# Patient Record
Sex: FEMALE | Race: WHITE | NOT HISPANIC OR LATINO | Employment: OTHER | URBAN - METROPOLITAN AREA
[De-identification: names, ages, dates, MRNs, and addresses within clinical notes are randomized per-mention and may not be internally consistent; named-entity substitution may affect disease eponyms.]

---

## 2017-03-21 ENCOUNTER — CONVERSION ENCOUNTER (OUTPATIENT)
Dept: FAMILY MEDICINE CLINIC | Facility: CLINIC | Age: 67
End: 2017-03-21

## 2017-03-21 LAB
ALBUMIN SERPL-MCNC: 4.6 G/DL (ref 3.6–5.1)
ALBUMIN/GLOB SERPL: ABNORMAL {RATIO} (ref 1–2.5)
ALP SERPL-CCNC: 83 UNITS/L (ref 33–130)
ALT SERPL-CCNC: 17 UNITS/L (ref 6–29)
AST SERPL-CCNC: 14 UNITS/L (ref 10–35)
BASOPHILS # BLD AUTO: ABNORMAL 10*3/MM3 (ref 0–200)
BASOPHILS NFR BLD AUTO: 0.4 %
BILIRUB SERPL-MCNC: 0.4 MG/DL (ref 0.2–1.2)
BUN SERPL-MCNC: 13 MG/DL (ref 7–25)
BUN/CREAT SERPL: ABNORMAL (ref 6–22)
CALCIUM SERPL-MCNC: 10.1 MG/DL (ref 8.6–10.4)
CHLORIDE SERPL-SCNC: 102 MMOL/L (ref 98–110)
CHOLEST SERPL-MCNC: 212 MG/DL (ref 125–200)
CHOLEST/HDLC SERPL: ABNORMAL {RATIO}
CO2 CONTENT VENOUS: 30 MMOL/L (ref 20–31)
CONV NEUTROPHILS/100 LEUKOCYTES IN BODY FLUID BY MANUAL COUNT: 66.7 %
CONV TOTAL PROTEIN: 7.1 G/DL (ref 6.1–8.1)
CREAT UR-MCNC: 0.86 MG/DL (ref 0.5–0.99)
EOSINOPHIL # BLD AUTO: 1.3 %
EOSINOPHIL # BLD AUTO: ABNORMAL 10*3/MM3 (ref 15–500)
ERYTHROCYTE [DISTWIDTH] IN BLOOD BY AUTOMATED COUNT: 14.3 % (ref 11–15)
GLOBULIN UR ELPH-MCNC: ABNORMAL G/DL (ref 1.9–3.7)
GLUCOSE SERPL-MCNC: 127 MG/DL (ref 65–99)
HCT VFR BLD AUTO: 39.9 % (ref 35–45)
HDLC SERPL-MCNC: 73 MG/DL
HGB BLD-MCNC: 13.3 G/DL (ref 11.7–15.5)
LDLC SERPL CALC-MCNC: ABNORMAL MG/DL
LYMPHOCYTES # BLD AUTO: ABNORMAL 10*3/MM3 (ref 850–3900)
LYMPHOCYTES NFR BLD AUTO: 27.8 %
MCH RBC QN AUTO: 27.2 PG (ref 27–33)
MCHC RBC AUTO-ENTMCNC: ABNORMAL % (ref 32–36)
MCV RBC AUTO: 81.6 FL (ref 80–100)
MONOCYTES # BLD AUTO: ABNORMAL 10*3/MICROLITER (ref 200–950)
MONOCYTES NFR BLD AUTO: 3.8 %
NEUTROPHILS # BLD AUTO: ABNORMAL 10*3/MM3 (ref 1500–7800)
PLATELET # BLD AUTO: ABNORMAL 10*3/MM3 (ref 140–400)
PMV BLD AUTO: 10.8 FL (ref 7.5–12.5)
POTASSIUM SERPL-SCNC: 3.5 MMOL/L (ref 3.5–5.3)
RBC # BLD AUTO: ABNORMAL 10*6/MM3 (ref 3.8–5.1)
SODIUM SERPL-SCNC: 141 MMOL/L (ref 135–146)
TRIGL SERPL-MCNC: 193 MG/DL
TSH SERPL-ACNC: 1.34 MICROINTL UNITS/ML (ref 0.4–4.5)
WBC # BLD AUTO: ABNORMAL K/UL (ref 3.8–10.8)

## 2018-04-05 ENCOUNTER — HOSPITAL ENCOUNTER (OUTPATIENT)
Dept: FAMILY MEDICINE CLINIC | Facility: CLINIC | Age: 68
Setting detail: SPECIMEN
Discharge: HOME | End: 2018-04-05
Attending: INTERNAL MEDICINE | Admitting: INTERNAL MEDICINE

## 2018-04-05 LAB
ALBUMIN SERPL-MCNC: 4.3 G/DL (ref 3.5–4.8)
ALBUMIN/GLOB SERPL: 1.7 {RATIO} (ref 1–1.7)
ALP SERPL-CCNC: 73 IU/L (ref 32–91)
ALT SERPL-CCNC: 21 IU/L (ref 14–54)
ANION GAP SERPL CALC-SCNC: 11.2 MMOL/L (ref 10–20)
AST SERPL-CCNC: 22 IU/L (ref 15–41)
BASOPHILS # BLD AUTO: 0 10*3/UL (ref 0–0.2)
BASOPHILS NFR BLD AUTO: 1 % (ref 0–2)
BILIRUB SERPL-MCNC: 0.7 MG/DL (ref 0.3–1.2)
BUN SERPL-MCNC: 11 MG/DL (ref 8–20)
BUN/CREAT SERPL: 13.8 (ref 5.4–26.2)
CALCIUM SERPL-MCNC: 9.6 MG/DL (ref 8.9–10.3)
CHLORIDE SERPL-SCNC: 100 MMOL/L (ref 101–111)
CHOLEST SERPL-MCNC: 231 MG/DL
CHOLEST/HDLC SERPL: 2.9 {RATIO}
CONV CO2: 29 MMOL/L (ref 22–32)
CONV LDL CHOLESTEROL DIRECT: 129 MG/DL (ref 0–100)
CONV TOTAL PROTEIN: 6.9 G/DL (ref 6.1–7.9)
CREAT UR-MCNC: 0.8 MG/DL (ref 0.4–1)
DIFFERENTIAL METHOD BLD: (no result)
EOSINOPHIL # BLD AUTO: 0.2 10*3/UL (ref 0–0.3)
EOSINOPHIL # BLD AUTO: 4 % (ref 0–3)
ERYTHROCYTE [DISTWIDTH] IN BLOOD BY AUTOMATED COUNT: 14.2 % (ref 11.5–14.5)
GLOBULIN UR ELPH-MCNC: 2.6 G/DL (ref 2.5–3.8)
GLUCOSE SERPL-MCNC: 100 MG/DL (ref 65–99)
HCT VFR BLD AUTO: 41.5 % (ref 35–49)
HDLC SERPL-MCNC: 79 MG/DL
HGB BLD-MCNC: 13.6 G/DL (ref 12–15)
LDLC/HDLC SERPL: 1.6 {RATIO}
LIPID INTERPRETATION: ABNORMAL
LYMPHOCYTES # BLD AUTO: 0.8 10*3/UL (ref 0.8–4.8)
LYMPHOCYTES NFR BLD AUTO: 22 % (ref 18–42)
MCH RBC QN AUTO: 26.9 PG (ref 26–32)
MCHC RBC AUTO-ENTMCNC: 32.9 G/DL (ref 32–36)
MCV RBC AUTO: 81.7 FL (ref 80–94)
MONOCYTES # BLD AUTO: 0.3 10*3/UL (ref 0.1–1.3)
MONOCYTES NFR BLD AUTO: 7 % (ref 2–11)
NEUTROPHILS # BLD AUTO: 2.5 10*3/UL (ref 2.3–8.6)
NEUTROPHILS NFR BLD AUTO: 66 % (ref 50–75)
NRBC BLD AUTO-RTO: 0 /100{WBCS}
NRBC/RBC NFR BLD MANUAL: 0 10*3/UL
PLATELET # BLD AUTO: 236 10*3/UL (ref 150–450)
PMV BLD AUTO: 9.5 FL (ref 7.4–10.4)
POTASSIUM SERPL-SCNC: 3.2 MMOL/L (ref 3.6–5.1)
RBC # BLD AUTO: 5.08 10*6/UL (ref 4–5.4)
SODIUM SERPL-SCNC: 137 MMOL/L (ref 136–144)
TRIGL SERPL-MCNC: 79 MG/DL
VLDLC SERPL CALC-MCNC: 23.3 MG/DL
WBC # BLD AUTO: 3.8 10*3/UL (ref 4.5–11.5)

## 2018-05-01 ENCOUNTER — HOSPITAL ENCOUNTER (OUTPATIENT)
Dept: FAMILY MEDICINE CLINIC | Facility: CLINIC | Age: 68
Setting detail: SPECIMEN
Discharge: HOME | End: 2018-05-01
Attending: INTERNAL MEDICINE | Admitting: INTERNAL MEDICINE

## 2018-05-01 LAB — POTASSIUM SERPL-SCNC: 3.3 MMOL/L (ref 3.6–5.1)

## 2018-05-17 ENCOUNTER — HOSPITAL ENCOUNTER (OUTPATIENT)
Dept: FAMILY MEDICINE CLINIC | Facility: CLINIC | Age: 68
Setting detail: SPECIMEN
Discharge: HOME | End: 2018-05-17
Attending: INTERNAL MEDICINE | Admitting: INTERNAL MEDICINE

## 2018-05-17 LAB
ANION GAP SERPL CALC-SCNC: 11.6 MMOL/L (ref 10–20)
BUN SERPL-MCNC: 7 MG/DL (ref 8–20)
BUN/CREAT SERPL: 7.8 (ref 5.4–26.2)
CALCIUM SERPL-MCNC: 9.5 MG/DL (ref 8.9–10.3)
CHLORIDE SERPL-SCNC: 101 MMOL/L (ref 101–111)
CONV CO2: 28 MMOL/L (ref 22–32)
CREAT UR-MCNC: 0.9 MG/DL (ref 0.4–1)
GLUCOSE SERPL-MCNC: 128 MG/DL (ref 65–99)
POTASSIUM SERPL-SCNC: 3.6 MMOL/L (ref 3.6–5.1)
SODIUM SERPL-SCNC: 137 MMOL/L (ref 136–144)

## 2018-06-19 ENCOUNTER — HOSPITAL ENCOUNTER (OUTPATIENT)
Dept: FAMILY MEDICINE CLINIC | Facility: CLINIC | Age: 68
Setting detail: SPECIMEN
Discharge: HOME | End: 2018-06-19
Attending: INTERNAL MEDICINE | Admitting: INTERNAL MEDICINE

## 2018-06-19 LAB
ANION GAP SERPL CALC-SCNC: 10.9 MMOL/L (ref 10–20)
BASOPHILS # BLD AUTO: 0 10*3/UL (ref 0–0.2)
BASOPHILS NFR BLD AUTO: 1 % (ref 0–2)
BUN SERPL-MCNC: 13 MG/DL (ref 8–20)
BUN/CREAT SERPL: 16.3 (ref 5.4–26.2)
CALCIUM SERPL-MCNC: 9.7 MG/DL (ref 8.9–10.3)
CHLORIDE SERPL-SCNC: 104 MMOL/L (ref 101–111)
CONV CO2: 27 MMOL/L (ref 22–32)
CREAT UR-MCNC: 0.8 MG/DL (ref 0.4–1)
DIFFERENTIAL METHOD BLD: (no result)
EOSINOPHIL # BLD AUTO: 0.2 10*3/UL (ref 0–0.3)
EOSINOPHIL # BLD AUTO: 4 % (ref 0–3)
ERYTHROCYTE [DISTWIDTH] IN BLOOD BY AUTOMATED COUNT: 14.4 % (ref 11.5–14.5)
GLUCOSE SERPL-MCNC: 92 MG/DL (ref 65–99)
HCT VFR BLD AUTO: 39.3 % (ref 35–49)
HGB BLD-MCNC: 12.8 G/DL (ref 12–15)
LYMPHOCYTES # BLD AUTO: 0.8 10*3/UL (ref 0.8–4.8)
LYMPHOCYTES NFR BLD AUTO: 20 % (ref 18–42)
MCH RBC QN AUTO: 26.6 PG (ref 26–32)
MCHC RBC AUTO-ENTMCNC: 32.7 G/DL (ref 32–36)
MCV RBC AUTO: 81.5 FL (ref 80–94)
MONOCYTES # BLD AUTO: 0.3 10*3/UL (ref 0.1–1.3)
MONOCYTES NFR BLD AUTO: 8 % (ref 2–11)
NEUTROPHILS # BLD AUTO: 2.8 10*3/UL (ref 2.3–8.6)
NEUTROPHILS NFR BLD AUTO: 67 % (ref 50–75)
NRBC BLD AUTO-RTO: 0 /100{WBCS}
NRBC/RBC NFR BLD MANUAL: 0 10*3/UL
PLATELET # BLD AUTO: 211 10*3/UL (ref 150–450)
PMV BLD AUTO: 10.1 FL (ref 7.4–10.4)
POTASSIUM SERPL-SCNC: 3.9 MMOL/L (ref 3.6–5.1)
RBC # BLD AUTO: 4.83 10*6/UL (ref 4–5.4)
SODIUM SERPL-SCNC: 138 MMOL/L (ref 136–144)
WBC # BLD AUTO: 4.1 10*3/UL (ref 4.5–11.5)

## 2018-11-14 ENCOUNTER — HOSPITAL ENCOUNTER (OUTPATIENT)
Dept: PREADMISSION TESTING | Facility: HOSPITAL | Age: 68
Discharge: HOME | End: 2018-11-14
Attending: PODIATRIST | Admitting: PODIATRIST

## 2018-11-14 LAB
ANION GAP SERPL CALC-SCNC: 14.7 MMOL/L (ref 10–20)
BASOPHILS # BLD AUTO: 0 10*3/UL (ref 0–0.2)
BASOPHILS NFR BLD AUTO: 1 % (ref 0–2)
BUN SERPL-MCNC: 9 MG/DL (ref 8–20)
BUN/CREAT SERPL: 11.3 (ref 5.4–26.2)
CALCIUM SERPL-MCNC: 9.6 MG/DL (ref 8.9–10.3)
CHLORIDE SERPL-SCNC: 100 MMOL/L (ref 101–111)
CONV CO2: 27 MMOL/L (ref 22–32)
CREAT UR-MCNC: 0.8 MG/DL (ref 0.4–1)
DIFFERENTIAL METHOD BLD: (no result)
EOSINOPHIL # BLD AUTO: 0.1 10*3/UL (ref 0–0.3)
EOSINOPHIL # BLD AUTO: 2 % (ref 0–3)
ERYTHROCYTE [DISTWIDTH] IN BLOOD BY AUTOMATED COUNT: 14.1 % (ref 11.5–14.5)
GLUCOSE SERPL-MCNC: 87 MG/DL (ref 65–99)
HCT VFR BLD AUTO: 39.3 % (ref 35–49)
HGB BLD-MCNC: 13.1 G/DL (ref 12–15)
LYMPHOCYTES # BLD AUTO: 0.7 10*3/UL (ref 0.8–4.8)
LYMPHOCYTES NFR BLD AUTO: 17 % (ref 18–42)
MCH RBC QN AUTO: 27.1 PG (ref 26–32)
MCHC RBC AUTO-ENTMCNC: 33.2 G/DL (ref 32–36)
MCV RBC AUTO: 81.4 FL (ref 80–94)
MONOCYTES # BLD AUTO: 0.3 10*3/UL (ref 0.1–1.3)
MONOCYTES NFR BLD AUTO: 7 % (ref 2–11)
NEUTROPHILS # BLD AUTO: 3.3 10*3/UL (ref 2.3–8.6)
NEUTROPHILS NFR BLD AUTO: 73 % (ref 50–75)
NRBC BLD AUTO-RTO: 0 /100{WBCS}
NRBC/RBC NFR BLD MANUAL: 0 10*3/UL
PLATELET # BLD AUTO: 220 10*3/UL (ref 150–450)
PMV BLD AUTO: 9.5 FL (ref 7.4–10.4)
POTASSIUM SERPL-SCNC: 3.7 MMOL/L (ref 3.6–5.1)
RBC # BLD AUTO: 4.83 10*6/UL (ref 4–5.4)
SODIUM SERPL-SCNC: 138 MMOL/L (ref 136–144)
WBC # BLD AUTO: 4.5 10*3/UL (ref 4.5–11.5)

## 2018-12-07 ENCOUNTER — HOSPITAL ENCOUNTER (OUTPATIENT)
Dept: OTHER | Facility: HOSPITAL | Age: 68
Setting detail: SPECIMEN
Discharge: HOME | End: 2018-12-07
Attending: PODIATRIST | Admitting: PODIATRIST

## 2019-01-07 ENCOUNTER — HOSPITAL ENCOUNTER (OUTPATIENT)
Dept: FAMILY MEDICINE CLINIC | Facility: CLINIC | Age: 69
Setting detail: SPECIMEN
Discharge: HOME | End: 2019-01-07
Attending: INTERNAL MEDICINE | Admitting: INTERNAL MEDICINE

## 2019-01-07 LAB
ALBUMIN SERPL-MCNC: 4 G/DL (ref 3.5–4.8)
ALBUMIN/GLOB SERPL: 1.6 {RATIO} (ref 1–1.7)
ALP SERPL-CCNC: 65 IU/L (ref 32–91)
ALT SERPL-CCNC: 19 IU/L (ref 14–54)
ANION GAP SERPL CALC-SCNC: 11.5 MMOL/L (ref 10–20)
AST SERPL-CCNC: 20 IU/L (ref 15–41)
BILIRUB SERPL-MCNC: 0.4 MG/DL (ref 0.3–1.2)
BUN SERPL-MCNC: 10 MG/DL (ref 8–20)
BUN/CREAT SERPL: 12.5 (ref 5.4–26.2)
CALCIUM SERPL-MCNC: 9.3 MG/DL (ref 8.9–10.3)
CHLORIDE SERPL-SCNC: 100 MMOL/L (ref 101–111)
CHOLEST SERPL-MCNC: 211 MG/DL
CHOLEST/HDLC SERPL: 2.9 {RATIO}
CONV CO2: 28 MMOL/L (ref 22–32)
CONV LDL CHOLESTEROL DIRECT: 128 MG/DL (ref 0–100)
CONV TOTAL PROTEIN: 6.5 G/DL (ref 6.1–7.9)
CREAT UR-MCNC: 0.8 MG/DL (ref 0.4–1)
GLOBULIN UR ELPH-MCNC: 2.5 G/DL (ref 2.5–3.8)
GLUCOSE SERPL-MCNC: 106 MG/DL (ref 65–99)
HDLC SERPL-MCNC: 72 MG/DL
LDLC/HDLC SERPL: 1.8 {RATIO}
LIPID INTERPRETATION: ABNORMAL
POTASSIUM SERPL-SCNC: 3.5 MMOL/L (ref 3.6–5.1)
SODIUM SERPL-SCNC: 136 MMOL/L (ref 136–144)
TRIGL SERPL-MCNC: 85 MG/DL
VLDLC SERPL CALC-MCNC: 10.7 MG/DL

## 2019-05-23 ENCOUNTER — HOSPITAL ENCOUNTER (OUTPATIENT)
Dept: FAMILY MEDICINE CLINIC | Facility: CLINIC | Age: 69
Setting detail: SPECIMEN
Discharge: HOME | End: 2019-05-23
Attending: INTERNAL MEDICINE | Admitting: INTERNAL MEDICINE

## 2019-05-23 ENCOUNTER — CONVERSION ENCOUNTER (OUTPATIENT)
Dept: FAMILY MEDICINE CLINIC | Facility: CLINIC | Age: 69
End: 2019-05-23

## 2019-05-23 LAB
ALBUMIN SERPL-MCNC: 4.4 G/DL (ref 3.5–4.8)
ALBUMIN/GLOB SERPL: 1.6 {RATIO} (ref 1–1.7)
ALP SERPL-CCNC: 71 IU/L (ref 32–91)
ALT SERPL-CCNC: 27 IU/L (ref 14–54)
ANION GAP SERPL CALC-SCNC: 20.6 MMOL/L (ref 10–20)
AST SERPL-CCNC: 24 IU/L (ref 15–41)
BASOPHILS # BLD AUTO: 0.1 10*3/UL (ref 0–0.2)
BASOPHILS NFR BLD AUTO: 1 % (ref 0–2)
BILIRUB SERPL-MCNC: 0.4 MG/DL (ref 0.3–1.2)
BUN SERPL-MCNC: 10 MG/DL (ref 8–20)
BUN/CREAT SERPL: 12.5 (ref 5.4–26.2)
CALCIUM SERPL-MCNC: 9.8 MG/DL (ref 8.9–10.3)
CHLORIDE SERPL-SCNC: 100 MMOL/L (ref 101–111)
CHOLEST SERPL-MCNC: 222 MG/DL
CHOLEST/HDLC SERPL: 3 {RATIO}
CONV CO2: 22 MMOL/L (ref 22–32)
CONV LDL CHOLESTEROL DIRECT: 129 MG/DL (ref 0–100)
CONV TOTAL PROTEIN: 7.1 G/DL (ref 6.1–7.9)
CREAT UR-MCNC: 0.8 MG/DL (ref 0.4–1)
DIFFERENTIAL METHOD BLD: (no result)
EOSINOPHIL # BLD AUTO: 0.2 10*3/UL (ref 0–0.3)
EOSINOPHIL # BLD AUTO: 3 % (ref 0–3)
ERYTHROCYTE [DISTWIDTH] IN BLOOD BY AUTOMATED COUNT: 14.2 % (ref 11.5–14.5)
GLOBULIN UR ELPH-MCNC: 2.7 G/DL (ref 2.5–3.8)
GLUCOSE SERPL-MCNC: 98 MG/DL (ref 65–99)
HCT VFR BLD AUTO: 39.5 % (ref 35–49)
HDLC SERPL-MCNC: 74 MG/DL
HGB BLD-MCNC: 13.2 G/DL (ref 12–15)
LDLC/HDLC SERPL: 1.8 {RATIO}
LIPID INTERPRETATION: ABNORMAL
LYMPHOCYTES # BLD AUTO: 1.2 10*3/UL (ref 0.8–4.8)
LYMPHOCYTES NFR BLD AUTO: 23 % (ref 18–42)
MCH RBC QN AUTO: 27.6 PG (ref 26–32)
MCHC RBC AUTO-ENTMCNC: 33.4 G/DL (ref 32–36)
MCV RBC AUTO: 82.7 FL (ref 80–94)
MONOCYTES # BLD AUTO: 0.3 10*3/UL (ref 0.1–1.3)
MONOCYTES NFR BLD AUTO: 6 % (ref 2–11)
NEUTROPHILS # BLD AUTO: 3.4 10*3/UL (ref 2.3–8.6)
NEUTROPHILS NFR BLD AUTO: 67 % (ref 50–75)
NRBC BLD AUTO-RTO: 0 /100{WBCS}
NRBC/RBC NFR BLD MANUAL: 0 10*3/UL
PLATELET # BLD AUTO: 249 10*3/UL (ref 150–450)
PMV BLD AUTO: 10.1 FL (ref 7.4–10.4)
POTASSIUM SERPL-SCNC: 3.6 MMOL/L (ref 3.6–5.1)
RBC # BLD AUTO: 4.77 10*6/UL (ref 4–5.4)
SODIUM SERPL-SCNC: 139 MMOL/L (ref 136–144)
TRIGL SERPL-MCNC: 125 MG/DL
TSH SERPL-ACNC: 1.79 UIU/ML (ref 0.34–5.6)
VIT B12 SERPL-MCNC: 799 PG/ML (ref 180–914)
VLDLC SERPL CALC-MCNC: 18.9 MG/DL
WBC # BLD AUTO: 5.2 10*3/UL (ref 4.5–11.5)

## 2019-05-24 LAB
25(OH)D3 SERPL-MCNC: 37 NG/ML (ref 30–100)
HBA1C MFR BLD: 5.6 % (ref 0–5.6)

## 2019-06-04 VITALS
HEART RATE: 85 BPM | RESPIRATION RATE: 20 BRPM | SYSTOLIC BLOOD PRESSURE: 144 MMHG | OXYGEN SATURATION: 98 % | DIASTOLIC BLOOD PRESSURE: 92 MMHG | BODY MASS INDEX: 41.66 KG/M2 | HEIGHT: 60 IN | WEIGHT: 212.2 LBS

## 2019-06-06 NOTE — PROGRESS NOTES
Vital Signs:    Patient Profile:    68 Years Old Female  Height:     60 inches (152.40 cm)  Weight:     212.2 pounds  BMI:        41.44     O2 Sat:     98 %  Temp:       98.0 degrees F oral  Pulse rate: 85 / minute  Resp:       20 per minute  BP Sittin / 92  (right arm)    Cuff size:  large      Problems: Active problems were reviewed with the patient during this visit.  Medications: Medications were reviewed with the patient during this visit.  Allergies: Allergies were reviewed with the patient during this visit.        Vitals Entered By: Sandi Johnston CMA (May 23, 2019 2:39 PM)      Referring Provider:  Tres Quigley MD  Primary Provider:  Sherin Rojo    CC:  Medicare wellness.    History of Present Illness:  see scanned packet         The patient comes in today for a Annual exam.  She denies chest pain, SOA, dizziness, bowel problems and bladder problems.  Since the last visit patient notes no new problems or concerns.  When asked about their health maintenence items, they report   their Colonoscopy is UTD, Mammogram is not UTD and DEXA is not UTD.  The ROS is negative for chest pain, SOA, fatique, dizzyness, headaches, fever, nausea, vomiting, diarrhea, chills, abdominal pain, bowel problems, bladder problems, urinary symptoms,   joint problems, muscle problems, neurologic problems, skin problems, weight gain, weight loss, depression and anxiety.        Past Medical History:     Reviewed history from 2019 and no changes required:        anxiety/depression        post menopausal x 9 years        arthritis        chronic back pain        DJD        asthma        HTN        lymphocytic colitis        low back pain        hpld                mammogram - last , no h/o abnormal        dexa - 2015, normal        pap - , no h/o abnormal        colonoscopy - last 3 years ago        AVNRT RF ABLATION--            Past Surgical History:     Reviewed history from 2016 and no changes  required:        right toe/foot surgery        right knee surgery, 2/2 torn meniscus        tubal ligation        SVT ablation , 8/2016, [facility]    Family History Summary:      Reviewed history Last on 01/14/2019 and no changes required:05/31/2019  Sister - Has Family History of Anxiety - Entered On: 12/8/2015  Sister - Has Family History of Arthritis - Entered On: 12/8/2015  Mother - Has Family History of Lung/Respiratory Disease - emphysema - Entered On: 12/8/2015  Brother - Has Family History of Anxiety - Entered On: 12/8/2015  Brother - Has Family History of Arthritis - Entered On: 12/8/2015  Mother - Has Family History of Heart Disease - MI age 79 - CABG - Entered On: 12/8/2015  Father - Has Family History of Kidney Disease - renal failure - Entered On: 12/8/2015  Mother - Has Family History of Diabetes - Entered On: 12/8/2015  Mother - Has Family History of Arthritis - Entered On: 12/8/2015    General Comments - FH:  Children: 3 - healthy  Denies FH breast, colon, or ovarian cancer    Social History:     Reviewed history from 12/07/2015 and no changes required:        Marital status:         Occupation: retired        Living arrangements: lives with         Risk Factors:     Smoked Tobacco Use:  Never smoker  Smokeless Tobacco Use:  Never  Drug use:  no  HIV high-risk behavior:  no  Caffeine use:  2 drinks per day  Alcohol use:  yes     Drinks per day:  <1     Has patient --        Felt need to cut down:  no        Been annoyed by complaints:  no        Felt guilty about drinking:  no        Needed eye opener in the morning:  no     Counseled to quit/cut down alcohol use:  no  Exercise:  no  Seatbelt use:  100 %  Sun Exposure:  occasionally    Family History Risk Factors:     Family History of MI in females < 65 years old:  no     Family History of MI in males < 55 years old:  no    Previous Tobacco Use: Signed On - 01/14/2019  Smoked Tobacco Use:  Never smoker  Smokeless Tobacco Use:  Never  Drug  use:  no  HIV high-risk behavior:  no  Caffeine use:  2 drinks per day    Previous Alcohol Use: Signed On - 01/14/2019  Alcohol use:  yes     Drinks per day:  <1     Has patient --        Felt need to cut down:  no        Been annoyed by complaints:  no        Felt guilty about drinking:  no        Needed eye opener in the morning:  no     Counseled to quit/cut down alcohol use:  no  Exercise:  no  Seatbelt use:  100 %  Sun Exposure:  occasionally    Family History Risk Factors:     Family History of MI in females < 65 years old:  no     Family History of MI in males < 55 years old:  no    Colonoscopy History:     Date of Last Colonoscopy:  01/01/2012    Mammogram History:     Date of Last Mammogram:  12/28/2015    PAP Smear History:     Date of Last PAP Smear:  01/01/2014        Physical Exam   Height:  60  Weight:  206.6  BP:  144/92 mm HG    Medication List:  POTASSIUM CL 10MEQ ER TABLETS (POTASSIUM CHLORIDE) TAKE 1 TABLET BY MOUTH THREE TIMES DAILY  PROAIR  (90 BASE) MCG/ACT INHALATION AEROSOL SOLUTION (ALBUTEROL SULFATE) 2 puffs 4 times daily as needed  B COMPLETE ORAL TABLET (B COMPLEX-BIOTIN-FA)   VITAMIN D 2000 UNIT ORAL CAPSULE (CHOLECALCIFEROL)   MULTIVITAMINS ORAL CAPSULE (MULTIPLE VITAMIN)   VENTOLIN  (90 BASE) MCG/ACT INHALATION AEROSOL SOLUTION (ALBUTEROL SULFATE) 2 puffs q4-6 hours prn  PULMICORT FLEXHALER 180 MCG/ACT INHALATION AEROSOL POWDER BREATH ACTIVATED (BUDESONIDE) inhale 2 puffs twice daily  OMEPRAZOLE 20 MG ORAL TABLET DELAYED RELEASE (OMEPRAZOLE) 1poqd  VALSARTAN/HCTZ 160MG/25MG TABLETS (VALSARTAN-HYDROCHLOROTHIAZIDE) TAKE 1 TABLET BY MOUTH DAILY      Surgical History   right toe/foot surgery  right knee surgery, 2/2 torn meniscus  tubal ligation  SVT ablation , 8/2016, [facility],    Risk Factors  Tobacco Use: Never smoker  Exercise: no  Type of Exercise: started 6/2016 with water aerobics, pilates, aminah  Illicit Drug use: no      Orientation   What is the:   year 1  season 1  date  1  day 1  month 1  Where are we:   [facility] 1  [facility] 1  [facility] 1  facility 1  floor 1    Registration   Three Objects:  object 1 1  object 2 1  object 3 1  Number of trials 1    Attention and Calculation   Count backwards from 100  OR  spell world backwards. D-L-R-O-W   93 1  86 1  79 1  72 1  65 1    Recall   Ask for the names of the three objects learned above. 1 Point for each.   object 1 1  object 2 1  object 3 1    Language   Name a pen 1  Name a watch 1  Repeat: No ifs, and, or buts 1  Take paper in your non-dominant hand 1  fold it in half 1  place on the floor 1  Read to self and then Close your eyes 1  Write a sentence (subject, verb, and makes sense) 1  Copy design (5 sided geometric figure (2 points must intersect) 1  Alert 1  Total MMSE Score: 30    Level of Function   Falls Information:   Screening for Future Fall Risk    Physical Examination   General Appearance   In no acute distress.  Alert & oriented.  Behavior and affect appropriate to situation  HEENT   PERRLA, EOMI, TM's normal.  Pharynx clear  Cardiovascular   Regular rate and rhythm  Lungs   Clear to auscultation        Impression & Recommendations:    Problem # 1:  PREVENTIVE HEALTH CARE (ICD-V70.0) (JNE42-H31.00)  Assessment: Unchanged  due for labs  due for mammogram, dexa  due for pna vaccine  overall mood is doing well  neg for depression/dementia  discussed decreasing carbs/saturated fats bc of hpld  continue exercise with silver sneakers (and laughter yoga) to help with mood as well as hpld  Orders:  Medicare-Subsequent Year (AWV) ()      Other Orders:  Medicare Pneumonia Injection Administration ()  [facility] CBC W/DIFF; PATH REVIEW IF INDICATED (CBC)  [facility] LIPID PANEL (LIPID)  [facility] VITAMIN B12 (B12)  [facility] COMPREHENSIVE METABOLIC PANEL (CMP) (MPC)  [facility] THYROID STIMULATING HORMONE (TSH) (TSH)  [facility] HEMOGLOBIN A1c (A1DCA)  Pnuemovax Vaccine (CPT-00952)  [facility] VITAMIN D TOTAL (VITD)  Medicare-Subsequent Year (AWV) ()      Patient  Instructions:  1)  During this visit for their annual exam, we reviewed their personal history, social history and family history.  We went over their medications and all the recommended health maintenence items for their age group. They were given the opportunity to ask   questions and discuss other concerns.      Vaccines Administered/Entered:  Vaccination Group: Pneumococcal PPSV23  Series: 1  Vaccination: Pneumovax 23 Injection Injectable 25 MCG/0.5ML  Administered Date: 5/23/2019 5:08 PM  [facility] Eligibility: Medicare  Dose/Route/Site : 0.5 mL / IM / Right Arm  Mfr/Lot#/Exp.Date: [employer] / [id number] / 6/8/2020  NDC/CVX: [id number] / 33  VIS Date : 10/6/2009  Administered by : Yumiko Johnston CMA   Comments :               Medication Administration    Orders Added:  1)  Medicare Pneumonia Injection Administration []  2)  [facility] CBC W/DIFF; PATH REVIEW IF INDICATED [CBC]  3)  [facility] LIPID PANEL [LIPID]  4)  [facility] VITAMIN B12 [B12]  5)  [facility] COMPREHENSIVE METABOLIC PANEL (CMP) [MPC]  6)  [facility] THYROID STIMULATING HORMONE (TSH) [TSH]  7)  [facility] HEMOGLOBIN A1c [A1DCA]  8)  Pnuemovax Vaccine [CPT-34240]  9)  [facility] VITAMIN D TOTAL [VITD]  10)  Medicare-Subsequent Year (AWV) []                PHQ-9 Survey Results             Electronically signed by Sherin Rojo on 05/31/2019 at 6:16 PM  ________________________________________________________________________       Disclaimer: Converted Note message may not contain all data elements that existed in the legacy source system. Please see [employer] for the original note details.

## 2019-08-29 RX ORDER — VALSARTAN AND HYDROCHLOROTHIAZIDE 160; 25 MG/1; MG/1
TABLET ORAL
Qty: 90 TABLET | Refills: 0 | Status: SHIPPED | OUTPATIENT
Start: 2019-08-29 | End: 2019-11-22 | Stop reason: SDUPTHER

## 2019-11-22 ENCOUNTER — OFFICE VISIT (OUTPATIENT)
Dept: FAMILY MEDICINE CLINIC | Facility: CLINIC | Age: 69
End: 2019-11-22

## 2019-11-22 ENCOUNTER — LAB (OUTPATIENT)
Dept: FAMILY MEDICINE CLINIC | Facility: CLINIC | Age: 69
End: 2019-11-22

## 2019-11-22 VITALS
TEMPERATURE: 97.6 F | HEART RATE: 68 BPM | WEIGHT: 215.8 LBS | RESPIRATION RATE: 12 BRPM | BODY MASS INDEX: 42.37 KG/M2 | HEIGHT: 60 IN | DIASTOLIC BLOOD PRESSURE: 78 MMHG | OXYGEN SATURATION: 97 % | SYSTOLIC BLOOD PRESSURE: 124 MMHG

## 2019-11-22 DIAGNOSIS — E78.5 HYPERLIPIDEMIA, UNSPECIFIED HYPERLIPIDEMIA TYPE: ICD-10-CM

## 2019-11-22 DIAGNOSIS — E55.9 VITAMIN D DEFICIENCY: ICD-10-CM

## 2019-11-22 DIAGNOSIS — E78.5 HYPERLIPIDEMIA, UNSPECIFIED HYPERLIPIDEMIA TYPE: Primary | ICD-10-CM

## 2019-11-22 DIAGNOSIS — I10 ESSENTIAL HYPERTENSION: ICD-10-CM

## 2019-11-22 LAB
25(OH)D3 SERPL-MCNC: 32.1 NG/ML (ref 30–100)
ALBUMIN SERPL-MCNC: 4.7 G/DL (ref 3.5–5.2)
ALBUMIN/GLOB SERPL: 1.5 G/DL
ALP SERPL-CCNC: 84 U/L (ref 39–117)
ALT SERPL W P-5'-P-CCNC: 19 U/L (ref 1–33)
ANION GAP SERPL CALCULATED.3IONS-SCNC: 12.4 MMOL/L (ref 5–15)
AST SERPL-CCNC: 17 U/L (ref 1–32)
BILIRUB SERPL-MCNC: 0.4 MG/DL (ref 0.2–1.2)
BUN BLD-MCNC: 13 MG/DL (ref 8–23)
BUN/CREAT SERPL: 17.3 (ref 7–25)
CALCIUM SPEC-SCNC: 10 MG/DL (ref 8.6–10.5)
CHLORIDE SERPL-SCNC: 100 MMOL/L (ref 98–107)
CHOLEST SERPL-MCNC: 208 MG/DL (ref 0–200)
CO2 SERPL-SCNC: 28.6 MMOL/L (ref 22–29)
CREAT BLD-MCNC: 0.75 MG/DL (ref 0.57–1)
GFR SERPL CREATININE-BSD FRML MDRD: 77 ML/MIN/1.73
GLOBULIN UR ELPH-MCNC: 3.2 GM/DL
GLUCOSE BLD-MCNC: 103 MG/DL (ref 65–99)
HDLC SERPL-MCNC: 78 MG/DL (ref 40–60)
LDLC SERPL CALC-MCNC: 106 MG/DL (ref 0–100)
LDLC/HDLC SERPL: 1.36 {RATIO}
POTASSIUM BLD-SCNC: 3.8 MMOL/L (ref 3.5–5.2)
PROT SERPL-MCNC: 7.9 G/DL (ref 6–8.5)
SODIUM BLD-SCNC: 141 MMOL/L (ref 136–145)
TRIGL SERPL-MCNC: 118 MG/DL (ref 0–150)
VLDLC SERPL-MCNC: 23.6 MG/DL (ref 5–40)

## 2019-11-22 PROCEDURE — 82306 VITAMIN D 25 HYDROXY: CPT | Performed by: INTERNAL MEDICINE

## 2019-11-22 PROCEDURE — 99213 OFFICE O/P EST LOW 20 MIN: CPT | Performed by: INTERNAL MEDICINE

## 2019-11-22 PROCEDURE — 80061 LIPID PANEL: CPT | Performed by: INTERNAL MEDICINE

## 2019-11-22 PROCEDURE — 36415 COLL VENOUS BLD VENIPUNCTURE: CPT

## 2019-11-22 PROCEDURE — 80053 COMPREHEN METABOLIC PANEL: CPT | Performed by: INTERNAL MEDICINE

## 2019-11-22 RX ORDER — VALSARTAN AND HYDROCHLOROTHIAZIDE 160; 25 MG/1; MG/1
1 TABLET ORAL DAILY
Qty: 90 TABLET | Refills: 1 | Status: SHIPPED | OUTPATIENT
Start: 2019-11-22 | End: 2020-02-12

## 2019-11-22 RX ORDER — NICOTINE POLACRILEX 4 MG/1
20 GUM, CHEWING ORAL DAILY
COMMUNITY
Start: 2015-12-07 | End: 2023-01-12

## 2019-11-22 RX ORDER — ALBUTEROL SULFATE 90 UG/1
AEROSOL, METERED RESPIRATORY (INHALATION)
COMMUNITY
Start: 2016-01-03 | End: 2020-05-27

## 2019-11-22 RX ORDER — LANOLIN ALCOHOL/MO/W.PET/CERES
1000 CREAM (GRAM) TOPICAL DAILY
COMMUNITY
End: 2020-11-30

## 2019-11-22 RX ORDER — MULTIVIT-MIN/IRON/FOLIC ACID/K 18-600-40
CAPSULE ORAL
COMMUNITY
Start: 2017-03-20 | End: 2020-05-27

## 2019-11-22 RX ORDER — MULTIVITAMIN
1 CAPSULE ORAL DAILY
COMMUNITY
Start: 2017-03-20

## 2019-11-22 RX ORDER — POTASSIUM CHLORIDE 750 MG/1
TABLET, FILM COATED, EXTENDED RELEASE ORAL
Refills: 0 | COMMUNITY
Start: 2019-08-28 | End: 2019-11-26 | Stop reason: SDUPTHER

## 2019-11-26 RX ORDER — POTASSIUM CHLORIDE 750 MG/1
TABLET, FILM COATED, EXTENDED RELEASE ORAL
Qty: 270 TABLET | Refills: 0 | Status: SHIPPED | OUTPATIENT
Start: 2019-11-26 | End: 2020-02-24

## 2019-11-26 RX ORDER — VALSARTAN AND HYDROCHLOROTHIAZIDE 160; 25 MG/1; MG/1
TABLET ORAL
Qty: 90 TABLET | Refills: 0 | Status: SHIPPED | OUTPATIENT
Start: 2019-11-26 | End: 2020-08-24

## 2019-12-06 ENCOUNTER — TELEPHONE (OUTPATIENT)
Dept: FAMILY MEDICINE CLINIC | Facility: CLINIC | Age: 69
End: 2019-12-06

## 2019-12-06 RX ORDER — GUAIFENESIN AND CODEINE PHOSPHATE 100; 10 MG/5ML; MG/5ML
5-10 SOLUTION ORAL 3 TIMES DAILY PRN
Qty: 473 ML | Refills: 0 | Status: SHIPPED | OUTPATIENT
Start: 2019-12-06 | End: 2020-02-12

## 2019-12-06 NOTE — TELEPHONE ENCOUNTER
10:15am Monday?  If she tolerates codeine, I can send in a cough syrup to hopefully help.  Otherwise, would make sure she's taking mucinex over the weekend.

## 2019-12-06 NOTE — TELEPHONE ENCOUNTER
Patient was called and informed. Patient has been schedule. Patient states that she would like the codeine cough syrup sent to [facility].

## 2019-12-06 NOTE — TELEPHONE ENCOUNTER
Patient left  stating that she has had a cough/cold for the last week. Patient states that mucus so far is clear, but she is afraid it will go into bronchitis. Patient is asking if she can be worked in today or Monday.

## 2019-12-09 ENCOUNTER — OFFICE VISIT (OUTPATIENT)
Dept: FAMILY MEDICINE CLINIC | Facility: CLINIC | Age: 69
End: 2019-12-09

## 2019-12-09 VITALS
HEART RATE: 67 BPM | DIASTOLIC BLOOD PRESSURE: 82 MMHG | SYSTOLIC BLOOD PRESSURE: 144 MMHG | WEIGHT: 220.4 LBS | HEIGHT: 60 IN | TEMPERATURE: 97.7 F | OXYGEN SATURATION: 97 % | RESPIRATION RATE: 12 BRPM | BODY MASS INDEX: 43.27 KG/M2

## 2019-12-09 DIAGNOSIS — R05.9 COUGH: Primary | ICD-10-CM

## 2019-12-09 PROCEDURE — 99213 OFFICE O/P EST LOW 20 MIN: CPT | Performed by: INTERNAL MEDICINE

## 2019-12-09 NOTE — PROGRESS NOTES
Subjective   Radha Trujillo is a 69 y.o. female.     Pt has had a cough for about a week now  Started cheratussin ac on Friday  Does help suppress cough and help her sleep  Has also been taking nyquil and mucinex intermittently  Which together seems to help break up chest congestion  But is still there  Still has white sputum  No fever or shortness of breath  Has gotten bronchitis frequently in the past  And this time, picked it up from her niece who came to visit  No sore throat or ear pain  Does have a little chest pain when she coughs  Felt really well yesterday so didn't take anything but a couple of advil  But does have more of a cough and fatigue today       The following portions of the patient's history were reviewed and updated as appropriate: allergies, current medications, past family history, past medical history, past social history, past surgical history and problem list.    Review of Systems   Constitutional: Positive for fatigue. Negative for fever.   HENT: Negative for congestion, ear pain, rhinorrhea and sore throat.    Eyes: Negative for blurred vision and itching.   Respiratory: Positive for cough. Negative for shortness of breath.    Cardiovascular: Negative for chest pain and palpitations.   Gastrointestinal: Negative for abdominal pain, diarrhea and vomiting.   Endocrine: Negative for polydipsia and polyuria.   Genitourinary: Negative for dysuria, frequency, hematuria and urgency.   Musculoskeletal: Negative for joint swelling and myalgias.   Skin: Negative for rash and skin lesions.   Neurological: Negative for dizziness, numbness and headache.   Psychiatric/Behavioral: Negative for depressed mood. The patient is not nervous/anxious.          Current Outpatient Medications:   •  albuterol sulfate HFA (VENTOLIN HFA) 108 (90 Base) MCG/ACT inhaler, VENTOLIN  (90 Base) MCG/ACT AERS, Disp: , Rfl:   •  budesonide (PULMICORT FLEXHALER) 180 MCG/ACT inhaler, PULMICORT FLEXHALER 180 MCG/ACT  "AEPB, Disp: , Rfl:   •  Cholecalciferol (VITAMIN D) 50 MCG (2000 UT) capsule, VITAMIN D 2000 UNIT CAPS, Disp: , Rfl:   •  Glucosamine-Chondroitin (MOVE FREE PO), Take  by mouth., Disp: , Rfl:   •  guaiFENesin-codeine (GUAIFENESIN AC) 100-10 MG/5ML liquid, Take 5-10 mL by mouth 3 (Three) Times a Day As Needed for Cough., Disp: 473 mL, Rfl: 0  •  Multiple Vitamin (MULTIVITAMIN) capsule, MULTIVITAMINS CAPS, Disp: , Rfl:   •  Omeprazole 20 MG tablet delayed-release, 15 mg., Disp: , Rfl:   •  potassium chloride (K-DUR) 10 MEQ CR tablet, TAKE 1 TABLET BY MOUTH THREE TIMES DAILY, Disp: 270 tablet, Rfl: 0  •  valsartan-hydrochlorothiazide (DIOVAN-HCT) 160-25 MG per tablet, Take 1 tablet by mouth Daily., Disp: 90 tablet, Rfl: 1  •  valsartan-hydrochlorothiazide (DIOVAN-HCT) 160-25 MG per tablet, TAKE 1 TABLET BY MOUTH DAILY, Disp: 90 tablet, Rfl: 0  •  vitamin B-12 (CYANOCOBALAMIN) 1000 MCG tablet, Take 1,000 mcg by mouth Daily., Disp: , Rfl:     Objective   /82 (BP Location: Left arm, Patient Position: Sitting, Cuff Size: Adult)   Pulse 67   Temp 97.7 °F (36.5 °C) (Oral)   Resp 12   Ht 152.4 cm (60\")   Wt 100 kg (220 lb 6.4 oz)   SpO2 97%   BMI 43.04 kg/m²   Physical Exam   Constitutional: She is oriented to person, place, and time. She appears well-developed and well-nourished. No distress.   HENT:   Head: Normocephalic and atraumatic.   Right Ear: External ear normal.   Left Ear: External ear normal.   Mouth/Throat: Oropharynx is clear and moist. No oropharyngeal exudate.   Eyes: Conjunctivae and EOM are normal. Right eye exhibits no discharge. Left eye exhibits no discharge. No scleral icterus.   Neck: Normal range of motion. Neck supple. No thyromegaly present.   Cardiovascular: Normal rate, regular rhythm and normal heart sounds. Exam reveals no gallop and no friction rub.   No murmur heard.  Pulmonary/Chest: Effort normal and breath sounds normal. No respiratory distress. She has no wheezes. She has no " rales.   Musculoskeletal: Normal range of motion. She exhibits no edema or deformity.   Lymphadenopathy:     She has no cervical adenopathy.   Neurological: She is alert and oriented to person, place, and time. No cranial nerve deficit.   Skin: Skin is warm and dry. No rash noted. She is not diaphoretic. No erythema.   Psychiatric: She has a normal mood and affect. Her behavior is normal. Thought content normal.   Vitals reviewed.        Assessment/Plan   Problems Addressed this Visit     None      Visit Diagnoses     Cough    -  Primary        Exam with good aeration  Suspect viral URI  Continue supportive care  Discussed to be careful with mucinex and cheratussin since both contain guaifenesin  If not better by Thursday, will send in Rx for steroids + abx         Procedures

## 2019-12-18 RX ORDER — AZITHROMYCIN 250 MG/1
TABLET, FILM COATED ORAL
Qty: 6 TABLET | Refills: 0 | Status: SHIPPED | OUTPATIENT
Start: 2019-12-18 | End: 2020-02-12

## 2019-12-18 RX ORDER — METHYLPREDNISOLONE 4 MG/1
TABLET ORAL
Qty: 21 EACH | Refills: 0 | Status: SHIPPED | OUTPATIENT
Start: 2019-12-18 | End: 2020-02-12

## 2020-02-11 ENCOUNTER — TELEPHONE (OUTPATIENT)
Dept: FAMILY MEDICINE CLINIC | Facility: CLINIC | Age: 70
End: 2020-02-11

## 2020-02-12 ENCOUNTER — OFFICE VISIT (OUTPATIENT)
Dept: FAMILY MEDICINE CLINIC | Facility: CLINIC | Age: 70
End: 2020-02-12

## 2020-02-12 VITALS
RESPIRATION RATE: 12 BRPM | WEIGHT: 221 LBS | HEIGHT: 61 IN | HEART RATE: 70 BPM | TEMPERATURE: 97.8 F | OXYGEN SATURATION: 99 % | SYSTOLIC BLOOD PRESSURE: 142 MMHG | BODY MASS INDEX: 41.72 KG/M2 | DIASTOLIC BLOOD PRESSURE: 82 MMHG

## 2020-02-12 DIAGNOSIS — I10 ESSENTIAL HYPERTENSION: Primary | ICD-10-CM

## 2020-02-12 DIAGNOSIS — R42 VERTIGO: ICD-10-CM

## 2020-02-12 DIAGNOSIS — F39 MOOD DISORDER (HCC): ICD-10-CM

## 2020-02-12 PROCEDURE — 99214 OFFICE O/P EST MOD 30 MIN: CPT | Performed by: INTERNAL MEDICINE

## 2020-02-12 RX ORDER — FAMOTIDINE 20 MG/1
20 TABLET, FILM COATED ORAL
COMMUNITY
End: 2020-02-12

## 2020-02-12 RX ORDER — ASPIRIN 81 MG/1
81 TABLET ORAL DAILY
COMMUNITY
End: 2022-03-03

## 2020-02-12 NOTE — PROGRESS NOTES
Subjective   Radha Trujillo is a 69 y.o. female.     Pt is here for med check htn, vertigo, and mood d/o  Went to [facility] sun, was d'x with vertgo and started meclizine  Not much improvement over the next couple of days  But last night, sister told her to sleep upright in recliner, and head pressure is much better today  Hasn't tried turning head for fear of dizziness  Not nauseous  Spinning/Dizziness will leave after a few min    bp has been better today - was normal at home this morning  Just gets anxious and likely what lead to elevation previously  Was getting 140-150/90 prior to this morning    In general, still feels anxious, though kids are doing ok and  is doing ok  Still going to laughter yoga  Thinks she does want to go back on med  But would like to try an herbal supplement her nephew is currently taking first  Feels better about that than taking rx med       The following portions of the patient's history were reviewed and updated as appropriate: allergies, current medications, past family history, past medical history, past social history, past surgical history and problem list.    Review of Systems   Constitutional: Negative for fatigue and fever.   HENT: Negative for congestion, ear pain, rhinorrhea and sore throat.    Eyes: Negative for blurred vision and itching.   Respiratory: Negative for cough and shortness of breath.    Cardiovascular: Negative for chest pain and palpitations.   Gastrointestinal: Negative for abdominal pain, diarrhea and vomiting.   Endocrine: Negative for polydipsia and polyuria.   Genitourinary: Negative for dysuria, frequency, hematuria and urgency.   Musculoskeletal: Negative for joint swelling and myalgias.   Skin: Negative for rash and skin lesions.   Neurological: Positive for dizziness. Negative for numbness and headache.   Psychiatric/Behavioral: Positive for depressed mood. The patient is nervous/anxious.          Current Outpatient Medications:   •  albuterol  "sulfate HFA (VENTOLIN HFA) 108 (90 Base) MCG/ACT inhaler, VENTOLIN  (90 Base) MCG/ACT AERS, Disp: , Rfl:   •  aspirin 81 MG EC tablet, Take 81 mg by mouth Daily., Disp: , Rfl:   •  budesonide (PULMICORT FLEXHALER) 180 MCG/ACT inhaler, PULMICORT FLEXHALER 180 MCG/ACT AEPB, Disp: , Rfl:   •  Cholecalciferol (VITAMIN D) 50 MCG (2000 UT) capsule, VITAMIN D 2000 UNIT CAPS, Disp: , Rfl:   •  meclizine (ANTIVERT) 25 MG tablet, Take 1 tablet by mouth Every 6 (Six) Hours As Needed for Dizziness., Disp: 30 tablet, Rfl: 0  •  Multiple Vitamin (MULTIVITAMIN) capsule, MULTIVITAMINS CAPS, Disp: , Rfl:   •  Omeprazole 20 MG tablet delayed-release, 15 mg., Disp: , Rfl:   •  potassium chloride (K-DUR) 10 MEQ CR tablet, TAKE 1 TABLET BY MOUTH THREE TIMES DAILY, Disp: 270 tablet, Rfl: 0  •  valsartan-hydrochlorothiazide (DIOVAN-HCT) 160-25 MG per tablet, TAKE 1 TABLET BY MOUTH DAILY, Disp: 90 tablet, Rfl: 0  •  vitamin B-12 (CYANOCOBALAMIN) 1000 MCG tablet, Take 1,000 mcg by mouth Daily., Disp: , Rfl:     Objective   /82 (BP Location: Right arm, Patient Position: Sitting, Cuff Size: Large Adult)   Pulse 70   Temp 97.8 °F (36.6 °C) (Oral)   Resp 12   Ht 154.9 cm (61\")   Wt 100 kg (221 lb)   SpO2 99%   BMI 41.76 kg/m²   Physical Exam   Constitutional: She is oriented to person, place, and time. She appears well-developed and well-nourished. No distress.   HENT:   Head: Normocephalic and atraumatic.   Right Ear: External ear normal.   Left Ear: External ear normal.   Mouth/Throat: Oropharynx is clear and moist. No oropharyngeal exudate.   Eyes: Conjunctivae and EOM are normal. Right eye exhibits no discharge. Left eye exhibits no discharge. No scleral icterus.   Neck: Normal range of motion. Neck supple. No thyromegaly present.   Cardiovascular: Normal rate, regular rhythm and normal heart sounds. Exam reveals no gallop and no friction rub.   No murmur heard.  Pulmonary/Chest: Effort normal and breath sounds normal. " No respiratory distress. She has no wheezes. She has no rales.   Abdominal: Soft. Bowel sounds are normal. She exhibits no distension. There is no tenderness. There is no guarding.   Musculoskeletal: Normal range of motion. She exhibits no edema or deformity.   Lymphadenopathy:     She has no cervical adenopathy.   Neurological: She is alert and oriented to person, place, and time. No cranial nerve deficit.   Skin: Skin is warm and dry. No rash noted. She is not diaphoretic. No erythema.   Psychiatric: She has a normal mood and affect. Her behavior is normal. Thought content normal.   Vitals reviewed.        Assessment/Plan   Problems Addressed this Visit     None      Visit Diagnoses     Essential hypertension    -  Primary    Vertigo        Mood disorder (CMS/HCC)            bp is improving back to normal  Cont to monitor  Vertigo should cont to improve - if last longer than a few more days, refer to PT  Pt will send me name of supplement  Likely ok to try  If not effective, will try zoloft since I suspect pt has some depression as well as anxiety (guilt, overwhlemed, tearful)         Procedures

## 2020-02-17 DIAGNOSIS — R42 VERTIGO: Primary | ICD-10-CM

## 2020-02-17 DIAGNOSIS — H81.10 BENIGN PAROXYSMAL POSITIONAL VERTIGO, UNSPECIFIED LATERALITY: ICD-10-CM

## 2020-02-17 RX ORDER — MECLIZINE HYDROCHLORIDE 25 MG/1
25 TABLET ORAL EVERY 6 HOURS PRN
Qty: 30 TABLET | Refills: 1 | Status: SHIPPED | OUTPATIENT
Start: 2020-02-17 | End: 2020-05-27

## 2020-02-18 ENCOUNTER — TREATMENT (OUTPATIENT)
Dept: PHYSICAL THERAPY | Facility: CLINIC | Age: 70
End: 2020-02-18

## 2020-02-18 DIAGNOSIS — R42 VERTIGO: Primary | ICD-10-CM

## 2020-02-18 PROCEDURE — 97161 PT EVAL LOW COMPLEX 20 MIN: CPT | Performed by: PHYSICAL THERAPIST

## 2020-02-18 PROCEDURE — 95992 CANALITH REPOSITIONING PROC: CPT | Performed by: PHYSICAL THERAPIST

## 2020-02-18 NOTE — PROGRESS NOTES
Physical Therapy Initial Evaluation and Plan of Care    Patient: Radha Trujillo   : 1950  Diagnosis/ICD-10 Code:  Vertigo [R42]  Referring practitioner: Sherin Rojo MD  Date of Initial Visit: 2020  Today's Date: 2020  Patient seen for 1 sessions           Subjective Questionnaire: NDI: 28 Mild handicap      Subjective Evaluation    History of Present Illness  Date of surgery: 2020  Mechanism of injury: 70 y/o female who woke up and noticed spinning sensation with turning in bed; progressively worsened - seen at [facility] [place]: diagnosed w/ BPPV and referred to PT    Worse: turning in bed (L> R); bending over  Better: resting; meclizine.     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    Quality of life: good    Patient Goals  Patient goals for therapy: improved balance  Patient goal: resolve vertigo.           Objective   See Vestibular treatment section for details.  Vestibular Objective  Fall History: none  Use of Assistive Devices: none  Cognition  Orientation Level: Disoriented X4  Subjective Outcome Measures  Dizziness Handicap Inventory: Minimal Perception of having a handicap  Symptom Behavior  Type of Dizziness: Spinning  Frequency of Dizziness: Several Times a Day  Duration of Dizziness: Seconds  Aggravating Factors: Looking up to the ceiling, Lying supine, Turning head quickly, Rolling to right, Rolling to left, Forward bending  Occulomotor Exam Fixation Present  Occular ROM: Normal  Vestibulo-Occular Reflex (VOR)  VOR 1 Head Only: neg  VOR 2 Head and Object: neg        Positional Testing  Vertebrobasilar Artery Screen - Right: Negative  Vertebrobasilar Artery Screen - Left: Negative  [place]-Hallpike Right: Downbeat, right rotatory nystagmus  [place]-Hallpike Right Onset Time : immdediately  [place]-Hallpike Right Duration Time : 30 to 45 sec  [place]-Hallpike Left: No nystagmus  Treatment: R Epley(x 2 (R ear down))        Cervical AROM: min to mod limitation - no pain  MMT: UE's 5/5 B UE's  Sensation: intact to  light touch  Reflexes: 2+ B UE's  Rx: R CRT/Epley maneuver x 2      Assessment & Plan     Assessment  Impairments: impaired balance and safety issue  Other impairment: vestibular impairments: rolling; turning over; bedning down  Assessment details: Exhibits s/s consistent with BPPV: good response to CRT maneuver with lessening of symptoms after second intervention.    Based on the above impairments and the examination, this patient is appropriate for physical therapy and has the potential to benefit from Physical Therapy.   Prognosis: good  Functional Limitations: moving in bed  Goals  Plan Goals: STG's  1. 50% less symptom reproduction with functional activities at home and in community.     LTG's: by d/c  1. 90% less symptom reproduction with functional activities at home and in community.  2. NDI score to be 0 to 5  3. Voice readiness for d/c      Plan  Therapy options: will be seen for skilled physical therapy services  Planned therapy interventions: functional ROM exercises, neuromuscular re-education and home exercise program  Other planned therapy interventions: Can[place]ith repositioning   Frequency: 1x week  Duration in visits: 6  Treatment plan discussed with: patient        History # of Personal Factors and/or Comorbidities: LOW (0)  Examination of Body System(s): # of elements: LOW (1-2)  Clinical Presentation: STABLE   Clinical Decision Making: LOW       Timed:         Manual Therapy:         mins  80734;     Therapeutic Exercise:         mins  90545;     Neuromuscular Mary:        mins  57804;    Therapeutic Activity:          mins  79686;     Gait Training:           mins  74607;     Ultrasound:          mins  24673;    Ionto                                   mins   58710  Self Care                            mins   46657  Aquatic                               mins 45879  CRT                           __20__mins 11245    Un-Timed:  Electrical Stimulation:        mins  40407 ( );  Dry Needling           mins self-pay  Traction          mins 23043  Low Eval     30     Mins  26730  Mod Eval          Mins  79169  High Eval                            Mins  95496  Re-Eval                               mins  29592        Timed Treatment:  20    mins   Total Treatment:     50   mins    PT SIGNATURE: Joseph Stevens, PAN   DATE TREATMENT INITIATED: 2/18/2020    Medicare Initial Certification  Certification Period: 5/18/2020  I certify that the therapy services are furnished while this patient is under my care.  The services outlined above are required by this patient, and will be reviewed every 90 days.     PHYSICIAN:       DATE:     Please sign and return via fax to  .. Thank you, [facility].

## 2020-02-21 ENCOUNTER — TREATMENT (OUTPATIENT)
Dept: PHYSICAL THERAPY | Facility: CLINIC | Age: 70
End: 2020-02-21

## 2020-02-21 DIAGNOSIS — R42 VERTIGO: Primary | ICD-10-CM

## 2020-02-21 PROCEDURE — 97110 THERAPEUTIC EXERCISES: CPT | Performed by: PHYSICAL THERAPIST

## 2020-02-21 PROCEDURE — 95992 CANALITH REPOSITIONING PROC: CPT | Performed by: PHYSICAL THERAPIST

## 2020-02-21 NOTE — PROGRESS NOTES
Physical Therapy Daily Progress Note    VISIT#: 2    Subjective   Radha Trujillo reports: 75% better; occasional symptoms with looking down.      Objective     See Exercise, Manual, and Modality Logs for complete treatment.     Patient Education: relaxation techniques and importance of normalizing movement pattern discussed.  Rx: CRT x 1 (R ear down)  AROM cervical flex/ext x 10; Rotation x 10    Assessment/Plan - advanced HEP; added cervical Rot B and flex/ext x 10, 2 x daily. Significant decrease in symptoms. Anticipate discharge after next visit.    Goals  Plan Goals: STG's  1. 50% less symptom reproduction with functional activities at home and in community. MET    LTG's: by d/c  1. 90% less symptom reproduction with functional activities at home and in community.  2. NDI score to be 0 to 5  3. Voice readiness for d/c    Anticipate DC next Visit            Timed:         Manual Therapy:         mins  91972;     Therapeutic Exercise:    15     mins  06032;     Neuromuscular Mary:        mins  85009;    Therapeutic Activity:          mins  10588;     Gait Training:           mins  95125;     Ultrasound:          mins  21843;    Ionto                                   mins   03880  Self Care                            mins   52499  Canalith Repos                   mins  4209  Aquatic                               mins 44928  CRT                           __15__mins 89513      Un-Timed:  Electrical Stimulation:         mins  18190 ( );  Dry Needling         mins self-pay  Traction          mins 18634  Low Eval          Mins  34724  Mod Eval          Mins  22627  High Eval                            Mins  82300  Re-Eval                               mins  23221    Timed Treatment: 30     mins   Total Treatment:     30   mins    Joseph Stevens, PT

## 2020-02-24 RX ORDER — POTASSIUM CHLORIDE 750 MG/1
TABLET, FILM COATED, EXTENDED RELEASE ORAL
Qty: 270 TABLET | Refills: 0 | Status: SHIPPED | OUTPATIENT
Start: 2020-02-24 | End: 2020-08-24

## 2020-02-28 ENCOUNTER — TREATMENT (OUTPATIENT)
Dept: PHYSICAL THERAPY | Facility: CLINIC | Age: 70
End: 2020-02-28

## 2020-02-28 DIAGNOSIS — R42 VERTIGO: Primary | ICD-10-CM

## 2020-02-28 PROCEDURE — 97110 THERAPEUTIC EXERCISES: CPT | Performed by: PHYSICAL THERAPIST

## 2020-02-28 PROCEDURE — 97530 THERAPEUTIC ACTIVITIES: CPT | Performed by: PHYSICAL THERAPIST

## 2020-03-02 NOTE — PROGRESS NOTES
"Physical Therapy Daily Progress Note    VISIT#: 3    Subjective   Radha Trujillo reports: Reports no spinning sensation with position changes;, but, is experiencing dizziness or funny feeling with supine to sit and sit to stand; bending over; hx of HTN (fluctuates); also very anxious due to recent personal issues; white coat syndrome per patient.       Objective     See Exercise, Manual, and Modality Logs for complete treatment.     Patient Education: relaxation techniques and importance of normalizing movement pattern reviewed; symptoms of orthostatic hypotension and how BP can affect position changes: advised to transition slowly, allowing for 20 to 30 sec for BP to normalize prior to moving; discussed bending and positioning techniques to decrease incidence of symptoms: putting on socks, etc; advised to f/u with MD and monitor BP due to elevation in clinic. Patient to re-take BP once she is home and has relaxed for 20 min. If BP not down, f/u with MD or [facility].    Rx: see above  BP:  measured sit to stand: BP appeared to drop slightly, but difficult to measure due to girth of arm and BP taken in forearm during this trial with mechanical BP machine.  180/100: measured manually in sitting after sit to stand     [place] Hallpike and horizontal canals cleared and both negative B  AROM cervical flex/ext x 10; Rotation x 10 - reviewed    Assessment/Plan - reviewed HEP and educated as above; Patient very anxious and reportedly has \"white coat syndrome\"; has been monitoring at home and has been lower: typically in 140/85 range. If symptoms do not resolve or BP elevated, to f/u with MD.    Goals  Plan Goals: STG's  1. 50% less symptom reproduction with functional activities at home and in community. MET    LTG's: by d/c  1. 90% less symptom reproduction with functional activities at home and in community.  2. NDI score to be 0 to 5  3. Voice readiness for d/c    Anticipate DC next Visit            Timed:         Manual Therapy:   "       mins  13284;     Therapeutic Exercise:     15    mins  07186;     Neuromuscular Mary:        mins  12996;    Therapeutic Activity:     15     mins  36622;     Gait Training:           mins  48255;     Ultrasound:          mins  83468;    Ionto                                   mins   63006  Self Care                            mins   36903  Canalith Repos                   mins  4209  Aquatic                               mins 81352  CRT                           ____mins 95898      Un-Timed:  Electrical Stimulation:         mins  21736 ( );  Dry Needling         mins self-pay  Traction          mins 81009  Low Eval          Mins  07311  Mod Eval          Mins  92589  High Eval                            Mins  95882  Re-Eval                               mins  51425    Timed Treatment: 30     mins   Total Treatment:     30   mins    Joseph Stevens PT

## 2020-03-16 ENCOUNTER — TELEPHONE (OUTPATIENT)
Dept: FAMILY MEDICINE CLINIC | Facility: CLINIC | Age: 70
End: 2020-03-16

## 2020-03-16 NOTE — TELEPHONE ENCOUNTER
"Pt sent message via [employer] with concerns that her anxiety med may not be helping. States she feels a little \"off\". She is still only taking half a tab and wants to know what you suggest. I did let her know you were out until Friday.  "

## 2020-03-19 NOTE — TELEPHONE ENCOUNTER
"Could you clarify?  Does she think the medication is making her feel \"off\"?  Or does she think she feels \"off\" bc the med isn't helping?  If she thinks it's from lexapro, then go ahead and stop it  "

## 2020-03-19 NOTE — TELEPHONE ENCOUNTER
"May not notice any improvement with 5mg in terms of anxiety  I wanted to make sure she tolerated it before increasing to 10mg  If she thinks that the feeling \"off\" is bc of lexapro, that should have gone away by now if it were going to go away"

## 2020-03-19 NOTE — TELEPHONE ENCOUNTER
Spoke with pt, she states she fells fine now. She feels like it is starting to kick in. She has not been crying anymore like she was before. At this time she is happy with the med.

## 2020-03-19 NOTE — TELEPHONE ENCOUNTER
"Sorry, here is her exact message.      \"Hi Dr. Rojo.  Well, not sure this medication is helping.  How long do I need to be taking it until I feel some effectiveness? I still feel anxious and still a little \"off\". \"    "

## 2020-05-27 ENCOUNTER — TELEMEDICINE (OUTPATIENT)
Dept: FAMILY MEDICINE CLINIC | Facility: CLINIC | Age: 70
End: 2020-05-27

## 2020-05-27 DIAGNOSIS — Z12.39 SCREENING FOR BREAST CANCER: ICD-10-CM

## 2020-05-27 DIAGNOSIS — Z00.00 PREVENTATIVE HEALTH CARE: Primary | ICD-10-CM

## 2020-05-27 DIAGNOSIS — E55.9 VITAMIN D DEFICIENCY: ICD-10-CM

## 2020-05-27 DIAGNOSIS — Z12.31 ENCOUNTER FOR SCREENING MAMMOGRAM FOR MALIGNANT NEOPLASM OF BREAST: ICD-10-CM

## 2020-05-27 DIAGNOSIS — I10 ESSENTIAL HYPERTENSION: ICD-10-CM

## 2020-05-27 DIAGNOSIS — E78.5 HYPERLIPIDEMIA, UNSPECIFIED HYPERLIPIDEMIA TYPE: ICD-10-CM

## 2020-05-27 PROCEDURE — G0439 PPPS, SUBSEQ VISIT: HCPCS | Performed by: INTERNAL MEDICINE

## 2020-05-27 NOTE — PROGRESS NOTES
The ABCs of the Annual Wellness Visit  Subsequent Medicare Wellness Visit    No chief complaint on file.      Subjective   History of Present Illness:  Radha Trujillo is a 69 y.o. female who presents for a Subsequent Medicare Wellness Visit.    HEALTH RISK ASSESSMENT    Recent Hospitalizations:  No hospitalization(s) within the last year.    Current Medical Providers:  Patient Care Team:  Sherin Rojo MD as PCP - General (Internal Medicine)  Bismark, Lon Sanford Jr., DPM as PCP - Claims Attributed    Smoking Status:  Social History     Tobacco Use   Smoking Status Never Smoker   Smokeless Tobacco Never Used       Alcohol Consumption:  Social History     Substance and Sexual Activity   Alcohol Use No   • Frequency: Never       Depression Screen:   PHQ-2/PHQ-9 Depression Screening 5/27/2020   Little interest or pleasure in doing things 0   Feeling down, depressed, or hopeless 0   Total Score 0       Fall Risk Screen:  MUNIRA Fall Risk Assessment has not been completed.    Health Habits and Functional and Cognitive Screening:  Functional & Cognitive Status 5/27/2020   Do you have difficulty preparing food and eating? No   Do you have difficulty bathing yourself, getting dressed or grooming yourself? No   Do you have difficulty using the toilet? No   Do you have difficulty moving around from place to place? No   Do you have trouble with steps or getting out of a bed or a chair? Yes   Current Diet Well Balanced Diet   Dental Exam Up to date   Eye Exam Up to date   Exercise (times per week) 2 times per week   Current Exercise Activities Include Aerobics   Do you need help using the phone?  No   Are you deaf or do you have serious difficulty hearing?  No   Do you need help with transportation? No   Do you need help shopping? No   Do you need help preparing meals?  No   Do you need help with housework?  No   Do you need help with laundry? No   Do you need help taking your medications? No   Do you need help managing  money? No   Do you ever drive or ride in a car without wearing a seat belt? No   Have you felt unusual stress, anger or loneliness in the last month? Yes   Who do you live with? Spouse   If you need help, do you have trouble finding someone available to you? No   Have you been bothered in the last four weeks by sexual problems? No   Do you have difficulty concentrating, remembering or making decisions? No         Does the patient have evidence of cognitive impairment? No    Asprin use counseling:Taking ASA appropriately as indicated    Age-appropriate Screening Schedule:  Refer to the list below for future screening recommendations based on patient's age, sex and/or medical conditions. Orders for these recommended tests are listed in the plan section. The patient has been provided with a written plan.    Health Maintenance   Topic Date Due   • TDAP/TD VACCINES (1 - Tdap) 08/28/1961   • ZOSTER VACCINE (1 of 2) 08/28/2000   • COLONOSCOPY  11/22/2019   • MAMMOGRAM  04/13/2020   • INFLUENZA VACCINE  08/01/2020   • LIPID PANEL  11/22/2020          The following portions of the patient's history were reviewed and updated as appropriate: allergies, current medications, past family history, past medical history, past social history, past surgical history and problem list.    Outpatient Medications Prior to Visit   Medication Sig Dispense Refill   • albuterol sulfate HFA (VENTOLIN HFA) 108 (90 Base) MCG/ACT inhaler VENTOLIN  (90 Base) MCG/ACT AERS     • aspirin 81 MG EC tablet Take 81 mg by mouth Daily.     • budesonide (PULMICORT FLEXHALER) 180 MCG/ACT inhaler PULMICORT FLEXHALER 180 MCG/ACT AEPB     • Cholecalciferol (VITAMIN D) 50 MCG (2000 UT) capsule VITAMIN D 2000 UNIT CAPS     • meclizine (ANTIVERT) 25 MG tablet Take 1 tablet by mouth Every 6 (Six) Hours As Needed for Dizziness. 30 tablet 1   • Multiple Vitamin (MULTIVITAMIN) capsule MULTIVITAMINS CAPS     • Omeprazole 20 MG tablet delayed-release 15 mg.     •  potassium chloride (K-DUR) 10 MEQ CR tablet TAKE 1 TABLET BY MOUTH THREE TIMES DAILY 270 tablet 0   • sertraline (ZOLOFT) 50 MG tablet Take 0.5 tablets by mouth Daily. 45 tablet 1   • valsartan-hydrochlorothiazide (DIOVAN-HCT) 160-25 MG per tablet TAKE 1 TABLET BY MOUTH DAILY 90 tablet 0   • vitamin B-12 (CYANOCOBALAMIN) 1000 MCG tablet Take 1,000 mcg by mouth Daily.       No facility-administered medications prior to visit.        There is no problem list on file for this patient.      Advanced Care Planning:  ACP discussion was held with the patient during this visit. Patient has an advance directive in EMR which is still valid.     Review of Systems   Constitutional: Negative for activity change, fatigue and fever.   HENT: Negative for congestion, sinus pain and sore throat.    Eyes: Negative for pain and discharge.   Respiratory: Negative for cough and shortness of breath.    Cardiovascular: Negative for chest pain and palpitations.   Gastrointestinal: Negative for abdominal pain, diarrhea, nausea and vomiting.   Endocrine: Negative for polydipsia, polyphagia and polyuria.   Genitourinary: Negative for dysuria and hematuria.   Musculoskeletal: Positive for back pain. Negative for arthralgias and myalgias.   Skin: Negative for rash and wound.   Neurological: Negative for dizziness, weakness and headaches.   Hematological: Negative for adenopathy. Does not bruise/bleed easily.   Psychiatric/Behavioral: Negative for dysphoric mood. The patient is nervous/anxious.        Compared to one year ago, the patient feels her physical health is the same.  Compared to one year ago, the patient feels her mental health is better.    Reviewed chart for potential of high risk medication in the elderly: yes  Reviewed chart for potential of harmful drug interactions in the elderly:yes    Objective       There were no vitals filed for this visit.   133/88    There is no height or weight on file to calculate BMI.  Discussed the  patient's BMI with her. The BMI unable to obtain.    Physical Exam   Constitutional: She is oriented to person, place, and time. She appears well-developed and well-nourished.   HENT:   Head: Normocephalic and atraumatic.   Right Ear: External ear normal.   Left Ear: External ear normal.   Mouth/Throat: Oropharynx is clear and moist.   Eyes: Conjunctivae and EOM are normal. Right eye exhibits no discharge. Left eye exhibits no discharge. No scleral icterus.   Neck: Normal range of motion. Neck supple. No thyromegaly present.   Pulmonary/Chest: Effort normal.   Neurological: She is alert and oriented to person, place, and time. No cranial nerve deficit.   Psychiatric: She has a normal mood and affect. Her behavior is normal. Thought content normal.             Assessment/Plan   Medicare Risks and Personalized Health Plan  CMS Preventative Services Quick Reference  Breast Cancer/Mammogram Screening  Obesity/Overweight     The above risks/problems have been discussed with the patient.  Pertinent information has been shared with the patient in the After Visit Summary.  Follow up plans and orders are seen below in the Assessment/Plan Section.    Diagnoses and all orders for this visit:    1. Preventative health care (Primary)    2. Essential hypertension  -     CBC Auto Differential; Future  -     Comprehensive Metabolic Panel; Future  -     Lipid Panel; Future  -     TSH; Future    3. Hyperlipidemia, unspecified hyperlipidemia type  -     CBC Auto Differential; Future  -     Comprehensive Metabolic Panel; Future  -     Lipid Panel; Future  -     TSH; Future    4. Vitamin D deficiency  -     Vitamin D 25 Hydroxy; Future    5. Screening for breast cancer  -     Mammo Screening Digital Tomosynthesis Bilateral With CAD; Future    6. Encounter for screening mammogram for malignant neoplasm of breast   -     Mammo Screening Digital Tomosynthesis Bilateral With CAD; Future      Pt is due for labs - [facility]  Pt is due for mammogram  Discussed normal progression and treatment of lumbar spinal stenosis  Pt bought a back brace, which has been helping  Discussed not wearing it for long periods of time, since that could weaken core muscle  Discussed PT - pt will call/message if pain worsens and would like a referral  Pt has gained a little weight and work on diet changes          Follow Up:  No follow-ups on file.     An After Visit Summary and PPPS were given to the patient.

## 2020-06-04 ENCOUNTER — LAB (OUTPATIENT)
Dept: FAMILY MEDICINE CLINIC | Facility: CLINIC | Age: 70
End: 2020-06-04

## 2020-06-04 DIAGNOSIS — E78.5 HYPERLIPIDEMIA, UNSPECIFIED HYPERLIPIDEMIA TYPE: ICD-10-CM

## 2020-06-04 DIAGNOSIS — E55.9 VITAMIN D DEFICIENCY: ICD-10-CM

## 2020-06-04 DIAGNOSIS — I10 ESSENTIAL HYPERTENSION: ICD-10-CM

## 2020-06-04 LAB
25(OH)D3 SERPL-MCNC: 48.1 NG/ML (ref 30–100)
ALBUMIN SERPL-MCNC: 4.3 G/DL (ref 3.5–5.2)
ALBUMIN/GLOB SERPL: 1.7 G/DL
ALP SERPL-CCNC: 81 U/L (ref 39–117)
ALT SERPL W P-5'-P-CCNC: 19 U/L (ref 1–33)
ANION GAP SERPL CALCULATED.3IONS-SCNC: 9.5 MMOL/L (ref 5–15)
AST SERPL-CCNC: 18 U/L (ref 1–32)
BASOPHILS # BLD AUTO: 0.04 10*3/MM3 (ref 0–0.2)
BASOPHILS NFR BLD AUTO: 0.8 % (ref 0–1.5)
BILIRUB SERPL-MCNC: 0.4 MG/DL (ref 0.2–1.2)
BUN BLD-MCNC: 10 MG/DL (ref 8–23)
BUN/CREAT SERPL: 11.8 (ref 7–25)
CALCIUM SPEC-SCNC: 9.5 MG/DL (ref 8.6–10.5)
CHLORIDE SERPL-SCNC: 99 MMOL/L (ref 98–107)
CHOLEST SERPL-MCNC: 181 MG/DL (ref 0–200)
CO2 SERPL-SCNC: 27.5 MMOL/L (ref 22–29)
CREAT BLD-MCNC: 0.85 MG/DL (ref 0.57–1)
DEPRECATED RDW RBC AUTO: 39.2 FL (ref 37–54)
EOSINOPHIL # BLD AUTO: 0.34 10*3/MM3 (ref 0–0.4)
EOSINOPHIL NFR BLD AUTO: 6.6 % (ref 0.3–6.2)
ERYTHROCYTE [DISTWIDTH] IN BLOOD BY AUTOMATED COUNT: 13.2 % (ref 12.3–15.4)
GFR SERPL CREATININE-BSD FRML MDRD: 66 ML/MIN/1.73
GLOBULIN UR ELPH-MCNC: 2.6 GM/DL
GLUCOSE BLD-MCNC: 111 MG/DL (ref 65–99)
HCT VFR BLD AUTO: 38.2 % (ref 34–46.6)
HDLC SERPL-MCNC: 74 MG/DL (ref 40–60)
HGB BLD-MCNC: 12.5 G/DL (ref 12–15.9)
IMM GRANULOCYTES # BLD AUTO: 0.01 10*3/MM3 (ref 0–0.05)
IMM GRANULOCYTES NFR BLD AUTO: 0.2 % (ref 0–0.5)
LDLC SERPL CALC-MCNC: 87 MG/DL (ref 0–100)
LDLC/HDLC SERPL: 1.18 {RATIO}
LYMPHOCYTES # BLD AUTO: 1.08 10*3/MM3 (ref 0.7–3.1)
LYMPHOCYTES NFR BLD AUTO: 20.8 % (ref 19.6–45.3)
MCH RBC QN AUTO: 26.9 PG (ref 26.6–33)
MCHC RBC AUTO-ENTMCNC: 32.7 G/DL (ref 31.5–35.7)
MCV RBC AUTO: 82.2 FL (ref 79–97)
MONOCYTES # BLD AUTO: 0.48 10*3/MM3 (ref 0.1–0.9)
MONOCYTES NFR BLD AUTO: 9.3 % (ref 5–12)
NEUTROPHILS # BLD AUTO: 3.23 10*3/MM3 (ref 1.7–7)
NEUTROPHILS NFR BLD AUTO: 62.3 % (ref 42.7–76)
NRBC BLD AUTO-RTO: 0 /100 WBC (ref 0–0.2)
PLATELET # BLD AUTO: 252 10*3/MM3 (ref 140–450)
PMV BLD AUTO: 11.7 FL (ref 6–12)
POTASSIUM BLD-SCNC: 3.6 MMOL/L (ref 3.5–5.2)
PROT SERPL-MCNC: 6.9 G/DL (ref 6–8.5)
RBC # BLD AUTO: 4.65 10*6/MM3 (ref 3.77–5.28)
SODIUM BLD-SCNC: 136 MMOL/L (ref 136–145)
TRIGL SERPL-MCNC: 99 MG/DL (ref 0–150)
TSH SERPL DL<=0.05 MIU/L-ACNC: 1.68 UIU/ML (ref 0.27–4.2)
VLDLC SERPL-MCNC: 19.8 MG/DL (ref 5–40)
WBC NRBC COR # BLD: 5.18 10*3/MM3 (ref 3.4–10.8)

## 2020-06-04 PROCEDURE — 80053 COMPREHEN METABOLIC PANEL: CPT | Performed by: INTERNAL MEDICINE

## 2020-06-04 PROCEDURE — 36415 COLL VENOUS BLD VENIPUNCTURE: CPT

## 2020-06-04 PROCEDURE — 85025 COMPLETE CBC W/AUTO DIFF WBC: CPT | Performed by: INTERNAL MEDICINE

## 2020-06-04 PROCEDURE — 84443 ASSAY THYROID STIM HORMONE: CPT | Performed by: INTERNAL MEDICINE

## 2020-06-04 PROCEDURE — 82306 VITAMIN D 25 HYDROXY: CPT | Performed by: INTERNAL MEDICINE

## 2020-06-04 PROCEDURE — 80061 LIPID PANEL: CPT | Performed by: INTERNAL MEDICINE

## 2020-06-06 ENCOUNTER — PATIENT MESSAGE (OUTPATIENT)
Dept: FAMILY MEDICINE CLINIC | Facility: CLINIC | Age: 70
End: 2020-06-06

## 2020-06-09 NOTE — TELEPHONE ENCOUNTER
I think she's saying was fasting for the labs, though, so let's go ahead and check an a1c next time

## 2020-06-25 DIAGNOSIS — R92.8 ABNORMAL SCREENING MAMMOGRAM: Primary | ICD-10-CM

## 2020-06-25 DIAGNOSIS — Z12.39 SCREENING FOR BREAST CANCER: ICD-10-CM

## 2020-06-25 DIAGNOSIS — Z12.31 ENCOUNTER FOR SCREENING MAMMOGRAM FOR MALIGNANT NEOPLASM OF BREAST: ICD-10-CM

## 2020-06-29 ENCOUNTER — DOCUMENTATION (OUTPATIENT)
Dept: PHYSICAL THERAPY | Facility: CLINIC | Age: 70
End: 2020-06-29

## 2020-06-29 NOTE — PROGRESS NOTES
Discharge Summary  Discharge Summary from Physical Therapy Report    Patient: Radha Trujillo   : 1950  Diagnosis/ICD-10 Code:  Vertigo [R42]  Referring practitioner: Sherin Rojo MD    Dates  PT visit: 20 to 20  Number of Visits: 3     Discharge Status of Patient: Patient's BPPV seemed to resolve after 3 visits: has not returned to PT and will be discharged per dept guidelines.     Goals: Partially Met    Discharge Plan: Patient to return to referring/providing physician    Comments - good response to Canalith Repositioning Technique     Date of Discharge 20        Joseph Stevens, PT  Physical Therapist

## 2020-07-06 DIAGNOSIS — R92.8 ABNORMAL SCREENING MAMMOGRAM: ICD-10-CM

## 2020-07-16 ENCOUNTER — PATIENT MESSAGE (OUTPATIENT)
Dept: FAMILY MEDICINE CLINIC | Facility: CLINIC | Age: 70
End: 2020-07-16

## 2020-07-20 ENCOUNTER — E-VISIT (OUTPATIENT)
Dept: FAMILY MEDICINE CLINIC | Facility: CLINIC | Age: 70
End: 2020-07-20

## 2020-07-20 DIAGNOSIS — F41.9 ANXIETY: Primary | ICD-10-CM

## 2020-07-20 PROCEDURE — 99213 OFFICE O/P EST LOW 20 MIN: CPT | Performed by: INTERNAL MEDICINE

## 2020-07-20 RX ORDER — DOXEPIN HYDROCHLORIDE 10 MG/1
10 CAPSULE ORAL NIGHTLY
Qty: 30 CAPSULE | Refills: 3 | Status: SHIPPED | OUTPATIENT
Start: 2020-07-20 | End: 2020-11-30

## 2020-07-20 NOTE — PROGRESS NOTES
Ok, thank you very much.  ----- Message -----  From: Sherin Rojo MD  Sent: 7/20/2020  1:01 PM EDT  To: Radha Trujillo  Subject: RE: Non-Urgent Medical Question  Hi,    Sertraline does not seem like it is working for you.  You're not on a high dose, so it should be fine to stop without having to taper off, but I would like you to start another medication in its place.  I would like you to try doxepin before bedtime.  I'm hoping it will relax your muscles and help you sleep, as well as help with your anxiety.    Thanks,  Dr. Rojo      ----- Message -----     From: Radha Trujillo     Sent: 7/16/2020  6:37 PM EDT       To: Sherin Rojo MD  Subject: Non-Urgent Medical Question    Hi Dr. Rojo.  I don't think my anxiety meds are working.  I'm pretty stressed and it is affecting my body.  Stiff neck, shoulders, heart palpitations,  and where ever the stress wants to fall to give me discomfort. Not [facility] [employer] is depressing me.  Missing my friends, not seeing the few friends I have here.  My girls are doing better but now my son is disrespecting me.  He usually doesn't so this is what is upsetting.   Anyway, not sure what else to do.  This pandemic is horrible.  Your thoughts?    A/P:  Anxiety - dc zoloft  Start doxepin qhs  Pt had previously been on celexa, wellbutrin - wellbutrin didn't help and celexa gave side effects

## 2020-07-20 NOTE — TELEPHONE ENCOUNTER
From: Radha Trujillo  Sent: 7/20/2020 3:08 PM EDT  To: Sherin Rojo MD  Subject: RE: Non-Urgent Medical Question    Ok, thank you very much.  ----- Message -----  From: Sherin Rojo MD  Sent: 7/20/2020 1:01 PM EDT  To: Radha Trujillo  Subject: RE: Non-Urgent Medical Question  Hi,    Sertraline does not seem like it is working for you.  You're not on a high dose, so it should be fine to stop without having to taper off, but I would like you to start another medication in its place.  I would like you to try doxepin before bedtime.  I'm hoping it will relax your muscles and help you sleep, as well as help with your anxiety.    Thanks,  Dr. Rojo       ----- Message -----   From: Radha Trujillo   Sent: 7/16/2020 6:37 PM EDT   To: Sherin Rojo MD  Subject: Non-Urgent Medical Question    Hi Dr. Rojo. I don't think my anxiety meds are working. I'm pretty stressed and it is affecting my body. Stiff neck, shoulders, heart palpitations, and where ever the stress wants to fall to give me discomfort. Not going to my classes at [employer] is depressing me. Missing my friends, not seeing the few friends I have here. My girls are doing better but now my son is disrespecting me. He usually doesn't so this is what is upsetting. Anyway, not sure what else to do. This pandemic is horrible. Your thoughts?

## 2020-08-11 ENCOUNTER — PATIENT MESSAGE (OUTPATIENT)
Dept: FAMILY MEDICINE CLINIC | Facility: CLINIC | Age: 70
End: 2020-08-11

## 2020-08-12 NOTE — TELEPHONE ENCOUNTER
From: Rahda Trujillo  To: Sherin Rojo MD  Sent: 8/11/2020 10:37 PM EDT  Subject: Prescription Question    Dr. Rojo. It seems that I have been using my Pulmicort Flehaler a little more often than before. However, I believe the ones I have left have an expiration date of . I am assuming these two I have left should not be used. So, would you be kind to prescribe at least six months of them?    Thank you.    Radha Trujillo

## 2020-08-24 RX ORDER — VALSARTAN AND HYDROCHLOROTHIAZIDE 160; 25 MG/1; MG/1
TABLET ORAL
Qty: 90 TABLET | Refills: 0 | Status: SHIPPED | OUTPATIENT
Start: 2020-08-24 | End: 2020-11-20

## 2020-08-24 RX ORDER — POTASSIUM CHLORIDE 750 MG/1
TABLET, FILM COATED, EXTENDED RELEASE ORAL
Qty: 270 TABLET | Refills: 0 | Status: SHIPPED | OUTPATIENT
Start: 2020-08-24 | End: 2020-11-20

## 2020-09-09 DIAGNOSIS — F41.9 ANXIETY: Primary | ICD-10-CM

## 2020-09-09 RX ORDER — ALPRAZOLAM 0.25 MG/1
0.25 TABLET ORAL 2 TIMES DAILY PRN
Qty: 30 TABLET | Refills: 0 | Status: SHIPPED | OUTPATIENT
Start: 2020-09-09 | End: 2021-04-01

## 2020-09-16 ENCOUNTER — LAB (OUTPATIENT)
Dept: LAB | Facility: HOSPITAL | Age: 70
End: 2020-09-16

## 2020-09-16 ENCOUNTER — TRANSCRIBE ORDERS (OUTPATIENT)
Dept: ADMINISTRATIVE | Facility: HOSPITAL | Age: 70
End: 2020-09-16

## 2020-09-16 ENCOUNTER — HOSPITAL ENCOUNTER (OUTPATIENT)
Dept: CARDIOLOGY | Facility: HOSPITAL | Age: 70
Discharge: HOME | End: 2020-09-16

## 2020-09-16 DIAGNOSIS — I10 ESSENTIAL HYPERTENSION, MALIGNANT: ICD-10-CM

## 2020-09-16 DIAGNOSIS — I10 ESSENTIAL HYPERTENSION, MALIGNANT: Primary | ICD-10-CM

## 2020-09-16 LAB
ANION GAP SERPL CALCULATED.3IONS-SCNC: 9.8 MMOL/L (ref 5–15)
BASOPHILS # BLD AUTO: 0.03 10*3/MM3 (ref 0–0.2)
BASOPHILS NFR BLD AUTO: 0.7 % (ref 0–1.5)
BUN SERPL-MCNC: 13 MG/DL (ref 8–23)
BUN/CREAT SERPL: 15.5 (ref 7–25)
CALCIUM SPEC-SCNC: 9.4 MG/DL (ref 8.6–10.5)
CHLORIDE SERPL-SCNC: 102 MMOL/L (ref 98–107)
CO2 SERPL-SCNC: 27.2 MMOL/L (ref 22–29)
CREAT SERPL-MCNC: 0.84 MG/DL (ref 0.57–1)
DEPRECATED RDW RBC AUTO: 39.6 FL (ref 37–54)
EOSINOPHIL # BLD AUTO: 0.18 10*3/MM3 (ref 0–0.4)
EOSINOPHIL NFR BLD AUTO: 4.4 % (ref 0.3–6.2)
ERYTHROCYTE [DISTWIDTH] IN BLOOD BY AUTOMATED COUNT: 13.7 % (ref 12.3–15.4)
GFR SERPL CREATININE-BSD FRML MDRD: 67 ML/MIN/1.73
GLUCOSE SERPL-MCNC: 105 MG/DL (ref 65–99)
HCT VFR BLD AUTO: 36.9 % (ref 34–46.6)
HGB BLD-MCNC: 12.5 G/DL (ref 12–15.9)
IMM GRANULOCYTES # BLD AUTO: 0.02 10*3/MM3 (ref 0–0.05)
IMM GRANULOCYTES NFR BLD AUTO: 0.5 % (ref 0–0.5)
LYMPHOCYTES # BLD AUTO: 0.93 10*3/MM3 (ref 0.7–3.1)
LYMPHOCYTES NFR BLD AUTO: 22.5 % (ref 19.6–45.3)
MCH RBC QN AUTO: 27.2 PG (ref 26.6–33)
MCHC RBC AUTO-ENTMCNC: 33.9 G/DL (ref 31.5–35.7)
MCV RBC AUTO: 80.4 FL (ref 79–97)
MONOCYTES # BLD AUTO: 0.31 10*3/MM3 (ref 0.1–0.9)
MONOCYTES NFR BLD AUTO: 7.5 % (ref 5–12)
NEUTROPHILS NFR BLD AUTO: 2.66 10*3/MM3 (ref 1.7–7)
NEUTROPHILS NFR BLD AUTO: 64.4 % (ref 42.7–76)
NRBC BLD AUTO-RTO: 0 /100 WBC (ref 0–0.2)
PLATELET # BLD AUTO: 241 10*3/MM3 (ref 140–450)
PMV BLD AUTO: 11.7 FL (ref 6–12)
POTASSIUM SERPL-SCNC: 3.7 MMOL/L (ref 3.5–5.2)
RBC # BLD AUTO: 4.59 10*6/MM3 (ref 3.77–5.28)
SODIUM SERPL-SCNC: 139 MMOL/L (ref 136–145)
WBC # BLD AUTO: 4.13 10*3/MM3 (ref 3.4–10.8)

## 2020-09-16 PROCEDURE — 80048 BASIC METABOLIC PNL TOTAL CA: CPT

## 2020-09-16 PROCEDURE — 93005 ELECTROCARDIOGRAM TRACING: CPT | Performed by: PODIATRIST

## 2020-09-16 PROCEDURE — 85025 COMPLETE CBC W/AUTO DIFF WBC: CPT

## 2020-09-16 PROCEDURE — 36415 COLL VENOUS BLD VENIPUNCTURE: CPT

## 2020-09-17 PROCEDURE — 93010 ELECTROCARDIOGRAM REPORT: CPT | Performed by: INTERNAL MEDICINE

## 2020-09-21 ENCOUNTER — PATIENT MESSAGE (OUTPATIENT)
Dept: FAMILY MEDICINE CLINIC | Facility: CLINIC | Age: 70
End: 2020-09-21

## 2020-09-22 RX ORDER — DEXAMETHASONE 4 MG/1
1 TABLET ORAL
Qty: 1 INHALER | Refills: 1 | Status: SHIPPED | OUTPATIENT
Start: 2020-09-22

## 2020-09-22 NOTE — TELEPHONE ENCOUNTER
From: Radha Trujillo  To: Sherin Rojo MD  Sent: 9/21/2020 10:31 PM EDT  Subject: Non-Urgent Medical Question    Dr. Rojo, my Rx plan does not cover the full expense of my Pulmicort Flexhaler. Is there another inhaler I can use that is less expensive? This costs me $205 . I don't remember this being that expensive in the past. My brother in law suggested Advair. He has much worse asthma than I, so not sure if this is right for me. Also my Valsartan is not covered.    I have [employer] for Rx coverage. I checked [employer] and they didn't cover my Pulmicort either. Thinking of changing Rx plans by not sure now.     Your thoughts?    Thank you,  Radha Trujillo

## 2020-11-20 RX ORDER — VALSARTAN AND HYDROCHLOROTHIAZIDE 160; 25 MG/1; MG/1
TABLET ORAL
Qty: 90 TABLET | Refills: 0 | Status: SHIPPED | OUTPATIENT
Start: 2020-11-20 | End: 2021-02-10

## 2020-11-20 RX ORDER — POTASSIUM CHLORIDE 750 MG/1
TABLET, FILM COATED, EXTENDED RELEASE ORAL
Qty: 270 TABLET | Refills: 0 | Status: SHIPPED | OUTPATIENT
Start: 2020-11-20 | End: 2021-02-10

## 2020-11-30 ENCOUNTER — LAB (OUTPATIENT)
Dept: FAMILY MEDICINE CLINIC | Facility: CLINIC | Age: 70
End: 2020-11-30

## 2020-11-30 ENCOUNTER — OFFICE VISIT (OUTPATIENT)
Dept: FAMILY MEDICINE CLINIC | Facility: CLINIC | Age: 70
End: 2020-11-30

## 2020-11-30 VITALS
HEIGHT: 61 IN | TEMPERATURE: 97.1 F | WEIGHT: 220 LBS | BODY MASS INDEX: 41.54 KG/M2 | OXYGEN SATURATION: 96 % | SYSTOLIC BLOOD PRESSURE: 138 MMHG | DIASTOLIC BLOOD PRESSURE: 84 MMHG | RESPIRATION RATE: 16 BRPM | HEART RATE: 66 BPM

## 2020-11-30 DIAGNOSIS — F41.9 ANXIETY: ICD-10-CM

## 2020-11-30 DIAGNOSIS — E66.01 CLASS 3 SEVERE OBESITY WITHOUT SERIOUS COMORBIDITY WITH BODY MASS INDEX (BMI) OF 40.0 TO 44.9 IN ADULT, UNSPECIFIED OBESITY TYPE (HCC): ICD-10-CM

## 2020-11-30 DIAGNOSIS — E53.8 VITAMIN B12 DEFICIENCY: ICD-10-CM

## 2020-11-30 DIAGNOSIS — E55.9 VITAMIN D DEFICIENCY: ICD-10-CM

## 2020-11-30 DIAGNOSIS — E78.5 HYPERLIPIDEMIA, UNSPECIFIED HYPERLIPIDEMIA TYPE: ICD-10-CM

## 2020-11-30 DIAGNOSIS — R73.01 ELEVATED FASTING GLUCOSE: ICD-10-CM

## 2020-11-30 DIAGNOSIS — I10 ESSENTIAL HYPERTENSION: ICD-10-CM

## 2020-11-30 DIAGNOSIS — I10 ESSENTIAL HYPERTENSION: Primary | ICD-10-CM

## 2020-11-30 LAB
25(OH)D3 SERPL-MCNC: 54.1 NG/ML (ref 30–100)
ALBUMIN SERPL-MCNC: 4.7 G/DL (ref 3.5–5.2)
ALBUMIN/GLOB SERPL: 1.7 G/DL
ALP SERPL-CCNC: 82 U/L (ref 39–117)
ALT SERPL W P-5'-P-CCNC: 19 U/L (ref 1–33)
ANION GAP SERPL CALCULATED.3IONS-SCNC: 11 MMOL/L (ref 5–15)
AST SERPL-CCNC: 19 U/L (ref 1–32)
BASOPHILS # BLD AUTO: 0.05 10*3/MM3 (ref 0–0.2)
BASOPHILS NFR BLD AUTO: 1 % (ref 0–1.5)
BILIRUB SERPL-MCNC: 0.2 MG/DL (ref 0–1.2)
BUN SERPL-MCNC: 12 MG/DL (ref 8–23)
BUN/CREAT SERPL: 14.3 (ref 7–25)
CALCIUM SPEC-SCNC: 9.8 MG/DL (ref 8.6–10.5)
CHLORIDE SERPL-SCNC: 101 MMOL/L (ref 98–107)
CHOLEST SERPL-MCNC: 205 MG/DL (ref 0–200)
CO2 SERPL-SCNC: 29 MMOL/L (ref 22–29)
CREAT SERPL-MCNC: 0.84 MG/DL (ref 0.57–1)
DEPRECATED RDW RBC AUTO: 38.9 FL (ref 37–54)
EOSINOPHIL # BLD AUTO: 0.15 10*3/MM3 (ref 0–0.4)
EOSINOPHIL NFR BLD AUTO: 2.9 % (ref 0.3–6.2)
ERYTHROCYTE [DISTWIDTH] IN BLOOD BY AUTOMATED COUNT: 13.3 % (ref 12.3–15.4)
GFR SERPL CREATININE-BSD FRML MDRD: 67 ML/MIN/1.73
GLOBULIN UR ELPH-MCNC: 2.7 GM/DL
GLUCOSE SERPL-MCNC: 102 MG/DL (ref 65–99)
HBA1C MFR BLD: 5.9 % (ref 3.5–5.6)
HCT VFR BLD AUTO: 42.6 % (ref 34–46.6)
HDLC SERPL-MCNC: 89 MG/DL (ref 40–60)
HGB BLD-MCNC: 13.9 G/DL (ref 12–15.9)
IMM GRANULOCYTES # BLD AUTO: 0.02 10*3/MM3 (ref 0–0.05)
IMM GRANULOCYTES NFR BLD AUTO: 0.4 % (ref 0–0.5)
LDLC SERPL CALC-MCNC: 103 MG/DL (ref 0–100)
LDLC/HDLC SERPL: 1.14 {RATIO}
LYMPHOCYTES # BLD AUTO: 1.19 10*3/MM3 (ref 0.7–3.1)
LYMPHOCYTES NFR BLD AUTO: 23.2 % (ref 19.6–45.3)
MCH RBC QN AUTO: 26.4 PG (ref 26.6–33)
MCHC RBC AUTO-ENTMCNC: 32.6 G/DL (ref 31.5–35.7)
MCV RBC AUTO: 81 FL (ref 79–97)
MONOCYTES # BLD AUTO: 0.35 10*3/MM3 (ref 0.1–0.9)
MONOCYTES NFR BLD AUTO: 6.8 % (ref 5–12)
NEUTROPHILS NFR BLD AUTO: 3.37 10*3/MM3 (ref 1.7–7)
NEUTROPHILS NFR BLD AUTO: 65.7 % (ref 42.7–76)
NRBC BLD AUTO-RTO: 0 /100 WBC (ref 0–0.2)
PLATELET # BLD AUTO: 286 10*3/MM3 (ref 140–450)
PMV BLD AUTO: 11.5 FL (ref 6–12)
POTASSIUM SERPL-SCNC: 3.8 MMOL/L (ref 3.5–5.2)
PROT SERPL-MCNC: 7.4 G/DL (ref 6–8.5)
RBC # BLD AUTO: 5.26 10*6/MM3 (ref 3.77–5.28)
SODIUM SERPL-SCNC: 141 MMOL/L (ref 136–145)
TRIGL SERPL-MCNC: 73 MG/DL (ref 0–150)
VIT B12 BLD-MCNC: 536 PG/ML (ref 211–946)
VLDLC SERPL-MCNC: 13 MG/DL (ref 5–40)
WBC # BLD AUTO: 5.13 10*3/MM3 (ref 3.4–10.8)

## 2020-11-30 PROCEDURE — 83036 HEMOGLOBIN GLYCOSYLATED A1C: CPT | Performed by: INTERNAL MEDICINE

## 2020-11-30 PROCEDURE — 36415 COLL VENOUS BLD VENIPUNCTURE: CPT

## 2020-11-30 PROCEDURE — 82306 VITAMIN D 25 HYDROXY: CPT | Performed by: INTERNAL MEDICINE

## 2020-11-30 PROCEDURE — 80061 LIPID PANEL: CPT | Performed by: INTERNAL MEDICINE

## 2020-11-30 PROCEDURE — 85025 COMPLETE CBC W/AUTO DIFF WBC: CPT | Performed by: INTERNAL MEDICINE

## 2020-11-30 PROCEDURE — 99214 OFFICE O/P EST MOD 30 MIN: CPT | Performed by: INTERNAL MEDICINE

## 2020-11-30 PROCEDURE — 82607 VITAMIN B-12: CPT | Performed by: INTERNAL MEDICINE

## 2020-11-30 PROCEDURE — 80053 COMPREHEN METABOLIC PANEL: CPT | Performed by: INTERNAL MEDICINE

## 2020-11-30 RX ORDER — CHOLECALCIFEROL (VITAMIN D3) 125 MCG
5 CAPSULE ORAL NIGHTLY PRN
COMMUNITY
End: 2022-03-15

## 2020-11-30 RX ORDER — PYRIDOXINE HCL (VITAMIN B6) 100 MG
1 TABLET ORAL DAILY
COMMUNITY
End: 2022-03-15

## 2020-11-30 RX ORDER — VITAMINS A AND D
1 CAPSULE ORAL DAILY
COMMUNITY
Start: 2019-11-29

## 2020-12-30 ENCOUNTER — TELEPHONE (OUTPATIENT)
Dept: CARDIOLOGY | Facility: CLINIC | Age: 70
End: 2020-12-30

## 2020-12-30 NOTE — TELEPHONE ENCOUNTER
New patient being seen on 1/5/2021 by   Dr Vivar.   She just wanted to call and let you know in case she forgets to tell you at her appt. She is having dizziness every morning when she wakes up for about 20 min, she isnt sure if she is having heart palpitations or just extreme anxiety.     Spoke to her on the phone for at least 20 min or so, and she did get emotional randomly while talking about the dizziness and her PCP retiring.   She was worried about getting her prescriptions and I informed her that the practice she goes to will still have to fill her prescriptions until she sees the knew provider in the same office.    Note has been sent to Dr Vivar

## 2021-01-04 NOTE — PROGRESS NOTES
Cardiology Office Consultation      Encounter Date:  01/05/2021    Patient ID:   Radha Trujillo is a 70 y.o. female.    Reason For Consultation:  Chest pressure  Lightheadedness    History of Present Illness:  Dear Dr. Helton, No Known    I had the pleasure of seeing Radha Trujillo today. As you are well aware, this is a 70 y.o. female with no established history of ischemic heart disease.  She does have a history of acid reflux, anxiety, AVNRT status post radiofrequency ablation, and antiplatelet therapy.  She presents today for the evaluation of chest pressure and lightheadedness.    She reports that her chest discomfort is described as a pressure type sensation.  It is in the left side of the chest.  It is mild in intensity.  It tends to occur during times of stress.  She reports that she has had palpitations as well as lightheadedness and shortness of breath on exertion.  She reports that her symptoms were triggered about a month ago when her cousin who is her same age passed away.  She reports that she has begun to have problems focusing too much on her mortality and it is during these episodes where she has her pressure and discomfort.  She reports that the symptoms intensified about 2 weeks ago.  This appears to correlate quite well with the [place] holiday.  She reports that she had very little interaction with her family and she has no hobbies to occupy her time.  This coupled with the Covid shutdown leads the patient to be in active and with nothing to focus on other than her isolation and her recent cousin's passing.  She is anxious and emotional today.    With regards to her lightheadedness, she reports that this tends to happen after she has been in bed all night.  It is early in the morning when she gets up and for a little while afterwards, she will have problems with lightheadedness.  She reports that this goes away and the rest of the day she has no issues.    We discussed options and she  would like to proceed with a 2D echocardiogram and a stress test.  Unfortunately she has osteoarthritis and has difficulty ambulating long distances.  This will necessitate that she undergo a Lexiscan stress nuclear in lieu of a treadmill test.    She had laboratory studies performed in November 2020.  Her total cholesterol was 205, HDL 89, LDL was 103, triglycerides were 73.    Assessment & Plan    Impressions:  Chest pressure with typical and atypical features  Dyspnea on exertion  Palpitations  Dizziness and lightheadedness  Anxiety  Acid reflux  AVNRT status post radiofrequency ablation  Obesity with BMI of 41.5  Osteoarthritis of the knees    Recommendations:  2D echocardiogram  Lexiscan stress test  Follow-up in 6 weeks    Objective:    Vitals:  Vitals:    01/05/21 1017   BP: 176/90   Pulse: 80   SpO2: 98%   Weight: 99.8 kg (220 lb)       Physical Exam:    General: Alert, cooperative, no distress, appears stated age  Head:  Normocephalic, atraumatic, mucous membranes moist  Eyes:  Conjunctiva/corneas clear, EOM's intact     Neck:  Supple, thick with no bruit.  Lungs: Clear to auscultation bilaterally, no wheezes rhonchi rales are noted  Chest wall: No tenderness  Heart::  Regular rate and rhythm, S1 and S2 normal, 1/6 holosystolic murmur.  No rub or gallop  Abdomen: Soft, non-tender, nondistended bowel sounds active  Extremities: No cyanosis, clubbing, or edema  Pulses: 2+ and symmetric all extremities  Skin:  No rashes or lesions  Neuro/psych: A&O x3. CN II through XII are grossly intact with appropriate affect    History of Present Illness      Allergies:  Allergies   Allergen Reactions   • Adhesive Tape Rash       Medication Review:     Current Outpatient Medications:   •  ALPRAZolam (Xanax) 0.25 MG tablet, Take 1 tablet by mouth 2 (Two) Times a Day As Needed for Anxiety., Disp: 30 tablet, Rfl: 0  •  aspirin 81 MG EC tablet, Take 81 mg by mouth Daily., Disp: , Rfl:   •  Bioflavonoid Products (Vitamin C ER)  1000-100 MG tablet controlled-release, Take 1 capsule by mouth Daily., Disp: , Rfl:   •  fluticasone (Flovent HFA) 110 MCG/ACT inhaler, Inhale 1 puff 2 (Two) Times a Day., Disp: 1 inhaler, Rfl: 1  •  melatonin 5 MG tablet tablet, Take 5 mg by mouth At Night As Needed., Disp: , Rfl:   •  Multiple Vitamin (MULTIVITAMIN) capsule, MULTIVITAMINS CAPS, Disp: , Rfl:   •  Omeprazole 20 MG tablet delayed-release, 15 mg., Disp: , Rfl:   •  potassium chloride 10 MEQ CR tablet, TAKE 1 TABLET BY MOUTH THREE TIMES DAILY, Disp: 270 tablet, Rfl: 0  •  valsartan-hydrochlorothiazide (DIOVAN-HCT) 160-25 MG per tablet, TAKE 1 TABLET BY MOUTH DAILY, Disp: 90 tablet, Rfl: 0  •  Vitamins A & D 5000-400 units capsule, , Disp: , Rfl:     Family History:  Family History   Problem Relation Age of Onset   • Arthritis Mother    • Diabetes Mother    • Heart disease Mother    • Emphysema Mother    • Kidney disease Father    • Arthritis Sister    • Anxiety disorder Sister    • Arthritis Brother    • Anxiety disorder Brother        Past Medical History:  Past Medical History:   Diagnosis Date   • Abnormal coagulation profile    • Abnormal EKG    • Abnormal laboratory test    • Acquired spondylolisthesis    • Arthritis    • Asthma    • Body aches    • Bronchitis, acute    • Chest pain     PRESSURE   • Chronic back pain    • Cough    • Depression with anxiety    • DJD (degenerative joint disease)    • Essential hypertension    • Fatigue    • Hyperlipidemia    • Hypokalemia    • Low back pain    • Lymphocytic colitis    • Obesity    • Other specified disorders of bone density and structure, other site    • Panic disorder    • Paroxysmal SVT (supraventricular tachycardia) (CMS/HCC)    • Postmenopausal     9 YEARS   • Prediabetes    • SOB (shortness of breath)    • Unspecified injury of left Achilles tendon, initial encounter    • Vitamin B deficiency, unspecified    • Vitamin D deficiency, unspecified        Past surgical History:  Past Surgical  History:   Procedure Laterality Date   • ACHILLES TENDON SURGERY Left 2018   • AV NODE ABLATION  08/2016    AVNRT RF ABLATION --8/16   • COLONOSCOPY      LAST 3 YEARS AGO   • FOOT SURGERY Right     TOE/FOOT SURGERY   • KNEE SURGERY Right     2/2 TORN MENICUS   • TUBAL ABDOMINAL LIGATION         Social History:  Social History     Socioeconomic History   • Marital status:      Spouse name: Not on file   • Number of children: Not on file   • Years of education: Not on file   • Highest education level: Not on file   Tobacco Use   • Smoking status: Never Smoker   • Smokeless tobacco: Never Used   Substance and Sexual Activity   • Alcohol use: No     Frequency: Never   • Drug use: No   • Sexual activity: Defer       Review of Systems:  The following systems were reviewed as they relate to the cardiovascular system: Constitutional, Eyes, ENT, Cardiovascular, Respiratory, Gastrointestinal, Integumentary, Neurological, Psychiatric, Hematologic, Endocrine, Musculoskeletal, and Genitourinary. The pertinent cardiovascular findings are reported above with all other cardiovascular points within those systems being negative.    Diagnostic Study Review:     Current Electrocardiogram:    ECG 12 Lead    Date/Time: 1/5/2021 5:58 PM  Performed by: Kilo Vivar DO  Authorized by: Kilo Vivar DO   Comparison: not compared with previous ECG   Previous ECG: no previous ECG available  Comments: Normal sinus rhythm with a ventricular rate of 80 bpm.  Low voltage in the precordial leads.  Nonspecific repolarization changes.  Short QT and QTc intervals of 295 and 341 ms respectively.                NOTE: The following portions of the patient's history were reviewed and updated this visit as appropriate: allergies, current medications, past family history, past medical history, past social history, past surgical history and problem list.

## 2021-01-05 ENCOUNTER — OFFICE VISIT (OUTPATIENT)
Dept: CARDIOLOGY | Facility: CLINIC | Age: 71
End: 2021-01-05

## 2021-01-05 VITALS
SYSTOLIC BLOOD PRESSURE: 176 MMHG | HEART RATE: 80 BPM | BODY MASS INDEX: 41.57 KG/M2 | WEIGHT: 220 LBS | OXYGEN SATURATION: 98 % | DIASTOLIC BLOOD PRESSURE: 90 MMHG

## 2021-01-05 DIAGNOSIS — R07.9 CHEST PAIN, UNSPECIFIED TYPE: Primary | ICD-10-CM

## 2021-01-05 DIAGNOSIS — R06.09 DYSPNEA ON EXERTION: ICD-10-CM

## 2021-01-05 DIAGNOSIS — E66.01 MORBID OBESITY WITH BMI OF 40.0-44.9, ADULT (HCC): ICD-10-CM

## 2021-01-05 PROBLEM — M17.0 PRIMARY OSTEOARTHRITIS OF BOTH KNEES: Status: ACTIVE | Noted: 2021-01-05

## 2021-01-05 PROBLEM — R00.2 PALPITATIONS: Status: ACTIVE | Noted: 2021-01-05

## 2021-01-05 PROBLEM — Z98.890 HISTORY OF RADIOFREQUENCY ABLATION (RFA) PROCEDURE FOR CARDIAC ARRHYTHMIA: Status: ACTIVE | Noted: 2021-01-05

## 2021-01-05 PROCEDURE — 93000 ELECTROCARDIOGRAM COMPLETE: CPT | Performed by: INTERNAL MEDICINE

## 2021-01-05 PROCEDURE — 99204 OFFICE O/P NEW MOD 45 MIN: CPT | Performed by: INTERNAL MEDICINE

## 2021-01-25 ENCOUNTER — HOSPITAL ENCOUNTER (OUTPATIENT)
Dept: CARDIOLOGY | Facility: HOSPITAL | Age: 71
Discharge: HOME | End: 2021-01-25

## 2021-01-25 VITALS
DIASTOLIC BLOOD PRESSURE: 92 MMHG | WEIGHT: 220 LBS | HEIGHT: 61 IN | SYSTOLIC BLOOD PRESSURE: 177 MMHG | HEART RATE: 88 BPM | BODY MASS INDEX: 41.54 KG/M2

## 2021-01-25 DIAGNOSIS — R06.09 DYSPNEA ON EXERTION: ICD-10-CM

## 2021-01-25 DIAGNOSIS — R07.9 CHEST PAIN, UNSPECIFIED TYPE: ICD-10-CM

## 2021-01-25 LAB
ASCENDING AORTA: 3.5 CM
BH CV ECHO MEAS - ACS: 2.4 CM
BH CV ECHO MEAS - AO MAX PG (FULL): 2 MMHG
BH CV ECHO MEAS - AO MAX PG: 6.1 MMHG
BH CV ECHO MEAS - AO MEAN PG (FULL): 0.49 MMHG
BH CV ECHO MEAS - AO MEAN PG: 3 MMHG
BH CV ECHO MEAS - AO ROOT AREA (BSA CORRECTED): 1.6
BH CV ECHO MEAS - AO ROOT AREA: 7.9 CM^2
BH CV ECHO MEAS - AO ROOT DIAM: 3.2 CM
BH CV ECHO MEAS - AO V2 MAX: 123.3 CM/SEC
BH CV ECHO MEAS - AO V2 MEAN: 83 CM/SEC
BH CV ECHO MEAS - AO V2 VTI: 27.1 CM
BH CV ECHO MEAS - ASC AORTA: 3.5 CM
BH CV ECHO MEAS - AVA(I,A): 3.1 CM^2
BH CV ECHO MEAS - AVA(I,D): 3.1 CM^2
BH CV ECHO MEAS - AVA(V,A): 2.7 CM^2
BH CV ECHO MEAS - AVA(V,D): 2.7 CM^2
BH CV ECHO MEAS - BSA(HAYCOCK): 2.1 M^2
BH CV ECHO MEAS - BSA: 2 M^2
BH CV ECHO MEAS - BZI_BMI: 41.6 KILOGRAMS/M^2
BH CV ECHO MEAS - BZI_METRIC_HEIGHT: 154.9 CM
BH CV ECHO MEAS - BZI_METRIC_WEIGHT: 99.8 KG
BH CV ECHO MEAS - EDV(CUBED): 139.7 ML
BH CV ECHO MEAS - EDV(MOD-SP2): 71.9 ML
BH CV ECHO MEAS - EDV(MOD-SP4): 77.9 ML
BH CV ECHO MEAS - EDV(TEICH): 128.9 ML
BH CV ECHO MEAS - EF(CUBED): 72.8 %
BH CV ECHO MEAS - EF(MOD-BP): 63 %
BH CV ECHO MEAS - EF(MOD-SP2): 65.1 %
BH CV ECHO MEAS - EF(MOD-SP4): 62.4 %
BH CV ECHO MEAS - EF(TEICH): 64.1 %
BH CV ECHO MEAS - ESV(CUBED): 38.1 ML
BH CV ECHO MEAS - ESV(MOD-SP2): 25.1 ML
BH CV ECHO MEAS - ESV(MOD-SP4): 29.3 ML
BH CV ECHO MEAS - ESV(TEICH): 46.2 ML
BH CV ECHO MEAS - FS: 35.2 %
BH CV ECHO MEAS - IVS/LVPW: 1
BH CV ECHO MEAS - IVSD: 1.3 CM
BH CV ECHO MEAS - LA DIMENSION(2D): 4 CM
BH CV ECHO MEAS - LA DIMENSION: 4.2 CM
BH CV ECHO MEAS - LA/AO: 1.3
BH CV ECHO MEAS - LAT PEAK E' VEL: 6 CM/SEC
BH CV ECHO MEAS - LV DIASTOLIC VOL/BSA (35-75): 39.6 ML/M^2
BH CV ECHO MEAS - LV IVRT: 0.08 SEC
BH CV ECHO MEAS - LV MASS(C)D: 262.9 GRAMS
BH CV ECHO MEAS - LV MASS(C)DI: 133.7 GRAMS/M^2
BH CV ECHO MEAS - LV MAX PG: 4.1 MMHG
BH CV ECHO MEAS - LV MEAN PG: 2.5 MMHG
BH CV ECHO MEAS - LV SYSTOLIC VOL/BSA (12-30): 14.9 ML/M^2
BH CV ECHO MEAS - LV V1 MAX: 100.8 CM/SEC
BH CV ECHO MEAS - LV V1 MEAN: 77.2 CM/SEC
BH CV ECHO MEAS - LV V1 VTI: 25 CM
BH CV ECHO MEAS - LVIDD: 5.2 CM
BH CV ECHO MEAS - LVIDS: 3.4 CM
BH CV ECHO MEAS - LVOT AREA: 3.3 CM^2
BH CV ECHO MEAS - LVOT DIAM: 2.1 CM
BH CV ECHO MEAS - LVPWD: 1.2 CM
BH CV ECHO MEAS - MED PEAK E' VEL: 5 CM/SEC
BH CV ECHO MEAS - MV A MAX VEL: 112.5 CM/SEC
BH CV ECHO MEAS - MV DEC SLOPE: 386.7 CM/SEC^2
BH CV ECHO MEAS - MV DEC TIME: 0.2 SEC
BH CV ECHO MEAS - MV E MAX VEL: 76.7 CM/SEC
BH CV ECHO MEAS - MV E/A: 0.68
BH CV ECHO MEAS - MV MAX PG: 5.4 MMHG
BH CV ECHO MEAS - MV MEAN PG: 1.7 MMHG
BH CV ECHO MEAS - MV P1/2T: 56 MSEC
BH CV ECHO MEAS - MV V2 MAX: 116.5 CM/SEC
BH CV ECHO MEAS - MV V2 MEAN: 57.3 CM/SEC
BH CV ECHO MEAS - MV V2 VTI: 24.9 CM
BH CV ECHO MEAS - MVA(P1/2T): 3.9 CM2
BH CV ECHO MEAS - MVA(VTI): 3.4 CM^2
BH CV ECHO MEAS - PA ACC SLOPE: 895 CM/SEC2
BH CV ECHO MEAS - PA ACC TIME: 0.06 SEC
BH CV ECHO MEAS - PA MAX PG (FULL): 1.4 MMHG
BH CV ECHO MEAS - PA MAX PG: 2.7 MMHG
BH CV ECHO MEAS - PA MEAN PG (FULL): 0.88 MMHG
BH CV ECHO MEAS - PA MEAN PG: 1.7 MMHG
BH CV ECHO MEAS - PA PR(ACCEL): 52.5 MMHG
BH CV ECHO MEAS - PA V2 MAX: 82.6 CM/SEC
BH CV ECHO MEAS - PA V2 MEAN: 63.1 CM/SEC
BH CV ECHO MEAS - PA V2 VTI: 19.1 CM
BH CV ECHO MEAS - PI END-D VEL: 123.8 CM/SEC
BH CV ECHO MEAS - PI MAX PG: 19.6 MMHG
BH CV ECHO MEAS - PI MAX VEL: 221.5 CM/SEC
BH CV ECHO MEAS - PULM A REVS DUR: 0.09 SEC
BH CV ECHO MEAS - PULM A REVS VEL: 32.5 CM/SEC
BH CV ECHO MEAS - PULM DIAS VEL: 49.6 CM/SEC
BH CV ECHO MEAS - PULM S/D: 1.8
BH CV ECHO MEAS - PULM SYS VEL: 87.6 CM/SEC
BH CV ECHO MEAS - RAP SYSTOLE: 8 MMHG
BH CV ECHO MEAS - RV MAX PG: 1.3 MMHG
BH CV ECHO MEAS - RV MEAN PG: 0.83 MMHG
BH CV ECHO MEAS - RV V1 MAX: 58.1 CM/SEC
BH CV ECHO MEAS - RV V1 MEAN: 43.7 CM/SEC
BH CV ECHO MEAS - RV V1 VTI: 10.7 CM
BH CV ECHO MEAS - RVSP: 35.9 MMHG
BH CV ECHO MEAS - SI(AO): 109.3 ML/M^2
BH CV ECHO MEAS - SI(CUBED): 51.7 ML/M^2
BH CV ECHO MEAS - SI(LVOT): 42.5 ML/M^2
BH CV ECHO MEAS - SI(MOD-SP2): 23.8 ML/M^2
BH CV ECHO MEAS - SI(MOD-SP4): 24.7 ML/M^2
BH CV ECHO MEAS - SI(TEICH): 42 ML/M^2
BH CV ECHO MEAS - SV(AO): 215 ML
BH CV ECHO MEAS - SV(CUBED): 101.6 ML
BH CV ECHO MEAS - SV(LVOT): 83.6 ML
BH CV ECHO MEAS - SV(MOD-SP2): 46.8 ML
BH CV ECHO MEAS - SV(MOD-SP4): 48.6 ML
BH CV ECHO MEAS - SV(TEICH): 82.6 ML
BH CV ECHO MEAS - TAPSE (>1.6): 2 CM
BH CV ECHO MEAS - TR MAX PG: 28 MMHG
BH CV ECHO MEAS - TR MAX VEL: 264.1 CM/SEC
BH CV ECHO MEASUREMENTS AVERAGE E/E' RATIO: 13.95
BH CV VAS BP LEFT ARM: NORMAL MMHG
BH CV XLRA - RV BASE: 3.3 CM
BH CV XLRA - RV MID: 3.2 CM
BH CV XLRA - TDI S': 17 CM/SEC
IVRT: 79 MSEC
LEFT ATRIUM VOLUME INDEX: 26 ML/M2
LEFT ATRIUM VOLUME: 51 CM3

## 2021-01-25 PROCEDURE — 93306 TTE W/DOPPLER COMPLETE: CPT

## 2021-01-25 PROCEDURE — 93018 CV STRESS TEST I&R ONLY: CPT | Performed by: INTERNAL MEDICINE

## 2021-01-25 PROCEDURE — 25010000002 REGADENOSON 0.4 MG/5ML SOLUTION: Performed by: INTERNAL MEDICINE

## 2021-01-25 PROCEDURE — 78452 HT MUSCLE IMAGE SPECT MULT: CPT | Performed by: INTERNAL MEDICINE

## 2021-01-25 PROCEDURE — 0 TECHNETIUM SESTAMIBI: Performed by: INTERNAL MEDICINE

## 2021-01-25 PROCEDURE — A9500 TC99M SESTAMIBI: HCPCS | Performed by: INTERNAL MEDICINE

## 2021-01-25 PROCEDURE — 93016 CV STRESS TEST SUPVJ ONLY: CPT | Performed by: INTERNAL MEDICINE

## 2021-01-25 PROCEDURE — 93306 TTE W/DOPPLER COMPLETE: CPT | Performed by: INTERNAL MEDICINE

## 2021-01-25 PROCEDURE — 93017 CV STRESS TEST TRACING ONLY: CPT

## 2021-01-25 PROCEDURE — 78452 HT MUSCLE IMAGE SPECT MULT: CPT

## 2021-01-25 RX ADMIN — REGADENOSON 0.4 MG: 0.08 INJECTION, SOLUTION INTRAVENOUS at 12:35

## 2021-01-25 RX ADMIN — TECHNETIUM TC 99M SESTAMIBI 1 DOSE: 1 INJECTION INTRAVENOUS at 12:35

## 2021-01-25 RX ADMIN — TECHNETIUM TC 99M SESTAMIBI 1 DOSE: 1 INJECTION INTRAVENOUS at 10:40

## 2021-01-29 LAB
BH CV STRESS BP STAGE 1: NORMAL
BH CV STRESS COMMENTS STAGE 1: NORMAL
BH CV STRESS DOSE REGADENOSON STAGE 1: 0.4
BH CV STRESS DURATION MIN STAGE 1: 0
BH CV STRESS DURATION SEC STAGE 1: 10
BH CV STRESS HR STAGE 1: 115
BH CV STRESS PROTOCOL 1: NORMAL
BH CV STRESS RECOVERY BP: NORMAL MMHG
BH CV STRESS RECOVERY HR: 97 BPM
BH CV STRESS STAGE 1: 1
LV EF NUC BP: 79 %
MAXIMAL PREDICTED HEART RATE: 150 BPM
PERCENT MAX PREDICTED HR: 76.67 %
STRESS BASELINE BP: NORMAL MMHG
STRESS BASELINE HR: 85 BPM
STRESS PERCENT HR: 90 %
STRESS POST PEAK BP: NORMAL MMHG
STRESS POST PEAK HR: 115 BPM
STRESS TARGET HR: 128 BPM

## 2021-02-10 RX ORDER — POTASSIUM CHLORIDE 750 MG/1
TABLET, FILM COATED, EXTENDED RELEASE ORAL
Qty: 270 TABLET | Refills: 0 | Status: SHIPPED | OUTPATIENT
Start: 2021-02-10 | End: 2021-05-10

## 2021-02-10 RX ORDER — VALSARTAN AND HYDROCHLOROTHIAZIDE 160; 25 MG/1; MG/1
TABLET ORAL
Qty: 90 TABLET | Refills: 0 | Status: SHIPPED | OUTPATIENT
Start: 2021-02-10 | End: 2021-05-10

## 2021-03-02 RX ORDER — DOXEPIN HYDROCHLORIDE 10 MG/1
10 CAPSULE ORAL NIGHTLY
COMMUNITY
Start: 2021-01-11 | End: 2021-07-28

## 2021-03-09 DIAGNOSIS — Z23 NEED FOR VACCINATION: ICD-10-CM

## 2021-03-10 ENCOUNTER — OFFICE VISIT (OUTPATIENT)
Dept: CARDIOLOGY | Facility: CLINIC | Age: 71
End: 2021-03-10

## 2021-03-10 VITALS
WEIGHT: 216 LBS | HEART RATE: 78 BPM | SYSTOLIC BLOOD PRESSURE: 188 MMHG | DIASTOLIC BLOOD PRESSURE: 82 MMHG | OXYGEN SATURATION: 97 % | RESPIRATION RATE: 18 BRPM | BODY MASS INDEX: 40.78 KG/M2 | HEIGHT: 61 IN

## 2021-03-10 DIAGNOSIS — R07.9 CHEST PAIN, UNSPECIFIED TYPE: Primary | ICD-10-CM

## 2021-03-10 DIAGNOSIS — R06.09 DYSPNEA ON EXERTION: ICD-10-CM

## 2021-03-10 DIAGNOSIS — Z98.890 HISTORY OF RADIOFREQUENCY ABLATION (RFA) PROCEDURE FOR CARDIAC ARRHYTHMIA: ICD-10-CM

## 2021-03-10 PROCEDURE — 99214 OFFICE O/P EST MOD 30 MIN: CPT | Performed by: INTERNAL MEDICINE

## 2021-03-10 RX ORDER — IBUPROFEN 800 MG/1
800 TABLET ORAL 3 TIMES DAILY
COMMUNITY
Start: 2021-03-01 | End: 2021-07-28

## 2021-03-10 NOTE — PROGRESS NOTES
Cardiology Office Visit      Encounter Date:  03/10/2021    Patient ID:   Radha Trujillo is a 70 y.o. female.    Reason For Followup:  Chest pain  History of AVNRT    Brief Clinical History:  Dear Gabriele Riddle PA-C    I had the pleasure of seeing Radha Trujillo today. As you are well aware, this is a 70 y.o. female with no established history of ischemic heart disease.  She does have a history of acid reflux, anxiety, AVNRT status post radiofrequency ablation, and antiplatelet therapy.  She presents today for the evaluation of chest pressure and lightheadedness.    Interval History:  She denies any chest pain pressure heaviness or tightness.  She denies any shortness of breath.  She denies any PND orthopnea.  She denies any syncope or near syncope.  She reports feeling well from a cardiac perspective.    On a previous visit she had reported chest discomfort is described as a pressure type sensation.  It is in the left side of the chest.  It is mild in intensity.  It tends to occur during times of stress.  She reports that she has had palpitations as well as lightheadedness and shortness of breath on exertion.  She reports that her symptoms were triggered about a month ago when her cousin who is her same age passed away.  She reports that she has begun to have problems focusing too much on her mortality and it is during these episodes where she has her pressure and discomfort.  She reports that the symptoms intensified about 2 weeks ago.  This appears to correlate quite well with the [place] holiday.  She reports that she had very little interaction with her family and she has no hobbies to occupy her time.  This coupled with the Covid shutdown leads the patient to be in active and with nothing to focus on other than her isolation and her recent cousin's passing.  She had been anxious on that day and emotional.    She underwent a nuclear stress test that was negative for provokable ischemia.  Ejection  "fraction was calculated at greater than 70%.  Her 2D echocardiogram demonstrated normal left ventricular systolic function with an EF of 60 to 65%.  Moderate pulmonic valve regurgitation was noted.  Stage I diastolic dysfunction and mild left ventricular hypertrophy were noted.  She had both of her Covid vaccinations.    Her blood pressure is elevated today but she reports better control at home.  She will continue to monitor this.  She has started wearing compression hosiery and has noticed a decrease in her edema.    Assessment & Plan    Impressions:  Chest pressure with typical and atypical features     Negative ischemic work-up February 2021  Dyspnea on exertion  Hypertension with a significant situational component  Palpitations  Dizziness and lightheadedness  Anxiety  Acid reflux  AVNRT status post radiofrequency ablation  Obesity with BMI of 41.5  Osteoarthritis of the knees    Recommendations:  Continuation of her current cardiovascular regimen at the present time.  Follow-up in 1 years time sooner should there be difficulties.    Objective:    Vitals:  Vitals:    03/10/21 1432   BP: (!) 188/82   BP Location: Left arm   Patient Position: Sitting   Cuff Size: Large Adult   Pulse: 78   Resp: 18   SpO2: 97%   Weight: 98 kg (216 lb)   Height: 154.9 cm (61\")       Physical Exam:    General: Alert, cooperative, no distress, appears stated age  Head:  Normocephalic, atraumatic, mucous membranes moist  Eyes:  Conjunctiva/corneas clear, EOM's intact     Neck:  Supple,  no bruit  Lungs: Clear to auscultation bilaterally, no wheezes rhonchi rales are noted  Chest wall: No tenderness  Heart::  Regular rate and rhythm, S1 and S2 normal, 1/6 holosystolic murmur.  No rub or gallop  Abdomen: Soft, non-tender, nondistended bowel sounds active  Extremities: No cyanosis, clubbing, or edema  Pulses: 2+ and symmetric all extremities  Skin:  No rashes or lesions  Neuro/psych: A&O x3. CN II through XII are grossly intact with " appropriate affect      Allergies:  Allergies   Allergen Reactions   • Adhesive Tape Rash       Medication Review:     Current Outpatient Medications:   •  ALPRAZolam (Xanax) 0.25 MG tablet, Take 1 tablet by mouth 2 (Two) Times a Day As Needed for Anxiety., Disp: 30 tablet, Rfl: 0  •  aspirin 81 MG EC tablet, Take 81 mg by mouth Daily., Disp: , Rfl:   •  Bioflavonoid Products (Vitamin C ER) 1000-100 MG tablet controlled-release, Take 1 capsule by mouth Daily., Disp: , Rfl:   •  doxepin (SINEquan) 10 MG capsule, Take 10 mg by mouth Every Night., Disp: , Rfl:   •  fluticasone (Flovent HFA) 110 MCG/ACT inhaler, Inhale 1 puff 2 (Two) Times a Day., Disp: 1 inhaler, Rfl: 1  •  ibuprofen (ADVIL,MOTRIN) 800 MG tablet, Take 800 mg by mouth 3 (Three) Times a Day., Disp: , Rfl:   •  melatonin 5 MG tablet tablet, Take 5 mg by mouth At Night As Needed., Disp: , Rfl:   •  Multiple Vitamin (MULTIVITAMIN) capsule, MULTIVITAMINS CAPS, Disp: , Rfl:   •  Omeprazole 20 MG tablet delayed-release, 15 mg., Disp: , Rfl:   •  potassium chloride 10 MEQ CR tablet, TAKE 1 TABLET BY MOUTH THREE TIMES DAILY, Disp: 270 tablet, Rfl: 0  •  valsartan-hydrochlorothiazide (DIOVAN-HCT) 160-25 MG per tablet, TAKE 1 TABLET BY MOUTH DAILY, Disp: 90 tablet, Rfl: 0  •  Vitamins A & D 5000-400 units capsule, , Disp: , Rfl:     Family History:  Family History   Problem Relation Age of Onset   • Arthritis Mother    • Diabetes Mother    • Heart disease Mother    • Emphysema Mother    • Kidney disease Father    • Arthritis Sister    • Anxiety disorder Sister    • Arthritis Brother    • Anxiety disorder Brother        Past Medical History:  Past Medical History:   Diagnosis Date   • Abnormal coagulation profile    • Abnormal EKG    • Abnormal laboratory test    • Acquired spondylolisthesis    • Arthritis    • Asthma    • Body aches    • Bronchitis, acute    • Chest pain     PRESSURE   • Chronic back pain    • Cough    • Depression with anxiety    • DJD  (degenerative joint disease)    • Essential hypertension    • Fatigue    • Hyperlipidemia    • Hypokalemia    • Low back pain    • Lymphocytic colitis    • Obesity    • Other specified disorders of bone density and structure, other site    • Panic disorder    • Paroxysmal SVT (supraventricular tachycardia) (CMS/HCC)    • Postmenopausal     9 YEARS   • Prediabetes    • SOB (shortness of breath)    • Unspecified injury of left Achilles tendon, initial encounter    • Vitamin B deficiency, unspecified    • Vitamin D deficiency, unspecified        Past surgical History:  Past Surgical History:   Procedure Laterality Date   • ACHILLES TENDON SURGERY Left 2018   • AV NODE ABLATION  08/2016    AVNRT RF ABLATION --8/16   • COLONOSCOPY      LAST 3 YEARS AGO   • FOOT SURGERY Right     TOE/FOOT SURGERY   • KNEE SURGERY Right     2/2 TORN MENICUS   • TUBAL ABDOMINAL LIGATION         Social History:  Social History     Socioeconomic History   • Marital status:      Spouse name: Not on file   • Number of children: Not on file   • Years of education: Not on file   • Highest education level: Not on file   Tobacco Use   • Smoking status: Never Smoker   • Smokeless tobacco: Never Used   Vaping Use   • Vaping Use: Never used   Substance and Sexual Activity   • Alcohol use: No   • Drug use: No   • Sexual activity: Defer       Review of Systems:  The following systems were reviewed as they relate to the cardiovascular system: Constitutional, Eyes, ENT, Cardiovascular, Respiratory, Gastrointestinal, Integumentary, Neurological, Psychiatric, Hematologic, Endocrine, Musculoskeletal, and Genitourinary. The pertinent cardiovascular findings are reported above with all other cardiovascular points within those systems being negative.    Diagnostic Study Review:     Current Electrocardiogram:  Procedures no new EKG.  EKG dated 1/5/2021 demonstrates sinus rhythm with a ventricular rate of 80 bpm.      NOTE: The following portions of the  patient's note were reviewed, confirmed and/or updated this visit as appropriate: History of present illness/Interval history, physical examination, assessment & plan, allergies, current medications, past family history, past medical history, past social history, past surgical history and problem list.

## 2021-03-18 ENCOUNTER — TELEPHONE (OUTPATIENT)
Dept: FAMILY MEDICINE CLINIC | Facility: CLINIC | Age: 71
End: 2021-03-18

## 2021-03-18 RX ORDER — MONTELUKAST SODIUM 10 MG/1
10 TABLET ORAL NIGHTLY
Qty: 30 TABLET | Refills: 5 | Status: SHIPPED | OUTPATIENT
Start: 2021-03-18 | End: 2021-03-18

## 2021-03-18 RX ORDER — MONTELUKAST SODIUM 10 MG/1
10 TABLET ORAL NIGHTLY
Qty: 90 TABLET | Refills: 3 | Status: SHIPPED | OUTPATIENT
Start: 2021-03-18 | End: 2021-04-01

## 2021-03-18 NOTE — TELEPHONE ENCOUNTER
----- Message from Radha Trujillo sent at 3/17/2021  5:44 PM EDT -----  Regarding: Prescription Question  Contact: [phone]  Wilber Suazo.  I have started using Flovent HFA 110mcg oral more regularly due to the fact that I weeze at night.  It's not severe, but the weezing is annoying however, when I do weeze and I don't weeze every night.  So, my issue is two-fold.  The price is well over $200 and more importantly, I believe I am having some side effects.  I'm pretty tired throughout the day, even though I slept well at night, slight dizziness and a slight feeling of nausea at times.  I am thinking this Rx may be too strong for me and that the steroids are affecting me.  Is there another one that can be prescribed for me with less side effects?  I do not have severe asthma, but only diagnosed as mild.  I am not a fan of steroids if I don't need them.    Thank you.    Radha Trujillo

## 2021-03-18 NOTE — TELEPHONE ENCOUNTER
I will probably need to see you to make a great call on this. I will try to send in singulair for you and you can take this at night.

## 2021-03-29 ENCOUNTER — TELEPHONE (OUTPATIENT)
Dept: FAMILY MEDICINE CLINIC | Facility: CLINIC | Age: 71
End: 2021-03-29

## 2021-04-01 ENCOUNTER — OFFICE VISIT (OUTPATIENT)
Dept: FAMILY MEDICINE CLINIC | Facility: CLINIC | Age: 71
End: 2021-04-01

## 2021-04-01 ENCOUNTER — TELEPHONE (OUTPATIENT)
Dept: FAMILY MEDICINE CLINIC | Facility: CLINIC | Age: 71
End: 2021-04-01

## 2021-04-01 VITALS
OXYGEN SATURATION: 98 % | WEIGHT: 220.2 LBS | DIASTOLIC BLOOD PRESSURE: 94 MMHG | HEIGHT: 61 IN | HEART RATE: 80 BPM | SYSTOLIC BLOOD PRESSURE: 170 MMHG | TEMPERATURE: 97.1 F | BODY MASS INDEX: 41.57 KG/M2

## 2021-04-01 DIAGNOSIS — I10 ESSENTIAL HYPERTENSION: ICD-10-CM

## 2021-04-01 DIAGNOSIS — F41.9 ANXIETY: ICD-10-CM

## 2021-04-01 DIAGNOSIS — J30.9 ALLERGIC RHINITIS, UNSPECIFIED SEASONALITY, UNSPECIFIED TRIGGER: ICD-10-CM

## 2021-04-01 DIAGNOSIS — I10 ESSENTIAL HYPERTENSION: Primary | ICD-10-CM

## 2021-04-01 PROCEDURE — 99214 OFFICE O/P EST MOD 30 MIN: CPT | Performed by: NURSE PRACTITIONER

## 2021-04-01 RX ORDER — AMLODIPINE BESYLATE 5 MG/1
5 TABLET ORAL DAILY
Qty: 30 TABLET | Refills: 3 | Status: SHIPPED | OUTPATIENT
Start: 2021-04-01 | End: 2021-04-01

## 2021-04-01 RX ORDER — AMLODIPINE BESYLATE 5 MG/1
5 TABLET ORAL DAILY
Qty: 90 TABLET | Refills: 1 | Status: SHIPPED | OUTPATIENT
Start: 2021-04-01 | End: 2021-05-20

## 2021-04-01 RX ORDER — ESCITALOPRAM OXALATE 10 MG/1
10 TABLET ORAL DAILY
Qty: 90 TABLET | Refills: 1 | Status: SHIPPED | OUTPATIENT
Start: 2021-04-01 | End: 2021-07-28

## 2021-04-01 NOTE — PROGRESS NOTES
"Subjective   Radha Trujillo is a 70 y.o. female.     Chief Complaint   Patient presents with   • Allergic Rhinitis     sinus pressure-light headedness    • Allergies   • Fatigue       /94 (BP Location: Left arm, Patient Position: Sitting, Cuff Size: Adult)   Pulse 80   Temp 97.1 °F (36.2 °C) (Skin)   Ht 154.9 cm (61\")   Wt 99.9 kg (220 lb 3.2 oz)   SpO2 98%   BMI 41.61 kg/m²     BP Readings from Last 3 Encounters:   04/01/21 170/94   03/10/21 (!) 188/82   01/25/21 177/92       Wt Readings from Last 3 Encounters:   04/01/21 99.9 kg (220 lb 3.2 oz)   03/10/21 98 kg (216 lb)   01/25/21 99.8 kg (220 lb)       Pt comes in today with c/o allergies, lightheadedness, HA, fatigue.   Not sure what is going on with her. Feels it is either allergies or sinus infection. Not blowing much out of her nose. No cough.   Using nasocort and just recently started allegra a couple of days ago. Looks like she was prescribed singulair at 1 time, but stopped it after she states it was causing nightmares.    BP is elevated today, and looks like it has been elevated last few appts. Explained that her symptoms could also be associated with high BP. She is currently on Diovan HCT that she has been taking for about 20+ years.     Has been under a lot of stress lately.  has been sick with cancer and caring for him. Stressed about the events of our country, election, covid. Cries often. Doesn't sleep well. When asked about anxiety and depression she says she does struggle and thinks that causes her to worry more when she is feeling bad like now. Has been on lexapro in the past, and has worked well for her. Open to trying it again.     Allergic Rhinitis  She complains of fatigue and sneezing. She reports no congestion, cough, ear pain, rhinorrhea, sinus pressure or sore throat. Her past medical history is significant for allergies.   Allergies  Associated symptoms include fatigue. Pertinent negatives include no chest pain, " congestion, coughing or sore throat.   Fatigue  Associated symptoms include fatigue. Pertinent negatives include no chest pain, congestion, coughing or sore throat.        The following portions of the patient's history were reviewed and updated as appropriate: allergies, current medications, past family history, past medical history, past social history, past surgical history and problem list.    Review of Systems   Constitutional: Positive for fatigue.   HENT: Positive for postnasal drip and sneezing. Negative for congestion, ear pain, rhinorrhea, sinus pressure and sore throat.    Eyes: Negative for blurred vision.   Respiratory: Negative for cough and shortness of breath.    Cardiovascular: Negative for chest pain and palpitations.   Neurological: Positive for light-headedness and headache. Negative for dizziness.   Psychiatric/Behavioral: Positive for sleep disturbance and stress. Negative for suicidal ideas. The patient is nervous/anxious.        Objective   Physical Exam  Constitutional:       Appearance: She is well-developed.   HENT:      Nose:      Right Sinus: No maxillary sinus tenderness or frontal sinus tenderness.      Left Sinus: No maxillary sinus tenderness or frontal sinus tenderness.   Eyes:      Pupils: Pupils are equal, round, and reactive to light.   Cardiovascular:      Rate and Rhythm: Normal rate and regular rhythm.   Pulmonary:      Effort: Pulmonary effort is normal.      Breath sounds: Normal breath sounds.   Neurological:      Mental Status: She is alert and oriented to person, place, and time.   Psychiatric:         Attention and Perception: Attention normal.         Mood and Affect: Mood is anxious. Affect is tearful.         Speech: Speech normal.         Behavior: Behavior normal.           Diagnoses and all orders for this visit:    1. Essential hypertension (Primary)  Assessment & Plan:  Hypertension is worsening.  Dietary sodium restriction.  Weight loss.  Regular aerobic  exercise.  Medication changes per orders.  Blood pressure will be reassessed in 4 weeks.  Repeat /92. Will add amlodipine.   Pt states she monitors BP at home and typically much lower. Could have white coat syndrome.     Orders:  -     Discontinue: amLODIPine (NORVASC) 5 MG tablet; Take 1 tablet by mouth Daily.  Dispense: 30 tablet; Refill: 3    2. Anxiety  Assessment & Plan:  Will restart lexapro and re-evaluate in 4 weeks. Instructed pt take it at night.     Orders:  -     escitalopram (Lexapro) 10 MG tablet; Take 1 tablet by mouth Daily.  Dispense: 90 tablet; Refill: 1    3. Allergic rhinitis, unspecified seasonality, unspecified trigger  Comments:  cont allegra and nasocort     During this office visit, we discussed the pertinent aspects of the visit and treatment recommendations. Pt verbalizes understanding. Follow up was discussed. Patient was given the opportunity to ask questions and discuss other concerns.     Return in 4 weeks (on 4/29/2021) for HTN follow up.

## 2021-04-01 NOTE — TELEPHONE ENCOUNTER
----- Message from Radha Trujillo sent at 4/1/2021  9:05 AM EDT -----  Regarding: Non-Urgent Medical Question  Contact: [phone]  I would cem to know if allergies make a person feel lightheaded and a foggy feeling in their head?

## 2021-04-02 PROBLEM — I10 ESSENTIAL HYPERTENSION: Status: ACTIVE | Noted: 2021-04-02

## 2021-04-02 PROBLEM — F39 MOOD DISORDER: Status: ACTIVE | Noted: 2021-04-02

## 2021-04-02 PROBLEM — F41.9 ANXIETY: Status: ACTIVE | Noted: 2021-04-02

## 2021-04-02 PROBLEM — F39 MOOD DISORDER: Status: RESOLVED | Noted: 2021-04-02 | Resolved: 2021-04-02

## 2021-04-02 NOTE — ASSESSMENT & PLAN NOTE
Hypertension is worsening.  Dietary sodium restriction.  Weight loss.  Regular aerobic exercise.  Medication changes per orders.  Blood pressure will be reassessed in 4 weeks.  Repeat /92. Will add amlodipine.   Pt states she monitors BP at home and typically much lower. Could have white coat syndrome.

## 2021-04-29 ENCOUNTER — OFFICE VISIT (OUTPATIENT)
Dept: FAMILY MEDICINE CLINIC | Facility: CLINIC | Age: 71
End: 2021-04-29

## 2021-04-29 VITALS
HEIGHT: 61 IN | OXYGEN SATURATION: 98 % | HEART RATE: 72 BPM | DIASTOLIC BLOOD PRESSURE: 96 MMHG | TEMPERATURE: 97.3 F | WEIGHT: 218.2 LBS | BODY MASS INDEX: 41.19 KG/M2 | SYSTOLIC BLOOD PRESSURE: 160 MMHG

## 2021-04-29 DIAGNOSIS — I10 ESSENTIAL HYPERTENSION: Primary | ICD-10-CM

## 2021-04-29 DIAGNOSIS — F41.8 MIXED ANXIETY DEPRESSIVE DISORDER: ICD-10-CM

## 2021-04-29 DIAGNOSIS — E66.01 MORBID OBESITY WITH BMI OF 40.0-44.9, ADULT (HCC): ICD-10-CM

## 2021-04-29 PROBLEM — E53.9 VITAMIN B DEFICIENCY: Status: ACTIVE | Noted: 2019-01-14

## 2021-04-29 PROBLEM — E78.5 HYPERLIPIDEMIA: Status: ACTIVE | Noted: 2019-01-14

## 2021-04-29 PROBLEM — E87.6 HYPOKALEMIA: Status: ACTIVE | Noted: 2018-04-06

## 2021-04-29 PROCEDURE — 99213 OFFICE O/P EST LOW 20 MIN: CPT | Performed by: NURSE PRACTITIONER

## 2021-04-29 RX ORDER — VITAMIN B COMPLEX
CAPSULE ORAL DAILY
COMMUNITY
End: 2021-07-28

## 2021-04-29 NOTE — ASSESSMENT & PLAN NOTE
BP was repeated several times and compared to pt's home monitors (both wrist and arm cuff).   Repeat readings: 142/80 and 138/82.   We will cont same medication therapy and cont to keep diary.

## 2021-04-29 NOTE — ASSESSMENT & PLAN NOTE
Patient's depression is recurrent and is mild without psychosis. Their depression is currently in partial remission and the condition is improving with treatment. This will be reassessed at the next regular appointment. F/U as described:patient will continue current medication therapy, patient was prescribed an antidepressant medicine, patient referred to Mental Health Specialist and patient depression is being managed by their pcp.  Will cont lexapro for now. Recommendations of counselors given to pt.

## 2021-04-29 NOTE — PROGRESS NOTES
"Subjective   Radha Trujillo is a 70 y.o. female.     Chief Complaint   Patient presents with   • Hypertension       /96 (BP Location: Left arm, Patient Position: Sitting, Cuff Size: Adult)   Pulse 72   Temp 97.3 °F (36.3 °C) (Skin)   Ht 154.9 cm (61\")   Wt 99 kg (218 lb 3.2 oz)   SpO2 98%   BMI 41.23 kg/m²     BP Readings from Last 3 Encounters:   04/29/21 160/96   04/01/21 170/94   03/10/21 (!) 188/82       Wt Readings from Last 3 Encounters:   04/29/21 99 kg (218 lb 3.2 oz)   04/01/21 99.9 kg (220 lb 3.2 oz)   03/10/21 98 kg (216 lb)       Pt comes in today for follow up on BP.   At last appt we had added amlodipine and also started lexapro.   Has been dealing with a lot of anxiety and stress lately.   Has been struggling with daughter whom lives with her. In a bad relationship and mother worries about her. Feels like she is always bailing her out of bad situations.   lexapro is helping some. Would be interested in counseling as well.   At last appt pt was having trouble with dizziness and lightheadedness and that has since improved.   She is monitoring her BP at home and getting readings in the 110-130s/80s. Has improved since starting norvasc. She denies any CP, SOA, palpitatins, dizziness or HA's.        The following portions of the patient's history were reviewed and updated as appropriate: allergies, current medications, past family history, past medical history, past social history, past surgical history and problem list.    Review of Systems   Constitutional: Negative for fatigue.   Eyes: Negative for blurred vision.   Respiratory: Negative for shortness of breath.    Cardiovascular: Negative for chest pain and palpitations.   Neurological: Negative for dizziness and headache.   Psychiatric/Behavioral: Positive for depressed mood and stress.       Objective   Physical Exam  Constitutional:       Appearance: She is well-developed.   Eyes:      Pupils: Pupils are equal, round, and reactive to " light.   Cardiovascular:      Rate and Rhythm: Normal rate and regular rhythm.   Pulmonary:      Effort: Pulmonary effort is normal.      Breath sounds: Normal breath sounds.   Neurological:      Mental Status: She is alert and oriented to person, place, and time.   Psychiatric:         Mood and Affect: Mood normal.         Behavior: Behavior normal.           Diagnoses and all orders for this visit:    1. Essential hypertension (Primary)  Assessment & Plan:  BP was repeated several times and compared to pt's home monitors (both wrist and arm cuff).   Repeat readings: 142/80 and 138/82.   We will cont same medication therapy and cont to keep diary.       2. Morbid obesity with BMI of 40.0-44.9, adult (CMS/[facility])    3. Mixed anxiety depressive disorder  Assessment & Plan:  Patient's depression is recurrent and is mild without psychosis. Their depression is currently in partial remission and the condition is improving with treatment. This will be reassessed at the next regular appointment. F/U as described:patient will continue current medication therapy, patient was prescribed an antidepressant medicine, patient referred to Mental Health Specialist and patient depression is being managed by their pcp.  Will cont lexapro for now. Recommendations of counselors given to pt.       During this office visit, we discussed the pertinent aspects of the visit and treatment recommendations. Pt verbalizes understanding. Follow up was discussed. Patient was given the opportunity to ask questions and discuss other concerns.     Return in about 3 months (around 7/29/2021) for Medicare Wellness.

## 2021-05-10 RX ORDER — VALSARTAN AND HYDROCHLOROTHIAZIDE 160; 25 MG/1; MG/1
TABLET ORAL
Qty: 90 TABLET | Refills: 0 | Status: SHIPPED | OUTPATIENT
Start: 2021-05-10 | End: 2021-07-28

## 2021-05-10 RX ORDER — POTASSIUM CHLORIDE 750 MG/1
TABLET, FILM COATED, EXTENDED RELEASE ORAL
Qty: 270 TABLET | Refills: 0 | Status: SHIPPED | OUTPATIENT
Start: 2021-05-10 | End: 2021-08-06

## 2021-05-20 DIAGNOSIS — I10 ESSENTIAL HYPERTENSION: ICD-10-CM

## 2021-05-20 RX ORDER — AMLODIPINE BESYLATE 5 MG/1
5 TABLET ORAL DAILY
Qty: 90 TABLET | Refills: 1 | Status: SHIPPED | OUTPATIENT
Start: 2021-05-20 | End: 2021-11-11

## 2021-07-28 ENCOUNTER — LAB (OUTPATIENT)
Dept: FAMILY MEDICINE CLINIC | Facility: CLINIC | Age: 71
End: 2021-07-28

## 2021-07-28 ENCOUNTER — OFFICE VISIT (OUTPATIENT)
Dept: FAMILY MEDICINE CLINIC | Facility: CLINIC | Age: 71
End: 2021-07-28

## 2021-07-28 VITALS
SYSTOLIC BLOOD PRESSURE: 150 MMHG | HEART RATE: 69 BPM | WEIGHT: 215 LBS | BODY MASS INDEX: 40.59 KG/M2 | TEMPERATURE: 97.8 F | OXYGEN SATURATION: 98 % | DIASTOLIC BLOOD PRESSURE: 90 MMHG | HEIGHT: 61 IN

## 2021-07-28 DIAGNOSIS — Z11.59 NEED FOR HEPATITIS C SCREENING TEST: ICD-10-CM

## 2021-07-28 DIAGNOSIS — Z12.11 ENCOUNTER FOR SCREENING FOR MALIGNANT NEOPLASM OF COLON: ICD-10-CM

## 2021-07-28 DIAGNOSIS — Z12.31 ENCOUNTER FOR SCREENING MAMMOGRAM FOR MALIGNANT NEOPLASM OF BREAST: ICD-10-CM

## 2021-07-28 DIAGNOSIS — Z00.00 MEDICARE ANNUAL WELLNESS VISIT, SUBSEQUENT: ICD-10-CM

## 2021-07-28 DIAGNOSIS — Z78.0 POSTMENOPAUSE: ICD-10-CM

## 2021-07-28 DIAGNOSIS — I10 ESSENTIAL HYPERTENSION: ICD-10-CM

## 2021-07-28 DIAGNOSIS — Z00.00 MEDICARE ANNUAL WELLNESS VISIT, SUBSEQUENT: Primary | ICD-10-CM

## 2021-07-28 LAB
ALBUMIN SERPL-MCNC: 4.5 G/DL (ref 3.5–5.2)
ALBUMIN/GLOB SERPL: 1.6 G/DL
ALP SERPL-CCNC: 86 U/L (ref 39–117)
ALT SERPL W P-5'-P-CCNC: 20 U/L (ref 1–33)
ANION GAP SERPL CALCULATED.3IONS-SCNC: 9 MMOL/L (ref 5–15)
AST SERPL-CCNC: 19 U/L (ref 1–32)
BASOPHILS # BLD AUTO: 0.03 10*3/MM3 (ref 0–0.2)
BASOPHILS NFR BLD AUTO: 0.6 % (ref 0–1.5)
BILIRUB SERPL-MCNC: 0.3 MG/DL (ref 0–1.2)
BUN SERPL-MCNC: 9 MG/DL (ref 8–23)
BUN/CREAT SERPL: 11 (ref 7–25)
CALCIUM SPEC-SCNC: 9.5 MG/DL (ref 8.6–10.5)
CHLORIDE SERPL-SCNC: 99 MMOL/L (ref 98–107)
CHOLEST SERPL-MCNC: 182 MG/DL (ref 0–200)
CO2 SERPL-SCNC: 29 MMOL/L (ref 22–29)
CREAT SERPL-MCNC: 0.82 MG/DL (ref 0.57–1)
DEPRECATED RDW RBC AUTO: 41.3 FL (ref 37–54)
EOSINOPHIL # BLD AUTO: 0.13 10*3/MM3 (ref 0–0.4)
EOSINOPHIL NFR BLD AUTO: 2.6 % (ref 0.3–6.2)
ERYTHROCYTE [DISTWIDTH] IN BLOOD BY AUTOMATED COUNT: 13.6 % (ref 12.3–15.4)
GFR SERPL CREATININE-BSD FRML MDRD: 69 ML/MIN/1.73
GLOBULIN UR ELPH-MCNC: 2.9 GM/DL
GLUCOSE SERPL-MCNC: 109 MG/DL (ref 65–99)
HCT VFR BLD AUTO: 41.1 % (ref 34–46.6)
HCV AB SER DONR QL: NORMAL
HDLC SERPL-MCNC: 74 MG/DL (ref 40–60)
HGB BLD-MCNC: 13.5 G/DL (ref 12–15.9)
IMM GRANULOCYTES # BLD AUTO: 0 10*3/MM3 (ref 0–0.05)
IMM GRANULOCYTES NFR BLD AUTO: 0 % (ref 0–0.5)
LDLC SERPL CALC-MCNC: 90 MG/DL (ref 0–100)
LDLC/HDLC SERPL: 1.19 {RATIO}
LYMPHOCYTES # BLD AUTO: 1.34 10*3/MM3 (ref 0.7–3.1)
LYMPHOCYTES NFR BLD AUTO: 26.9 % (ref 19.6–45.3)
MCH RBC QN AUTO: 27.4 PG (ref 26.6–33)
MCHC RBC AUTO-ENTMCNC: 32.8 G/DL (ref 31.5–35.7)
MCV RBC AUTO: 83.5 FL (ref 79–97)
MONOCYTES # BLD AUTO: 0.37 10*3/MM3 (ref 0.1–0.9)
MONOCYTES NFR BLD AUTO: 7.4 % (ref 5–12)
NEUTROPHILS NFR BLD AUTO: 3.11 10*3/MM3 (ref 1.7–7)
NEUTROPHILS NFR BLD AUTO: 62.5 % (ref 42.7–76)
NRBC BLD AUTO-RTO: 0 /100 WBC (ref 0–0.2)
PLATELET # BLD AUTO: 280 10*3/MM3 (ref 140–450)
PMV BLD AUTO: 11.7 FL (ref 6–12)
POTASSIUM SERPL-SCNC: 3.5 MMOL/L (ref 3.5–5.2)
PROT SERPL-MCNC: 7.4 G/DL (ref 6–8.5)
RBC # BLD AUTO: 4.92 10*6/MM3 (ref 3.77–5.28)
SODIUM SERPL-SCNC: 137 MMOL/L (ref 136–145)
TRIGL SERPL-MCNC: 101 MG/DL (ref 0–150)
TSH SERPL DL<=0.05 MIU/L-ACNC: 1.42 UIU/ML (ref 0.27–4.2)
VLDLC SERPL-MCNC: 18 MG/DL (ref 5–40)
WBC # BLD AUTO: 4.98 10*3/MM3 (ref 3.4–10.8)

## 2021-07-28 PROCEDURE — 86803 HEPATITIS C AB TEST: CPT | Performed by: NURSE PRACTITIONER

## 2021-07-28 PROCEDURE — G0439 PPPS, SUBSEQ VISIT: HCPCS | Performed by: NURSE PRACTITIONER

## 2021-07-28 PROCEDURE — 36415 COLL VENOUS BLD VENIPUNCTURE: CPT

## 2021-07-28 PROCEDURE — 80053 COMPREHEN METABOLIC PANEL: CPT | Performed by: NURSE PRACTITIONER

## 2021-07-28 PROCEDURE — 99213 OFFICE O/P EST LOW 20 MIN: CPT | Performed by: NURSE PRACTITIONER

## 2021-07-28 PROCEDURE — 85025 COMPLETE CBC W/AUTO DIFF WBC: CPT | Performed by: NURSE PRACTITIONER

## 2021-07-28 PROCEDURE — 84443 ASSAY THYROID STIM HORMONE: CPT | Performed by: NURSE PRACTITIONER

## 2021-07-28 PROCEDURE — 80061 LIPID PANEL: CPT | Performed by: NURSE PRACTITIONER

## 2021-07-28 RX ORDER — VALSARTAN AND HYDROCHLOROTHIAZIDE 320; 25 MG/1; MG/1
1 TABLET, FILM COATED ORAL DAILY
Qty: 90 TABLET | Refills: 3 | Status: SHIPPED | OUTPATIENT
Start: 2021-07-28 | End: 2022-06-20

## 2021-07-28 RX ORDER — VITAMIN B COMPLEX
1 CAPSULE ORAL DAILY
COMMUNITY
End: 2022-03-15

## 2021-07-28 NOTE — PROGRESS NOTES
The ABCs of the Annual Wellness Visit  Subsequent Medicare Wellness Visit    Chief Complaint   Patient presents with   • Medicare Wellness-subsequent       Subjective   History of Present Illness:  Radha Trujillo is a 70 y.o. female who presents for a Subsequent Medicare Wellness Visit and other issues.  HTN:   Saw Dr. WASHINGTON at ENT and recently had in home sleep study, but hasnt received results yet   YONY knee pain: seeing ortho and getting injections currently. Needing replacement. Trying to put it off as long as possible.   Working on diet. Recently started nutrisystem.   Lumbar spinal stenosis: seeing Dr. Friend starting tomorrow.     HEALTH RISK ASSESSMENT    Recent Hospitalizations:  No hospitalization(s) within the last year.    Current Medical Providers:  Patient Care Team:  Maribel Hale APRN as PCP - General (Nurse Practitioner)    Smoking Status:  Social History     Tobacco Use   Smoking Status Never Smoker   Smokeless Tobacco Never Used       Alcohol Consumption:  Social History     Substance and Sexual Activity   Alcohol Use No       Depression Screen:   PHQ-2/PHQ-9 Depression Screening 7/28/2021   Little interest or pleasure in doing things 0   Feeling down, depressed, or hopeless 0   Total Score 0       Fall Risk Screen:  STEADI Fall Risk Assessment was completed, and patient is at MODERATE risk for falls. Assessment completed on:7/28/2021    Health Habits and Functional and Cognitive Screening:  Functional & Cognitive Status 7/28/2021   Do you have difficulty preparing food and eating? No   Do you have difficulty bathing yourself, getting dressed or grooming yourself? No   Do you have difficulty using the toilet? No   Do you have difficulty moving around from place to place? No   Do you have trouble with steps or getting out of a bed or a chair? No   Current Diet Well Balanced Diet   Dental Exam Up to date   Eye Exam Up to date   Exercise (times per week) 1 times per week   Current Exercises Include  Stationary Bicycling/Spin Class   Current Exercise Activities Include -   Do you need help using the phone?  No   Are you deaf or do you have serious difficulty hearing?  No   Do you need help with transportation? No   Do you need help shopping? No   Do you need help preparing meals?  No   Do you need help with housework?  No   Do you need help with laundry? No   Do you need help taking your medications? No   Do you need help managing money? No   Do you ever drive or ride in a car without wearing a seat belt? No   Have you felt unusual stress, anger or loneliness in the last month? No   Who do you live with? Spouse   If you need help, do you have trouble finding someone available to you? No   Have you been bothered in the last four weeks by sexual problems? No   Do you have difficulty concentrating, remembering or making decisions? No         Does the patient have evidence of cognitive impairment? No    Asprin use counseling:Start ASA 81 mg daily     Age-appropriate Screening Schedule:  Refer to the list below for future screening recommendations based on patient's age, sex and/or medical conditions. Orders for these recommended tests are listed in the plan section. The patient has been provided with a written plan.    Health Maintenance   Topic Date Due   • DXA SCAN  05/29/2021   • MAMMOGRAM  07/02/2021   • TDAP/TD VACCINES (1 - Tdap) 07/28/2021 (Originally 8/28/1969)   • ZOSTER VACCINE (1 of 2) 08/06/2022 (Originally 8/28/2000)   • INFLUENZA VACCINE  10/01/2021   • LIPID PANEL  11/30/2021          The following portions of the patient's history were reviewed and updated as appropriate: allergies, current medications, past family history, past medical history, past social history, past surgical history and problem list.    Outpatient Medications Prior to Visit   Medication Sig Dispense Refill   • amLODIPine (NORVASC) 5 MG tablet TAKE 1 TABLET BY MOUTH DAILY 90 tablet 1   • aspirin 81 MG EC tablet Take 81 mg by  mouth Daily.     • Bioflavonoid Products (Vitamin C ER) 1000-100 MG tablet controlled-release Take 1 capsule by mouth Daily.     • fluticasone (Flovent HFA) 110 MCG/ACT inhaler Inhale 1 puff 2 (Two) Times a Day. 1 inhaler 1   • melatonin 5 MG tablet tablet Take 5 mg by mouth At Night As Needed.     • Multiple Vitamin (MULTIVITAMIN) capsule MULTIVITAMINS CAPS     • Omeprazole 20 MG tablet delayed-release 15 mg.     • potassium chloride 10 MEQ CR tablet TAKE 1 TABLET BY MOUTH THREE TIMES DAILY 270 tablet 0   • Vitamins A & D 5000-400 units capsule      • valsartan-hydrochlorothiazide (DIOVAN-HCT) 160-25 MG per tablet TAKE 1 TABLET BY MOUTH DAILY 90 tablet 0   • B Complex Vitamins (vitamin b complex) capsule capsule Take  by mouth Daily.     • B Complex Vitamins (vitamin b complex) capsule capsule Take  by mouth Daily.     • doxepin (SINEquan) 10 MG capsule Take 10 mg by mouth Every Night.     • escitalopram (Lexapro) 10 MG tablet Take 1 tablet by mouth Daily. 90 tablet 1   • ibuprofen (ADVIL,MOTRIN) 800 MG tablet Take 800 mg by mouth 3 (Three) Times a Day.       No facility-administered medications prior to visit.       Patient Active Problem List   Diagnosis   • Primary osteoarthritis of both knees   • Palpitations   • History of radiofrequency ablation (RFA) procedure for cardiac arrhythmia   • Chest pain   • Dyspnea on exertion   • Morbid obesity with BMI of 40.0-44.9, adult (CMS/[facility])   • Essential hypertension   • Anxiety   • Impaired glucose tolerance   • Hyperlipidemia   • Hypokalemia   • Mixed anxiety depressive disorder   • Vitamin B deficiency       Advanced Care Planning:  ACP discussion was held with the patient during this visit. Patient does not have an advance directive, information provided.    Review of Systems    Compared to one year ago, the patient feels her physical health is the same.  Compared to one year ago, the patient feels her mental health is better.    Reviewed chart for potential of high  "risk medication in the elderly: yes  Reviewed chart for potential of harmful drug interactions in the elderly:yes    Objective         Vitals:    07/28/21 1045   BP: 150/90   BP Location: Right arm   Patient Position: Sitting   Cuff Size: Adult   Pulse: 69   Temp: 97.8 °F (36.6 °C)   TempSrc: Skin   SpO2: 98%   Weight: 97.5 kg (215 lb)   Height: 154.9 cm (61\")       Body mass index is 40.62 kg/m².  Discussed the patient's BMI with her. The BMI is above average; BMI management plan is completed.    Physical Exam  Constitutional:       Appearance: She is well-developed.   HENT:      Head: Normocephalic.   Eyes:      Conjunctiva/sclera: Conjunctivae normal.      Pupils: Pupils are equal, round, and reactive to light.   Neck:      Thyroid: No thyromegaly.   Cardiovascular:      Rate and Rhythm: Normal rate and regular rhythm.      Heart sounds: No murmur heard.     Pulmonary:      Effort: Pulmonary effort is normal.      Breath sounds: Normal breath sounds.   Abdominal:      General: Bowel sounds are normal.      Palpations: Abdomen is soft. There is no mass.      Tenderness: There is no abdominal tenderness.   Musculoskeletal:      Cervical back: Neck supple.   Skin:     General: Skin is warm and dry.      Findings: No lesion.   Neurological:      Mental Status: She is alert and oriented to person, place, and time.   Psychiatric:         Behavior: Behavior normal.               Assessment/Plan   Medicare Risks and Personalized Health Plan  CMS Preventative Services Quick Reference  Advance Directive Discussion  Breast Cancer/Mammogram Screening  Cardiovascular risk  Colon Cancer Screening  Dementia/Memory   Depression/Dysphoria  Diabetic Lab Screening   Fall Risk  Glaucoma Risk  Hearing Problem  Obesity/Overweight   Osteoporosis Risk    The above risks/problems have been discussed with the patient.  Pertinent information has been shared with the patient in the After Visit Summary.  Follow up plans and orders are seen " below in the Assessment/Plan Section.    Diagnoses and all orders for this visit:    1. Medicare annual wellness visit, subsequent (Primary)  -     Hepatitis C Antibody; Future  -     Comprehensive Metabolic Panel; Future  -     CBC & Differential; Future  -     Lipid Panel; Future  -     TSH; Future    2. Essential hypertension  Assessment & Plan:  Hypertension is unchanged.  Dietary sodium restriction.  Weight loss.  Regular aerobic exercise.  Medication changes per orders.  Blood pressure will be reassessed in 3 months.  increase valsartan to 320mg     Orders:  -     valsartan-hydrochlorothiazide (DIOVAN-HCT) 320-25 MG per tablet; Take 1 tablet by mouth Daily.  Dispense: 90 tablet; Refill: 3    3. Encounter for screening for malignant neoplasm of colon  -     Ambulatory Referral For Screening Colonoscopy    4. Postmenopause  -     DEXA Bone Density Axial; Future    5. Encounter for screening mammogram for malignant neoplasm of breast  -     Mammo Screening Digital Tomosynthesis Bilateral With CAD; Future    6. Need for hepatitis C screening test  -     Hepatitis C Antibody; Future  increase valsartan  Check labs  Mammogram  dexa  Colonoscopy  Discussed importance of regular exercise and recommended starting or continuing a regular exercise program for good health. The patient was also encouraged to lose weight for better health.   During this visit for their annual exam, we reviewed their personal history, social history and family history. We went over their medications and all the recommended health maintenance items for their age group. They were given the opportunity to ask questions and discuss other concerns.     Follow Up:  No follow-ups on file.     An After Visit Summary and PPPS were given to the patient.

## 2021-07-28 NOTE — ASSESSMENT & PLAN NOTE
Hypertension is unchanged.  Dietary sodium restriction.  Weight loss.  Regular aerobic exercise.  Medication changes per orders.  Blood pressure will be reassessed in 3 months.  increase valsartan to 320mg

## 2021-08-06 RX ORDER — POTASSIUM CHLORIDE 750 MG/1
TABLET, FILM COATED, EXTENDED RELEASE ORAL
Qty: 270 TABLET | Refills: 0 | Status: SHIPPED | OUTPATIENT
Start: 2021-08-06 | End: 2021-11-04

## 2021-08-06 RX ORDER — VALSARTAN AND HYDROCHLOROTHIAZIDE 160; 25 MG/1; MG/1
TABLET ORAL
Qty: 90 TABLET | Refills: 0 | OUTPATIENT
Start: 2021-08-06

## 2021-09-21 ENCOUNTER — OFFICE VISIT (OUTPATIENT)
Dept: FAMILY MEDICINE CLINIC | Facility: CLINIC | Age: 71
End: 2021-09-21

## 2021-09-21 VITALS
TEMPERATURE: 97.5 F | OXYGEN SATURATION: 98 % | HEIGHT: 61 IN | SYSTOLIC BLOOD PRESSURE: 146 MMHG | WEIGHT: 214.6 LBS | HEART RATE: 84 BPM | DIASTOLIC BLOOD PRESSURE: 84 MMHG | BODY MASS INDEX: 40.52 KG/M2

## 2021-09-21 DIAGNOSIS — I10 ESSENTIAL HYPERTENSION: Primary | ICD-10-CM

## 2021-09-21 PROCEDURE — 99213 OFFICE O/P EST LOW 20 MIN: CPT | Performed by: NURSE PRACTITIONER

## 2021-09-21 NOTE — PROGRESS NOTES
"Chief Complaint  3 month follow up (hypertensions)  Subjective        Radha Trujillo presents to [facility]  Pt comes in today for follow up on BP. At last appt we had increased her valsartan to 320mg. Bp is improved. Tolerating it with no neg side effects. Denies any CP, SOA, palpitations, dizziness, or HA's.   No specific concerns today.        Objective     Vital Signs:   /84 (BP Location: Right arm, Patient Position: Sitting, Cuff Size: Adult)   Pulse 84   Temp 97.5 °F (36.4 °C) (Skin)   Ht 154.9 cm (61\")   Wt 97.3 kg (214 lb 9.6 oz)   SpO2 98%   BMI 40.55 kg/m²       BP Readings from Last 3 Encounters:   09/21/21 146/84   07/28/21 150/90   04/29/21 160/96       Wt Readings from Last 3 Encounters:   09/21/21 97.3 kg (214 lb 9.6 oz)   07/28/21 97.5 kg (215 lb)   04/29/21 99 kg (218 lb 3.2 oz)     Physical Exam  Constitutional:       Appearance: She is well-developed.   Eyes:      Pupils: Pupils are equal, round, and reactive to light.   Cardiovascular:      Rate and Rhythm: Normal rate and regular rhythm.   Pulmonary:      Effort: Pulmonary effort is normal.      Breath sounds: Normal breath sounds.   Neurological:      Mental Status: She is alert and oriented to person, place, and time.        Result Review :   The following data was reviewed by: RICKY Mahoney on 09/21/2021:  Common labs    Common Labsle 11/30/20 11/30/20 11/30/20 11/30/20 7/28/21 7/28/21 7/28/21    1000 1000 1000 1000 1110 1110 1110   Glucose   102 (A)   109 (A)    BUN   12   9    Creatinine   0.84   0.82    eGFR Non African Am   67   69    Sodium   141   137    Potassium   3.8   3.5    Chloride   101   99    Calcium   9.8   9.5    Albumin   4.70   4.50    Total Bilirubin   0.2   0.3    Alkaline Phosphatase   82   86    AST (SGOT)   19   19    ALT (SGPT)   19   20    WBC  5.13   4.98     Hemoglobin  13.9   13.5     Hematocrit  42.6   41.1     Platelets  286   280     Total Cholesterol    " 205 (A)   182   Triglycerides    73   101   HDL Cholesterol    89 (A)   74 (A)   LDL Cholesterol     103 (A)   90   Hemoglobin A1C 5.9 (A)         (A) Abnormal value                      Assessment and Plan    Diagnoses and all orders for this visit:    1. Essential hypertension (Primary)  Assessment & Plan:  Hypertension is improving with treatment.  Continue current treatment regimen.  Dietary sodium restriction.  Weight loss.  Regular aerobic exercise.  Continue current medications.  Ambulatory blood pressure monitoring.  Blood pressure will be reassessed at the next regular appointment.  Repeat Bp 134/80. Will cont same regimen. Will monitor BP at home and f/u in 6 months.     During this office visit, we discussed the pertinent aspects of the visit and treatment recommendations. Pt verbalizes understanding. Follow up was discussed. Patient was given the opportunity to ask questions and discuss other concerns.         Follow Up   Return in about 6 months (around 3/21/2022).  Patient was given instructions and counseling regarding her condition or for health maintenance advice. Please see specific information pulled into the AVS if appropriate.

## 2021-09-21 NOTE — ASSESSMENT & PLAN NOTE
Hypertension is improving with treatment.  Continue current treatment regimen.  Dietary sodium restriction.  Weight loss.  Regular aerobic exercise.  Continue current medications.  Ambulatory blood pressure monitoring.  Blood pressure will be reassessed at the next regular appointment.  Repeat Bp 134/80. Will cont same regimen. Will monitor BP at home and f/u in 6 months.

## 2021-10-15 ENCOUNTER — TELEPHONE (OUTPATIENT)
Dept: CARDIOLOGY | Facility: CLINIC | Age: 71
End: 2021-10-15

## 2021-10-15 NOTE — TELEPHONE ENCOUNTER
This patient has sent this message-can you please advise.      ---- Message from Radha Trujillo sent at 10/14/2021  9:41 PM EDT -----  Regarding: Strange heart palpitations       In the last few weeks I've been getting a palpitation where it beats fast for a few seconds, then I feel like it takes my breath away for a second, I feel a little warm and just a little shaky. I check my blood pressure and it's fine.  I have no chest pains.  I have been stressed lately.   I will be getting my cpap machine next week.  I have moderate sleep apnea.  Not sure if that matters. Your thoughts?     Thank you.    Radha Trujillo

## 2021-10-19 ENCOUNTER — TELEPHONE (OUTPATIENT)
Dept: CARDIOLOGY | Facility: CLINIC | Age: 71
End: 2021-10-19

## 2021-10-19 DIAGNOSIS — R00.2 PALPITATIONS: Primary | ICD-10-CM

## 2021-10-19 NOTE — TELEPHONE ENCOUNTER
Patient was contacted and informed of the message below.She verbalized understanding.        ----- Message from Kilo Vivar DO sent at 10/19/2021 12:48 PM EDT -----  We can get her outfitted with a mobile cardiac telemetry to see if there are any significant arrhythmias.  I will place the order.  ----- Message -----  From: Mercy Hood MA  Sent: 10/18/2021   1:29 PM EDT  To: DO Radha Lopez    10/14/2021  9:41 PM    Hi Dr. Perdue.  In the last few weeks I've been getting a palpitation where it beats fast for a few seconds, then I feel like it takes my breath away for a second, I feel a little warm and just a little shaky. I check my blood pressure and it's fine.  I have no chest pains.  I have been stressed lately.   I will be getting my cpap machine next week.  I have moderate sleep apnea.  Not sure if that matters. Your thoughts?     Thank you.     Radha Trujillo

## 2021-10-30 ENCOUNTER — HOSPITAL ENCOUNTER (EMERGENCY)
Facility: HOSPITAL | Age: 71
Discharge: HOME | End: 2021-10-30
Attending: EMERGENCY MEDICINE | Admitting: EMERGENCY MEDICINE

## 2021-10-30 VITALS
BODY MASS INDEX: 39.84 KG/M2 | HEART RATE: 78 BPM | TEMPERATURE: 98.2 F | OXYGEN SATURATION: 98 % | SYSTOLIC BLOOD PRESSURE: 159 MMHG | WEIGHT: 211 LBS | HEIGHT: 61 IN | RESPIRATION RATE: 18 BRPM | DIASTOLIC BLOOD PRESSURE: 85 MMHG

## 2021-10-30 DIAGNOSIS — F41.9 ANXIETY: ICD-10-CM

## 2021-10-30 DIAGNOSIS — R00.2 PALPITATION: Primary | ICD-10-CM

## 2021-10-30 LAB
ANION GAP SERPL CALCULATED.3IONS-SCNC: 15 MMOL/L (ref 5–15)
BASOPHILS # BLD AUTO: 0.1 10*3/MM3 (ref 0–0.2)
BASOPHILS NFR BLD AUTO: 1 % (ref 0–1.5)
BUN SERPL-MCNC: 11 MG/DL (ref 8–23)
BUN/CREAT SERPL: 13.6 (ref 7–25)
CALCIUM SPEC-SCNC: 9.7 MG/DL (ref 8.6–10.5)
CHLORIDE SERPL-SCNC: 100 MMOL/L (ref 98–107)
CO2 SERPL-SCNC: 23 MMOL/L (ref 22–29)
CREAT SERPL-MCNC: 0.81 MG/DL (ref 0.57–1)
DEPRECATED RDW RBC AUTO: 41.1 FL (ref 37–54)
EOSINOPHIL # BLD AUTO: 0 10*3/MM3 (ref 0–0.4)
EOSINOPHIL NFR BLD AUTO: 0.6 % (ref 0.3–6.2)
ERYTHROCYTE [DISTWIDTH] IN BLOOD BY AUTOMATED COUNT: 14.5 % (ref 12.3–15.4)
GFR SERPL CREATININE-BSD FRML MDRD: 70 ML/MIN/1.73
GLUCOSE SERPL-MCNC: 112 MG/DL (ref 65–99)
HCT VFR BLD AUTO: 40.5 % (ref 34–46.6)
HGB BLD-MCNC: 13.7 G/DL (ref 12–15.9)
HOLD SPECIMEN: NORMAL
LYMPHOCYTES # BLD AUTO: 1.1 10*3/MM3 (ref 0.7–3.1)
LYMPHOCYTES NFR BLD AUTO: 13.7 % (ref 19.6–45.3)
MCH RBC QN AUTO: 27.7 PG (ref 26.6–33)
MCHC RBC AUTO-ENTMCNC: 33.7 G/DL (ref 31.5–35.7)
MCV RBC AUTO: 82.2 FL (ref 79–97)
MONOCYTES # BLD AUTO: 0.5 10*3/MM3 (ref 0.1–0.9)
MONOCYTES NFR BLD AUTO: 6.1 % (ref 5–12)
NEUTROPHILS NFR BLD AUTO: 6.3 10*3/MM3 (ref 1.7–7)
NEUTROPHILS NFR BLD AUTO: 78.6 % (ref 42.7–76)
NRBC BLD AUTO-RTO: 0.1 /100 WBC (ref 0–0.2)
PLATELET # BLD AUTO: 248 10*3/MM3 (ref 140–450)
PMV BLD AUTO: 8.9 FL (ref 6–12)
POTASSIUM SERPL-SCNC: 3.8 MMOL/L (ref 3.5–5.2)
RBC # BLD AUTO: 4.93 10*6/MM3 (ref 3.77–5.28)
SODIUM SERPL-SCNC: 138 MMOL/L (ref 136–145)
TROPONIN T SERPL-MCNC: <0.01 NG/ML (ref 0–0.03)
WBC # BLD AUTO: 8 10*3/MM3 (ref 3.4–10.8)

## 2021-10-30 PROCEDURE — 80048 BASIC METABOLIC PNL TOTAL CA: CPT | Performed by: EMERGENCY MEDICINE

## 2021-10-30 PROCEDURE — 84484 ASSAY OF TROPONIN QUANT: CPT | Performed by: EMERGENCY MEDICINE

## 2021-10-30 PROCEDURE — 36415 COLL VENOUS BLD VENIPUNCTURE: CPT | Performed by: EMERGENCY MEDICINE

## 2021-10-30 PROCEDURE — 93005 ELECTROCARDIOGRAM TRACING: CPT | Performed by: EMERGENCY MEDICINE

## 2021-10-30 PROCEDURE — 99283 EMERGENCY DEPT VISIT LOW MDM: CPT

## 2021-10-30 PROCEDURE — 85025 COMPLETE CBC W/AUTO DIFF WBC: CPT | Performed by: EMERGENCY MEDICINE

## 2021-10-30 RX ORDER — ESCITALOPRAM OXALATE 10 MG/1
10 TABLET ORAL DAILY
Qty: 30 TABLET | Refills: 0 | Status: SHIPPED | OUTPATIENT
Start: 2021-10-30 | End: 2021-11-14 | Stop reason: SDUPTHER

## 2021-10-31 LAB — QT INTERVAL: 392 MS

## 2021-11-01 ENCOUNTER — PATIENT MESSAGE (OUTPATIENT)
Dept: FAMILY MEDICINE CLINIC | Facility: CLINIC | Age: 71
End: 2021-11-01

## 2021-11-01 RX ORDER — ALPRAZOLAM 0.25 MG/1
0.25 TABLET ORAL 2 TIMES DAILY PRN
Qty: 30 TABLET | Refills: 0 | Status: SHIPPED | OUTPATIENT
Start: 2021-11-01 | End: 2023-01-24 | Stop reason: SDUPTHER

## 2021-11-01 NOTE — TELEPHONE ENCOUNTER
From: Radha Trujillo  To: RICKY Mahoney  Sent: 11/1/2021 8:03 AM EDT  Subject: Jimena López. I was recently taken to the ER, for as it turned out, an anxiety attack. My heart had checked out fine, however, the doctor on call prescribed Lexopro for me to start taking again. He suggested xanax, for short term, but I still had some left over from when Dr. Rojo prescribed some for me last year. This prescription will be running out soon. Can you prescribe for 30 days? I've started a walking routine to help with this anxiety. I have been told that will help. I know the Lexopro will take some time to kick in.  Thank you.    I will STOP taking the medications listed below when I get home from the hospital:  None

## 2021-11-02 ENCOUNTER — TELEPHONE (OUTPATIENT)
Dept: CARDIOLOGY | Facility: CLINIC | Age: 71
End: 2021-11-02

## 2021-11-02 NOTE — TELEPHONE ENCOUNTER
*I called and notified the patient that someone from our office will contact her to set up for the heart monitor.          ----- Message from Radha Trujillo sent at 10/27/2021 11:52 AM EDT -----  Regarding: Cardiac monitor  I have not yet received a call regarding my monitor.

## 2021-11-02 NOTE — TELEPHONE ENCOUNTER
I spoke with the patient and she wanted to tell Dr. Vivar what her situation was and that she had been evaluated at  ER, but she is still coming to have the monitor on 11/3/2021.              ----- Message from Radha Trujillo sent at 10/30/2021  4:45 PM EDT -----  Regarding: Heart monitor  Dr. Vivar.  Today I had my  call 911 due to a weird feeling I was having in my chest.    Nothing hurt and no pressure but it felt familiar.   My fitbit showed my heart rate going up.  Got nervous.  Long story short after two EKGs and a lot of blood work, nothing negative showed up.  [facility] and I already had some Xanax.  To take them at night.   I've had anxiety attacks before and one similar to this about 20 years ago.  That's why it felt familiar.  Anyway, I was hoping we can cancel the heart monitor after having these negative tests outcome?

## 2021-11-03 ENCOUNTER — HOSPITAL ENCOUNTER (OUTPATIENT)
Dept: CARDIOLOGY | Facility: HOSPITAL | Age: 71
Discharge: HOME | End: 2021-11-03

## 2021-11-03 DIAGNOSIS — R00.2 PALPITATIONS: ICD-10-CM

## 2021-11-04 RX ORDER — POTASSIUM CHLORIDE 750 MG/1
TABLET, FILM COATED, EXTENDED RELEASE ORAL
Qty: 270 TABLET | Refills: 0 | Status: SHIPPED | OUTPATIENT
Start: 2021-11-04 | End: 2022-02-02

## 2021-11-11 DIAGNOSIS — I10 ESSENTIAL HYPERTENSION: ICD-10-CM

## 2021-11-11 RX ORDER — AMLODIPINE BESYLATE 5 MG/1
5 TABLET ORAL DAILY
Qty: 90 TABLET | Refills: 1 | Status: SHIPPED | OUTPATIENT
Start: 2021-11-11 | End: 2022-05-22 | Stop reason: SDUPTHER

## 2021-11-14 RX ORDER — ESCITALOPRAM OXALATE 10 MG/1
10 TABLET ORAL DAILY
Qty: 90 TABLET | Refills: 0 | Status: SHIPPED | OUTPATIENT
Start: 2021-11-14 | End: 2022-02-10 | Stop reason: DRUGHIGH

## 2021-11-19 LAB
MAXIMAL PREDICTED HEART RATE: 149 BPM
STRESS TARGET HR: 127 BPM

## 2021-11-19 PROCEDURE — 93228 REMOTE 30 DAY ECG REV/REPORT: CPT | Performed by: INTERNAL MEDICINE

## 2022-02-02 RX ORDER — POTASSIUM CHLORIDE 750 MG/1
TABLET, FILM COATED, EXTENDED RELEASE ORAL
Qty: 270 TABLET | Refills: 0 | Status: SHIPPED | OUTPATIENT
Start: 2022-02-02 | End: 2022-05-03

## 2022-02-10 ENCOUNTER — LAB (OUTPATIENT)
Dept: FAMILY MEDICINE CLINIC | Facility: CLINIC | Age: 72
End: 2022-02-10

## 2022-02-10 ENCOUNTER — APPOINTMENT (OUTPATIENT)
Dept: CT IMAGING | Facility: HOSPITAL | Age: 72
End: 2022-02-10

## 2022-02-10 ENCOUNTER — HOSPITAL ENCOUNTER (INPATIENT)
Facility: HOSPITAL | Age: 72
LOS: 2 days | Discharge: HOME | End: 2022-02-12
Attending: EMERGENCY MEDICINE | Admitting: INTERNAL MEDICINE

## 2022-02-10 ENCOUNTER — OFFICE VISIT (OUTPATIENT)
Dept: FAMILY MEDICINE CLINIC | Facility: CLINIC | Age: 72
End: 2022-02-10

## 2022-02-10 VITALS
TEMPERATURE: 96.9 F | DIASTOLIC BLOOD PRESSURE: 86 MMHG | HEIGHT: 61 IN | OXYGEN SATURATION: 98 % | SYSTOLIC BLOOD PRESSURE: 157 MMHG | BODY MASS INDEX: 40.06 KG/M2 | WEIGHT: 212.2 LBS | HEART RATE: 79 BPM | RESPIRATION RATE: 22 BRPM

## 2022-02-10 DIAGNOSIS — R53.83 TIREDNESS: ICD-10-CM

## 2022-02-10 DIAGNOSIS — I26.99 OTHER ACUTE PULMONARY EMBOLISM, UNSPECIFIED WHETHER ACUTE COR PULMONALE PRESENT: Primary | ICD-10-CM

## 2022-02-10 DIAGNOSIS — R06.02 SHORTNESS OF BREATH: Primary | ICD-10-CM

## 2022-02-10 DIAGNOSIS — R06.02 SHORTNESS OF BREATH: ICD-10-CM

## 2022-02-10 DIAGNOSIS — F41.9 ANXIETY: ICD-10-CM

## 2022-02-10 PROBLEM — E87.6 HYPOKALEMIA: Status: RESOLVED | Noted: 2018-04-06 | Resolved: 2022-02-10

## 2022-02-10 PROBLEM — I10 ESSENTIAL HYPERTENSION: Chronic | Status: ACTIVE | Noted: 2021-04-02

## 2022-02-10 PROBLEM — F41.8 MIXED ANXIETY DEPRESSIVE DISORDER: Chronic | Status: ACTIVE | Noted: 2018-07-03

## 2022-02-10 PROBLEM — R00.2 PALPITATIONS: Status: RESOLVED | Noted: 2021-01-05 | Resolved: 2022-02-10

## 2022-02-10 PROBLEM — R06.09 DYSPNEA ON EXERTION: Status: RESOLVED | Noted: 2021-01-05 | Resolved: 2022-02-10

## 2022-02-10 PROBLEM — E78.5 HYPERLIPIDEMIA: Chronic | Status: ACTIVE | Noted: 2019-01-14

## 2022-02-10 PROBLEM — Z98.890 HISTORY OF RADIOFREQUENCY ABLATION (RFA) PROCEDURE FOR CARDIAC ARRHYTHMIA: Status: RESOLVED | Noted: 2021-01-05 | Resolved: 2022-02-10

## 2022-02-10 PROBLEM — E66.01 MORBID OBESITY WITH BMI OF 40.0-44.9, ADULT (HCC): Chronic | Status: ACTIVE | Noted: 2021-01-05

## 2022-02-10 PROBLEM — R07.9 CHEST PAIN: Status: RESOLVED | Noted: 2021-01-05 | Resolved: 2022-02-10

## 2022-02-10 PROBLEM — E53.9 VITAMIN B DEFICIENCY: Status: RESOLVED | Noted: 2019-01-14 | Resolved: 2022-02-10

## 2022-02-10 PROBLEM — M17.0 PRIMARY OSTEOARTHRITIS OF BOTH KNEES: Status: RESOLVED | Noted: 2021-01-05 | Resolved: 2022-02-10

## 2022-02-10 LAB
25(OH)D3 SERPL-MCNC: 51.2 NG/ML (ref 30–100)
ALBUMIN SERPL-MCNC: 4.2 G/DL (ref 3.5–5.2)
ALBUMIN/GLOB SERPL: 1.6 G/DL
ALP SERPL-CCNC: 84 U/L (ref 39–117)
ALT SERPL W P-5'-P-CCNC: 15 U/L (ref 1–33)
ANION GAP SERPL CALCULATED.3IONS-SCNC: 12 MMOL/L (ref 5–15)
AST SERPL-CCNC: 16 U/L (ref 1–32)
B PARAPERT DNA SPEC QL NAA+PROBE: NOT DETECTED
B PERT DNA SPEC QL NAA+PROBE: NOT DETECTED
BACTERIA UR QL AUTO: ABNORMAL /HPF
BASOPHILS # BLD AUTO: 0.1 10*3/MM3 (ref 0–0.2)
BASOPHILS NFR BLD AUTO: 0.8 % (ref 0–1.5)
BILIRUB SERPL-MCNC: 0.3 MG/DL (ref 0–1.2)
BILIRUB UR QL STRIP: NEGATIVE
BUN SERPL-MCNC: 12 MG/DL (ref 8–23)
BUN/CREAT SERPL: 16.9 (ref 7–25)
C PNEUM DNA NPH QL NAA+NON-PROBE: NOT DETECTED
CALCIUM SPEC-SCNC: 9.8 MG/DL (ref 8.6–10.5)
CHLORIDE SERPL-SCNC: 101 MMOL/L (ref 98–107)
CLARITY UR: CLEAR
CO2 SERPL-SCNC: 27 MMOL/L (ref 22–29)
COLOR UR: YELLOW
CREAT SERPL-MCNC: 0.71 MG/DL (ref 0.57–1)
D DIMER PPP FEU-MCNC: 11.14 MG/L (FEU) (ref 0–0.59)
DEPRECATED RDW RBC AUTO: 38.8 FL (ref 37–54)
DEPRECATED RDW RBC AUTO: 41.1 FL (ref 37–54)
EOSINOPHIL # BLD AUTO: 0.1 10*3/MM3 (ref 0–0.4)
EOSINOPHIL NFR BLD AUTO: 1.8 % (ref 0.3–6.2)
ERYTHROCYTE [DISTWIDTH] IN BLOOD BY AUTOMATED COUNT: 13.3 % (ref 12.3–15.4)
ERYTHROCYTE [DISTWIDTH] IN BLOOD BY AUTOMATED COUNT: 14.7 % (ref 12.3–15.4)
FLUAV SUBTYP SPEC NAA+PROBE: NOT DETECTED
FLUBV RNA ISLT QL NAA+PROBE: NOT DETECTED
GFR SERPL CREATININE-BSD FRML MDRD: 81 ML/MIN/1.73
GLOBULIN UR ELPH-MCNC: 2.7 GM/DL
GLUCOSE SERPL-MCNC: 107 MG/DL (ref 65–99)
GLUCOSE UR STRIP-MCNC: NEGATIVE MG/DL
HADV DNA SPEC NAA+PROBE: NOT DETECTED
HCOV 229E RNA SPEC QL NAA+PROBE: NOT DETECTED
HCOV HKU1 RNA SPEC QL NAA+PROBE: NOT DETECTED
HCOV NL63 RNA SPEC QL NAA+PROBE: NOT DETECTED
HCOV OC43 RNA SPEC QL NAA+PROBE: NOT DETECTED
HCT VFR BLD AUTO: 37.5 % (ref 34–46.6)
HCT VFR BLD AUTO: 38.6 % (ref 34–46.6)
HGB BLD-MCNC: 12.8 G/DL (ref 12–15.9)
HGB BLD-MCNC: 12.9 G/DL (ref 12–15.9)
HGB UR QL STRIP.AUTO: NEGATIVE
HMPV RNA NPH QL NAA+NON-PROBE: NOT DETECTED
HPIV1 RNA ISLT QL NAA+PROBE: NOT DETECTED
HPIV2 RNA SPEC QL NAA+PROBE: NOT DETECTED
HPIV3 RNA NPH QL NAA+PROBE: NOT DETECTED
HPIV4 P GENE NPH QL NAA+PROBE: NOT DETECTED
HYALINE CASTS UR QL AUTO: ABNORMAL /LPF
KETONES UR QL STRIP: NEGATIVE
LEUKOCYTE ESTERASE UR QL STRIP.AUTO: ABNORMAL
LYMPHOCYTES # BLD AUTO: 1.2 10*3/MM3 (ref 0.7–3.1)
LYMPHOCYTES NFR BLD AUTO: 18.6 % (ref 19.6–45.3)
M PNEUMO IGG SER IA-ACNC: NOT DETECTED
MCH RBC QN AUTO: 27.2 PG (ref 26.6–33)
MCH RBC QN AUTO: 27.6 PG (ref 26.6–33)
MCHC RBC AUTO-ENTMCNC: 33.4 G/DL (ref 31.5–35.7)
MCHC RBC AUTO-ENTMCNC: 34.1 G/DL (ref 31.5–35.7)
MCV RBC AUTO: 81.1 FL (ref 79–97)
MCV RBC AUTO: 81.3 FL (ref 79–97)
MONOCYTES # BLD AUTO: 0.6 10*3/MM3 (ref 0.1–0.9)
MONOCYTES NFR BLD AUTO: 8.6 % (ref 5–12)
NEUTROPHILS NFR BLD AUTO: 4.5 10*3/MM3 (ref 1.7–7)
NEUTROPHILS NFR BLD AUTO: 70.2 % (ref 42.7–76)
NITRITE UR QL STRIP: NEGATIVE
NRBC BLD AUTO-RTO: 0.3 /100 WBC (ref 0–0.2)
NT-PROBNP SERPL-MCNC: 167.4 PG/ML (ref 0–900)
PH UR STRIP.AUTO: 7.5 [PH] (ref 5–8)
PLATELET # BLD AUTO: 195 10*3/MM3 (ref 140–450)
PLATELET # BLD AUTO: 216 10*3/MM3 (ref 140–450)
PMV BLD AUTO: 11.8 FL (ref 6–12)
PMV BLD AUTO: 9.1 FL (ref 6–12)
POTASSIUM SERPL-SCNC: 3.8 MMOL/L (ref 3.5–5.2)
PROT SERPL-MCNC: 6.9 G/DL (ref 6–8.5)
PROT UR QL STRIP: NEGATIVE
QT INTERVAL: 386 MS
RBC # BLD AUTO: 4.63 10*6/MM3 (ref 3.77–5.28)
RBC # BLD AUTO: 4.75 10*6/MM3 (ref 3.77–5.28)
RBC # UR STRIP: ABNORMAL /HPF
REF LAB TEST METHOD: ABNORMAL
RHINOVIRUS RNA SPEC NAA+PROBE: NOT DETECTED
RSV RNA NPH QL NAA+NON-PROBE: NOT DETECTED
SARS-COV-2 RNA NPH QL NAA+NON-PROBE: NOT DETECTED
SODIUM SERPL-SCNC: 140 MMOL/L (ref 136–145)
SP GR UR STRIP: 1.01 (ref 1–1.03)
SQUAMOUS #/AREA URNS HPF: ABNORMAL /HPF
TROPONIN T SERPL-MCNC: <0.01 NG/ML (ref 0–0.03)
TSH SERPL DL<=0.05 MIU/L-ACNC: 2.07 UIU/ML (ref 0.27–4.2)
UROBILINOGEN UR QL STRIP: ABNORMAL
VIT B12 BLD-MCNC: 727 PG/ML (ref 211–946)
WBC # UR STRIP: ABNORMAL /HPF
WBC NRBC COR # BLD: 6.07 10*3/MM3 (ref 3.4–10.8)
WBC NRBC COR # BLD: 6.4 10*3/MM3 (ref 3.4–10.8)

## 2022-02-10 PROCEDURE — 85025 COMPLETE CBC W/AUTO DIFF WBC: CPT | Performed by: NURSE PRACTITIONER

## 2022-02-10 PROCEDURE — 83880 ASSAY OF NATRIURETIC PEPTIDE: CPT | Performed by: NURSE PRACTITIONER

## 2022-02-10 PROCEDURE — 0 IOPAMIDOL PER 1 ML: Performed by: EMERGENCY MEDICINE

## 2022-02-10 PROCEDURE — 84443 ASSAY THYROID STIM HORMONE: CPT | Performed by: STUDENT IN AN ORGANIZED HEALTH CARE EDUCATION/TRAINING PROGRAM

## 2022-02-10 PROCEDURE — 36415 COLL VENOUS BLD VENIPUNCTURE: CPT

## 2022-02-10 PROCEDURE — 99214 OFFICE O/P EST MOD 30 MIN: CPT | Performed by: NURSE PRACTITIONER

## 2022-02-10 PROCEDURE — 81001 URINALYSIS AUTO W/SCOPE: CPT | Performed by: NURSE PRACTITIONER

## 2022-02-10 PROCEDURE — 82306 VITAMIN D 25 HYDROXY: CPT | Performed by: NURSE PRACTITIONER

## 2022-02-10 PROCEDURE — 85027 COMPLETE CBC AUTOMATED: CPT | Performed by: NURSE PRACTITIONER

## 2022-02-10 PROCEDURE — 25010000002 HEPARIN (PORCINE) PER 1000 UNITS: Performed by: NURSE PRACTITIONER

## 2022-02-10 PROCEDURE — 82607 VITAMIN B-12: CPT | Performed by: NURSE PRACTITIONER

## 2022-02-10 PROCEDURE — 0202U NFCT DS 22 TRGT SARS-COV-2: CPT | Performed by: NURSE PRACTITIONER

## 2022-02-10 PROCEDURE — 87086 URINE CULTURE/COLONY COUNT: CPT | Performed by: NURSE PRACTITIONER

## 2022-02-10 PROCEDURE — 94640 AIRWAY INHALATION TREATMENT: CPT

## 2022-02-10 PROCEDURE — 94799 UNLISTED PULMONARY SVC/PX: CPT

## 2022-02-10 PROCEDURE — 71275 CT ANGIOGRAPHY CHEST: CPT

## 2022-02-10 PROCEDURE — 99222 1ST HOSP IP/OBS MODERATE 55: CPT | Performed by: STUDENT IN AN ORGANIZED HEALTH CARE EDUCATION/TRAINING PROGRAM

## 2022-02-10 PROCEDURE — 80053 COMPREHEN METABOLIC PANEL: CPT | Performed by: NURSE PRACTITIONER

## 2022-02-10 PROCEDURE — 99284 EMERGENCY DEPT VISIT MOD MDM: CPT

## 2022-02-10 PROCEDURE — 93005 ELECTROCARDIOGRAM TRACING: CPT

## 2022-02-10 PROCEDURE — 93005 ELECTROCARDIOGRAM TRACING: CPT | Performed by: EMERGENCY MEDICINE

## 2022-02-10 PROCEDURE — 84484 ASSAY OF TROPONIN QUANT: CPT | Performed by: NURSE PRACTITIONER

## 2022-02-10 PROCEDURE — 85379 FIBRIN DEGRADATION QUANT: CPT | Performed by: NURSE PRACTITIONER

## 2022-02-10 RX ORDER — INSULIN LISPRO 100 [IU]/ML
0-7 INJECTION, SOLUTION INTRAVENOUS; SUBCUTANEOUS EVERY 6 HOURS SCHEDULED
Status: DISCONTINUED | OUTPATIENT
Start: 2022-02-11 | End: 2022-02-12

## 2022-02-10 RX ORDER — AMOXICILLIN 250 MG
2 CAPSULE ORAL 2 TIMES DAILY
Status: DISCONTINUED | OUTPATIENT
Start: 2022-02-11 | End: 2022-02-12 | Stop reason: HOSPADM

## 2022-02-10 RX ORDER — HEPARIN SOD,PORCINE/0.9 % NACL 25000/250
15.2 INTRAVENOUS SOLUTION INTRAVENOUS
Status: DISCONTINUED | OUTPATIENT
Start: 2022-02-10 | End: 2022-02-11

## 2022-02-10 RX ORDER — AZELASTINE 1 MG/ML
1 SPRAY, METERED NASAL 2 TIMES DAILY
COMMUNITY
Start: 2021-11-29

## 2022-02-10 RX ORDER — POLYETHYLENE GLYCOL 3350 17 G/17G
17 POWDER, FOR SOLUTION ORAL DAILY PRN
Status: DISCONTINUED | OUTPATIENT
Start: 2022-02-10 | End: 2022-02-12 | Stop reason: HOSPADM

## 2022-02-10 RX ORDER — NICOTINE POLACRILEX 4 MG
15 LOZENGE BUCCAL
Status: DISCONTINUED | OUTPATIENT
Start: 2022-02-10 | End: 2022-02-12 | Stop reason: HOSPADM

## 2022-02-10 RX ORDER — IPRATROPIUM BROMIDE AND ALBUTEROL SULFATE 2.5; .5 MG/3ML; MG/3ML
3 SOLUTION RESPIRATORY (INHALATION) EVERY 6 HOURS PRN
Status: DISCONTINUED | OUTPATIENT
Start: 2022-02-10 | End: 2022-02-12 | Stop reason: HOSPADM

## 2022-02-10 RX ORDER — INSULIN LISPRO 100 [IU]/ML
0-7 INJECTION, SOLUTION INTRAVENOUS; SUBCUTANEOUS AS NEEDED
Status: DISCONTINUED | OUTPATIENT
Start: 2022-02-10 | End: 2022-02-12

## 2022-02-10 RX ORDER — ALUMINA, MAGNESIA, AND SIMETHICONE 2400; 2400; 240 MG/30ML; MG/30ML; MG/30ML
15 SUSPENSION ORAL EVERY 6 HOURS PRN
Status: DISCONTINUED | OUTPATIENT
Start: 2022-02-10 | End: 2022-02-12 | Stop reason: HOSPADM

## 2022-02-10 RX ORDER — DEXTROSE MONOHYDRATE 25 G/50ML
25 INJECTION, SOLUTION INTRAVENOUS
Status: DISCONTINUED | OUTPATIENT
Start: 2022-02-10 | End: 2022-02-12 | Stop reason: HOSPADM

## 2022-02-10 RX ORDER — BISACODYL 10 MG
10 SUPPOSITORY, RECTAL RECTAL DAILY PRN
Status: DISCONTINUED | OUTPATIENT
Start: 2022-02-10 | End: 2022-02-12 | Stop reason: HOSPADM

## 2022-02-10 RX ORDER — ONDANSETRON 4 MG/1
4 TABLET, FILM COATED ORAL EVERY 6 HOURS PRN
Status: DISCONTINUED | OUTPATIENT
Start: 2022-02-10 | End: 2022-02-12 | Stop reason: HOSPADM

## 2022-02-10 RX ORDER — BISACODYL 5 MG/1
5 TABLET, DELAYED RELEASE ORAL DAILY PRN
Status: DISCONTINUED | OUTPATIENT
Start: 2022-02-10 | End: 2022-02-12 | Stop reason: HOSPADM

## 2022-02-10 RX ORDER — ESCITALOPRAM OXALATE 20 MG/1
20 TABLET ORAL DAILY
Qty: 30 TABLET | Refills: 1 | Status: SHIPPED | OUTPATIENT
Start: 2022-02-10 | End: 2022-03-31

## 2022-02-10 RX ORDER — ACETAMINOPHEN 650 MG/1
650 SUPPOSITORY RECTAL EVERY 4 HOURS PRN
Status: DISCONTINUED | OUTPATIENT
Start: 2022-02-10 | End: 2022-02-12 | Stop reason: HOSPADM

## 2022-02-10 RX ORDER — IPRATROPIUM BROMIDE AND ALBUTEROL SULFATE 2.5; .5 MG/3ML; MG/3ML
3 SOLUTION RESPIRATORY (INHALATION)
Status: DISCONTINUED | OUTPATIENT
Start: 2022-02-11 | End: 2022-02-11 | Stop reason: SDUPTHER

## 2022-02-10 RX ORDER — PANTOPRAZOLE SODIUM 40 MG/10ML
40 INJECTION, POWDER, LYOPHILIZED, FOR SOLUTION INTRAVENOUS
Status: DISCONTINUED | OUTPATIENT
Start: 2022-02-11 | End: 2022-02-11

## 2022-02-10 RX ORDER — SODIUM CHLORIDE 0.9 % (FLUSH) 0.9 %
10 SYRINGE (ML) INJECTION AS NEEDED
Status: DISCONTINUED | OUTPATIENT
Start: 2022-02-10 | End: 2022-02-12 | Stop reason: HOSPADM

## 2022-02-10 RX ORDER — ONDANSETRON 2 MG/ML
4 INJECTION INTRAMUSCULAR; INTRAVENOUS EVERY 6 HOURS PRN
Status: DISCONTINUED | OUTPATIENT
Start: 2022-02-10 | End: 2022-02-12 | Stop reason: HOSPADM

## 2022-02-10 RX ORDER — OLANZAPINE 10 MG/2ML
1 INJECTION, POWDER, LYOPHILIZED, FOR SOLUTION INTRAMUSCULAR AS NEEDED
Status: DISCONTINUED | OUTPATIENT
Start: 2022-02-10 | End: 2022-02-12 | Stop reason: HOSPADM

## 2022-02-10 RX ORDER — ACETAMINOPHEN 325 MG/1
650 TABLET ORAL EVERY 4 HOURS PRN
Status: DISCONTINUED | OUTPATIENT
Start: 2022-02-10 | End: 2022-02-12 | Stop reason: HOSPADM

## 2022-02-10 RX ORDER — SODIUM CHLORIDE 0.9 % (FLUSH) 0.9 %
10 SYRINGE (ML) INJECTION EVERY 12 HOURS SCHEDULED
Status: DISCONTINUED | OUTPATIENT
Start: 2022-02-10 | End: 2022-02-12 | Stop reason: HOSPADM

## 2022-02-10 RX ADMIN — IOPAMIDOL 75 ML: 755 INJECTION, SOLUTION INTRAVENOUS at 21:05

## 2022-02-10 RX ADMIN — HEPARIN SODIUM 15.2 UNITS/KG/HR: 5000 INJECTION INTRAVENOUS; SUBCUTANEOUS at 23:03

## 2022-02-10 RX ADMIN — IPRATROPIUM BROMIDE AND ALBUTEROL SULFATE 3 ML: 2.5; .5 SOLUTION RESPIRATORY (INHALATION) at 23:11

## 2022-02-10 NOTE — PROGRESS NOTES
"Chief Complaint  Shortness of Breath (x2 days, started: Tues. 2/8)    Subjective          Radha Trujillo presents to [facility]  History of Present Illness  Here today with c/o sob since Tuesday of this week  Woke up fine but noticed that she was short of breath after walking into the ca    She also had an allergy attack that day, lots of nasal allergy symptoms, lots of blowing of her nose - using 3 different things for this - saline nasal, astelin, and flonase with some relief  Towards the end of the day her symptoms improved     In the past couple of weeks has been feeling more tired through the day  she does have a h/o sleep apnea and she uses a CPAP at night    Denies that her sob is increased with activity    Has had some nasal drainage and throat clearing    Has experienced tightness in her chest this morning is relieved with xanax which she took at 8 am today  Is on lexapro for anxiety - thinks maybe her dose is insufficient    Sees cardiology, Dr Vivar - has follow up visit scheduled for 3/22/22    She feels bored and thinks maybe she has too much time on her hands    Review of Systems   Constitutional: Positive for fatigue.        Reports feeling tired after sleeping for 8 hours   HENT: Positive for postnasal drip and rhinorrhea.    Respiratory: Positive for chest tightness and shortness of breath. Negative for cough and wheezing.    Cardiovascular: Negative for chest pain, palpitations and leg swelling.   Gastrointestinal: Negative.    Genitourinary: Negative.    Neurological: Positive for headaches.        \"tired headache\"   Psychiatric/Behavioral: Negative.  Negative for sleep disturbance.     Objective   Vital Signs:   /86 (BP Location: Left arm, Patient Position: Sitting, Cuff Size: Large Adult)   Pulse 79   Temp 96.9 °F (36.1 °C) (Infrared)   Resp 22   Ht 154.9 cm (61\")   Wt 96.3 kg (212 lb 3.2 oz)   SpO2 98%   BMI 40.09 kg/m²       Rechecked o2 sat " was 98% on room air    Physical Exam  Vitals reviewed.   Constitutional:       Appearance: Normal appearance.   HENT:      Right Ear: Tympanic membrane normal.      Left Ear: Tympanic membrane normal.      Nose: Nose normal.      Mouth/Throat:      Mouth: Mucous membranes are moist.      Pharynx: Oropharynx is clear.   Eyes:      Extraocular Movements: Extraocular movements intact.   Neck:      Vascular: No carotid bruit.   Cardiovascular:      Rate and Rhythm: Normal rate and regular rhythm.      Pulses: Normal pulses.      Heart sounds: Normal heart sounds.   Pulmonary:      Effort: Pulmonary effort is normal.      Breath sounds: Normal breath sounds.   Musculoskeletal:      Cervical back: Neck supple. No tenderness.      Right lower leg: No edema.      Left lower leg: No edema.   Lymphadenopathy:      Cervical: No cervical adenopathy.   Skin:     General: Skin is warm.   Neurological:      Mental Status: She is alert and oriented to person, place, and time.        Result Review :                 Assessment and Plan    Diagnoses and all orders for this visit:    1. Shortness of breath (Primary)  -     CBC No Differential; Future  -     D-dimer, Quantitative; Future  -     XR Chest PA & Lateral; Future    2. Tiredness  -     CBC No Differential; Future  -     Vitamin D 25 hydroxy; Future  -     Vitamin B12; Future  -     Urinalysis With Culture If Indicated - Urine, Clean Catch; Future    3. Body mass index (BMI) 40.0-44.9, adult (HCC)   -     Vitamin D 25 hydroxy; Future    4. Anxiety  -     escitalopram (Lexapro) 20 MG tablet; Take 1 tablet by mouth Daily.  Dispense: 30 tablet; Refill: 1    check labs, xray, adjust dose of lexapro      Follow Up   Return if symptoms worsen or fail to improve, for Next scheduled follow up.  Patient was given instructions and counseling regarding her condition or for health maintenance advice. Please see specific information pulled into the AVS if appropriate.

## 2022-02-11 ENCOUNTER — APPOINTMENT (OUTPATIENT)
Dept: CARDIOLOGY | Facility: HOSPITAL | Age: 72
End: 2022-02-11

## 2022-02-11 ENCOUNTER — TELEPHONE (OUTPATIENT)
Dept: ONCOLOGY | Facility: OTHER | Age: 72
End: 2022-02-11

## 2022-02-11 LAB
ALBUMIN SERPL-MCNC: 4.1 G/DL (ref 3.5–5.2)
ALBUMIN/GLOB SERPL: 1.6 G/DL
ALP SERPL-CCNC: 80 U/L (ref 39–117)
ALT SERPL W P-5'-P-CCNC: 12 U/L (ref 1–33)
ANION GAP SERPL CALCULATED.3IONS-SCNC: 10 MMOL/L (ref 5–15)
ANION GAP SERPL CALCULATED.3IONS-SCNC: 11 MMOL/L (ref 5–15)
APTT PPP: 51.6 SECONDS (ref 61–76.5)
APTT PPP: 53.9 SECONDS (ref 61–76.5)
APTT PPP: 98.9 SECONDS (ref 24–31)
AST SERPL-CCNC: 13 U/L (ref 1–32)
BACTERIA SPEC AEROBE CULT: NORMAL
BASOPHILS # BLD AUTO: 0 10*3/MM3 (ref 0–0.2)
BASOPHILS NFR BLD AUTO: 0.6 % (ref 0–1.5)
BH CV ECHO MEAS - ACS: 2.4 CM
BH CV ECHO MEAS - AO MAX PG (FULL): 3.2 MMHG
BH CV ECHO MEAS - AO MAX PG: 7.5 MMHG
BH CV ECHO MEAS - AO MEAN PG (FULL): 1.1 MMHG
BH CV ECHO MEAS - AO MEAN PG: 3.5 MMHG
BH CV ECHO MEAS - AO ROOT AREA (BSA CORRECTED): 1.8
BH CV ECHO MEAS - AO ROOT AREA: 9.7 CM^2
BH CV ECHO MEAS - AO ROOT DIAM: 3.5 CM
BH CV ECHO MEAS - AO V2 MAX: 137.1 CM/SEC
BH CV ECHO MEAS - AO V2 MEAN: 86.8 CM/SEC
BH CV ECHO MEAS - AO V2 VTI: 28.1 CM
BH CV ECHO MEAS - AORTIC HR: 77.7 BPM
BH CV ECHO MEAS - AORTIC R-R: 0.77 SEC
BH CV ECHO MEAS - AVA(I,A): 3.7 CM^2
BH CV ECHO MEAS - AVA(I,D): 3.7 CM^2
BH CV ECHO MEAS - AVA(V,A): 3.1 CM^2
BH CV ECHO MEAS - AVA(V,D): 3.1 CM^2
BH CV ECHO MEAS - BSA(HAYCOCK): 2.1 M^2
BH CV ECHO MEAS - BSA: 2 M^2
BH CV ECHO MEAS - BZI_BMI: 40.8 KILOGRAMS/M^2
BH CV ECHO MEAS - BZI_METRIC_HEIGHT: 154.9 CM
BH CV ECHO MEAS - BZI_METRIC_WEIGHT: 98 KG
BH CV ECHO MEAS - CI(AO): 10.8 L/MIN/M^2
BH CV ECHO MEAS - CI(LVOT): 4.1 L/MIN/M^2
BH CV ECHO MEAS - CO(AO): 21.2 L/MIN
BH CV ECHO MEAS - CO(LVOT): 8 L/MIN
BH CV ECHO MEAS - EDV(CUBED): 115.3 ML
BH CV ECHO MEAS - EDV(MOD-SP4): 76.8 ML
BH CV ECHO MEAS - EDV(TEICH): 111.1 ML
BH CV ECHO MEAS - EF(CUBED): 63.1 %
BH CV ECHO MEAS - EF(MOD-BP): 67 %
BH CV ECHO MEAS - EF(MOD-SP4): 66.5 %
BH CV ECHO MEAS - EF(TEICH): 54.5 %
BH CV ECHO MEAS - ESV(CUBED): 42.5 ML
BH CV ECHO MEAS - ESV(MOD-SP4): 25.7 ML
BH CV ECHO MEAS - ESV(TEICH): 50.5 ML
BH CV ECHO MEAS - FS: 28.3 %
BH CV ECHO MEAS - IVS/LVPW: 1
BH CV ECHO MEAS - IVSD: 1.2 CM
BH CV ECHO MEAS - LA DIMENSION(2D): 3.8 CM
BH CV ECHO MEAS - LV DIASTOLIC VOL/BSA (35-75): 39.4 ML/M^2
BH CV ECHO MEAS - LV MASS(C)D: 231.3 GRAMS
BH CV ECHO MEAS - LV MASS(C)DI: 118.5 GRAMS/M^2
BH CV ECHO MEAS - LV MAX PG: 4.4 MMHG
BH CV ECHO MEAS - LV MEAN PG: 2.4 MMHG
BH CV ECHO MEAS - LV SYSTOLIC VOL/BSA (12-30): 13.2 ML/M^2
BH CV ECHO MEAS - LV V1 MAX: 104.3 CM/SEC
BH CV ECHO MEAS - LV V1 MEAN: 73.6 CM/SEC
BH CV ECHO MEAS - LV V1 VTI: 25.5 CM
BH CV ECHO MEAS - LVIDD: 4.9 CM
BH CV ECHO MEAS - LVIDS: 3.5 CM
BH CV ECHO MEAS - LVOT AREA: 4.1 CM^2
BH CV ECHO MEAS - LVOT DIAM: 2.3 CM
BH CV ECHO MEAS - LVPWD: 1.2 CM
BH CV ECHO MEAS - MV A MAX VEL: 107.3 CM/SEC
BH CV ECHO MEAS - MV DEC SLOPE: 273.3 CM/SEC^2
BH CV ECHO MEAS - MV DEC TIME: 0.28 SEC
BH CV ECHO MEAS - MV E MAX VEL: 75.4 CM/SEC
BH CV ECHO MEAS - MV E/A: 0.7
BH CV ECHO MEAS - MV MAX PG: 4.5 MMHG
BH CV ECHO MEAS - MV MEAN PG: 1.6 MMHG
BH CV ECHO MEAS - MV V2 MAX: 105.8 CM/SEC
BH CV ECHO MEAS - MV V2 MEAN: 58.3 CM/SEC
BH CV ECHO MEAS - MV V2 VTI: 24.4 CM
BH CV ECHO MEAS - MVA(VTI): 4.2 CM^2
BH CV ECHO MEAS - PA MAX PG (FULL): 0.82 MMHG
BH CV ECHO MEAS - PA MAX PG: 2.8 MMHG
BH CV ECHO MEAS - PA V2 MAX: 82.7 CM/SEC
BH CV ECHO MEAS - PULM A REVS DUR: 0.11 SEC
BH CV ECHO MEAS - PULM A REVS VEL: 22.2 CM/SEC
BH CV ECHO MEAS - PULM DIAS VEL: 39 CM/SEC
BH CV ECHO MEAS - PULM S/D: 1.6
BH CV ECHO MEAS - PULM SYS VEL: 62.5 CM/SEC
BH CV ECHO MEAS - RAP SYSTOLE: 3 MMHG
BH CV ECHO MEAS - RV MAX PG: 2 MMHG
BH CV ECHO MEAS - RV MEAN PG: 1.1 MMHG
BH CV ECHO MEAS - RV V1 MAX: 70.3 CM/SEC
BH CV ECHO MEAS - RV V1 MEAN: 49.1 CM/SEC
BH CV ECHO MEAS - RV V1 VTI: 17.6 CM
BH CV ECHO MEAS - RVDD: 3.4 CM
BH CV ECHO MEAS - RVSP: 41 MMHG
BH CV ECHO MEAS - SI(AO): 139.6 ML/M^2
BH CV ECHO MEAS - SI(CUBED): 37.3 ML/M^2
BH CV ECHO MEAS - SI(LVOT): 53.1 ML/M^2
BH CV ECHO MEAS - SI(MOD-SP4): 26.2 ML/M^2
BH CV ECHO MEAS - SI(TEICH): 31 ML/M^2
BH CV ECHO MEAS - SV(AO): 272.6 ML
BH CV ECHO MEAS - SV(CUBED): 72.8 ML
BH CV ECHO MEAS - SV(LVOT): 103.6 ML
BH CV ECHO MEAS - SV(MOD-SP4): 51.1 ML
BH CV ECHO MEAS - SV(TEICH): 60.5 ML
BH CV ECHO MEAS - TR MAX VEL: 288.2 CM/SEC
BH CV LOW VAS RIGHT GASTRONEMIUS VESSEL: 1
BH CV LOW VAS RIGHT POPLITEAL SPONT: 1
BH CV LOW VAS RIGHT POSTERIOR TIBIAL VESSEL: 1
BH CV LOWER VASCULAR LEFT COMMON FEMORAL AUGMENT: NORMAL
BH CV LOWER VASCULAR LEFT COMMON FEMORAL COMPETENT: NORMAL
BH CV LOWER VASCULAR LEFT COMMON FEMORAL COMPRESS: NORMAL
BH CV LOWER VASCULAR LEFT COMMON FEMORAL PHASIC: NORMAL
BH CV LOWER VASCULAR LEFT COMMON FEMORAL SPONT: NORMAL
BH CV LOWER VASCULAR LEFT DISTAL FEMORAL COMPRESS: NORMAL
BH CV LOWER VASCULAR LEFT GASTRONEMIUS COMPRESS: NORMAL
BH CV LOWER VASCULAR LEFT GREATER SAPH AK COMPRESS: NORMAL
BH CV LOWER VASCULAR LEFT GREATER SAPH BK COMPRESS: NORMAL
BH CV LOWER VASCULAR LEFT LESSER SAPH COMPRESS: NORMAL
BH CV LOWER VASCULAR LEFT MID FEMORAL AUGMENT: NORMAL
BH CV LOWER VASCULAR LEFT MID FEMORAL COMPETENT: NORMAL
BH CV LOWER VASCULAR LEFT MID FEMORAL COMPRESS: NORMAL
BH CV LOWER VASCULAR LEFT MID FEMORAL PHASIC: NORMAL
BH CV LOWER VASCULAR LEFT MID FEMORAL SPONT: NORMAL
BH CV LOWER VASCULAR LEFT PERONEAL COMPRESS: NORMAL
BH CV LOWER VASCULAR LEFT POPLITEAL AUGMENT: NORMAL
BH CV LOWER VASCULAR LEFT POPLITEAL COMPETENT: NORMAL
BH CV LOWER VASCULAR LEFT POPLITEAL COMPRESS: NORMAL
BH CV LOWER VASCULAR LEFT POPLITEAL PHASIC: NORMAL
BH CV LOWER VASCULAR LEFT POPLITEAL SPONT: NORMAL
BH CV LOWER VASCULAR LEFT POSTERIOR TIBIAL COMPRESS: NORMAL
BH CV LOWER VASCULAR LEFT PROXIMAL FEMORAL COMPRESS: NORMAL
BH CV LOWER VASCULAR LEFT SAPHENOFEMORAL JUNCTION COMPRESS: NORMAL
BH CV LOWER VASCULAR RIGHT COMMON FEMORAL AUGMENT: NORMAL
BH CV LOWER VASCULAR RIGHT COMMON FEMORAL COMPETENT: NORMAL
BH CV LOWER VASCULAR RIGHT COMMON FEMORAL COMPRESS: NORMAL
BH CV LOWER VASCULAR RIGHT COMMON FEMORAL PHASIC: NORMAL
BH CV LOWER VASCULAR RIGHT COMMON FEMORAL SPONT: NORMAL
BH CV LOWER VASCULAR RIGHT DISTAL FEMORAL COMPRESS: NORMAL
BH CV LOWER VASCULAR RIGHT GASTRONEMIUS COMPRESS: NORMAL
BH CV LOWER VASCULAR RIGHT GASTRONEMIUS THROMBUS: NORMAL
BH CV LOWER VASCULAR RIGHT GREATER SAPH AK COMPRESS: NORMAL
BH CV LOWER VASCULAR RIGHT GREATER SAPH BK COMPRESS: NORMAL
BH CV LOWER VASCULAR RIGHT LESSER SAPH COMPRESS: NORMAL
BH CV LOWER VASCULAR RIGHT MID FEMORAL AUGMENT: NORMAL
BH CV LOWER VASCULAR RIGHT MID FEMORAL COMPETENT: NORMAL
BH CV LOWER VASCULAR RIGHT MID FEMORAL COMPRESS: NORMAL
BH CV LOWER VASCULAR RIGHT MID FEMORAL PHASIC: NORMAL
BH CV LOWER VASCULAR RIGHT MID FEMORAL SPONT: NORMAL
BH CV LOWER VASCULAR RIGHT PERONEAL COMPRESS: NORMAL
BH CV LOWER VASCULAR RIGHT POPLITEAL AUGMENT: NORMAL
BH CV LOWER VASCULAR RIGHT POPLITEAL COMPETENT: NORMAL
BH CV LOWER VASCULAR RIGHT POPLITEAL COMPRESS: NORMAL
BH CV LOWER VASCULAR RIGHT POPLITEAL PHASIC: NORMAL
BH CV LOWER VASCULAR RIGHT POPLITEAL SPONT: NORMAL
BH CV LOWER VASCULAR RIGHT POPLITEAL THROMBUS: NORMAL
BH CV LOWER VASCULAR RIGHT POSTERIOR TIBIAL COMPRESS: NORMAL
BH CV LOWER VASCULAR RIGHT POSTERIOR TIBIAL THROMBUS: NORMAL
BH CV LOWER VASCULAR RIGHT PROXIMAL FEMORAL COMPRESS: NORMAL
BH CV LOWER VASCULAR RIGHT SAPHENOFEMORAL JUNCTION COMPRESS: NORMAL
BILIRUB SERPL-MCNC: 0.4 MG/DL (ref 0–1.2)
BUN SERPL-MCNC: 8 MG/DL (ref 8–23)
BUN SERPL-MCNC: 9 MG/DL (ref 8–23)
BUN/CREAT SERPL: 11.8 (ref 7–25)
BUN/CREAT SERPL: 16.4 (ref 7–25)
CA-I SERPL ISE-MCNC: 1.21 MMOL/L (ref 1.2–1.3)
CALCIUM SPEC-SCNC: 9.3 MG/DL (ref 8.6–10.5)
CALCIUM SPEC-SCNC: 9.5 MG/DL (ref 8.6–10.5)
CHLORIDE SERPL-SCNC: 101 MMOL/L (ref 98–107)
CHLORIDE SERPL-SCNC: 103 MMOL/L (ref 98–107)
CHOLEST SERPL-MCNC: 181 MG/DL (ref 0–200)
CO2 SERPL-SCNC: 25 MMOL/L (ref 22–29)
CO2 SERPL-SCNC: 28 MMOL/L (ref 22–29)
CREAT SERPL-MCNC: 0.55 MG/DL (ref 0.57–1)
CREAT SERPL-MCNC: 0.68 MG/DL (ref 0.57–1)
DEPRECATED RDW RBC AUTO: 41.6 FL (ref 37–54)
EOSINOPHIL # BLD AUTO: 0 10*3/MM3 (ref 0–0.4)
EOSINOPHIL NFR BLD AUTO: 0.5 % (ref 0.3–6.2)
ERYTHROCYTE [DISTWIDTH] IN BLOOD BY AUTOMATED COUNT: 14.8 % (ref 12.3–15.4)
GFR SERPL CREATININE-BSD FRML MDRD: 109 ML/MIN/1.73
GFR SERPL CREATININE-BSD FRML MDRD: 85 ML/MIN/1.73
GLOBULIN UR ELPH-MCNC: 2.5 GM/DL
GLUCOSE BLDC GLUCOMTR-MCNC: 114 MG/DL (ref 70–105)
GLUCOSE BLDC GLUCOMTR-MCNC: 122 MG/DL (ref 70–105)
GLUCOSE BLDC GLUCOMTR-MCNC: 124 MG/DL (ref 70–105)
GLUCOSE SERPL-MCNC: 123 MG/DL (ref 65–99)
GLUCOSE SERPL-MCNC: 139 MG/DL (ref 65–99)
HBA1C MFR BLD: 5.7 % (ref 3.5–5.6)
HCG SERPL QL: NEGATIVE
HCT VFR BLD AUTO: 37.1 % (ref 34–46.6)
HDLC SERPL-MCNC: 75 MG/DL (ref 40–60)
HGB BLD-MCNC: 12.4 G/DL (ref 12–15.9)
INR PPP: 1.03 (ref 2–3)
INR PPP: 1.04 (ref 0.93–1.1)
INR PPP: 1.05 (ref 0.93–1.1)
LDLC SERPL CALC-MCNC: 93 MG/DL (ref 0–100)
LDLC/HDLC SERPL: 1.23 {RATIO}
LYMPHOCYTES # BLD AUTO: 1.1 10*3/MM3 (ref 0.7–3.1)
LYMPHOCYTES NFR BLD AUTO: 16.5 % (ref 19.6–45.3)
MAGNESIUM SERPL-MCNC: 1.9 MG/DL (ref 1.6–2.4)
MCH RBC QN AUTO: 27.1 PG (ref 26.6–33)
MCHC RBC AUTO-ENTMCNC: 33.5 G/DL (ref 31.5–35.7)
MCV RBC AUTO: 80.9 FL (ref 79–97)
MONOCYTES # BLD AUTO: 0.4 10*3/MM3 (ref 0.1–0.9)
MONOCYTES NFR BLD AUTO: 6 % (ref 5–12)
NEUTROPHILS NFR BLD AUTO: 5 10*3/MM3 (ref 1.7–7)
NEUTROPHILS NFR BLD AUTO: 76.4 % (ref 42.7–76)
NRBC BLD AUTO-RTO: 0.1 /100 WBC (ref 0–0.2)
PHOSPHATE SERPL-MCNC: 3.3 MG/DL (ref 2.5–4.5)
PLATELET # BLD AUTO: 191 10*3/MM3 (ref 140–450)
PMV BLD AUTO: 8.9 FL (ref 6–12)
POTASSIUM SERPL-SCNC: 3.4 MMOL/L (ref 3.5–5.2)
POTASSIUM SERPL-SCNC: 3.6 MMOL/L (ref 3.5–5.2)
PROT SERPL-MCNC: 6.6 G/DL (ref 6–8.5)
PROTHROMBIN TIME: 11.4 SECONDS (ref 19.4–28.5)
PROTHROMBIN TIME: 11.5 SECONDS (ref 9.6–11.7)
PROTHROMBIN TIME: 11.6 SECONDS (ref 9.6–11.7)
RBC # BLD AUTO: 4.59 10*6/MM3 (ref 3.77–5.28)
SODIUM SERPL-SCNC: 139 MMOL/L (ref 136–145)
SODIUM SERPL-SCNC: 139 MMOL/L (ref 136–145)
TRIGL SERPL-MCNC: 68 MG/DL (ref 0–150)
VLDLC SERPL-MCNC: 13 MG/DL (ref 5–40)
WBC NRBC COR # BLD: 6.5 10*3/MM3 (ref 3.4–10.8)

## 2022-02-11 PROCEDURE — 99222 1ST HOSP IP/OBS MODERATE 55: CPT | Performed by: INTERNAL MEDICINE

## 2022-02-11 PROCEDURE — 99152 MOD SED SAME PHYS/QHP 5/>YRS: CPT | Performed by: INTERNAL MEDICINE

## 2022-02-11 PROCEDURE — 3E06317 INTRODUCTION OF OTHER THROMBOLYTIC INTO CENTRAL ARTERY, PERCUTANEOUS APPROACH: ICD-10-PCS | Performed by: INTERNAL MEDICINE

## 2022-02-11 PROCEDURE — 94760 N-INVAS EAR/PLS OXIMETRY 1: CPT

## 2022-02-11 PROCEDURE — 94799 UNLISTED PULMONARY SVC/PX: CPT

## 2022-02-11 PROCEDURE — 25010000002 HEPARIN (PORCINE) PER 1000 UNITS: Performed by: NURSE PRACTITIONER

## 2022-02-11 PROCEDURE — 25010000002 MIDAZOLAM PER 1 MG: Performed by: INTERNAL MEDICINE

## 2022-02-11 PROCEDURE — 85306 CLOT INHIBIT PROT S FREE: CPT | Performed by: INTERNAL MEDICINE

## 2022-02-11 PROCEDURE — 85210 CLOT FACTOR II PROTHROM SPEC: CPT | Performed by: INTERNAL MEDICINE

## 2022-02-11 PROCEDURE — 85730 THROMBOPLASTIN TIME PARTIAL: CPT | Performed by: NURSE PRACTITIONER

## 2022-02-11 PROCEDURE — 86147 CARDIOLIPIN ANTIBODY EA IG: CPT | Performed by: INTERNAL MEDICINE

## 2022-02-11 PROCEDURE — 25010000002 FENTANYL CITRATE (PF) 100 MCG/2ML SOLUTION: Performed by: INTERNAL MEDICINE

## 2022-02-11 PROCEDURE — C1769 GUIDE WIRE: HCPCS | Performed by: INTERNAL MEDICINE

## 2022-02-11 PROCEDURE — 85300 ANTITHROMBIN III ACTIVITY: CPT | Performed by: INTERNAL MEDICINE

## 2022-02-11 PROCEDURE — 85303 CLOT INHIBIT PROT C ACTIVITY: CPT | Performed by: INTERNAL MEDICINE

## 2022-02-11 PROCEDURE — 85705 THROMBOPLASTIN INHIBITION: CPT | Performed by: INTERNAL MEDICINE

## 2022-02-11 PROCEDURE — 82962 GLUCOSE BLOOD TEST: CPT

## 2022-02-11 PROCEDURE — 83735 ASSAY OF MAGNESIUM: CPT | Performed by: STUDENT IN AN ORGANIZED HEALTH CARE EDUCATION/TRAINING PROGRAM

## 2022-02-11 PROCEDURE — 94640 AIRWAY INHALATION TREATMENT: CPT

## 2022-02-11 PROCEDURE — 85670 THROMBIN TIME PLASMA: CPT | Performed by: INTERNAL MEDICINE

## 2022-02-11 PROCEDURE — 85610 PROTHROMBIN TIME: CPT | Performed by: NURSE PRACTITIONER

## 2022-02-11 PROCEDURE — 85347 COAGULATION TIME ACTIVATED: CPT

## 2022-02-11 PROCEDURE — 99153 MOD SED SAME PHYS/QHP EA: CPT | Performed by: INTERNAL MEDICINE

## 2022-02-11 PROCEDURE — 85613 RUSSELL VIPER VENOM DILUTED: CPT | Performed by: INTERNAL MEDICINE

## 2022-02-11 PROCEDURE — 85610 PROTHROMBIN TIME: CPT | Performed by: INTERNAL MEDICINE

## 2022-02-11 PROCEDURE — 85730 THROMBOPLASTIN TIME PARTIAL: CPT | Performed by: INTERNAL MEDICINE

## 2022-02-11 PROCEDURE — C1751 CATH, INF, PER/CENT/MIDLINE: HCPCS | Performed by: INTERNAL MEDICINE

## 2022-02-11 PROCEDURE — 85732 THROMBOPLASTIN TIME PARTIAL: CPT | Performed by: INTERNAL MEDICINE

## 2022-02-11 PROCEDURE — 02FR3Z0 FRAGMENTATION OF LEFT PULMONARY ARTERY, PERCUTANEOUS APPROACH, ULTRASONIC: ICD-10-PCS | Performed by: INTERNAL MEDICINE

## 2022-02-11 PROCEDURE — 02FQ3Z0 FRAGMENTATION OF RIGHT PULMONARY ARTERY, PERCUTANEOUS APPROACH, ULTRASONIC: ICD-10-PCS | Performed by: INTERNAL MEDICINE

## 2022-02-11 PROCEDURE — 36014 PLACE CATHETER IN ARTERY: CPT | Performed by: INTERNAL MEDICINE

## 2022-02-11 PROCEDURE — 37211 THROMBOLYTIC ART THERAPY: CPT | Performed by: INTERNAL MEDICINE

## 2022-02-11 PROCEDURE — 86148 ANTI-PHOSPHOLIPID ANTIBODY: CPT | Performed by: INTERNAL MEDICINE

## 2022-02-11 PROCEDURE — C1894 INTRO/SHEATH, NON-LASER: HCPCS | Performed by: INTERNAL MEDICINE

## 2022-02-11 PROCEDURE — 85305 CLOT INHIBIT PROT S TOTAL: CPT | Performed by: INTERNAL MEDICINE

## 2022-02-11 PROCEDURE — 93306 TTE W/DOPPLER COMPLETE: CPT | Performed by: INTERNAL MEDICINE

## 2022-02-11 PROCEDURE — 80053 COMPREHEN METABOLIC PANEL: CPT | Performed by: STUDENT IN AN ORGANIZED HEALTH CARE EDUCATION/TRAINING PROGRAM

## 2022-02-11 PROCEDURE — 80061 LIPID PANEL: CPT | Performed by: STUDENT IN AN ORGANIZED HEALTH CARE EDUCATION/TRAINING PROGRAM

## 2022-02-11 PROCEDURE — 84100 ASSAY OF PHOSPHORUS: CPT | Performed by: STUDENT IN AN ORGANIZED HEALTH CARE EDUCATION/TRAINING PROGRAM

## 2022-02-11 PROCEDURE — 94761 N-INVAS EAR/PLS OXIMETRY MLT: CPT

## 2022-02-11 PROCEDURE — 85025 COMPLETE CBC W/AUTO DIFF WBC: CPT | Performed by: STUDENT IN AN ORGANIZED HEALTH CARE EDUCATION/TRAINING PROGRAM

## 2022-02-11 PROCEDURE — 84703 CHORIONIC GONADOTROPIN ASSAY: CPT | Performed by: INTERNAL MEDICINE

## 2022-02-11 PROCEDURE — 83036 HEMOGLOBIN GLYCOSYLATED A1C: CPT | Performed by: STUDENT IN AN ORGANIZED HEALTH CARE EDUCATION/TRAINING PROGRAM

## 2022-02-11 PROCEDURE — 81240 F2 GENE: CPT | Performed by: INTERNAL MEDICINE

## 2022-02-11 PROCEDURE — 86146 BETA-2 GLYCOPROTEIN ANTIBODY: CPT | Performed by: INTERNAL MEDICINE

## 2022-02-11 PROCEDURE — 0 IOPAMIDOL PER 1 ML: Performed by: INTERNAL MEDICINE

## 2022-02-11 PROCEDURE — 99223 1ST HOSP IP/OBS HIGH 75: CPT | Performed by: INTERNAL MEDICINE

## 2022-02-11 PROCEDURE — C1725 CATH, TRANSLUMIN NON-LASER: HCPCS | Performed by: INTERNAL MEDICINE

## 2022-02-11 PROCEDURE — 82330 ASSAY OF CALCIUM: CPT | Performed by: STUDENT IN AN ORGANIZED HEALTH CARE EDUCATION/TRAINING PROGRAM

## 2022-02-11 PROCEDURE — 93970 EXTREMITY STUDY: CPT

## 2022-02-11 PROCEDURE — 81241 F5 GENE: CPT | Performed by: INTERNAL MEDICINE

## 2022-02-11 PROCEDURE — 93306 TTE W/DOPPLER COMPLETE: CPT

## 2022-02-11 PROCEDURE — 25010000002 ALTEPLASE 2 MG RECONSTITUTED SOLUTION 1 EACH VIAL: Performed by: INTERNAL MEDICINE

## 2022-02-11 RX ORDER — MIDAZOLAM HYDROCHLORIDE 1 MG/ML
INJECTION INTRAMUSCULAR; INTRAVENOUS AS NEEDED
Status: DISCONTINUED | OUTPATIENT
Start: 2022-02-11 | End: 2022-02-11 | Stop reason: HOSPADM

## 2022-02-11 RX ORDER — BUDESONIDE 0.5 MG/2ML
1 INHALANT ORAL
Status: DISCONTINUED | OUTPATIENT
Start: 2022-02-11 | End: 2022-02-12 | Stop reason: HOSPADM

## 2022-02-11 RX ORDER — AMLODIPINE BESYLATE 5 MG/1
5 TABLET ORAL DAILY
Status: DISCONTINUED | OUTPATIENT
Start: 2022-02-11 | End: 2022-02-12 | Stop reason: HOSPADM

## 2022-02-11 RX ORDER — HEPARIN SOD,PORCINE/0.9 % NACL 25000/250
250 INTRAVENOUS SOLUTION INTRAVENOUS CONTINUOUS
Status: DISCONTINUED | OUTPATIENT
Start: 2022-02-11 | End: 2022-02-12 | Stop reason: ALTCHOICE

## 2022-02-11 RX ORDER — HEPARIN SODIUM 10000 [USP'U]/100ML
250 INJECTION, SOLUTION INTRAVENOUS CONTINUOUS
Status: DISCONTINUED | OUTPATIENT
Start: 2022-02-11 | End: 2022-02-11

## 2022-02-11 RX ORDER — SODIUM CHLORIDE 9 MG/ML
35 INJECTION, SOLUTION INTRAVENOUS CONTINUOUS
Status: DISCONTINUED | OUTPATIENT
Start: 2022-02-11 | End: 2022-02-12 | Stop reason: HOSPADM

## 2022-02-11 RX ORDER — WARFARIN SODIUM 5 MG/1
5 TABLET ORAL
Status: DISCONTINUED | OUTPATIENT
Start: 2022-02-11 | End: 2022-02-11

## 2022-02-11 RX ORDER — PANTOPRAZOLE SODIUM 40 MG/1
40 TABLET, DELAYED RELEASE ORAL EVERY MORNING
Status: DISCONTINUED | OUTPATIENT
Start: 2022-02-11 | End: 2022-02-11

## 2022-02-11 RX ORDER — ACETAMINOPHEN 325 MG/1
650 TABLET ORAL EVERY 6 HOURS PRN
Status: DISCONTINUED | OUTPATIENT
Start: 2022-02-11 | End: 2022-02-12 | Stop reason: HOSPADM

## 2022-02-11 RX ORDER — FENTANYL CITRATE 50 UG/ML
INJECTION, SOLUTION INTRAMUSCULAR; INTRAVENOUS AS NEEDED
Status: DISCONTINUED | OUTPATIENT
Start: 2022-02-11 | End: 2022-02-11 | Stop reason: HOSPADM

## 2022-02-11 RX ORDER — PANTOPRAZOLE SODIUM 40 MG/1
40 TABLET, DELAYED RELEASE ORAL EVERY MORNING
Status: DISCONTINUED | OUTPATIENT
Start: 2022-02-12 | End: 2022-02-12 | Stop reason: HOSPADM

## 2022-02-11 RX ORDER — LIDOCAINE HYDROCHLORIDE 20 MG/ML
INJECTION, SOLUTION INFILTRATION; PERINEURAL AS NEEDED
Status: DISCONTINUED | OUTPATIENT
Start: 2022-02-11 | End: 2022-02-11 | Stop reason: HOSPADM

## 2022-02-11 RX ORDER — SODIUM CHLORIDE 9 MG/ML
35 INJECTION, SOLUTION INTRAVENOUS CONTINUOUS
Status: DISCONTINUED | OUTPATIENT
Start: 2022-02-11 | End: 2022-02-11 | Stop reason: SDUPTHER

## 2022-02-11 RX ORDER — ALPRAZOLAM 0.25 MG/1
0.25 TABLET ORAL 2 TIMES DAILY PRN
Status: DISCONTINUED | OUTPATIENT
Start: 2022-02-11 | End: 2022-02-12 | Stop reason: HOSPADM

## 2022-02-11 RX ORDER — CHOLECALCIFEROL (VITAMIN D3) 125 MCG
5 CAPSULE ORAL NIGHTLY PRN
Status: DISCONTINUED | OUTPATIENT
Start: 2022-02-11 | End: 2022-02-12 | Stop reason: HOSPADM

## 2022-02-11 RX ORDER — DIPHENOXYLATE HYDROCHLORIDE AND ATROPINE SULFATE 2.5; .025 MG/1; MG/1
1 TABLET ORAL DAILY
Status: DISCONTINUED | OUTPATIENT
Start: 2022-02-11 | End: 2022-02-12 | Stop reason: HOSPADM

## 2022-02-11 RX ORDER — ESCITALOPRAM OXALATE 10 MG/1
20 TABLET ORAL DAILY
Status: DISCONTINUED | OUTPATIENT
Start: 2022-02-11 | End: 2022-02-12 | Stop reason: HOSPADM

## 2022-02-11 RX ORDER — OXYCODONE HYDROCHLORIDE 5 MG/1
5 TABLET ORAL EVERY 4 HOURS PRN
Status: DISCONTINUED | OUTPATIENT
Start: 2022-02-11 | End: 2022-02-12 | Stop reason: HOSPADM

## 2022-02-11 RX ORDER — ASPIRIN 81 MG/1
81 TABLET ORAL DAILY
Status: CANCELLED | OUTPATIENT
Start: 2022-02-11

## 2022-02-11 RX ORDER — IPRATROPIUM BROMIDE AND ALBUTEROL SULFATE 2.5; .5 MG/3ML; MG/3ML
3 SOLUTION RESPIRATORY (INHALATION)
Status: DISCONTINUED | OUTPATIENT
Start: 2022-02-11 | End: 2022-02-12 | Stop reason: HOSPADM

## 2022-02-11 RX ADMIN — Medication 10 ML: at 08:20

## 2022-02-11 RX ADMIN — SODIUM CHLORIDE 35 ML/HR: 0.9 INJECTION, SOLUTION INTRAVENOUS at 17:30

## 2022-02-11 RX ADMIN — IPRATROPIUM BROMIDE AND ALBUTEROL SULFATE 3 ML: 2.5; .5 SOLUTION RESPIRATORY (INHALATION) at 22:22

## 2022-02-11 RX ADMIN — IPRATROPIUM BROMIDE AND ALBUTEROL SULFATE 3 ML: 2.5; .5 SOLUTION RESPIRATORY (INHALATION) at 06:55

## 2022-02-11 RX ADMIN — ALTEPLASE 1 MG/HR: 2.2 INJECTION, POWDER, LYOPHILIZED, FOR SOLUTION INTRAVENOUS at 17:30

## 2022-02-11 RX ADMIN — Medication 10 ML: at 21:00

## 2022-02-11 RX ADMIN — ESCITALOPRAM OXALATE 20 MG: 10 TABLET ORAL at 12:23

## 2022-02-11 RX ADMIN — IPRATROPIUM BROMIDE AND ALBUTEROL SULFATE 3 ML: 2.5; .5 SOLUTION RESPIRATORY (INHALATION) at 10:43

## 2022-02-11 RX ADMIN — BUDESONIDE 1 MG: 0.5 SUSPENSION RESPIRATORY (INHALATION) at 22:26

## 2022-02-11 RX ADMIN — HEPARIN SODIUM 13.2 UNITS/KG/HR: 5000 INJECTION INTRAVENOUS; SUBCUTANEOUS at 08:11

## 2022-02-11 RX ADMIN — AMLODIPINE BESYLATE 5 MG: 5 TABLET ORAL at 12:23

## 2022-02-11 RX ADMIN — BUDESONIDE 0.5 MG: 0.5 SUSPENSION RESPIRATORY (INHALATION) at 10:43

## 2022-02-11 RX ADMIN — PANTOPRAZOLE SODIUM 40 MG: 40 INJECTION, POWDER, LYOPHILIZED, FOR SOLUTION INTRAVENOUS at 05:05

## 2022-02-11 NOTE — CONSULTS
PULMONARY CRITICAL CARE CONSULT NOTE      PATIENT IDENTIFICATION:  Name: Radha Trujillo  MRN: [id number]  :  1950     Age: 71 y.o.  Sex: female        DATE OF CONSULTATION:  2022  PRIMARY CARE PHYSICIAN    Maribel Hale APRN                  CHIEF COMPLAINT: PE    History of Present Illness:   Radha Trujillo is a 71 y.o. female pt with coagulations abnormalities presented er with 3 days  shortness of breath.  Patient states she began to feel short of breath , started  walking from her car into the [facility].  She states she has not been feeling ill, no cough no fever no chills,   she hasdenies pain or swelling in her lower extremities, she denies airplane travel, and long car rides.       The patient denies chest pain, abdominal pain, and fevers.  At the time of my assessment, she is awake alert oriented and on room air.  She denies complaints states she only feels short of air with exertion      Review of Systems:   Constitutional: As above    Eyes: negative   ENT/oropharynx: negative   Cardiovascular: negative   Respiratory: As above    Gastrointestinal: negative   Genitourinary: negative   Neurological: negative   Musculoskeletal: negative   Integument/breast: negative   Endocrine: negative   Allergic/Immunologic: negative     Past Medical History:  Past Medical History:   Diagnosis Date   • Abnormal coagulation profile    • Abnormal EKG    • Abnormal laboratory test    • Acquired spondylolisthesis    • Allergic 2021    Start shots in 21   • Arthritis    • Asthma    • Body aches    • Bronchitis, acute    • Chest pain     PRESSURE   • Chronic back pain    • Cough    • Depression with anxiety    • DJD (degenerative joint disease)    • Essential hypertension    • Fatigue    • History of radiofrequency ablation (RFA) procedure for cardiac arrhythmia 2021   • Hyperlipidemia    • Hypokalemia    • Low back pain    • Lymphocytic colitis    • Obesity    • Other specified disorders of bone  density and structure, other site    • Panic disorder    • Paroxysmal SVT (supraventricular tachycardia) (HCC)    • Postmenopausal     9 YEARS   • Prediabetes    • SOB (shortness of breath)    • Unspecified injury of left Achilles tendon, initial encounter    • Vitamin B deficiency 1/14/2019   • Vitamin B deficiency, unspecified    • Vitamin D deficiency, unspecified        Past Surgical History:  Past Surgical History:   Procedure Laterality Date   • ACHILLES TENDON SURGERY Left 2018   • AV NODE ABLATION  08/2016    AVNRT RF ABLATION --8/16   • COLONOSCOPY N/A 01/01/2012   • FOOT SURGERY Right     TOE/FOOT SURGERY   • KNEE SURGERY Right     2/2 TORN MENICUS   • TUBAL ABDOMINAL LIGATION          Family History:  Family History   Problem Relation Age of Onset   • Arthritis Mother    • Diabetes Mother    • Heart disease Mother    • Emphysema Mother    • Kidney disease Father    • Arthritis Sister    • Anxiety disorder Sister    • Arthritis Brother    • Anxiety disorder Brother    • Acute myelogenous leukemia Niece    • Myasthenia gravis Niece         Social History:   Social History     Tobacco Use   • Smoking status: Never Smoker   • Smokeless tobacco: Never Used   Substance Use Topics   • Alcohol use: Yes     Alcohol/week: 1.0 standard drink     Types: 1 Standard drinks or equivalent per week     Comment: 1 DRINK WEEKLY, IF THAT: RARE/OCC;  CAFFEINE USE + 1 COFFEE DAILY        Allergies:  Allergies   Allergen Reactions   • Adhesive Tape Rash       Home Meds:  Medications Prior to Admission   Medication Sig Dispense Refill Last Dose   • ALPRAZolam (Xanax) 0.25 MG tablet Take 1 tablet by mouth 2 (Two) Times a Day As Needed for Anxiety. 30 tablet 0    • amLODIPine (NORVASC) 5 MG tablet TAKE 1 TABLET BY MOUTH DAILY 90 tablet 1    • aspirin 81 MG EC tablet Take 81 mg by mouth Daily.      • azelastine (ASTELIN) 0.1 % nasal spray       • B Complex Vitamins (vitamin b complex) capsule capsule Take  by mouth Daily.      •  "Bioflavonoid Products (Vitamin C ER) 1000-100 MG tablet controlled-release Take 1 capsule by mouth Daily.      • escitalopram (Lexapro) 20 MG tablet Take 1 tablet by mouth Daily. 30 tablet 1    • fluticasone (Flovent HFA) 110 MCG/ACT inhaler Inhale 1 puff 2 (Two) Times a Day. 1 inhaler 1    • melatonin 5 MG tablet tablet Take 5 mg by mouth At Night As Needed.      • Multiple Vitamin (MULTIVITAMIN) capsule MULTIVITAMINS CAPS      • Omeprazole 20 MG tablet delayed-release 15 mg.      • potassium chloride 10 MEQ CR tablet TAKE 1 TABLET BY MOUTH THREE TIMES DAILY 270 tablet 0    • valsartan-hydrochlorothiazide (DIOVAN-HCT) 320-25 MG per tablet Take 1 tablet by mouth Daily. 90 tablet 3    • Vitamins A & D 5000-400 units capsule           Objective:  tMax 24 hrs: Temp (24hrs), Av.6 °F (36.4 °C), Min:96.9 °F (36.1 °C), Max:98.3 °F (36.8 °C)      Vitals Ranges:   Temp:  [96.9 °F (36.1 °C)-98.3 °F (36.8 °C)] 97.7 °F (36.5 °C)  Heart Rate:  [71-96] 82  Resp:  [14-22] 16  BP: (129-185)/(67-91) 135/67    Intake and Output Last 3 Shifts:   No intake/output data recorded.    Exam:  /67   Pulse 82   Temp 97.7 °F (36.5 °C) (Oral)   Resp 16   Ht 154.9 cm (61\")   Wt 98 kg (216 lb)   SpO2 97%   BMI 40.81 kg/m²       22  0325 22  0639   Weight: 98.2 kg (216 lb 7.9 oz) 98 kg (216 lb)     General Appearance: WDWN     HEENT:  Normocephalic, without obvious abnormality, atraumaticConjunctiva/corneas clear,.  Normal external ear canals, Nares normal, no drainage  Neck:  Supple, symmetrical, trachea midline. No JVD.  Lungs /Chest wall:   Bilateral basal rhonchi, respirations unlabored symmetrical wall movement.     Heart:  Regular rate and rhythm, systolic murmur PMI left sternal border  Abdomen: Soft, non-tender, no masses, no organomegaly.    Extremities: Trace edema no clubbing or Cyanosis        Data Review:  All labs (24hrs):   Recent Results (from the past 24 hour(s))   CBC No Differential    Collection " Time: 02/10/22 12:30 PM    Specimen: Blood   Result Value Ref Range    WBC 6.07 3.40 - 10.80 10*3/mm3    RBC 4.75 3.77 - 5.28 10*6/mm3    Hemoglobin 12.9 12.0 - 15.9 g/dL    Hematocrit 38.6 34.0 - 46.6 %    MCV 81.3 79.0 - 97.0 fL    MCH 27.2 26.6 - 33.0 pg    MCHC 33.4 31.5 - 35.7 g/dL    RDW 13.3 12.3 - 15.4 %    RDW-SD 38.8 37.0 - 54.0 fl    MPV 11.8 6.0 - 12.0 fL    Platelets 216 140 - 450 10*3/mm3   D-dimer, Quantitative    Collection Time: 02/10/22 12:30 PM    Specimen: Blood   Result Value Ref Range    D-Dimer, Quantitative 11.14 (H) 0.00 - 0.59 mg/L (FEU)   Vitamin D 25 hydroxy    Collection Time: 02/10/22 12:30 PM    Specimen: Blood   Result Value Ref Range    25 Hydroxy, Vitamin D 51.2 30.0 - 100.0 ng/ml   Vitamin B12    Collection Time: 02/10/22 12:30 PM    Specimen: Blood   Result Value Ref Range    Vitamin B-12 727 211 - 946 pg/mL   Urinalysis With Culture If Indicated - Urine, Clean Catch    Collection Time: 02/10/22 12:30 PM    Specimen: Urine, Clean Catch   Result Value Ref Range    Color, UA Yellow Yellow, Straw    Appearance, UA Clear Clear    pH, UA 7.5 5.0 - 8.0    Specific Gravity, UA 1.009 1.005 - 1.030    Glucose, UA Negative Negative    Ketones, UA Negative Negative    Bilirubin, UA Negative Negative    Blood, UA Negative Negative    Protein, UA Negative Negative    Leuk Esterase, UA Moderate (2+) (A) Negative    Nitrite, UA Negative Negative    Urobilinogen, UA 0.2 E.U./dL 0.2 - 1.0 E.U./dL   Urinalysis, Microscopic Only - Urine, Clean Catch    Collection Time: 02/10/22 12:30 PM    Specimen: Urine, Clean Catch   Result Value Ref Range    RBC, UA None Seen None Seen, 0-2 /HPF    WBC, UA 0-2 None Seen, 0-2 /HPF    Bacteria, UA 1+ (A) None Seen /HPF    Squamous Epithelial Cells, UA 0-2 None Seen, 0-2 /HPF    Hyaline Casts, UA None Seen None Seen /LPF    Methodology Manual Light Microscopy    ECG 12 Lead    Collection Time: 02/10/22  5:45 PM   Result Value Ref Range    QT Interval 386 ms    Comprehensive Metabolic Panel    Collection Time: 02/10/22  7:42 PM    Specimen: Blood   Result Value Ref Range    Glucose 107 (H) 65 - 99 mg/dL    BUN 12 8 - 23 mg/dL    Creatinine 0.71 0.57 - 1.00 mg/dL    Sodium 140 136 - 145 mmol/L    Potassium 3.8 3.5 - 5.2 mmol/L    Chloride 101 98 - 107 mmol/L    CO2 27.0 22.0 - 29.0 mmol/L    Calcium 9.8 8.6 - 10.5 mg/dL    Total Protein 6.9 6.0 - 8.5 g/dL    Albumin 4.20 3.50 - 5.20 g/dL    ALT (SGPT) 15 1 - 33 U/L    AST (SGOT) 16 1 - 32 U/L    Alkaline Phosphatase 84 39 - 117 U/L    Total Bilirubin 0.3 0.0 - 1.2 mg/dL    eGFR Non African Amer 81 >60 mL/min/1.73    Globulin 2.7 gm/dL    A/G Ratio 1.6 g/dL    BUN/Creatinine Ratio 16.9 7.0 - 25.0    Anion Gap 12.0 5.0 - 15.0 mmol/L   Troponin    Collection Time: 02/10/22  7:42 PM    Specimen: Blood   Result Value Ref Range    Troponin T <0.010 0.000 - 0.030 ng/mL   BNP    Collection Time: 02/10/22  7:42 PM    Specimen: Blood   Result Value Ref Range    proBNP 167.4 0.0 - 900.0 pg/mL   CBC Auto Differential    Collection Time: 02/10/22  7:42 PM    Specimen: Blood   Result Value Ref Range    WBC 6.40 3.40 - 10.80 10*3/mm3    RBC 4.63 3.77 - 5.28 10*6/mm3    Hemoglobin 12.8 12.0 - 15.9 g/dL    Hematocrit 37.5 34.0 - 46.6 %    MCV 81.1 79.0 - 97.0 fL    MCH 27.6 26.6 - 33.0 pg    MCHC 34.1 31.5 - 35.7 g/dL    RDW 14.7 12.3 - 15.4 %    RDW-SD 41.1 37.0 - 54.0 fl    MPV 9.1 6.0 - 12.0 fL    Platelets 195 140 - 450 10*3/mm3    Neutrophil % 70.2 42.7 - 76.0 %    Lymphocyte % 18.6 (L) 19.6 - 45.3 %    Monocyte % 8.6 5.0 - 12.0 %    Eosinophil % 1.8 0.3 - 6.2 %    Basophil % 0.8 0.0 - 1.5 %    Neutrophils, Absolute 4.50 1.70 - 7.00 10*3/mm3    Lymphocytes, Absolute 1.20 0.70 - 3.10 10*3/mm3    Monocytes, Absolute 0.60 0.10 - 0.90 10*3/mm3    Eosinophils, Absolute 0.10 0.00 - 0.40 10*3/mm3    Basophils, Absolute 0.10 0.00 - 0.20 10*3/mm3    nRBC 0.3 (H) 0.0 - 0.2 /100 WBC   TSH    Collection Time: 02/10/22  7:42 PM    Specimen: Blood    Result Value Ref Range    TSH 2.070 0.270 - 4.200 uIU/mL   Respiratory Panel PCR w/COVID-19(SARS-CoV-2) MEGA/SCAR/KARUNA/PAD/COR/MAD/THANG In-House, NP Swab in UTM/VTM, 3-4 HR TAT - Swab, Nasopharynx    Collection Time: 02/10/22  7:43 PM    Specimen: Nasopharynx; Swab   Result Value Ref Range    ADENOVIRUS, PCR Not Detected Not Detected    Coronavirus 229E Not Detected Not Detected    Coronavirus HKU1 Not Detected Not Detected    Coronavirus NL63 Not Detected Not Detected    Coronavirus OC43 Not Detected Not Detected    COVID19 Not Detected Not Detected - Ref. Range    Human Metapneumovirus Not Detected Not Detected    Human Rhinovirus/Enterovirus Not Detected Not Detected    Influenza A PCR Not Detected Not Detected    Influenza B PCR Not Detected Not Detected    Parainfluenza Virus 1 Not Detected Not Detected    Parainfluenza Virus 2 Not Detected Not Detected    Parainfluenza Virus 3 Not Detected Not Detected    Parainfluenza Virus 4 Not Detected Not Detected    RSV, PCR Not Detected Not Detected    Bordetella pertussis pcr Not Detected Not Detected    Bordetella parapertussis PCR Not Detected Not Detected    Chlamydophila pneumoniae PCR Not Detected Not Detected    Mycoplasma pneumo by PCR Not Detected Not Detected   Protime-INR    Collection Time: 02/11/22  5:06 AM    Specimen: Blood   Result Value Ref Range    Protime 11.6 9.6 - 11.7 Seconds    INR 1.05 0.93 - 1.10   aPTT    Collection Time: 02/11/22  5:06 AM    Specimen: Blood   Result Value Ref Range    PTT 98.9 (C) 24.0 - 31.0 seconds   Magnesium    Collection Time: 02/11/22  5:06 AM    Specimen: Blood   Result Value Ref Range    Magnesium 1.9 1.6 - 2.4 mg/dL   Phosphorus    Collection Time: 02/11/22  5:06 AM    Specimen: Blood   Result Value Ref Range    Phosphorus 3.3 2.5 - 4.5 mg/dL   Comprehensive Metabolic Panel    Collection Time: 02/11/22  5:06 AM    Specimen: Blood   Result Value Ref Range    Glucose 123 (H) 65 - 99 mg/dL    BUN 8 8 - 23 mg/dL     Creatinine 0.68 0.57 - 1.00 mg/dL    Sodium 139 136 - 145 mmol/L    Potassium 3.6 3.5 - 5.2 mmol/L    Chloride 101 98 - 107 mmol/L    CO2 28.0 22.0 - 29.0 mmol/L    Calcium 9.5 8.6 - 10.5 mg/dL    Total Protein 6.6 6.0 - 8.5 g/dL    Albumin 4.10 3.50 - 5.20 g/dL    ALT (SGPT) 12 1 - 33 U/L    AST (SGOT) 13 1 - 32 U/L    Alkaline Phosphatase 80 39 - 117 U/L    Total Bilirubin 0.4 0.0 - 1.2 mg/dL    eGFR Non African Amer 85 >60 mL/min/1.73    Globulin 2.5 gm/dL    A/G Ratio 1.6 g/dL    BUN/Creatinine Ratio 11.8 7.0 - 25.0    Anion Gap 10.0 5.0 - 15.0 mmol/L   Lipid Panel    Collection Time: 02/11/22  5:06 AM    Specimen: Blood   Result Value Ref Range    Total Cholesterol 181 0 - 200 mg/dL    Triglycerides 68 0 - 150 mg/dL    HDL Cholesterol 75 (H) 40 - 60 mg/dL    LDL Cholesterol  93 0 - 100 mg/dL    VLDL Cholesterol 13 5 - 40 mg/dL    LDL/HDL Ratio 1.23    Calcium, Ionized    Collection Time: 02/11/22  5:06 AM    Specimen: Blood   Result Value Ref Range    Ionized Calcium 1.21 1.20 - 1.30 mmol/L   CBC Auto Differential    Collection Time: 02/11/22  5:06 AM    Specimen: Blood   Result Value Ref Range    WBC 6.50 3.40 - 10.80 10*3/mm3    RBC 4.59 3.77 - 5.28 10*6/mm3    Hemoglobin 12.4 12.0 - 15.9 g/dL    Hematocrit 37.1 34.0 - 46.6 %    MCV 80.9 79.0 - 97.0 fL    MCH 27.1 26.6 - 33.0 pg    MCHC 33.5 31.5 - 35.7 g/dL    RDW 14.8 12.3 - 15.4 %    RDW-SD 41.6 37.0 - 54.0 fl    MPV 8.9 6.0 - 12.0 fL    Platelets 191 140 - 450 10*3/mm3    Neutrophil % 76.4 (H) 42.7 - 76.0 %    Lymphocyte % 16.5 (L) 19.6 - 45.3 %    Monocyte % 6.0 5.0 - 12.0 %    Eosinophil % 0.5 0.3 - 6.2 %    Basophil % 0.6 0.0 - 1.5 %    Neutrophils, Absolute 5.00 1.70 - 7.00 10*3/mm3    Lymphocytes, Absolute 1.10 0.70 - 3.10 10*3/mm3    Monocytes, Absolute 0.40 0.10 - 0.90 10*3/mm3    Eosinophils, Absolute 0.00 0.00 - 0.40 10*3/mm3    Basophils, Absolute 0.00 0.00 - 0.20 10*3/mm3    nRBC 0.1 0.0 - 0.2 /100 WBC   Adult Transthoracic Echo Complete W/  Cont if Necessary Per Protocol    Collection Time: 02/11/22  7:09 AM   Result Value Ref Range    BSA 2.0 m^2    RVIDd 3.4 cm    IVSd 1.2 cm    LVIDd 4.9 cm    LVIDs 3.5 cm    LVPWd 1.2 cm    IVS/LVPW 1.0     FS 28.3 %    EDV(Teich) 111.1 ml    ESV(Teich) 50.5 ml    EF(Teich) 54.5 %    EDV(cubed) 115.3 ml    ESV(cubed) 42.5 ml    EF(cubed) 63.1 %    LV mass(C)d 231.3 grams    LV mass(C)dI 118.5 grams/m^2    SV(Teich) 60.5 ml    SI(Teich) 31.0 ml/m^2    SV(cubed) 72.8 ml    SI(cubed) 37.3 ml/m^2    Ao root diam 3.5 cm    Ao root area 9.7 cm^2    ACS 2.4 cm    LVOT diam 2.3 cm    LVOT area 4.1 cm^2    EDV(MOD-sp4) 76.8 ml    ESV(MOD-sp4) 25.7 ml    EF(MOD-sp4) 66.5 %    SV(MOD-sp4) 51.1 ml    SI(MOD-sp4) 26.2 ml/m^2    Ao root area (BSA corrected) 1.8     LV Chavez Vol (BSA corrected) 39.4 ml/m^2    LV Sys Vol (BSA corrected) 13.2 ml/m^2    Aortic R-R 0.77 sec    Aortic HR 77.7 BPM    MV E max tim 75.4 cm/sec    MV A max tim 107.3 cm/sec    MV E/A 0.7     MV V2 max 105.8 cm/sec    MV max PG 4.5 mmHg    MV V2 mean 58.3 cm/sec    MV mean PG 1.6 mmHg    MV V2 VTI 24.4 cm    MVA(VTI) 4.2 cm^2    MV dec slope 273.3 cm/sec^2    MV dec time 0.28 sec    Ao pk tim 137.1 cm/sec    Ao max PG 7.5 mmHg    Ao max PG (full) 3.2 mmHg    Ao V2 mean 86.8 cm/sec    Ao mean PG 3.5 mmHg    Ao mean PG (full) 1.1 mmHg    Ao V2 VTI 28.1 cm    LINCOLN(I,A) 3.7 cm^2    LINCOLN(I,D) 3.7 cm^2    LINCOLN(V,A) 3.1 cm^2    LINCOLN(V,D) 3.1 cm^2    LV V1 max PG 4.4 mmHg    LV V1 mean PG 2.4 mmHg    LV V1 max 104.3 cm/sec    LV V1 mean 73.6 cm/sec    LV V1 VTI 25.5 cm    CO(Ao) 21.2 l/min    CI(Ao) 10.8 l/min/m^2    SV(Ao) 272.6 ml    SI(Ao) 139.6 ml/m^2    CO(LVOT) 8.0 l/min    CI(LVOT) 4.1 l/min/m^2    SV(LVOT) 103.6 ml    SI(LVOT) 53.1 ml/m^2    PA V2 max 82.7 cm/sec    PA max PG 2.8 mmHg    PA max PG (full) 0.82 mmHg    RV V1 max PG 2.0 mmHg    RV V1 mean PG 1.1 mmHg    RV V1 max 70.3 cm/sec    RV V1 mean 49.1 cm/sec    RV V1 VTI 17.6 cm    TR max tim 288.2 cm/sec     RVSP(TR) 36.5 mmHg    RAP systole 3.0 mmHg    Pulm Sys Dominic 62.5 cm/sec    Pulm Chavez Dominic 39.0 cm/sec    Pulm S/D 1.6     Pulm A Revs Dur 0.11 sec    Pulm A Revs Dominic 22.2 cm/sec     CV ECHO PAOLA - BZI_BMI 40.8 kilograms/m^2     CV ECHO PAOLA - BSA(HAYCOCK) 2.1 m^2     CV ECHO PAOLA - BZI_METRIC_WEIGHT 98.0 kg     CV ECHO PAOLA - BZI_METRIC_HEIGHT 154.9 cm    EF(MOD-bp) 67.0 %    LA dimension(2D) 3.8 cm   POC Glucose Once    Collection Time: 02/11/22  7:38 AM    Specimen: Blood   Result Value Ref Range    Glucose 124 (H) 70 - 105 mg/dL   Duplex Venous Lower Extremity - Bilateral CAR    Collection Time: 02/11/22  8:47 AM   Result Value Ref Range    Right Popliteal Spont 1     Right Posterior Tibial Vessel 1     Right Gastronemius Vessel 1     Right Common Femoral Spont Y     Right Common Femoral Phasic Y     Right Common Femoral Augment Y     Right Common Femoral Competent Y     Right Common Femoral Compress C     Right Saphenofemoral Junction Compress C     Right Proximal Femoral Compress C     Right Mid Femoral Spont Y     Right Mid Femoral Phasic Y     Right Mid Femoral Augment Y     Right Mid Femoral Competent Y     Right Mid Femoral Compress C     Right Distal Femoral Compress C     Right Popliteal Spont N     Right Popliteal Phasic N     Right Popliteal Augment N     Right Popliteal Competent N     Right Popliteal Compress N     Right Popliteal Thrombus A     Right Posterior Tibial Compress P     Right Posterior Tibial Thrombus A     Right Peroneal Compress C     Right Gastronemius Compress P     Right Gastronemius Thrombus A     Right Greater Saph AK Compress C     Right Greater Saph BK Compress C     Right Lesser Saph Compress C     Left Common Femoral Spont Y     Left Common Femoral Phasic Y     Left Common Femoral Augment Y     Left Common Femoral Competent Y     Left Common Femoral Compress C     Left Saphenofemoral Junction Compress C     Left Proximal Femoral Compress C     Left Mid Femoral Spont Y      Left Mid Femoral Phasic Y     Left Mid Femoral Augment Y     Left Mid Femoral Competent Y     Left Mid Femoral Compress C     Left Distal Femoral Compress C     Left Popliteal Spont Y     Left Popliteal Phasic Y     Left Popliteal Augment Y     Left Popliteal Competent Y     Left Popliteal Compress C     Left Posterior Tibial Compress C     Left Peroneal Compress C     Left Gastronemius Compress C     Left Greater Saph AK Compress C     Left Greater Saph BK Compress C     Left Lesser Saph Compress C         Imaging:  [unfilled]    ASSESSMENT:  Acute pulmonary embolism (HCC)    Morbid obesity with BMI of 40.0-44.9, adult (HCC)    SOLE  Asthma  Essential hypertension    Hyperlipidemia    Mixed anxiety depressive disorder     PLAN:  Anticoagulation heparin clinically stable on RA  O2 support at night / BIPAP  Bronchodilator  Inhaled corticosteroids  Electrolytes/ glycemic control  DVT and GI prophylaxis.  Total Critical care time in direct medical management (   ) minutes, This time specifically excludes time spent performing procedures.       Feliz Preciado MD. D, ABSM.  2/11/2022  09:13 EST

## 2022-02-11 NOTE — H&P
PULMONARY/CRITICAL CARE HISTORY & PHYSICAL       PATIENT NAME:     Radha Trujillo  :     1950    MRN:     [id number]       ROOM:     [facility] ED      PRIMARY CARE PHYSICIAN:  Maribel Hale APRN    SUBJECTIVE     CHIEF COMPLAINT:   Dyspnea    HISTORY OF PRESENT ILLNESS:  Radha Trujillo is a 71 y.o. female  has a past medical history of Abnormal coagulation profile, Abnormal EKG, Abnormal laboratory test, Acquired spondylolisthesis, Allergic (2021), Arthritis, Asthma, Body aches, Bronchitis, acute, Chest pain, Chronic back pain, Cough, Depression with anxiety, DJD (degenerative joint disease), Essential hypertension, Fatigue, History of radiofrequency ablation (RFA) procedure for cardiac arrhythmia (2021), Hyperlipidemia, Hypokalemia, Low back pain, Lymphocytic colitis, Obesity, Other specified disorders of bone density and structure, other site, Panic disorder, Paroxysmal SVT (supraventricular tachycardia) (HCC), Postmenopausal, Prediabetes, SOB (shortness of breath), Unspecified injury of left Achilles tendon, initial encounter, Vitamin B deficiency (2019), Vitamin B deficiency, unspecified, and Vitamin D deficiency, unspecified.   Patient presented to [facility] on 2/10/2022 with complaint of shortness of breath.  Patient states she began to feel short of breath Tuesday while walking from her car into the [facility].  She states she has not been feeling ill, she has noticed no pain or swelling in her lower extremities, she denies airplane travel, and long car rides.  Patient states that she nor her  have had COVID-19 and she is negative today by lab test.  The patient denies chest pain, abdominal pain, and fevers.  At the time of my assessment, she is awake alert oriented and on room air.  She denies complaints states she only feels short of air with exertion.    In the ED, labs were obtained with abnormalities as follows: Glucose 107, D-dimer 11.14.  Radiology studies  obtained with the following findings: CT chest showed pulmonary emboli throughout right and left segmental and subsegmental branches with possible early right heart strain.    Pulmonary/Intensivist service was contacted for admission to ICU and further evaluation and treatment of patient's condition.      REVIEW OF SYSTEMS:  Pertinent items are noted in HPI, all other systems reviewed and negative      ASSESSMENT     Principal Problem:    Acute pulmonary embolism (HCC)  Active Problems:    Morbid obesity with BMI of 40.0-44.9, adult (HCC)    Essential hypertension    Hyperlipidemia    Mixed anxiety depressive disorder         PLAN     Pulmonary embolism  -CT reviewed  -Continue Heparin drip protocol  -BLE dopplers  -Echo in a.m.  -Continuous Pulse Ox  -Titrate O2 to keep SATS >90%  -Cardiology following due to possible right heart strain    Essential Hypertension, Chronic; hyperlipidemia  -Continue home medications as appropriate   - Monitor with routine vital signs     Anxiety/Depression  -Continue home meds as appropriate    Obesity  -encourage lifestyle modifications      Code Status: Full  VTE Prophylaxis: Heparin drip  PUD Prophylaxis: PPI      HOSPITAL MEDICATIONS     SCHEDULED MEDICATIONS:  [START ON 2/11/2022] insulin lispro, 0-7 Units, Subcutaneous, Q6H  [START ON 2/11/2022] ipratropium-albuterol, 3 mL, Nebulization, Q6H - RT  [START ON 2/11/2022] senna-docusate sodium, 2 tablet, Oral, BID  sodium chloride, 10 mL, Intravenous, Q12H         CONTINUOUS INFUSIONS:    heparin, 15.2 Units/kg/hr, Last Rate: 15.2 Units/kg/hr (02/10/22 0092)         PRN MEDICATIONS:     acetaminophen **OR** acetaminophen    aluminum-magnesium hydroxide-simethicone    [START ON 2/11/2022] senna-docusate sodium **AND** polyethylene glycol **AND** bisacodyl **AND** bisacodyl    dextrose    dextrose    glucagon (human recombinant)    heparin    heparin    [START ON 2/11/2022] insulin lispro **AND** insulin lispro     "ipratropium-albuterol    ondansetron **OR** ondansetron    sodium chloride       OBJECTIVE     VITAL SIGNS:  /73   Pulse 84   Temp 97.4 °F (36.3 °C) (Tympanic)   Resp 14   Ht 154.9 cm (61\")   Wt 98.6 kg (217 lb 6 oz)   SpO2 97%   BMI 41.07 kg/m²     Wt Readings from Last 3 Encounters:   02/10/22 98.6 kg (217 lb 6 oz)   02/10/22 96.3 kg (212 lb 3.2 oz)   10/30/21 95.7 kg (211 lb)       INTAKE/OUTPUT:  No intake or output data in the 24 hours ending 02/10/22 2609    PHYSICAL EXAM:   Constitutional: Obese, non-toxic appearance   Eyes:  PERRL  HENT:  Atraumatic, trachea midline  Respiratory: Clear throughout, non-labored respirations  Cardiovascular:  Normal rate, normal rhythm   GI:  Soft, rounded, nondistended, normal bowel sounds   :  Defer   Musculoskeletal: Bilateral lower extremity nonpitting ankle edema  Integument:  Well hydrated, no rash   Neurologic:  Alert & oriented x 3, no focal deficits noted   Psychiatric:  Speech and behavior appropriate       HISTORY     HISTORY:  Past Medical History:   Diagnosis Date    Abnormal coagulation profile     Abnormal EKG     Abnormal laboratory test     Acquired spondylolisthesis     Allergic 7/16/2021    Start shots in 7/27/21    Arthritis     Asthma     Body aches     Bronchitis, acute     Chest pain     PRESSURE    Chronic back pain     Cough     Depression with anxiety     DJD (degenerative joint disease)     Essential hypertension     Fatigue     History of radiofrequency ablation (RFA) procedure for cardiac arrhythmia 1/5/2021    Hyperlipidemia     Hypokalemia     Low back pain     Lymphocytic colitis     Obesity     Other specified disorders of bone density and structure, other site     Panic disorder     Paroxysmal SVT (supraventricular tachycardia) (HCC)     Postmenopausal     9 YEARS    Prediabetes     SOB (shortness of breath)     Unspecified injury of left Achilles tendon, initial encounter     Vitamin B deficiency 1/14/2019    Vitamin B " deficiency, unspecified     Vitamin D deficiency, unspecified      Past Surgical History:   Procedure Laterality Date    ACHILLES TENDON SURGERY Left 2018    AV NODE ABLATION  08/2016    AVNRT RF ABLATION --8/16    COLONOSCOPY N/A 01/01/2012    FOOT SURGERY Right     TOE/FOOT SURGERY    KNEE SURGERY Right     2/2 TORN MENICUS    TUBAL ABDOMINAL LIGATION       Family History   Problem Relation Age of Onset    Arthritis Mother     Diabetes Mother     Heart disease Mother     Emphysema Mother     Kidney disease Father     Arthritis Sister     Anxiety disorder Sister     Arthritis Brother     Anxiety disorder Brother     Acute myelogenous leukemia Niece     Myasthenia gravis Niece      Social History     Socioeconomic History    Marital status:      Spouse name: Alonso Trujillo    Number of children: 3   Tobacco Use    Smoking status: Never Smoker    Smokeless tobacco: Never Used   Vaping Use    Vaping Use: Never used   Substance and Sexual Activity    Alcohol use: Yes     Alcohol/week: 1.0 standard drink     Types: 1 Standard drinks or equivalent per week     Comment: 1 DRINK WEEKLY, IF THAT: RARE/OCC;  CAFFEINE USE + 1 COFFEE DAILY    Drug use: No    Sexual activity: Not Currently     Partners: Male     Birth control/protection: Other, Tubal ligation     Comment: monogamous w/ ;  s/p BTL - no high-risk sexual behaviors        HOME MEDICATIONS:   Prior to Admission medications    Medication Sig Start Date End Date Taking? Authorizing Provider   ALPRAZolam (Xanax) 0.25 MG tablet Take 1 tablet by mouth 2 (Two) Times a Day As Needed for Anxiety. 11/1/21   Maribel Hale APRN   amLODIPine (NORVASC) 5 MG tablet TAKE 1 TABLET BY MOUTH DAILY 11/11/21   Maribel Hale APRN   aspirin 81 MG EC tablet Take 81 mg by mouth Daily.    Provider, MD More   azelastine (ASTELIN) 0.1 % nasal spray  11/29/21   Provider, MD More   B Complex Vitamins (vitamin b complex) capsule capsule Take  by mouth Daily.     More Helton MD   Bioflavonoid Products (Vitamin C ER) 1000-100 MG tablet controlled-release Take 1 capsule by mouth Daily.    More Helton MD   escitalopram (Lexapro) 20 MG tablet Take 1 tablet by mouth Daily. 2/10/22   Raj Funk APRN   fluticasone (Flovent HFA) 110 MCG/ACT inhaler Inhale 1 puff 2 (Two) Times a Day. 9/22/20   Sherin Rojo MD   melatonin 5 MG tablet tablet Take 5 mg by mouth At Night As Needed.    ProviderMore MD   Multiple Vitamin (MULTIVITAMIN) capsule MULTIVITAMINS CAPS 3/20/17   More Helton MD   Omeprazole 20 MG tablet delayed-release 15 mg. 12/7/15   More Helton MD   potassium chloride 10 MEQ CR tablet TAKE 1 TABLET BY MOUTH THREE TIMES DAILY 2/2/22   Maribel Hale APRN   valsartan-hydrochlorothiazide (DIOVAN-HCT) 320-25 MG per tablet Take 1 tablet by mouth Daily. 7/28/21 7/28/22  Maribel Hale APRN   Vitamins A & D 5000-400 units capsule  11/29/19   More Helton MD   escitalopram (Lexapro) 10 MG tablet Take 1 tablet by mouth Daily. 11/14/21 2/10/22  Maribel Hale APRN     Patient's home medications not verified, unable to reconcile at this time    IMMUNIZATIONS:  Immunization History   Administered Date(s) Administered    COVID-19 (PFIZER) PURPLE CAP 02/08/2021, 03/08/2021, 11/22/2021    Flu Vaccine Intradermal Quad 18-64YR 10/16/2019    Fluad Quad 65+ 10/10/2020    Fluzone High Dose =>65 Years ([employer] ONLY) 12/07/2015, 11/08/2016, 10/08/2017    Fluzone High-Dose 65+yrs 10/10/2020, 10/17/2021    Influenza, Unspecified 10/16/2019    Pneumococcal Conjugate 13-Valent (PCV13) 04/05/2018    Pneumococcal Polysaccharide (PPSV23) 05/23/2019        ALLERGIES:  Adhesive tape      RESULTS     LABS:  Lab Results (last 24 hours)       Procedure Component Value Units Date/Time    CBC No Differential [[id number]]  (Normal) Collected: 02/10/22 1230    Specimen: Blood Updated: 02/10/22 1944     WBC 6.07 10*3/mm3      RBC  4.75 10*6/mm3      Hemoglobin 12.9 g/dL      Hematocrit 38.6 %      MCV 81.3 fL      MCH 27.2 pg      MCHC 33.4 g/dL      RDW 13.3 %      RDW-SD 38.8 fl      MPV 11.8 fL      Platelets 216 10*3/mm3     D-dimer, Quantitative [[id number]]  (Abnormal) Collected: 02/10/22 1230    Specimen: Blood Updated: 02/10/22 1452     D-Dimer, Quantitative 11.14 mg/L (FEU)     Narrative:      Reference Range  --------------------------------------------------------------------     < 0.50   Negative Predictive Value  0.50-0.59   Indeterminate    >= 0.60   Probable VTE             A very low percentage of patients with DVT may yield D-Dimer results   below the cut-off of 0.50 mg/L FEU.  This is known to be more   prevalent in patients with distal DVT.             Results of this test should always be interpreted in conjunction with   the patient's medical history, clinical presentation and other   findings.  Clinical diagnosis should not be based on the result of   INNOVANCE D-Dimer alone.    Vitamin D 25 hydroxy [[id number]]  (Normal) Collected: 02/10/22 1230    Specimen: Blood Updated: 02/10/22 2024     25 Hydroxy, Vitamin D 51.2 ng/ml     Narrative:      Reference Range for Total Vitamin D 25(OH)     Deficiency <20.0 ng/mL   Insufficiency 21-29 ng/mL   Sufficiency  ng/mL  Toxicity >100 ng/ml    Results may be falsely increased if patient taking Biotin.      Vitamin B12 [[id number]]  (Normal) Collected: 02/10/22 1230    Specimen: Blood Updated: 02/10/22 2024     Vitamin B-12 727 pg/mL     Narrative:      Results may be falsely increased if patient taking Biotin.      Urinalysis With Culture If Indicated - Urine, Clean Catch [[id number]]  (Abnormal) Collected: 02/10/22 1230    Specimen: Urine, Clean Catch Updated: 02/10/22 1943     Color, UA Yellow     Appearance, UA Clear     pH, UA 7.5     Specific Gravity, UA 1.009     Glucose, UA Negative     Ketones, UA Negative     Bilirubin, UA Negative     Blood, UA Negative     Protein, UA  Negative     Leuk Esterase, UA Moderate (2+)     Nitrite, UA Negative     Urobilinogen, UA 0.2 E.U./dL    Urine Culture - Urine, Urine, Clean Catch [[id number]] Collected: 02/10/22 1230    Specimen: Urine, Clean Catch Updated: 02/10/22 1943    Urinalysis, Microscopic Only - Urine, Clean Catch [[id number]]  (Abnormal) Collected: 02/10/22 1230    Specimen: Urine, Clean Catch Updated: 02/10/22 2018     RBC, UA None Seen /HPF      WBC, UA 0-2 /HPF      Bacteria, UA 1+ /HPF      Squamous Epithelial Cells, UA 0-2 /HPF      Hyaline Casts, UA None Seen /LPF      Methodology Manual Light Microscopy    CBC & Differential [[id number]]  (Abnormal) Collected: 02/10/22 1942    Specimen: Blood Updated: 02/10/22 2006    Narrative:      The following orders were created for panel order CBC & Differential.  Procedure                               Abnormality         Status                     ---------                               -----------         ------                     CBC Auto Differential[[id number]]        Abnormal            Final result                 Please view results for these tests on the individual orders.    Comprehensive Metabolic Panel [[id number]]  (Abnormal) Collected: 02/10/22 1942    Specimen: Blood Updated: 02/10/22 2013     Glucose 107 mg/dL      BUN 12 mg/dL      Creatinine 0.71 mg/dL      Sodium 140 mmol/L      Potassium 3.8 mmol/L      Chloride 101 mmol/L      CO2 27.0 mmol/L      Calcium 9.8 mg/dL      Total Protein 6.9 g/dL      Albumin 4.20 g/dL      ALT (SGPT) 15 U/L      AST (SGOT) 16 U/L      Alkaline Phosphatase 84 U/L      Total Bilirubin 0.3 mg/dL      eGFR Non African Amer 81 mL/min/1.73      Globulin 2.7 gm/dL      A/G Ratio 1.6 g/dL      BUN/Creatinine Ratio 16.9     Anion Gap 12.0 mmol/L     Narrative:      GFR Normal >60  Chronic Kidney Disease <60  Kidney Failure <15      Troponin [[id number]]  (Normal) Collected: 02/10/22 1942    Specimen: Blood Updated: 02/10/22 2013     Troponin T <0.010  ng/mL     Narrative:      Troponin T Reference Range:  <= 0.03 ng/mL-   Negative for AMI  >0.03 ng/mL-     Abnormal for myocardial necrosis.  Clinicians would have to utilize clinical acumen, EKG, Troponin and serial changes to determine if it is an Acute Myocardial Infarction or myocardial injury due to an underlying chronic condition.       Results may be falsely decreased if patient taking Biotin.      BNP [[id number]]  (Normal) Collected: 02/10/22 1942    Specimen: Blood Updated: 02/10/22 2009     proBNP 167.4 pg/mL     Narrative:      Among patients with dyspnea, NT-proBNP is highly sensitive for the detection of acute congestive heart failure. In addition NT-proBNP of <300 pg/ml effectively rules out acute congestive heart failure with 99% negative predictive value.    Results may be falsely decreased if patient taking Biotin.      CBC Auto Differential [[id number]]  (Abnormal) Collected: 02/10/22 1942    Specimen: Blood Updated: 02/10/22 2006     WBC 6.40 10*3/mm3      RBC 4.63 10*6/mm3      Hemoglobin 12.8 g/dL      Hematocrit 37.5 %      MCV 81.1 fL      MCH 27.6 pg      MCHC 34.1 g/dL      RDW 14.7 %      RDW-SD 41.1 fl      MPV 9.1 fL      Platelets 195 10*3/mm3      Neutrophil % 70.2 %      Lymphocyte % 18.6 %      Monocyte % 8.6 %      Eosinophil % 1.8 %      Basophil % 0.8 %      Neutrophils, Absolute 4.50 10*3/mm3      Lymphocytes, Absolute 1.20 10*3/mm3      Monocytes, Absolute 0.60 10*3/mm3      Eosinophils, Absolute 0.10 10*3/mm3      Basophils, Absolute 0.10 10*3/mm3      nRBC 0.3 /100 WBC     TSH [[id number]]  (Normal) Collected: 02/10/22 1942    Specimen: Blood Updated: 02/10/22 2333     TSH 2.070 uIU/mL     Respiratory Panel PCR w/COVID-19(SARS-CoV-2) MEGA/SCAR/KARUNA/PAD/COR/MAD/THANG In-House, NP Swab in [facility], 3-4 HR TAT - Swab, Nasopharynx [[id number]]  (Normal) Collected: 02/10/22 1943    Specimen: Swab from Nasopharynx Updated: 02/10/22 2035     ADENOVIRUS, PCR Not Detected     Coronavirus 229E  Not Detected     Coronavirus HKU1 Not Detected     Coronavirus NL63 Not Detected     Coronavirus OC43 Not Detected     COVID19 Not Detected     Human Metapneumovirus Not Detected     Human Rhinovirus/Enterovirus Not Detected     Influenza A PCR Not Detected     Influenza B PCR Not Detected     Parainfluenza Virus 1 Not Detected     Parainfluenza Virus 2 Not Detected     Parainfluenza Virus 3 Not Detected     Parainfluenza Virus 4 Not Detected     RSV, PCR Not Detected     Bordetella pertussis pcr Not Detected     Bordetella parapertussis PCR Not Detected     Chlamydophila pneumoniae PCR Not Detected     Mycoplasma pneumo by PCR Not Detected    Narrative:      In the setting of a positive respiratory panel with a viral infection PLUS a negative procalcitonin without other underlying concern for bacterial infection, consider observing off antibiotics or discontinuation of antibiotics and continue supportive care. If the respiratory panel is positive for atypical bacterial infection (Bordetella pertussis, Chlamydophila pneumoniae, or Mycoplasma pneumoniae), consider antibiotic de-escalation to target atypical bacterial infection.              MICRO:  Microbiology Results (last 10 days)       Procedure Component Value - Date/Time    Respiratory Panel PCR w/COVID-19(SARS-CoV-2) MEGA/SCAR/KARUNA/PAD/COR/MAD/THANG In-House, NP Swab in UTM/VTM, 3-4 HR TAT - Swab, Nasopharynx [[id number]]  (Normal) Collected: 02/10/22 1943    Lab Status: Final result Specimen: Swab from Nasopharynx Updated: 02/10/22 2035     ADENOVIRUS, PCR Not Detected     Coronavirus 229E Not Detected     Coronavirus HKU1 Not Detected     Coronavirus NL63 Not Detected     Coronavirus OC43 Not Detected     COVID19 Not Detected     Human Metapneumovirus Not Detected     Human Rhinovirus/Enterovirus Not Detected     Influenza A PCR Not Detected     Influenza B PCR Not Detected     Parainfluenza Virus 1 Not Detected     Parainfluenza Virus 2 Not Detected      Parainfluenza Virus 3 Not Detected     Parainfluenza Virus 4 Not Detected     RSV, PCR Not Detected     Bordetella pertussis pcr Not Detected     Bordetella parapertussis PCR Not Detected     Chlamydophila pneumoniae PCR Not Detected     Mycoplasma pneumo by PCR Not Detected    Narrative:      In the setting of a positive respiratory panel with a viral infection PLUS a negative procalcitonin without other underlying concern for bacterial infection, consider observing off antibiotics or discontinuation of antibiotics and continue supportive care. If the respiratory panel is positive for atypical bacterial infection (Bordetella pertussis, Chlamydophila pneumoniae, or Mycoplasma pneumoniae), consider antibiotic de-escalation to target atypical bacterial infection.              RADIOLOGY STUDIES:  Imaging Results (Last 72 Hours)       Procedure Component Value Units Date/Time    CT Chest Pulmonary Embolism [[id number]] Collected: 02/10/22 2108     Updated: 02/10/22 2116    Narrative:      CT CHEST PULMONARY EMBOLISM-     Date of Exam: 2/10/2022 9:00 PM     Indication: PE suspected, low/intermediate prob, positive D-dimer.   Shortness of breath for 4 days     Comparison: CT PE 12/05/2016     Technique: Serial and axial CT images of the chest were obtained  following the uneventful intravenous administration of 75 cc Isovue-370  contrast. Reconstructions in the coronal and sagittal planes were also  performed.  Automated exposure control and iterative reconstruction  methods were used.     FINDINGS:     Pulmonary Arteries there is adequate opacification of pulmonary  arteries. There are filling defects present within the right upper lobe  segmental and subsegmental bronchi, primarily the apical apical  posterior branch, filling defects within the right middle lobe medial  branch and segmental branch, filling defects in the right lower lobe  segmental and subsegmental arteries. There are filling defects within  the left  upper lobe anterior subsegmental vessel, the left lower lobe  and lingular segmental branches and the left lower lobe subsegmental  branches. The pulmonary arteries have normal caliber. There is slight  deflection of the in interventricular septum to the left which could be  seen with right heart strain.      Hilum and Mediastinum: No pathologically enlarged lymph nodes.  Normal  heart size.  No significant coronary artery atherosclerotic disease.  Unremarkable thoracic aorta.   No pericardial effusion.     Lung Parenchyma and Pleura: Mild bibasilar subsegmental atelectasis. No  focal parenchymal opacities. No suspicious pulmonary nodules. No  endobronchial lesions. No pleural effusions.     Upper Abdomen: Small hiatal hernia.     Soft tissues: Unremarkable.     Osseous structures: No aggressive focal lytic or sclerotic osseous  lesions.       Impression:      There our pulmonary artery emboli throughout the right and left  segmental and subsegmental branches. No pulmonary infarct is seen. There  is minimal common cavity of the interventricular septum which could be  seen with early right heart strain.           Electronically Signed By-Lilliam Gaspar MD On:2/10/2022 9:14 PM  This report was finalized on [id number] by  Lilliam Gaspar MD.                ECHOCARDIOGRAM:  Results for orders placed during the hospital encounter of 01/25/21    Adult Transthoracic Echo Complete W/ Cont if Necessary Per Protocol    Interpretation Summary  · Estimated left ventricular EF was in agreement with the calculated left ventricular EF. Left ventricular ejection fraction appears to be 61 - 65%.  · Estimated right ventricular systolic pressure from tricuspid regurgitation is mildly elevated (35-45 mmHg).  · Moderate pulmonic valve regurgitation is present.  · Left ventricular diastolic function is consistent with (grade I) impaired relaxation.  · Left ventricular wall thickness is consistent with concentric hypertrophy.         I  reviewed the patient's new clinical results.    I discussed the patient's findings and my recommendations with patient and nursing staff.  I have discussed plan with the attending Pulmonary/Intensivist physician, who agrees with this plan of care.    Appropriate PPE worn during assessment of patient per established guidelines.      Electronically signed by RICKY Cadet, 02/10/22, 10:38 PM EST.     New H/P was done

## 2022-02-11 NOTE — ED PROVIDER NOTES
Subjective   Patient is a 71-year-old female presents emergency department with a complaint of an abnormal outpatient test for her primary care provider.  Patient reports that she had not been feeling well and feeling short of breath onset Monday.  Patient reports she started walking into the [facility], and suddenly felt for breath.  She denies any chest pain.  No dizziness, lightheadedness, diaphoresis or syncope.  No abnormal leg pain or swelling.  No recent surgeries.  No recent long trips.  She does report has been more sedentary than normal.  No fever chills.  No recent COVID-19 diagnosis.          Review of Systems   Constitutional: Negative for chills, diaphoresis, fatigue and fever.   Respiratory: Positive for shortness of breath. Negative for cough and chest tightness.    Cardiovascular: Negative for chest pain, palpitations and leg swelling.   Gastrointestinal: Negative for abdominal pain, diarrhea, nausea and vomiting.   Genitourinary: Negative for dysuria.   Musculoskeletal: Negative for back pain and neck pain.   Skin: Negative for color change and rash.   Neurological: Negative for dizziness, syncope and light-headedness.       Past Medical History:   Diagnosis Date   • Abnormal coagulation profile    • Abnormal EKG    • Abnormal laboratory test    • Acquired spondylolisthesis    • Allergic 7/16/2021    Start shots in 7/27/21   • Arthritis    • Asthma    • Body aches    • Bronchitis, acute    • Chest pain     PRESSURE   • Chronic back pain    • Cough    • Depression with anxiety    • DJD (degenerative joint disease)    • Essential hypertension    • Fatigue    • History of radiofrequency ablation (RFA) procedure for cardiac arrhythmia 1/5/2021   • Hyperlipidemia    • Hypokalemia    • Low back pain    • Lymphocytic colitis    • Obesity    • Other specified disorders of bone density and structure, other site    • Panic disorder    • Paroxysmal SVT (supraventricular tachycardia) (HCC)    • Postmenopausal     9  YEARS   • Prediabetes    • SOB (shortness of breath)    • Unspecified injury of left Achilles tendon, initial encounter    • Vitamin B deficiency 1/14/2019   • Vitamin B deficiency, unspecified    • Vitamin D deficiency, unspecified        Allergies   Allergen Reactions   • Adhesive Tape Rash       Past Surgical History:   Procedure Laterality Date   • ACHILLES TENDON SURGERY Left 2018   • AV NODE ABLATION  08/2016    AVNRT RF ABLATION --8/16   • COLONOSCOPY N/A 01/01/2012   • FOOT SURGERY Right     TOE/FOOT SURGERY   • KNEE SURGERY Right     2/2 TORN MENICUS   • TUBAL ABDOMINAL LIGATION         Family History   Problem Relation Age of Onset   • Arthritis Mother    • Diabetes Mother    • Heart disease Mother    • Emphysema Mother    • Kidney disease Father    • Arthritis Sister    • Anxiety disorder Sister    • Arthritis Brother    • Anxiety disorder Brother    • Acute myelogenous leukemia Niece    • Myasthenia gravis Niece        Social History     Socioeconomic History   • Marital status:      Spouse name: Alonso Trujillo   • Number of children: 3   Tobacco Use   • Smoking status: Never Smoker   • Smokeless tobacco: Never Used   Vaping Use   • Vaping Use: Never used   Substance and Sexual Activity   • Alcohol use: Yes     Alcohol/week: 1.0 standard drink     Types: 1 Standard drinks or equivalent per week     Comment: 1 DRINK WEEKLY, IF THAT: RARE/OCC;  CAFFEINE USE + 1 COFFEE DAILY   • Drug use: No   • Sexual activity: Not Currently     Partners: Male     Birth control/protection: Other, Tubal ligation     Comment: monogamous w/ ;  s/p BTL - no high-risk sexual behaviors           Objective   Physical Exam  Vitals and nursing note reviewed.   Constitutional:       General: She is not in acute distress.     Appearance: She is well-developed. She is not ill-appearing, toxic-appearing or diaphoretic.   HENT:      Head: Normocephalic and atraumatic.   Eyes:      Extraocular Movements: Extraocular movements  "intact.      Pupils: Pupils are equal, round, and reactive to light.   Cardiovascular:      Rate and Rhythm: Normal rate and regular rhythm.      Heart sounds: No murmur heard.  No friction rub. No gallop.    Pulmonary:      Effort: Pulmonary effort is normal.      Breath sounds: Normal breath sounds.   Abdominal:      General: Bowel sounds are normal.      Palpations: Abdomen is soft.   Musculoskeletal:      Cervical back: Normal range of motion and neck supple.      Right lower leg: No tenderness. No edema.      Left lower leg: No tenderness. No edema.   Skin:     General: Skin is warm and dry.      Capillary Refill: Capillary refill takes less than 2 seconds.   Neurological:      General: No focal deficit present.      Mental Status: She is alert and oriented to person, place, and time.   Psychiatric:         Mood and Affect: Mood normal.         Behavior: Behavior normal.         Procedures           ED Course  ED Course as of 02/11/22 0135   Thu Feb 10, 2022   2146 Consult with Dr. Stinson cardiology [LB]   2234 Consult with intensivist Rosio GARCÍA.  [LB]      ED Course User Index  [LB] Patti Tapia APRN           /73   Pulse 80   Temp 97.4 °F (36.3 °C) (Tympanic)   Resp 14   Ht 154.9 cm (61\")   Wt 98.6 kg (217 lb 6 oz)   SpO2 92%   BMI 41.07 kg/m²   Labs Reviewed   COMPREHENSIVE METABOLIC PANEL - Abnormal; Notable for the following components:       Result Value    Glucose 107 (*)     All other components within normal limits    Narrative:     GFR Normal >60  Chronic Kidney Disease <60  Kidney Failure <15     CBC WITH AUTO DIFFERENTIAL - Abnormal; Notable for the following components:    Lymphocyte % 18.6 (*)     nRBC 0.3 (*)     All other components within normal limits   RESPIRATORY PANEL PCR W/ COVID-19 (SARS-COV-2) MEGA/SCAR/KARUNA/PAD/[facility] IN-HOUSE, NP SWAB IN UTM/VTP, 3-4 HR TAT - Normal    Narrative:     In the setting of a positive respiratory panel with a viral infection PLUS a " negative procalcitonin without other underlying concern for bacterial infection, consider observing off antibiotics or discontinuation of antibiotics and continue supportive care. If the respiratory panel is positive for atypical bacterial infection (Bordetella pertussis, Chlamydophila pneumoniae, or Mycoplasma pneumoniae), consider antibiotic de-escalation to target atypical bacterial infection.   TROPONIN (IN-HOUSE) - Normal    Narrative:     Troponin T Reference Range:  <= 0.03 ng/mL-   Negative for AMI  >0.03 ng/mL-     Abnormal for myocardial necrosis.  Clinicians would have to utilize clinical acumen, EKG, Troponin and serial changes to determine if it is an Acute Myocardial Infarction or myocardial injury due to an underlying chronic condition.       Results may be falsely decreased if patient taking Biotin.     BNP (IN-HOUSE) - Normal    Narrative:     Among patients with dyspnea, NT-proBNP is highly sensitive for the detection of acute congestive heart failure. In addition NT-proBNP of <300 pg/ml effectively rules out acute congestive heart failure with 99% negative predictive value.    Results may be falsely decreased if patient taking Biotin.     TSH - Normal   PROTIME-INR   APTT   APTT   APTT   HEMOGLOBIN A1C   MAGNESIUM   PHOSPHORUS   COMPREHENSIVE METABOLIC PANEL   LIPID PANEL   CALCIUM, IONIZED   CBC WITH AUTO DIFFERENTIAL   POCT GLUCOSE FINGERSTICK   POCT GLUCOSE FINGERSTICK   POCT GLUCOSE FINGERSTICK   POCT GLUCOSE FINGERSTICK   POCT GLUCOSE FINGERSTICK   CBC AND DIFFERENTIAL    Narrative:     The following orders were created for panel order CBC & Differential.  Procedure                               Abnormality         Status                     ---------                               -----------         ------                     CBC Auto Differential[[id number]]        Abnormal            Final result                 Please view results for these tests on the individual orders.   CBC AND  DIFFERENTIAL    Narrative:     The following orders were created for panel order CBC & Differential.  Procedure                               Abnormality         Status                     ---------                               -----------         ------                     CBC Auto Differential[[id number]]                                                         Please view results for these tests on the individual orders.     Medications   heparin 25348 units/250 mL (100 units/mL) in 0.9% NaCl infusion (15.2 Units/kg/hr × 98.6 kg Intravenous New Bag 2/10/22 8343)   heparin bolus from bag 3,900 Units (has no administration in time range)   heparin bolus from bag 7,900 Units (has no administration in time range)   dextrose (GLUTOSE) oral gel 15 g (has no administration in time range)   dextrose (D50W) (25 g/50 mL) IV injection 25 g (has no administration in time range)   glucagon (human recombinant) (GLUCAGEN DIAGNOSTIC) 1 mg in sterile water (preservative free) 1 mL injection (has no administration in time range)   sodium chloride 0.9 % flush 10 mL (has no administration in time range)   sodium chloride 0.9 % flush 10 mL (has no administration in time range)   aluminum-magnesium hydroxide-simethicone (MAALOX MAX) 400-400-40 MG/5ML suspension 15 mL (has no administration in time range)   acetaminophen (TYLENOL) tablet 650 mg (has no administration in time range)     Or   acetaminophen (TYLENOL) suppository 650 mg (has no administration in time range)   ondansetron (ZOFRAN) tablet 4 mg (has no administration in time range)     Or   ondansetron (ZOFRAN) injection 4 mg (has no administration in time range)   insulin lispro (ADMELOG) injection 0-7 Units (has no administration in time range)     And   insulin lispro (ADMELOG) injection 0-7 Units (has no administration in time range)   ipratropium-albuterol (DUO-NEB) nebulizer solution 3 mL ( Nebulization Canceled Entry 2/11/22 0100)   ipratropium-albuterol (DUO-NEB)  nebulizer solution 3 mL (has no administration in time range)   sennosides-docusate (PERICOLACE) 8.6-50 MG per tablet 2 tablet (has no administration in time range)     And   polyethylene glycol (MIRALAX) packet 17 g (has no administration in time range)     And   bisacodyl (DULCOLAX) EC tablet 5 mg (has no administration in time range)     And   bisacodyl (DULCOLAX) suppository 10 mg (has no administration in time range)   pantoprazole (PROTONIX) injection 40 mg (has no administration in time range)   iopamidol (ISOVUE-370) 76 % injection 100 mL (75 mL Intravenous Given 2/10/22 2105)   heparin bolus from bag 7,900 Units (7,900 Units Intravenous Bolus from Bag 2/10/22 2307)     CT Chest Pulmonary Embolism    Result Date: 2/10/2022  There our pulmonary artery emboli throughout the right and left segmental and subsegmental branches. No pulmonary infarct is seen. There is minimal common cavity of the interventricular septum which could be seen with early right heart strain.    Electronically Signed By-Lilliam Gaspar MD On:2/10/2022 9:14 PM This report was finalized on [id number] by  Lilliam Gaspar MD.                                          MDM  Appropriate PPE was worn during the duration of the care for this patient while in the emergency department per [facility] Policy    Chart Review--> patient's D-dimer elevated with outpatient labs today.  Noted to be 11.14.    ----Differentials--> PE, COVID-19, pneumonia  This list is not all inclusive and does not constitute the entireity of considered causes.     ----ED  Course-->Patient was brought back to the emergency department room for evaluation and placed on appropriate monitoring.    Patient had IV established and blood work obtained    Labs--> D-dimer on outpatient basis 11.14, which was completed today.  CBC, CMP, BNP and troponin are unremarkable.    ----Radiology and imaging orders as above, interpreted per ED physician and/or radiologist----> CT  revealsPulmonary emboli throughout the right and left segmental and subsegmental branches.  No pulmonary artifact.  Possible early right heart strain.      ----Consults--> spoke with Dr. Stinson, interventional cardiology regarding CT results.  Requested patient be placed in ICU, and started on heparin protocol.    Consulted with intensivist, RICKY Avelar.  Patient will be admitted to pulmonology.    ----Disposition> I discussed with the patient their test results, work-up here in the emergency department, and need for admission and further evaluation. Patient is agreeable to the plan of care. At time of disposition patients VS are non concerning, and without acute distress.  Opportunity was provided for questions at the bedside, all questions and concerns were addressed.    Vital signs have  been reviewed. Patient is afebrile. Non toxic in appearance. Alert and oriented to person, place, time and situation.                          Final diagnoses:   Other acute pulmonary embolism, unspecified whether acute cor pulmonale present (HCC)   Shortness of breath       ED Disposition  ED Disposition     ED Disposition Condition Comment    Decision to Admit  Level of Care: Critical Care [6]   Diagnosis: Other acute pulmonary embolism, unspecified whether acute cor pulmonale present (HCC) [[id number]]   Certification: I Certify That Inpatient Hospital Services Are Medically Necessary For Greater Than 2 Midnights            No follow-up provider specified.       Medication List      No changes were made to your prescriptions during this visit.          Patti Tapia, APRN  02/11/22 0135

## 2022-02-11 NOTE — CONSULTS
Hematology/Oncology Inpatient Consultation    Patient name: Radha Trujillo  : 1950  MRN: [id number]  Referring Provider: Dr.Kheder Preciado  Reason for Consultation: Pulmonary embolism    Chief complaint:     History of present illness:    Radha Trujillo is a 71 y.o. female who presented to [facility] on 2/10/2022 with complaints of abnormal test from her PCP.  Reports that she has not been feeling well and has been feeling short of breath since Monday.  Patient reports that she started walking into [facility] and suddenly felt short of breath.  Denies chest pain, fever, chills, no recent COVID-19 diagnosis, dizziness, lightheadedness, diaphoresis, syncope, abnormal leg pain or swelling.  No recent surgeries or long trips.  She does report she has been more sedentary than normal.  Positive Respiratory panel with viral infection plus a negative procalcitonin without other underlying concern for bacterial infection.  CT chest PE protocol revealed Pulmonary embolism in the right and left segmental and subsegmental branches with early right heart strain. No pulmonary infarct is seen. Patient was started on IV heparin.       22  Hematology/Oncology was consulted for Pulmonary embolism in the right and left segmental and subsegmental branches with early right heart strain.  No pulmonary infarct is seen; Patient was started on IV heparin. No history of blood disorders or DVT.  Patient has no smoking history including no smokeless tobacco or illicit drug use.  Admits to occasional ETOH use.     He/She  has a past medical history of Abnormal coagulation profile, Abnormal EKG, Abnormal laboratory test, Acquired spondylolisthesis, Allergic (2021), Arthritis, Asthma, Body aches, Bronchitis, acute, Chest pain, Chronic back pain, Cough, Depression with anxiety, DJD (degenerative joint disease), Essential hypertension, Fatigue, History of radiofrequency ablation (RFA) procedure for cardiac arrhythmia  (1/5/2021), Hyperlipidemia, Hypokalemia, Low back pain, Lymphocytic colitis, Obesity, Other specified disorders of bone density and structure, other site, Panic disorder, Paroxysmal SVT (supraventricular tachycardia) (HCC), Postmenopausal, Prediabetes, SOB (shortness of breath), Unspecified injury of left Achilles tendon, initial encounter, Vitamin B deficiency (1/14/2019), Vitamin B deficiency, unspecified, and Vitamin D deficiency, unspecified.    PCP: Maribel Hale APRN    History:  Past Medical History:   Diagnosis Date   • Abnormal coagulation profile    • Abnormal EKG    • Abnormal laboratory test    • Acquired spondylolisthesis    • Allergic 7/16/2021    Start shots in 7/27/21   • Arthritis    • Asthma    • Body aches    • Bronchitis, acute    • Chest pain     PRESSURE   • Chronic back pain    • Cough    • Depression with anxiety    • DJD (degenerative joint disease)    • Essential hypertension    • Fatigue    • History of radiofrequency ablation (RFA) procedure for cardiac arrhythmia 1/5/2021   • Hyperlipidemia    • Hypokalemia    • Low back pain    • Lymphocytic colitis    • Obesity    • Other specified disorders of bone density and structure, other site    • Panic disorder    • Paroxysmal SVT (supraventricular tachycardia) (HCC)    • Postmenopausal     9 YEARS   • Prediabetes    • SOB (shortness of breath)    • Unspecified injury of left Achilles tendon, initial encounter    • Vitamin B deficiency 1/14/2019   • Vitamin B deficiency, unspecified    • Vitamin D deficiency, unspecified    ,   Past Surgical History:   Procedure Laterality Date   • ACHILLES TENDON SURGERY Left 2018   • AV NODE ABLATION  08/2016    AVNRT RF ABLATION --8/16   • COLONOSCOPY N/A 01/01/2012   • FOOT SURGERY Right     TOE/FOOT SURGERY   • KNEE SURGERY Right     2/2 TORN MENICUS   • TUBAL ABDOMINAL LIGATION     ,   Family History   Problem Relation Age of Onset   • Arthritis Mother    • Diabetes Mother    • Heart disease Mother     • Emphysema Mother    • Kidney disease Father    • Arthritis Sister    • Anxiety disorder Sister    • Arthritis Brother    • Anxiety disorder Brother    • Acute myelogenous leukemia Niece    • Myasthenia gravis Niece    ,   Social History     Tobacco Use   • Smoking status: Never Smoker   • Smokeless tobacco: Never Used   Vaping Use   • Vaping Use: Never used   Substance Use Topics   • Alcohol use: Yes     Alcohol/week: 1.0 standard drink     Types: 1 Standard drinks or equivalent per week     Comment: 1 DRINK WEEKLY, IF THAT: RARE/OCC;  CAFFEINE USE + 1 COFFEE DAILY   • Drug use: No   ,   Medications Prior to Admission   Medication Sig Dispense Refill Last Dose   • ALPRAZolam (Xanax) 0.25 MG tablet Take 1 tablet by mouth 2 (Two) Times a Day As Needed for Anxiety. 30 tablet 0    • amLODIPine (NORVASC) 5 MG tablet TAKE 1 TABLET BY MOUTH DAILY 90 tablet 1    • aspirin 81 MG EC tablet Take 81 mg by mouth Daily.      • azelastine (ASTELIN) 0.1 % nasal spray 1 spray into the nostril(s) as directed by provider 2 (Two) Times a Day.      • B Complex Vitamins (vitamin b complex) capsule capsule Take 1 capsule by mouth Daily.      • Bioflavonoid Products (Vitamin C ER) 1000-100 MG tablet controlled-release Take 1 capsule by mouth Daily.      • escitalopram (Lexapro) 20 MG tablet Take 1 tablet by mouth Daily. 30 tablet 1    • fluticasone (Flovent HFA) 110 MCG/ACT inhaler Inhale 1 puff 2 (Two) Times a Day. 1 inhaler 1    • melatonin 5 MG tablet tablet Take 5 mg by mouth At Night As Needed.      • Multiple Vitamin (MULTIVITAMIN) capsule Take 1 capsule by mouth Daily.      • Omeprazole 20 MG tablet delayed-release Take 20 mg by mouth Daily.      • potassium chloride 10 MEQ CR tablet TAKE 1 TABLET BY MOUTH THREE TIMES DAILY 270 tablet 0    • valsartan-hydrochlorothiazide (DIOVAN-HCT) 320-25 MG per tablet Take 1 tablet by mouth Daily. 90 tablet 3    • Vitamins A & D 5000-400 units capsule Take 1 capsule by mouth Daily.      ,  Scheduled Meds:  budesonide, 1 mg, Nebulization, BID - RT  insulin lispro, 0-7 Units, Subcutaneous, Q6H  ipratropium-albuterol, 3 mL, Nebulization, Q4H - RT  pantoprazole, 40 mg, Intravenous, Q AM  senna-docusate sodium, 2 tablet, Oral, BID  sodium chloride, 10 mL, Intravenous, Q12H    , Continuous Infusions:  heparin, 15.2 Units/kg/hr, Last Rate: 13.2 Units/kg/hr (02/11/22 0811)    , PRN Meds:  •  acetaminophen **OR** acetaminophen  •  aluminum-magnesium hydroxide-simethicone  •  senna-docusate sodium **AND** polyethylene glycol **AND** bisacodyl **AND** bisacodyl  •  dextrose  •  dextrose  •  glucagon (human recombinant)  •  heparin  •  heparin  •  insulin lispro **AND** insulin lispro  •  ipratropium-albuterol  •  ondansetron **OR** ondansetron  •  sodium chloride   Allergies:  Adhesive tape    Subjective     ROS:  Review of Systems   Constitutional: Positive for fatigue. Negative for activity change, appetite change, chills, diaphoresis, fever and unexpected weight change.   HENT: Negative for congestion, dental problem, ear discharge, ear pain, facial swelling, hearing loss, mouth sores, nosebleeds, sore throat, tinnitus, trouble swallowing and voice change.    Eyes: Negative for photophobia and visual disturbance.   Respiratory: Negative for cough, choking, chest tightness, shortness of breath and wheezing.    Cardiovascular: Negative for chest pain, palpitations and leg swelling.   Gastrointestinal: Negative for abdominal distention, abdominal pain, constipation, diarrhea, nausea and vomiting.   Endocrine: Negative.    Genitourinary: Negative for decreased urine volume, difficulty urinating, dysuria, flank pain, frequency, hematuria and urgency.   Musculoskeletal: Negative for back pain, gait problem, joint swelling, myalgias, neck pain and neck stiffness.   Skin: Negative for color change, rash and wound.   Neurological: Negative for dizziness, tremors, syncope, speech difficulty, weakness, numbness and  "headaches.   Hematological: Negative.    Psychiatric/Behavioral: Negative.         Objective   Vital Signs:   /94   Pulse 70   Temp 97.7 °F (36.5 °C) (Oral)   Resp 16   Ht 154.9 cm (61\")   Wt 98 kg (216 lb)   SpO2 97%   BMI 40.81 kg/m²     Physical Exam: (performed by MD)  Physical Exam  Vitals and nursing note reviewed. Exam conducted with a chaperone present.   Constitutional:       General: She is not in acute distress.     Appearance: Normal appearance. She is obese. She is not ill-appearing, toxic-appearing or diaphoretic.   HENT:      Head: Normocephalic and atraumatic.      Right Ear: External ear normal.      Left Ear: External ear normal.      Nose: Nose normal. No congestion or rhinorrhea.      Mouth/Throat:      Mouth: Mucous membranes are moist.      Pharynx: Oropharynx is clear.   Eyes:      General:         Right eye: No discharge.         Left eye: No discharge.      Extraocular Movements: Extraocular movements intact.      Conjunctiva/sclera: Conjunctivae normal.      Pupils: Pupils are equal, round, and reactive to light.   Neck:      Vascular: No carotid bruit.   Cardiovascular:      Rate and Rhythm: Normal rate and regular rhythm.      Pulses: Normal pulses.      Heart sounds: Normal heart sounds. No murmur heard.  No friction rub. No gallop.    Pulmonary:      Effort: Pulmonary effort is normal. No respiratory distress.      Breath sounds: Normal breath sounds. No stridor. No wheezing, rhonchi or rales.   Chest:      Chest wall: No tenderness.   Abdominal:      General: Abdomen is flat. Bowel sounds are normal. There is no distension.      Palpations: Abdomen is soft. There is no mass.      Tenderness: There is no abdominal tenderness. There is no guarding or rebound.      Hernia: No hernia is present.   Genitourinary:     Rectum: Normal.   Musculoskeletal:         General: No swelling, tenderness or signs of injury. Normal range of motion.      Cervical back: Normal range of motion " and neck supple. No rigidity or tenderness.   Lymphadenopathy:      Cervical: No cervical adenopathy.   Skin:     General: Skin is warm and dry.      Capillary Refill: Capillary refill takes less than 2 seconds.      Coloration: Skin is not jaundiced.      Findings: No erythema or rash.   Neurological:      General: No focal deficit present.      Mental Status: She is alert and oriented to person, place, and time. Mental status is at baseline.      Cranial Nerves: No cranial nerve deficit.      Sensory: No sensory deficit.      Motor: No weakness.      Coordination: Coordination normal.      Gait: Gait normal.      Deep Tendon Reflexes: Reflexes normal.   Psychiatric:         Mood and Affect: Mood normal.         Behavior: Behavior normal.         Thought Content: Thought content normal.         Judgment: Judgment normal.         Results Review:  Lab Results (last 48 hours)     Procedure Component Value Units Date/Time    POC Glucose Once [[id number]]  (Abnormal) Collected: 02/11/22 1001    Specimen: Blood Updated: 02/11/22 1034     Glucose 122 mg/dL      Comment: Serial Number: [id number]:  [id number]       POC Glucose Once [[id number]]  (Abnormal) Collected: 02/11/22 0738    Specimen: Blood Updated: 02/11/22 0739     Glucose 124 mg/dL      Comment: Serial Number: [id number]:  [id number]       Protime-INR [[id number]]  (Normal) Collected: 02/11/22 0506    Specimen: Blood Updated: 02/11/22 0556     Protime 11.6 Seconds      INR 1.05    aPTT [[id number]]  (Abnormal) Collected: 02/11/22 0506    Specimen: Blood Updated: 02/11/22 0556     PTT 98.9 seconds     Calcium, Ionized [[id number]]  (Normal) Collected: 02/11/22 0506    Specimen: Blood Updated: 02/11/22 0548     Ionized Calcium 1.21 mmol/L     Phosphorus [[id number]]  (Normal) Collected: 02/11/22 0506    Specimen: Blood Updated: 02/11/22 0539     Phosphorus 3.3 mg/dL     Comprehensive Metabolic Panel [[id number]]  (Abnormal) Collected: 02/11/22 0506     Specimen: Blood Updated: 02/11/22 0539     Glucose 123 mg/dL      BUN 8 mg/dL      Creatinine 0.68 mg/dL      Sodium 139 mmol/L      Potassium 3.6 mmol/L      Chloride 101 mmol/L      CO2 28.0 mmol/L      Calcium 9.5 mg/dL      Total Protein 6.6 g/dL      Albumin 4.10 g/dL      ALT (SGPT) 12 U/L      AST (SGOT) 13 U/L      Alkaline Phosphatase 80 U/L      Total Bilirubin 0.4 mg/dL      eGFR Non African Amer 85 mL/min/1.73      Globulin 2.5 gm/dL      A/G Ratio 1.6 g/dL      BUN/Creatinine Ratio 11.8     Anion Gap 10.0 mmol/L     Narrative:      GFR Normal >60  Chronic Kidney Disease <60  Kidney Failure <15      Magnesium [[id number]]  (Normal) Collected: 02/11/22 0506    Specimen: Blood Updated: 02/11/22 0539     Magnesium 1.9 mg/dL     Lipid Panel [[id number]]  (Abnormal) Collected: 02/11/22 0506    Specimen: Blood Updated: 02/11/22 0539     Total Cholesterol 181 mg/dL      Triglycerides 68 mg/dL      HDL Cholesterol 75 mg/dL      LDL Cholesterol  93 mg/dL      VLDL Cholesterol 13 mg/dL      LDL/HDL Ratio 1.23    Narrative:      Cholesterol Reference Ranges  (U.S. Department of Health and Human Services ATP III Classifications)    Desirable          <200 mg/dL  Borderline High    200-239 mg/dL  High Risk          >240 mg/dL      Triglyceride Reference Ranges  (U.S. Department of Health and Human Services ATP III Classifications)    Normal           <150 mg/dL  Borderline High  150-199 mg/dL  High             200-499 mg/dL  Very High        >500 mg/dL    HDL Reference Ranges  (U.S. Department of Health and Human Services ATP III Classifications)    Low     <40 mg/dl (major risk factor for CHD)  High    >60 mg/dl ('negative' risk factor for CHD)        LDL Reference Ranges  (U.S. Department of Health and Human Services ATP III Classifications)    Optimal          <100 mg/dL  Near Optimal     100-129 mg/dL  Borderline High  130-159 mg/dL  High             160-189 mg/dL  Very High        >189 mg/dL    CBC & Differential  [[id number]]  (Abnormal) Collected: 02/11/22 0506    Specimen: Blood Updated: 02/11/22 0513    Narrative:      The following orders were created for panel order CBC & Differential.  Procedure                               Abnormality         Status                     ---------                               -----------         ------                     CBC Auto Differential[[id number]]        Abnormal            Final result                 Please view results for these tests on the individual orders.    CBC Auto Differential [[id number]]  (Abnormal) Collected: 02/11/22 0506    Specimen: Blood Updated: 02/11/22 0513     WBC 6.50 10*3/mm3      RBC 4.59 10*6/mm3      Hemoglobin 12.4 g/dL      Hematocrit 37.1 %      MCV 80.9 fL      MCH 27.1 pg      MCHC 33.5 g/dL      RDW 14.8 %      RDW-SD 41.6 fl      MPV 8.9 fL      Platelets 191 10*3/mm3      Neutrophil % 76.4 %      Lymphocyte % 16.5 %      Monocyte % 6.0 %      Eosinophil % 0.5 %      Basophil % 0.6 %      Neutrophils, Absolute 5.00 10*3/mm3      Lymphocytes, Absolute 1.10 10*3/mm3      Monocytes, Absolute 0.40 10*3/mm3      Eosinophils, Absolute 0.00 10*3/mm3      Basophils, Absolute 0.00 10*3/mm3      nRBC 0.1 /100 WBC     Hemoglobin A1c [[id number]] Collected: 02/11/22 0506    Specimen: Blood Updated: 02/11/22 0510    TSH [[id number]]  (Normal) Collected: 02/10/22 1942    Specimen: Blood Updated: 02/10/22 2333     TSH 2.070 uIU/mL     Respiratory Panel PCR w/COVID-19(SARS-CoV-2) MEGA/SCAR/KARUNA/PAD/COR/MAD/THANG In-House, NP Swab in UTM/VTM, 3-4 HR TAT - Swab, Nasopharynx [[id number]]  (Normal) Collected: 02/10/22 1943    Specimen: Swab from Nasopharynx Updated: 02/10/22 2035     ADENOVIRUS, PCR Not Detected     Coronavirus 229E Not Detected     Coronavirus HKU1 Not Detected     Coronavirus NL63 Not Detected     Coronavirus OC43 Not Detected     COVID19 Not Detected     Human Metapneumovirus Not Detected     Human Rhinovirus/Enterovirus Not Detected     Influenza A PCR  Not Detected     Influenza B PCR Not Detected     Parainfluenza Virus 1 Not Detected     Parainfluenza Virus 2 Not Detected     Parainfluenza Virus 3 Not Detected     Parainfluenza Virus 4 Not Detected     RSV, PCR Not Detected     Bordetella pertussis pcr Not Detected     Bordetella parapertussis PCR Not Detected     Chlamydophila pneumoniae PCR Not Detected     Mycoplasma pneumo by PCR Not Detected    Narrative:      In the setting of a positive respiratory panel with a viral infection PLUS a negative procalcitonin without other underlying concern for bacterial infection, consider observing off antibiotics or discontinuation of antibiotics and continue supportive care. If the respiratory panel is positive for atypical bacterial infection (Bordetella pertussis, Chlamydophila pneumoniae, or Mycoplasma pneumoniae), consider antibiotic de-escalation to target atypical bacterial infection.    Comprehensive Metabolic Panel [[id number]]  (Abnormal) Collected: 02/10/22 1942    Specimen: Blood Updated: 02/10/22 2013     Glucose 107 mg/dL      BUN 12 mg/dL      Creatinine 0.71 mg/dL      Sodium 140 mmol/L      Potassium 3.8 mmol/L      Chloride 101 mmol/L      CO2 27.0 mmol/L      Calcium 9.8 mg/dL      Total Protein 6.9 g/dL      Albumin 4.20 g/dL      ALT (SGPT) 15 U/L      AST (SGOT) 16 U/L      Alkaline Phosphatase 84 U/L      Total Bilirubin 0.3 mg/dL      eGFR Non African Amer 81 mL/min/1.73      Globulin 2.7 gm/dL      A/G Ratio 1.6 g/dL      BUN/Creatinine Ratio 16.9     Anion Gap 12.0 mmol/L     Narrative:      GFR Normal >60  Chronic Kidney Disease <60  Kidney Failure <15      Troponin [[id number]]  (Normal) Collected: 02/10/22 1942    Specimen: Blood Updated: 02/10/22 2013     Troponin T <0.010 ng/mL     Narrative:      Troponin T Reference Range:  <= 0.03 ng/mL-   Negative for AMI  >0.03 ng/mL-     Abnormal for myocardial necrosis.  Clinicians would have to utilize clinical acumen, EKG, Troponin and serial changes  to determine if it is an Acute Myocardial Infarction or myocardial injury due to an underlying chronic condition.       Results may be falsely decreased if patient taking Biotin.      BNP [[id number]]  (Normal) Collected: 02/10/22 1942    Specimen: Blood Updated: 02/10/22 2009     proBNP 167.4 pg/mL     Narrative:      Among patients with dyspnea, NT-proBNP is highly sensitive for the detection of acute congestive heart failure. In addition NT-proBNP of <300 pg/ml effectively rules out acute congestive heart failure with 99% negative predictive value.    Results may be falsely decreased if patient taking Biotin.      CBC & Differential [[id number]]  (Abnormal) Collected: 02/10/22 1942    Specimen: Blood Updated: 02/10/22 2006    Narrative:      The following orders were created for panel order CBC & Differential.  Procedure                               Abnormality         Status                     ---------                               -----------         ------                     CBC Auto Differential[[id number]]        Abnormal            Final result                 Please view results for these tests on the individual orders.    CBC Auto Differential [[id number]]  (Abnormal) Collected: 02/10/22 1942    Specimen: Blood Updated: 02/10/22 2006     WBC 6.40 10*3/mm3      RBC 4.63 10*6/mm3      Hemoglobin 12.8 g/dL      Hematocrit 37.5 %      MCV 81.1 fL      MCH 27.6 pg      MCHC 34.1 g/dL      RDW 14.7 %      RDW-SD 41.1 fl      MPV 9.1 fL      Platelets 195 10*3/mm3      Neutrophil % 70.2 %      Lymphocyte % 18.6 %      Monocyte % 8.6 %      Eosinophil % 1.8 %      Basophil % 0.8 %      Neutrophils, Absolute 4.50 10*3/mm3      Lymphocytes, Absolute 1.20 10*3/mm3      Monocytes, Absolute 0.60 10*3/mm3      Eosinophils, Absolute 0.10 10*3/mm3      Basophils, Absolute 0.10 10*3/mm3      nRBC 0.3 /100 WBC            Pending Results:     Imaging Reviewed:   CT Chest Pulmonary Embolism    Result Date: 2/10/2022  There  our pulmonary artery emboli throughout the right and left segmental and subsegmental branches. No pulmonary infarct is seen. There is minimal common cavity of the interventricular septum which could be seen with early right heart strain.    Electronically Signed By-Lilliam Gaspar MD On:2/10/2022 9:14 PM This report was finalized on [id number] by  Lilliam Gaspar MD.           Assessment/Plan   ASSESSMENT  Pulmonary embolism:   CT chest PE protocol confirmed pulmonary embolism  in the right and left segmental and subsegmental branches with early right heart strain.  No pulmonary infarct is seen; Most likely provoked PE due to increased sedentary lifestyle.and obesity  Differentials include sedentary lifestyle, obesity, heart disease, CHF, kidney disease, obesity, malignancy, immobilization, acquired causes and inherited causes such as Factor V mutation.  Patient was started on IV heparin. No history of blood disorders or DVT.  Patient has no smoking history including no smokeless tobacco or illicit drug use.  Admits to occasional ETOH use. Will obtain hypercoagulation work up and start on oral anticoagulation Coumadin along with heparin gtt until INR goal is 2-3. Pharmacy consulted.    Obesity: BMI 40.8: recommend lifestyle changes and diet modifications    Osteoarthritis:    Asthma:   Ordered ICS/bronchodilators, followed by pulmonary    HLD/HTN  Receiving amlodipine    Mixed anxiety depressive disorder  Receiving lexapro and prn Xanax    PLAN  1. CT reviewed   2. Continue  Heparin gtt until INR 2-3  3. Will consult pharmacy to assist with dosing of Coumadin will begin with Coumadin 5mg po daily along with heparin gtt, pharmacy to monitoring of INR with goal of 2-3  4. Will need anticoagulation outpatient consult in order to continue monitoring of INR  5. Obtain hypercoagulation work up and PT/INR for baseline  6. Monitor for bleeding  7. Will continue to follow    Electronically signed by RICKY Elliott, 02/11/22,  12:31 PM EST.    Ms. Maurer was admitted with sudden onset of dyspnea without any associated symptoms.  She was found to have pulmonary embolism with associated thrombosis of the right posterior tibial and the right gastrocnemius veins.  She reported that for several days, at least a few weeks, she had been much less mobile than before.  She had been spending 6 to 8 hours of the day in a recliner watching TV.  Shortly before the admission she had walked a distance that before she had been able to traverse without difficulties and became dyspneic to the point where she couldn't talk.  She had no chest pain.  She has had no other significant past medical history.  She suffers from obesity and describes a history of osteoarthritis she receives treatment for hypertension as well as hyperlipidemia but otherwise takes very little medication.  On exam she is a well-built and obese woman.  She is conversant, in good spirits and well oriented to person time and place.  She is not jaundiced.  The lungs are clear bilaterally.  Heart is regular.  Abdomen is rounded, protuberant and soft.  The liver and spleen do not seem to be enlarged.  There is no significant edema or tenderness of the lower extremities.  No cords can be palpated.  Reviewed the laboratory exams and the imaging studies.  Discussed with her and with her family.  I am inclined to believe that this is a provoked thromboembolic phenomenon and does a limited period of anticoagulation is reasonable.  Anticoagulation with warfarin will be started.  Direct oral anticoagulant drugs are not generally recommended for people whose body mass index is 40 or greater.  These drugs have not been tested in this subgroup of patients.  On this basis warfarin seems the most appropriate medication.  Discussed with her the importance of mobility as well as of weight management.  I will continue to follow her.    Chalino Russo MD on 2/11/2022 at 5:06 PM

## 2022-02-11 NOTE — TELEPHONE ENCOUNTER
[facility] CALLED REQUESTING AN INPATIENT CONSULTATION FOR THIS PATIENT. W/T TO OFFICE TO ASSIST.

## 2022-02-11 NOTE — CONSULTS
Cardiology Consult Note      REQUESTING PHYSICIAN    Feliz Preciado MD    PATIENT IDENTIFICATION  Name: Radha Trujillo  Age: 71 y.o.  Sex: female  :  1950  MRN: [id number]             REASON FOR CONSULTATION:  71-year-old female with no known history of coronary occlusive disease.  Past medical history includes SVT ablation, degenerative disc disease, essential hypertension, dyslipidemia, prediabetes, vitamin D/B deficiency      CC:  Elevated heart rate    HISTORY OF PRESENT ILLNESS:   Patient presented to the emergency department [facility] to  with complaint of elevated heart rate and dyspnea with exertion.  Her symptoms began approximately 4 days ago when she experienced shortness of breath when walking from her car into the [facility]-which is abnormal for her.  She denies any chest pain, pressure, tightness, palpitations.  She reports that she does not feel ill.  She did note elevated heart rate on her smart watch.  In the ED, work-up includes CT chest that showed multiple pulmonary emboli throughout the right and left segmental and subsegmental branches with possible early right heart strain.  She was admitted to the intensive care unit, started on IV heparin and warfarin.  Upon my evaluation, she is sitting up in bed smiling and appears no acute distress.  She has had no lower extremity edema, dizziness or lightheadedness.  She does note shortness of breath now with just ambulating across the room.      REVIEW OF SYSTEMS:  Pertinent items are noted in HPI, all other systems reviewed and negative    OBJECTIVE   Troponin negative x1  proBNP within normal limits      ASSESSMENT  Multiple bilateral pulmonary embolism  Dyspnea with exertion  Mild-moderate right heart strain  Obesity  Osteoarthritis  Hypertension  Dyslipidemia    PLAN  TTE with normal LV systolic function EF 66-70%, moderate TR, mildly elevated RV systolic function with RVSP 35-45 mmHg.  RV cavity moderately dilated with mildly  "reduced RV systolic function.  No evidence of acute fluid overload, no respiratory distress at rest  No evidence of acute myocardial injury  Plan for EKOS procedure this afternoon  Further recommendations following procedure        Vital Signs  Visit Vitals  /63   Pulse 90   Temp 98 °F (36.7 °C) (Oral)   Resp 16   Ht 154.9 cm (61\")   Wt 98 kg (216 lb)   SpO2 98%   BMI 40.81 kg/m²     Oxygen Therapy  SpO2: 98 %  Pulse Oximetry Type: Continuous  Device (Oxygen Therapy): room air  Probe Placed On (Pulse Ox): Right:, finger  Probe Removed From (Pulse Ox): Right:, finger  Flowsheet Rows        First Filed Value   Admission Height 154.9 cm (61\") Documented at 02/10/2022 1623   Admission Weight 98.6 kg (217 lb 6 oz) Documented at 02/10/2022 1623          Intake & Output (last 3 days)         02/08 0701  02/09 0700 02/09 0701  02/10 0700 02/10 0701  02/11 0700 02/11 0701  02/12 0700    Urine (mL/kg/hr)    950 (1.5)    Total Output    950    Net    -950                  Lines, Drains & Airways       Active LDAs       Name Placement date Placement time Site Days    Peripheral IV 02/10/22 2303 Right Antecubital 02/10/22  2303  Antecubital  less than 1                    MEDICAL HISTORY    Past Medical History:   Diagnosis Date    Abnormal coagulation profile     Abnormal EKG     Abnormal laboratory test     Acquired spondylolisthesis     Allergic 7/16/2021    Start shots in 7/27/21    Arthritis     Asthma     Body aches     Bronchitis, acute     Chest pain     PRESSURE    Chronic back pain     Cough     Depression with anxiety     DJD (degenerative joint disease)     Essential hypertension     Fatigue     History of radiofrequency ablation (RFA) procedure for cardiac arrhythmia 1/5/2021    Hyperlipidemia     Hypokalemia     Low back pain     Lymphocytic colitis     Obesity     Other specified disorders of bone density and structure, other site     Panic disorder     Paroxysmal SVT (supraventricular tachycardia) (HCC)     " "Postmenopausal     9 YEARS    Prediabetes     SOB (shortness of breath)     Unspecified injury of left Achilles tendon, initial encounter     Vitamin B deficiency 1/14/2019    Vitamin B deficiency, unspecified     Vitamin D deficiency, unspecified         SURGICAL HISTORY    Past Surgical History:   Procedure Laterality Date    ACHILLES TENDON SURGERY Left 2018    AV NODE ABLATION  08/2016    AVNRT RF ABLATION --8/16    COLONOSCOPY N/A 01/01/2012    FOOT SURGERY Right     TOE/FOOT SURGERY    KNEE SURGERY Right     2/2 TORN MENICUS    TUBAL ABDOMINAL LIGATION          FAMILY HISTORY    Family History   Problem Relation Age of Onset    Arthritis Mother     Diabetes Mother     Heart disease Mother     Emphysema Mother     Kidney disease Father     Arthritis Sister     Anxiety disorder Sister     Arthritis Brother     Anxiety disorder Brother     Acute myelogenous leukemia Niece     Myasthenia gravis Niece        SOCIAL HISTORY    Social History     Tobacco Use    Smoking status: Never Smoker    Smokeless tobacco: Never Used   Substance Use Topics    Alcohol use: Yes     Alcohol/week: 1.0 standard drink     Types: 1 Standard drinks or equivalent per week     Comment: 1 DRINK WEEKLY, IF THAT: RARE/OCC;  CAFFEINE USE + 1 COFFEE DAILY        ALLERGIES    Allergies   Allergen Reactions    Adhesive Tape Rash              /63   Pulse 90   Temp 98 °F (36.7 °C) (Oral)   Resp 16   Ht 154.9 cm (61\")   Wt 98 kg (216 lb)   SpO2 98%   BMI 40.81 kg/m²   Intake/Output last 3 shifts:  No intake/output data recorded.  Intake/Output this shift:  I/O this shift:  In: -   Out: 950 [Urine:950]    PHYSICAL EXAM:    General: Well-developed, obese 71-year-old female who is alert, cooperative, no distress, appears stated age  Head:  Normocephalic, atraumatic, mucous membranes moist  Eyes:  Conjunctiva/corneas clear, EOM's intact     Neck:  Supple,  no bruit  Lungs: Clear to auscultation bilaterally, no wheezes rhonchi rales are " noted  Chest wall: No tenderness  Heart::  Regular rate and rhythm, S1 and S2 normal, 1/6 holosystolic murmur.  No rub or gallop  Abdomen: Soft, non-tender, nondistended bowel sounds active  Extremities: No cyanosis, clubbing, or edema   Pulses: 2+ and symmetric all extremities  Skin:  No rashes or lesions  Neuro/psych: A&O x3. CN II through XII are grossly intact with appropriate affect      Scheduled Meds:      amLODIPine, 5 mg, Oral, Daily  budesonide, 1 mg, Nebulization, BID - RT  escitalopram, 20 mg, Oral, Daily  insulin lispro, 0-7 Units, Subcutaneous, Q6H  ipratropium-albuterol, 3 mL, Nebulization, Q4H - RT  multivitamin, 1 tablet, Oral, Daily  [START ON 2/12/2022] pantoprazole, 40 mg, Oral, QAM  senna-docusate sodium, 2 tablet, Oral, BID  sodium chloride, 10 mL, Intravenous, Q12H  warfarin, 5 mg, Oral, Daily        Continuous Infusions:    heparin, 15.2 Units/kg/hr, Last Rate: 15.2 Units/kg/hr (02/11/22 1251)  Pharmacy to dose warfarin,         PRN Meds:      acetaminophen **OR** acetaminophen    ALPRAZolam    aluminum-magnesium hydroxide-simethicone    senna-docusate sodium **AND** polyethylene glycol **AND** bisacodyl **AND** bisacodyl    dextrose    dextrose    glucagon (human recombinant)    heparin    heparin    insulin lispro **AND** insulin lispro    ipratropium-albuterol    melatonin    ondansetron **OR** ondansetron    Pharmacy to dose warfarin    sodium chloride        Results Review:     I reviewed the patient's new clinical results.    CBC    Results from last 7 days   Lab Units 02/11/22  0506 02/10/22  1942 02/10/22  1230   WBC 10*3/mm3 6.50 6.40 6.07   HEMOGLOBIN g/dL 12.4 12.8 12.9   PLATELETS 10*3/mm3 191 195 216     Cr Clearance Estimated Creatinine Clearance: 69.1 mL/min (by C-G formula based on SCr of 0.68 mg/dL).  Coag   Results from last 7 days   Lab Units 02/11/22  1159 02/11/22  0506   INR  1.03* 1.05   APTT seconds 51.6* 98.9*     HbA1C   Lab Results   Component Value Date    [facility]  5[facility]7 (H) 02/11/2022    HGBA1C 5.9 (H) 11/30/2020    HGBA1C 5.6 05/23/2019     Blood Glucose   Glucose   Date/Time Value Ref Range Status   02/11/2022 1001 122 (H) 70 - 105 mg/dL Final     Comment:     Serial Number: [id number]:  [id number]   02/11/2022 0738 124 (H) 70 - 105 mg/dL Final     Comment:     Serial Number: [id number]:  [id number]     Infection   Results from last 7 days   Lab Units 02/10/22  1230   URINECX  Growth present, too young to evaluate     CMP   Results from last 7 days   Lab Units 02/11/22  0506 02/10/22  1942   SODIUM mmol/L 139 140   POTASSIUM mmol/L 3.6 3.8   CHLORIDE mmol/L 101 101   CO2 mmol/L 28.0 27.0   BUN mg/dL 8 12   CREATININE mg/dL 0.68 0.71   GLUCOSE mg/dL 123* 107*   ALBUMIN g/dL 4.10 4.20   BILIRUBIN mg/dL 0.4 0.3   ALK PHOS U/L 80 84   AST (SGOT) U/L 13 16   ALT (SGPT) U/L 12 15     ABG      UA    Results from last 7 days   Lab Units 02/10/22  1230   NITRITE UA  Negative   WBC UA /HPF 0-2   BACTERIA UA /HPF 1+*   SQUAM EPITHEL UA /HPF 0-2   URINECX  Growth present, too young to evaluate     MELLY  No results found for: POCMETH, POCAMPHET, POCBARBITUR, POCBENZO, POCCOCAINE, POCOPIATES, POCOXYCODO, POCPHENCYC, POCPROPOXY, POCTHC, POCTRICYC  Lysis Labs   Results from last 7 days   Lab Units 02/11/22  1159 02/11/22  0506 02/10/22  1942 02/10/22  1230   INR  1.03* 1.05  --   --    APTT seconds 51.6* 98.9*  --   --    HEMOGLOBIN g/dL  --  12.4 12.8 12.9   PLATELETS 10*3/mm3  --  191 195 216   CREATININE mg/dL  --  0.68 0.71  --      Radiology(recent) CT Chest Pulmonary Embolism    Result Date: 2/10/2022  There our pulmonary artery emboli throughout the right and left segmental and subsegmental branches. No pulmonary infarct is seen. There is minimal common cavity of the interventricular septum which could be seen with early right heart strain.    Electronically Signed By-Lilliam Gaspar MD On:2/10/2022 9:14 PM This report was finalized on [id number] by  Lilliam Gaspar,  MD.        Results from last 7 days   Lab Units 02/10/22  1942   TROPONIN T ng/mL <0.010       Xrays, labs reviewed personally by physician.    ECG/EMG Results (most recent)       Procedure Component Value Units Date/Time    ECG 12 Lead [[id number]] Collected: 02/10/22 1745     Updated: 02/10/22 1747     QT Interval 386 ms     Narrative:      HEART RATE= 79  bpm  RR Interval= 760  ms  SD Interval= 162  ms  P Horizontal Axis= -8  deg  P Front Axis= 58  deg  QRSD Interval= 88  ms  QT Interval= 386  ms  QRS Axis= -8  deg  T Wave Axis= 44  deg  - OTHERWISE NORMAL ECG -  Sinus rhythm  Low voltage, precordial leads  When compared with ECG of 30-Oct-2021 12:46:11,  No significant change  Electronically Signed By:   Date and Time of Study: 2022-02-10 17:45:36    Adult Transthoracic Echo Complete W/ Cont if Necessary Per Protocol [[id number]] Collected: 02/11/22 0640     Updated: 02/11/22 1317     BSA 2.0 m^2      RVIDd 3.4 cm      IVSd 1.2 cm      LVIDd 4.9 cm      LVIDs 3.5 cm      LVPWd 1.2 cm      IVS/LVPW 1.0     FS 28.3 %      EDV(Teich) 111.1 ml      ESV(Teich) 50.5 ml      EF(Teich) 54.5 %      EDV(cubed) 115.3 ml      ESV(cubed) 42.5 ml      EF(cubed) 63.1 %      LV mass(C)d 231.3 grams      LV mass(C)dI 118.5 grams/m^2      SV(Teich) 60.5 ml      SI(Teich) 31.0 ml/m^2      SV(cubed) 72.8 ml      SI(cubed) 37.3 ml/m^2      Ao root diam 3.5 cm      Ao root area 9.7 cm^2      ACS 2.4 cm      LVOT diam 2.3 cm      LVOT area 4.1 cm^2      EDV(MOD-sp4) 76.8 ml      ESV(MOD-sp4) 25.7 ml      EF(MOD-sp4) 66.5 %      SV(MOD-sp4) 51.1 ml      SI(MOD-sp4) 26.2 ml/m^2      Ao root area (BSA corrected) 1.8     LV Chavez Vol (BSA corrected) 39.4 ml/m^2      LV Sys Vol (BSA corrected) 13.2 ml/m^2      Aortic R-R 0.77 sec      Aortic HR 77.7 BPM      MV E max tim 75.4 cm/sec      MV A max tim 107.3 cm/sec      MV E/A 0.7     MV V2 max 105.8 cm/sec      MV max PG 4.5 mmHg      MV V2 mean 58.3 cm/sec      MV mean PG 1.6 mmHg      MV V2  VTI 24.4 cm      MVA(VTI) 4.2 cm^2      MV dec slope 273.3 cm/sec^2      MV dec time 0.28 sec      Ao pk dominic 137.1 cm/sec      Ao max PG 7.5 mmHg      Ao max PG (full) 3.2 mmHg      Ao V2 mean 86.8 cm/sec      Ao mean PG 3.5 mmHg      Ao mean PG (full) 1.1 mmHg      Ao V2 VTI 28.1 cm      LINCOLN(I,A) 3.7 cm^2      LINCOLN(I,D) 3.7 cm^2      LINCOLN(V,A) 3.1 cm^2      LINCOLN(V,D) 3.1 cm^2      LV V1 max PG 4.4 mmHg      LV V1 mean PG 2.4 mmHg      LV V1 max 104.3 cm/sec      LV V1 mean 73.6 cm/sec      LV V1 VTI 25.5 cm      CO(Ao) 21.2 l/min      CI(Ao) 10.8 l/min/m^2      SV(Ao) 272.6 ml      SI(Ao) 139.6 ml/m^2      CO(LVOT) 8.0 l/min      CI(LVOT) 4.1 l/min/m^2      SV(LVOT) 103.6 ml      SI(LVOT) 53.1 ml/m^2      PA V2 max 82.7 cm/sec      PA max PG 2.8 mmHg      PA max PG (full) 0.82 mmHg      RV V1 max PG 2.0 mmHg      RV V1 mean PG 1.1 mmHg      RV V1 max 70.3 cm/sec      RV V1 mean 49.1 cm/sec      RV V1 VTI 17.6 cm      TR max dominic 288.2 cm/sec      RVSP(TR) 41 mmHg      RAP systole 3.0 mmHg      Pulm Sys Dominic 62.5 cm/sec      Pulm Chavez Dominic 39.0 cm/sec      Pulm S/D 1.6     Pulm A Revs Dur 0.11 sec      Pulm A Revs Dominic 22.2 cm/sec       CV ECHO PAOLA - BZI_BMI 40.8 kilograms/m^2       CV ECHO PAOLA - BSA(HAYCOCK) 2.1 m^2       CV ECHO PAOLA - BZI_METRIC_WEIGHT 98.0 kg       CV ECHO PAOLA - BZI_METRIC_HEIGHT 154.9 cm      EF(MOD-bp) 67.0 %      LA dimension(2D) 3.8 cm     Narrative:      · Estimated left ventricular EF was in agreement with the calculated left   ventricular EF. Left ventricular ejection fraction appears to be 66 - 70%.   Left ventricular systolic function is normal.  · Moderate tricuspid valve regurgitation is present.  · Estimated right ventricular systolic pressure from tricuspid   regurgitation is mildly elevated (35-45 mmHg).  · The right ventricular cavity is moderately dilated.  · Mildly reduced right ventricular systolic function noted.                 Medication Review:   I have reviewed the  patient's current medication list  Scheduled Meds:amLODIPine, 5 mg, Oral, Daily  budesonide, 1 mg, Nebulization, BID - RT  escitalopram, 20 mg, Oral, Daily  insulin lispro, 0-7 Units, Subcutaneous, Q6H  ipratropium-albuterol, 3 mL, Nebulization, Q4H - RT  multivitamin, 1 tablet, Oral, Daily  [START ON 2/12/2022] pantoprazole, 40 mg, Oral, QAM  senna-docusate sodium, 2 tablet, Oral, BID  sodium chloride, 10 mL, Intravenous, Q12H  warfarin, 5 mg, Oral, Daily      Continuous Infusions:heparin, 15.2 Units/kg/hr, Last Rate: 15.2 Units/kg/hr (02/11/22 1251)  Pharmacy to dose warfarin,       PRN Meds:.  acetaminophen **OR** acetaminophen    ALPRAZolam    aluminum-magnesium hydroxide-simethicone    senna-docusate sodium **AND** polyethylene glycol **AND** bisacodyl **AND** bisacodyl    dextrose    dextrose    glucagon (human recombinant)    heparin    heparin    insulin lispro **AND** insulin lispro    ipratropium-albuterol    melatonin    ondansetron **OR** ondansetron    Pharmacy to dose warfarin    sodium chloride    Imaging:  Imaging Results (Last 72 Hours)       Procedure Component Value Units Date/Time    CT Chest Pulmonary Embolism [[id number]] Collected: 02/10/22 2108     Updated: 02/10/22 2116    Narrative:      CT CHEST PULMONARY EMBOLISM-     Date of Exam: 2/10/2022 9:00 PM     Indication: PE suspected, low/intermediate prob, positive D-dimer.   Shortness of breath for 4 days     Comparison: CT PE 12/05/2016     Technique: Serial and axial CT images of the chest were obtained  following the uneventful intravenous administration of 75 cc Isovue-370  contrast. Reconstructions in the coronal and sagittal planes were also  performed.  Automated exposure control and iterative reconstruction  methods were used.     FINDINGS:     Pulmonary Arteries there is adequate opacification of pulmonary  arteries. There are filling defects present within the right upper lobe  segmental and subsegmental bronchi, primarily the apical  "apical  posterior branch, filling defects within the right middle lobe medial  branch and segmental branch, filling defects in the right lower lobe  segmental and subsegmental arteries. There are filling defects within  the left upper lobe anterior subsegmental vessel, the left lower lobe  and lingular segmental branches and the left lower lobe subsegmental  branches. The pulmonary arteries have normal caliber. There is slight  deflection of the in interventricular septum to the left which could be  seen with right heart strain.      Hilum and Mediastinum: No pathologically enlarged lymph nodes.  Normal  heart size.  No significant coronary artery atherosclerotic disease.  Unremarkable thoracic aorta.   No pericardial effusion.     Lung Parenchyma and Pleura: Mild bibasilar subsegmental atelectasis. No  focal parenchymal opacities. No suspicious pulmonary nodules. No  endobronchial lesions. No pleural effusions.     Upper Abdomen: Small hiatal hernia.     Soft tissues: Unremarkable.     Osseous structures: No aggressive focal lytic or sclerotic osseous  lesions.       Impression:      There our pulmonary artery emboli throughout the right and left  segmental and subsegmental branches. No pulmonary infarct is seen. There  is minimal common cavity of the interventricular septum which could be  seen with early right heart strain.           Electronically Signed By-Lilliam Gaspar MD On:2/10/2022 9:14 PM  This report was finalized on [id number] by  Lilliam Gaspar MD.              RICKY Harding  02/11/22  13:26 EST       EMR Dragon/Transcription:   \"Dictated utilizing Dragon dictation\".       Electronically signed by RICKY Harding, 02/11/22, 1:26 PM EST.    Cardiology attending:  Seen and examined.  Chart and labs reviewed.  History and exam findings are verified with above changes noted.  Assessment and plan notated by APC after being formulated by attending consultant.  Note that greater than 50% of " the time spent in care of the patient was provided by attending consultant.  Patient reports that she began having shortness of breath after leaving the [facility].  CT scan in the emergency department demonstrated pulmonary emboli with right heart strain.  Images personally reviewed.  Additionally 2D echocardiogram demonstrated right ventricular dilation and mild right ventricular systolic dysfunction.  Images personally reviewed.  Case discussed with pulmonary medicine.  We will plan for EKOS procedure later today.  Risk benefits and options discussed with patient.  She consents to proceed.

## 2022-02-11 NOTE — CASE MANAGEMENT/SOCIAL WORK
Discharge Planning Assessment   [place]     Patient Name: Radha Trujillo  MRN: [id number]  Today's Date: 2/11/2022    Admit Date: 2/10/2022     Discharge Needs Assessment     Row Name 02/11/22 1221       Living Environment    Lives With spouse    Current Living Arrangements home/apartment/condo    Primary Care Provided by self    Provides Primary Care For no one    Family Caregiver if Needed spouse    Quality of Family Relationships supportive; helpful    Able to Return to Prior Arrangements yes       Resource/Environmental Concerns    Resource/Environmental Concerns none    Transportation Concerns car, none       Transition Planning    Patient/Family Anticipates Transition to home with family    Patient/Family Anticipated Services at Transition none    Transportation Anticipated family or friend will provide       Discharge Needs Assessment    Readmission Within the Last 30 Days no previous admission in last 30 days    Equipment Currently Used at Home cpap; cane, straight    Concerns to be Addressed discharge planning; medication  possible test claim may be needed for anticoagulant, once determined.    Anticipated Changes Related to Illness none    Equipment Needed After Discharge none               Discharge Plan     Row Name 02/11/22 1224       Plan    Plan D/C Plan: Anticipate home with spouse. Heart cath 2/11. Watch for need of anticoagulant.    Patient/Family in Agreement with Plan yes    Plan Comments Met with patient in room.  Patient lives at home with spouse, is I with ADLs and drives.  Patient has cpap and cane.  Denies need for HHC.  PCP verified.  Patient denies trouble affording home medications.  Patient spouse will provide transportation at d/c.  Patient currently on hep gtt, with heart cath scheduled for today.              Continued Care and Services - Admitted Since 2/10/2022           Expected Discharge Date and Time     Expected Discharge Date Expected Discharge Time    Feb 14, 2022           Demographic Summary     Row Name 02/11/22 1220       General Information    Admission Type inpatient    Arrived From emergency department    Referral Source admission list    Reason for Consult discharge planning    Preferred Language English     Used During This Interaction no               Functional Status     Row Name 02/11/22 1221       Functional Status    Usual Activity Tolerance excellent    Current Activity Tolerance good       Functional Status, IADL    Medications independent    Meal Preparation independent    Housekeeping independent    Laundry independent    Shopping independent       Mental Status    General Appearance WDL WDL       Mental Status Summary    Recent Changes in Mental Status/Cognitive Functioning no changes                          Renate Vaughn RN

## 2022-02-11 NOTE — ED NOTES
Pt reports SOA since Tuesday, was seen at PCP and had elevated DDimer. Pt sent to ED for further evaluation. PT does report being under increased stress with family and covid worries lately        Brandee Chase RN  02/10/22 1953

## 2022-02-12 ENCOUNTER — READMISSION MANAGEMENT (OUTPATIENT)
Dept: CALL CENTER | Facility: HOSPITAL | Age: 72
End: 2022-02-12

## 2022-02-12 VITALS
SYSTOLIC BLOOD PRESSURE: 127 MMHG | HEIGHT: 61 IN | RESPIRATION RATE: 18 BRPM | BODY MASS INDEX: 40.37 KG/M2 | DIASTOLIC BLOOD PRESSURE: 61 MMHG | TEMPERATURE: 98.1 F | OXYGEN SATURATION: 97 % | WEIGHT: 213.85 LBS | HEART RATE: 82 BPM

## 2022-02-12 LAB
ACT BLD: 160 SECONDS (ref 89–137)
ACT BLD: 184 SECONDS (ref 89–137)
ALBUMIN SERPL-MCNC: 4.1 G/DL (ref 3.5–5.2)
ALBUMIN/GLOB SERPL: 1.6 G/DL
ALP SERPL-CCNC: 82 U/L (ref 39–117)
ALT SERPL W P-5'-P-CCNC: 13 U/L (ref 1–33)
ANION GAP SERPL CALCULATED.3IONS-SCNC: 11 MMOL/L (ref 5–15)
APTT PPP: 23.3 SECONDS (ref 61–76.5)
APTT PPP: 28.7 SECONDS (ref 61–76.5)
APTT PPP: 31 SECONDS (ref 61–76.5)
AST SERPL-CCNC: 18 U/L (ref 1–32)
BASOPHILS # BLD AUTO: 0 10*3/MM3 (ref 0–0.2)
BASOPHILS # BLD AUTO: 0.1 10*3/MM3 (ref 0–0.2)
BASOPHILS # BLD AUTO: 0.1 10*3/MM3 (ref 0–0.2)
BASOPHILS NFR BLD AUTO: 0.7 % (ref 0–1.5)
BASOPHILS NFR BLD AUTO: 0.7 % (ref 0–1.5)
BASOPHILS NFR BLD AUTO: 0.9 % (ref 0–1.5)
BILIRUB SERPL-MCNC: 0.5 MG/DL (ref 0–1.2)
BUN SERPL-MCNC: 10 MG/DL (ref 8–23)
BUN/CREAT SERPL: 16.9 (ref 7–25)
CA-I SERPL ISE-MCNC: 1.21 MMOL/L (ref 1.2–1.3)
CALCIUM SPEC-SCNC: 9.2 MG/DL (ref 8.6–10.5)
CARDIOLIPIN IGG SER IA-ACNC: <9 GPL U/ML (ref 0–14)
CARDIOLIPIN IGM SER IA-ACNC: <9 MPL U/ML (ref 0–12)
CHLORIDE SERPL-SCNC: 103 MMOL/L (ref 98–107)
CO2 SERPL-SCNC: 25 MMOL/L (ref 22–29)
CREAT SERPL-MCNC: 0.59 MG/DL (ref 0.57–1)
DEPRECATED RDW RBC AUTO: 41.6 FL (ref 37–54)
DEPRECATED RDW RBC AUTO: 42.4 FL (ref 37–54)
DEPRECATED RDW RBC AUTO: 42.4 FL (ref 37–54)
EOSINOPHIL # BLD AUTO: 0.1 10*3/MM3 (ref 0–0.4)
EOSINOPHIL NFR BLD AUTO: 0.8 % (ref 0.3–6.2)
EOSINOPHIL NFR BLD AUTO: 0.9 % (ref 0.3–6.2)
EOSINOPHIL NFR BLD AUTO: 1.6 % (ref 0.3–6.2)
ERYTHROCYTE [DISTWIDTH] IN BLOOD BY AUTOMATED COUNT: 14.6 % (ref 12.3–15.4)
ERYTHROCYTE [DISTWIDTH] IN BLOOD BY AUTOMATED COUNT: 14.9 % (ref 12.3–15.4)
ERYTHROCYTE [DISTWIDTH] IN BLOOD BY AUTOMATED COUNT: 14.9 % (ref 12.3–15.4)
FIBRINOGEN PPP-MCNC: 386 MG/DL (ref 210–450)
FIBRINOGEN PPP-MCNC: 404 MG/DL (ref 210–450)
FIBRINOGEN PPP-MCNC: 416 MG/DL (ref 210–450)
GFR SERPL CREATININE-BSD FRML MDRD: 100 ML/MIN/1.73
GLOBULIN UR ELPH-MCNC: 2.5 GM/DL
GLUCOSE BLDC GLUCOMTR-MCNC: 167 MG/DL (ref 70–105)
GLUCOSE SERPL-MCNC: 122 MG/DL (ref 65–99)
HCT VFR BLD AUTO: 33.6 % (ref 34–46.6)
HCT VFR BLD AUTO: 37.4 % (ref 34–46.6)
HCT VFR BLD AUTO: 37.4 % (ref 34–46.6)
HGB BLD-MCNC: 11.5 G/DL (ref 12–15.9)
HGB BLD-MCNC: 12.6 G/DL (ref 12–15.9)
HGB BLD-MCNC: 12.7 G/DL (ref 12–15.9)
INR PPP: 1.04 (ref 0.93–1.1)
INR PPP: 1.05 (ref 0.93–1.1)
INR PPP: 1.06 (ref 0.93–1.1)
LYMPHOCYTES # BLD AUTO: 0.7 10*3/MM3 (ref 0.7–3.1)
LYMPHOCYTES # BLD AUTO: 0.8 10*3/MM3 (ref 0.7–3.1)
LYMPHOCYTES # BLD AUTO: 1 10*3/MM3 (ref 0.7–3.1)
LYMPHOCYTES NFR BLD AUTO: 10.4 % (ref 19.6–45.3)
LYMPHOCYTES NFR BLD AUTO: 11.2 % (ref 19.6–45.3)
LYMPHOCYTES NFR BLD AUTO: 12.8 % (ref 19.6–45.3)
MAGNESIUM SERPL-MCNC: 1.8 MG/DL (ref 1.6–2.4)
MCH RBC QN AUTO: 27.7 PG (ref 26.6–33)
MCH RBC QN AUTO: 27.7 PG (ref 26.6–33)
MCH RBC QN AUTO: 28 PG (ref 26.6–33)
MCHC RBC AUTO-ENTMCNC: 33.8 G/DL (ref 31.5–35.7)
MCHC RBC AUTO-ENTMCNC: 33.9 G/DL (ref 31.5–35.7)
MCHC RBC AUTO-ENTMCNC: 34.3 G/DL (ref 31.5–35.7)
MCV RBC AUTO: 80.7 FL (ref 79–97)
MCV RBC AUTO: 81.7 FL (ref 79–97)
MCV RBC AUTO: 82.9 FL (ref 79–97)
MONOCYTES # BLD AUTO: 0.5 10*3/MM3 (ref 0.1–0.9)
MONOCYTES # BLD AUTO: 0.7 10*3/MM3 (ref 0.1–0.9)
MONOCYTES # BLD AUTO: 0.7 10*3/MM3 (ref 0.1–0.9)
MONOCYTES NFR BLD AUTO: 7.1 % (ref 5–12)
MONOCYTES NFR BLD AUTO: 8.9 % (ref 5–12)
MONOCYTES NFR BLD AUTO: 9.3 % (ref 5–12)
NEUTROPHILS NFR BLD AUTO: 5.5 10*3/MM3 (ref 1.7–7)
NEUTROPHILS NFR BLD AUTO: 5.8 10*3/MM3 (ref 1.7–7)
NEUTROPHILS NFR BLD AUTO: 5.9 10*3/MM3 (ref 1.7–7)
NEUTROPHILS NFR BLD AUTO: 76 % (ref 42.7–76)
NEUTROPHILS NFR BLD AUTO: 77.8 % (ref 42.7–76)
NEUTROPHILS NFR BLD AUTO: 80.9 % (ref 42.7–76)
NRBC BLD AUTO-RTO: 0 /100 WBC (ref 0–0.2)
NRBC BLD AUTO-RTO: 0.1 /100 WBC (ref 0–0.2)
NRBC BLD AUTO-RTO: 0.1 /100 WBC (ref 0–0.2)
PHOSPHATE SERPL-MCNC: 3 MG/DL (ref 2.5–4.5)
PLATELET # BLD AUTO: 189 10*3/MM3 (ref 140–450)
PLATELET # BLD AUTO: 196 10*3/MM3 (ref 140–450)
PLATELET # BLD AUTO: 204 10*3/MM3 (ref 140–450)
PMV BLD AUTO: 8.7 FL (ref 6–12)
PMV BLD AUTO: 8.8 FL (ref 6–12)
PMV BLD AUTO: 9.1 FL (ref 6–12)
POTASSIUM SERPL-SCNC: 3.2 MMOL/L (ref 3.5–5.2)
POTASSIUM SERPL-SCNC: 4.4 MMOL/L (ref 3.5–5.2)
PROT SERPL-MCNC: 6.6 G/DL (ref 6–8.5)
PROTHROM ACT/NOR PPP: 115 % (ref 50–154)
PROTHROMBIN TIME: 11.5 SECONDS (ref 9.6–11.7)
PROTHROMBIN TIME: 11.6 SECONDS (ref 9.6–11.7)
PROTHROMBIN TIME: 11.7 SECONDS (ref 9.6–11.7)
RBC # BLD AUTO: 4.17 10*6/MM3 (ref 3.77–5.28)
RBC # BLD AUTO: 4.51 10*6/MM3 (ref 3.77–5.28)
RBC # BLD AUTO: 4.57 10*6/MM3 (ref 3.77–5.28)
SODIUM SERPL-SCNC: 139 MMOL/L (ref 136–145)
WBC NRBC COR # BLD: 6.8 10*3/MM3 (ref 3.4–10.8)
WBC NRBC COR # BLD: 7.5 10*3/MM3 (ref 3.4–10.8)
WBC NRBC COR # BLD: 7.8 10*3/MM3 (ref 3.4–10.8)

## 2022-02-12 PROCEDURE — 99232 SBSQ HOSP IP/OBS MODERATE 35: CPT | Performed by: INTERNAL MEDICINE

## 2022-02-12 PROCEDURE — 85025 COMPLETE CBC W/AUTO DIFF WBC: CPT | Performed by: INTERNAL MEDICINE

## 2022-02-12 PROCEDURE — 85347 COAGULATION TIME ACTIVATED: CPT

## 2022-02-12 PROCEDURE — 83735 ASSAY OF MAGNESIUM: CPT | Performed by: INTERNAL MEDICINE

## 2022-02-12 PROCEDURE — 99231 SBSQ HOSP IP/OBS SF/LOW 25: CPT | Performed by: INTERNAL MEDICINE

## 2022-02-12 PROCEDURE — 85730 THROMBOPLASTIN TIME PARTIAL: CPT | Performed by: INTERNAL MEDICINE

## 2022-02-12 PROCEDURE — 85384 FIBRINOGEN ACTIVITY: CPT | Performed by: INTERNAL MEDICINE

## 2022-02-12 PROCEDURE — 82330 ASSAY OF CALCIUM: CPT | Performed by: INTERNAL MEDICINE

## 2022-02-12 PROCEDURE — 82962 GLUCOSE BLOOD TEST: CPT

## 2022-02-12 PROCEDURE — 25010000002 ONDANSETRON PER 1 MG: Performed by: INTERNAL MEDICINE

## 2022-02-12 PROCEDURE — 84132 ASSAY OF SERUM POTASSIUM: CPT | Performed by: INTERNAL MEDICINE

## 2022-02-12 PROCEDURE — 85610 PROTHROMBIN TIME: CPT | Performed by: INTERNAL MEDICINE

## 2022-02-12 PROCEDURE — 25010000002 ENOXAPARIN PER 10 MG: Performed by: INTERNAL MEDICINE

## 2022-02-12 PROCEDURE — 36415 COLL VENOUS BLD VENIPUNCTURE: CPT | Performed by: INTERNAL MEDICINE

## 2022-02-12 PROCEDURE — 80053 COMPREHEN METABOLIC PANEL: CPT | Performed by: INTERNAL MEDICINE

## 2022-02-12 PROCEDURE — 84100 ASSAY OF PHOSPHORUS: CPT | Performed by: INTERNAL MEDICINE

## 2022-02-12 PROCEDURE — 94799 UNLISTED PULMONARY SVC/PX: CPT

## 2022-02-12 RX ORDER — POTASSIUM CHLORIDE 20 MEQ/1
40 TABLET, EXTENDED RELEASE ORAL AS NEEDED
Status: DISCONTINUED | OUTPATIENT
Start: 2022-02-12 | End: 2022-02-12 | Stop reason: SDUPTHER

## 2022-02-12 RX ORDER — POTASSIUM CHLORIDE 20 MEQ/1
40 TABLET, EXTENDED RELEASE ORAL AS NEEDED
Status: DISCONTINUED | OUTPATIENT
Start: 2022-02-12 | End: 2022-02-12 | Stop reason: HOSPADM

## 2022-02-12 RX ORDER — WARFARIN SODIUM 5 MG/1
5 TABLET ORAL
Status: DISCONTINUED | OUTPATIENT
Start: 2022-02-12 | End: 2022-02-12

## 2022-02-12 RX ORDER — POTASSIUM CHLORIDE 1.5 G/1.77G
40 POWDER, FOR SOLUTION ORAL AS NEEDED
Status: DISCONTINUED | OUTPATIENT
Start: 2022-02-12 | End: 2022-02-12 | Stop reason: HOSPADM

## 2022-02-12 RX ADMIN — ONDANSETRON 4 MG: 2 INJECTION INTRAMUSCULAR; INTRAVENOUS at 01:04

## 2022-02-12 RX ADMIN — Medication 10 ML: at 08:05

## 2022-02-12 RX ADMIN — ESCITALOPRAM OXALATE 20 MG: 10 TABLET ORAL at 08:05

## 2022-02-12 RX ADMIN — THERA TABS 1 TABLET: TAB at 08:05

## 2022-02-12 RX ADMIN — BUDESONIDE 1 MG: 0.5 SUSPENSION RESPIRATORY (INHALATION) at 08:50

## 2022-02-12 RX ADMIN — RIVAROXABAN 15 MG: 15 TABLET, FILM COATED ORAL at 14:07

## 2022-02-12 RX ADMIN — POTASSIUM CHLORIDE 40 MEQ: 1500 TABLET, EXTENDED RELEASE ORAL at 03:57

## 2022-02-12 RX ADMIN — IPRATROPIUM BROMIDE AND ALBUTEROL SULFATE 3 ML: 2.5; .5 SOLUTION RESPIRATORY (INHALATION) at 12:11

## 2022-02-12 RX ADMIN — AMLODIPINE BESYLATE 5 MG: 5 TABLET ORAL at 08:05

## 2022-02-12 RX ADMIN — PANTOPRAZOLE SODIUM 40 MG: 40 TABLET, DELAYED RELEASE ORAL at 08:05

## 2022-02-12 RX ADMIN — DOCUSATE SODIUM 50 MG AND SENNOSIDES 8.6 MG 2 TABLET: 8.6; 5 TABLET, FILM COATED ORAL at 08:05

## 2022-02-12 RX ADMIN — ACETAMINOPHEN 650 MG: 325 TABLET, FILM COATED ORAL at 02:18

## 2022-02-12 RX ADMIN — ENOXAPARIN SODIUM 100 MG: 100 INJECTION SUBCUTANEOUS at 01:47

## 2022-02-12 RX ADMIN — IPRATROPIUM BROMIDE AND ALBUTEROL SULFATE 3 ML: 2.5; .5 SOLUTION RESPIRATORY (INHALATION) at 08:50

## 2022-02-12 NOTE — PLAN OF CARE
Pt being discharged home today. To follow up with cardiology next week, and hematology in 2-3 weeks

## 2022-02-12 NOTE — NURSING NOTE
Eval: Upon my arrival to room and upon receiving report; A review of EKOS system and review of Drips, IV and dual venous sheaths.completed with Cath Lab RN. EKOS representative. TPA, Heparin and NS started with insertion of EKOS Catheter in Cath Lab @ 1730. Will con't infusions for a total time of 6 hours. Plan for MELINA GARCÍA intensivist  Will discontinue devices at 2330 and I will start eval of ACT to allow for sheaths to be removed. Coordination of care with pharmacy, cardiology and intensivist will be ongoing

## 2022-02-12 NOTE — PROGRESS NOTES
Cardiology Progress Note    Patient Identification:  Name: Radha Trujillo  Age: 71 y.o.  Sex: female  :  1950  MRN: [id number]                 Follow Up / Chief Complaint: PE s/p EKOS  Chief Complaint   Patient presents with   • Shortness of Breath       Interval History: Patient presented with PE and DVT underwent EKOS TPA on 2022.       Subjective: Patient seen and examined.  Chart reviewed.  Labs reviewed.  Discussed with RN taking care of patient.      Objective:  Troponin negative x1  proBNP within normal limits    History of present illness:  This is a 71-year-old with previous history of nonocclusive CAD, SVT ablation, DDD, hypertension, dyslipidemia, prediabetes, vitamin D and B deficiency presented to the emergency department [facility] to 1022 with complaint of elevated heart rate and dyspnea with exertion.  Her symptoms began approximately 4 days ago when she experienced shortness of breath when walking from her car into the [facility]-which is abnormal for her.  She denies any chest pain, pressure, tightness, palpitations.  She reports that she does not feel ill.  She did note elevated heart rate on her smart watch.  In the ED, work-up includes CT chest that showed multiple pulmonary emboli throughout the right and left segmental and subsegmental branches with possible early right heart strain.  She was admitted to the intensive care unit, started on IV heparin and warfarin.  Upon my evaluation, she is sitting up in bed smiling and appears no acute distress.  She has had no lower extremity edema, dizziness or lightheadedness        Assessment:  Multiple bilateral pulmonary embolism  Dyspnea with exertion  Mild-moderate right heart strain  Obesity  Osteoarthritis  Hypertension  Dyslipidemia      Plan:    TTE with normal LV systolic function EF 66-70%, moderate TR, mildly elevated RV systolic function with RVSP 35-45 mmHg.  RV cavity moderately dilated with mildly reduced RV systolic  function.  No evidence of acute fluid overload, no respiratory distress at rest  No evidence of acute myocardial injury  Patient underwent EKOS on 2/11/2022.  Would recommend full anticoagulation with NOACs for PE.  With Xarelto loading dose for 5 days and 20 daily after that with meals.  Discussed with patient about options of warfarin versus Xarelto.  Patient wants to go on Xarelto.  Discussed with pharmacist in the unit.  Discussed with intensivist team rounding on the patient today nurse practitioner and Dr. Preciado  Discussed with RN taking care of patient to coordinate care.  Advised patient to follow-up with primary cardiologist in 2 weeks.    Past Medical History:  Past Medical History:   Diagnosis Date   • Abnormal coagulation profile    • Abnormal EKG    • Abnormal laboratory test    • Acquired spondylolisthesis    • Allergic 7/16/2021    Start shots in 7/27/21   • Arthritis    • Asthma    • Body aches    • Bronchitis, acute    • Chest pain     PRESSURE   • Chronic back pain    • Cough    • Depression with anxiety    • DJD (degenerative joint disease)    • Essential hypertension    • Fatigue    • History of radiofrequency ablation (RFA) procedure for cardiac arrhythmia 1/5/2021   • Hyperlipidemia    • Hypokalemia    • Low back pain    • Lymphocytic colitis    • Obesity    • Other specified disorders of bone density and structure, other site    • Panic disorder    • Paroxysmal SVT (supraventricular tachycardia) (HCC)    • Postmenopausal     9 YEARS   • Prediabetes    • SOB (shortness of breath)    • Unspecified injury of left Achilles tendon, initial encounter    • Vitamin B deficiency 1/14/2019   • Vitamin B deficiency, unspecified    • Vitamin D deficiency, unspecified      Past Surgical History:  Past Surgical History:   Procedure Laterality Date   • ACHILLES TENDON SURGERY Left 2018   • AV NODE ABLATION  08/2016    AVNRT RF ABLATION --8/16   • COLONOSCOPY N/A 01/01/2012   • FOOT SURGERY Right      "TOE/FOOT SURGERY   • KNEE SURGERY Right     2/2 TORN MENICUS   • TUBAL ABDOMINAL LIGATION          Social History:   Social History     Tobacco Use   • Smoking status: Never Smoker   • Smokeless tobacco: Never Used   Substance Use Topics   • Alcohol use: Yes     Alcohol/week: 1.0 standard drink     Types: 1 Standard drinks or equivalent per week     Comment: 1 DRINK WEEKLY, IF THAT: RARE/OCC;  CAFFEINE USE + 1 COFFEE DAILY      Family History:  Family History   Problem Relation Age of Onset   • Arthritis Mother    • Diabetes Mother    • Heart disease Mother    • Emphysema Mother    • Kidney disease Father    • Arthritis Sister    • Anxiety disorder Sister    • Arthritis Brother    • Anxiety disorder Brother    • Acute myelogenous leukemia Niece    • Myasthenia gravis Niece           Allergies:  Allergies   Allergen Reactions   • Adhesive Tape Rash     Scheduled Meds:  amLODIPine, 5 mg, Daily  budesonide, 1 mg, BID - RT  enoxaparin, 1 mg/kg, Q12H  escitalopram, 20 mg, Daily  ipratropium-albuterol, 3 mL, Q4H - RT  multivitamin, 1 tablet, Daily  pantoprazole, 40 mg, QAM  senna-docusate sodium, 2 tablet, BID  sodium chloride, 10 mL, Q12H  warfarin, 5 mg, Daily          Review of Systems:   ROS    Constitution: Negative for chills and fever.   Cardiovascular: Negative for chest pain and palpitations.   Respiratory: Negative for cough and hemoptysis.    Gastrointestinal: Negative for nausea.        Constitutional:  Temp:  [98 °F (36.7 °C)-98.3 °F (36.8 °C)] 98 °F (36.7 °C)  Heart Rate:  [46-98] 87  Resp:  [16-18] 18  BP: ()/(45-81) 106/56    Physical Exam   /56   Pulse 87   Temp 98 °F (36.7 °C) (Oral)   Resp 18   Ht 154.9 cm (61\")   Wt 97 kg (213 lb 13.5 oz)   SpO2 98%   BMI 40.41 kg/m²   General:  Appears in no acute distress  Eyes: Sclera is anicteric,  conjunctiva is clear   HEENT:  No JVD. Thyroid not visibly enlarged. No mucosal pallor or cyanosis  Respiratory: Respirations regular and unlabored at " rest.  Bilaterally good breath sounds, with good air entry in all fields. No crackles, rubs or wheezes auscultated  Cardiovascular: S1,S2 Regular rate and rhythm. No murmur, rub or gallop auscultated.  . No pretibial pitting edema  Gastrointestinal: Abdomen nondistended, soft  Musculoskeletal:  No abnormal movements  Extremities: No digital clubbing or cyanosis  Skin: Color pink. Skin warm and dry to touch. No rashes  No xanthoma  Neuro: Alert and awake, no lateralizing deficits appreciated    INTAKE AND OUTPUT:    Intake/Output Summary (Last 24 hours) at 2/12/2022 1017  Last data filed at 2/12/2022 0916  Gross per 24 hour   Intake 2176 ml   Output 300 ml   Net 1876 ml       Cardiographics  Telemetry: Sinus rhythm    ECG:   ECG 12 Lead   Preliminary Result   HEART RATE= 79  bpm   RR Interval= 760  ms   GA Interval= 162  ms   P Horizontal Axis= -8  deg   P Front Axis= 58  deg   QRSD Interval= 88  ms   QT Interval= 386  ms   QRS Axis= -8  deg   T Wave Axis= 44  deg   - OTHERWISE NORMAL ECG -   Sinus rhythm   Low voltage, precordial leads   When compared with ECG of 30-Oct-2021 12:46:11,   No significant change   Electronically Signed By:    Date and Time of Study: 2022-02-10 17:45:36        I have personally reviewed EKG    Echocardiogram: Results for orders placed during the hospital encounter of 02/10/22    Adult Transthoracic Echo Complete W/ Cont if Necessary Per Protocol    Interpretation Summary  · Estimated left ventricular EF was in agreement with the calculated left ventricular EF. Left ventricular ejection fraction appears to be 66 - 70%. Left ventricular systolic function is normal.  · Moderate tricuspid valve regurgitation is present.  · Estimated right ventricular systolic pressure from tricuspid regurgitation is mildly elevated (35-45 mmHg).  · The right ventricular cavity is moderately dilated.  · Mildly reduced right ventricular systolic function noted.      Lab Review   I have reviewed the  "labs  Results from last 7 days   Lab Units 02/10/22  1942   TROPONIN T ng/mL <0.010     Results from last 7 days   Lab Units 02/12/22  0058   MAGNESIUM mg/dL 1.8     Results from last 7 days   Lab Units 02/12/22  0838 02/12/22 0058 02/12/22 0058   SODIUM mmol/L  --   --  139   POTASSIUM mmol/L 4.4   < > 3.2*   BUN mg/dL  --   --  10   CREATININE mg/dL  --   --  0.59   CALCIUM mg/dL  --   --  9.2    < > = values in this interval not displayed.         Results from last 7 days   Lab Units 02/12/22  0624 02/12/22  0058 02/11/22  0506   WBC 10*3/mm3 7.50 6.80 6.50   HEMOGLOBIN g/dL 11.5* 12.7 12.4   HEMATOCRIT % 33.6* 37.4 37.1   PLATELETS 10*3/mm3 189 196 191     Results from last 7 days   Lab Units 02/12/22  0624 02/12/22 0058 02/11/22  1935   INR  1.06 1.04 1.04   APTT seconds 31.0* 23.3* 53.9*       RADIOLOGY:  Imaging Results (Last 24 Hours)     ** No results found for the last 24 hours. **                )2/12/2022  MD FREDDIE Kaur/Transcription:   \"Dictated utilizing Dragon dictation\".   "

## 2022-02-12 NOTE — PLAN OF CARE
Goal Outcome Evaluation:                Pharmacist Future Anticoagulation COST Assessment ([facility]):     Pharmacy ran test claim for future anticoagulation therapy. Indication: DVT/PE    Drug Covered/PA required Patient Copay per month        Xarelto Covered without PA $ $18.85            [facility] pharmacy can provide first month free to patient through M2B service if chosen. (ALL patients eligible.)    Patient is not currently signed up.    If they are eligible,  or [facility] pharmacy can provide coupon upon request.     For questions, reach out to [facility] at [phone]    Brandee Mcdonnell RPH   2/12/2022 13:32 EST2

## 2022-02-12 NOTE — PROGRESS NOTES
"PULMONARY CRITICAL CARE Progress  NOTE      PATIENT IDENTIFICATION:  Name: Radha Trujillo  MRN: [id number]  :  1950     Age: 71 y.o.  Sex: female    DATE OF Note:  2022   Referring Physician: Feliz Preciado MD                  Subjective:   Feeling better, no new issue, no SOB no chest pain, no nausea or vomiting, no change in bowel habit, no dysuria,  no new  skin rash or itching.      Objective:  tMax 24 hrs: Temp (24hrs), Av °F (36.7 °C), Min:97.4 °F (36.3 °C), Max:98.3 °F (36.8 °C)      Vitals Ranges:   Temp:  [97.4 °F (36.3 °C)-98.3 °F (36.8 °C)] 97.4 °F (36.3 °C)  Heart Rate:  [46-98] 81  Resp:  [16-20] 20  BP: ()/(45-81) 104/47    Intake and Output Last 3 Shifts:   I/O last 3 completed shifts:  In: 1696 [P.O.:360; I.V.:1336]  Out: 1250 [Urine:1250]    Exam:  /47 (BP Location: Left arm, Patient Position: Sitting)   Pulse 81   Temp 97.4 °F (36.3 °C) (Oral)   Resp 20   Ht 154.9 cm (61\")   Wt 97 kg (213 lb 13.5 oz)   SpO2 97%   BMI 40.41 kg/m²     General Appearance:     HEENT:  Normocephalic, without obvious abnormality, Conjunctiva/corneas clear,.  Normal external ear canals, Nares normal, no drainage     Neck:  Supple, symmetrical, trachea midline. No JVD.  Lungs /Chest wall:   Bilateral basal rhonchi, respirations unlabored symmetrical wall movement.     Heart:  Regular rate and rhythm, systolic murmur PMI left sternal border  Abdomen: Soft, non-tender, no masses, no organomegaly.    Extremities: Trace edema no clubbing or Cyanosis        Medications:    Current Facility-Administered Medications:   •  acetaminophen (TYLENOL) tablet 650 mg, 650 mg, Oral, Q4H PRN, 650 mg at 22 0218 **OR** acetaminophen (TYLENOL) suppository 650 mg, 650 mg, Rectal, Q4H PRN, Kilo Vivar, DO  •  acetaminophen (TYLENOL) tablet 650 mg, 650 mg, Oral, Q6H PRN, Kilo Vivar, DO  •  ALPRAZolam (XANAX) tablet 0.25 mg, 0.25 mg, Oral, BID PRN, Kilo Vivar" Clifford, DO  •  aluminum-magnesium hydroxide-simethicone (MAALOX MAX) 400-400-40 MG/5ML suspension 15 mL, 15 mL, Oral, Q6H PRN, Kilo Vivar, DO  •  amLODIPine (NORVASC) tablet 5 mg, 5 mg, Oral, Daily, Kilo Vivar DO, 5 mg at 02/12/22 0805  •  sennosides-docusate (PERICOLACE) 8.6-50 MG per tablet 2 tablet, 2 tablet, Oral, BID, 2 tablet at 02/12/22 0805 **AND** polyethylene glycol (MIRALAX) packet 17 g, 17 g, Oral, Daily PRN **AND** bisacodyl (DULCOLAX) EC tablet 5 mg, 5 mg, Oral, Daily PRN **AND** bisacodyl (DULCOLAX) suppository 10 mg, 10 mg, Rectal, Daily PRN, Kilo Vivar, DO  •  budesonide (PULMICORT) nebulizer solution 1 mg, 1 mg, Nebulization, BID - RT, Kilo Vivar DO, 1 mg at 02/12/22 0850  •  dextrose (D50W) (25 g/50 mL) IV injection 25 g, 25 g, Intravenous, Q15 Min PRN, Kilo Vivar, DO  •  dextrose (GLUTOSE) oral gel 15 g, 15 g, Oral, Q15 Min PRN, Kilo Vivar, DO  •  escitalopram (LEXAPRO) tablet 20 mg, 20 mg, Oral, Daily, Kilo Vivar, DO, 20 mg at 02/12/22 0805  •  glucagon (human recombinant) (GLUCAGEN DIAGNOSTIC) 1 mg in sterile water (preservative free) 1 mL injection, 1 mg, Subcutaneous, PRN, Kilo Vivar, DO  •  ipratropium-albuterol (DUO-NEB) nebulizer solution 3 mL, 3 mL, Nebulization, Q6H PRN, Kilo Vivar, DO  •  ipratropium-albuterol (DUO-NEB) nebulizer solution 3 mL, 3 mL, Nebulization, Q4H - RT, Kilo Vivar DO, 3 mL at 02/12/22 0850  •  melatonin tablet 5 mg, 5 mg, Oral, Nightly PRN, Kilo Vivar DO  •  multivitamin (THERAGRAN) tablet 1 tablet, 1 tablet, Oral, Daily, Kilo Vivar DO, 1 tablet at 02/12/22 0805  •  ondansetron (ZOFRAN) tablet 4 mg, 4 mg, Oral, Q6H PRN **OR** ondansetron (ZOFRAN) injection 4 mg, 4 mg, Intravenous, Q6H PRN, Kilo Vivar DO, 4 mg at 02/12/22 0104  •  oxyCODONE  (ROXICODONE) immediate release tablet 5 mg, 5 mg, Oral, Q4H PRN, Kilo Vivar DO  •  pantoprazole (PROTONIX) EC tablet 40 mg, 40 mg, Oral, QAM, Kilo Vivar DO, 40 mg at 02/12/22 0805  •  potassium chloride (K-DUR,KLOR-CON) CR tablet 40 mEq, 40 mEq, Oral, PRN, Kiesha Mcdaniel, APRN, 40 mEq at 02/12/22 0357  •  potassium chloride (KLOR-CON) packet 40 mEq, 40 mEq, Oral, PRN, Cruzito Quiroz MD  •  rivaroxaban (XARELTO) tablet 15 mg, 15 mg, Oral, BID With Meals **FOLLOWED BY** [START ON 3/5/2022] rivaroxaban (XARELTO) tablet 20 mg, 20 mg, Oral, Daily With Dinner, Cruzito Quiroz MD  •  sodium chloride 0.9 % flush 10 mL, 10 mL, Intravenous, Q12H, Kilo Vivar DO, 10 mL at 02/12/22 0805  •  sodium chloride 0.9 % flush 10 mL, 10 mL, Intravenous, PRN, Kilo Vivar DO  •  sodium chloride 0.9 % infusion, 35 mL/hr, Intracatheter, Continuous, Kilo Vivar DO, Stopped at 02/11/22 2335  •  sodium chloride 0.9 % infusion, 35 mL/hr, Intracatheter, Continuous, Kilo Vivar DO, Stopped at 02/11/22 2335    Data Review:  All labs (24hrs):   Recent Results (from the past 24 hour(s))   aPTT    Collection Time: 02/11/22 11:59 AM    Specimen: Blood   Result Value Ref Range    PTT 51.6 (L) 61.0 - 76.5 seconds   Protime-INR    Collection Time: 02/11/22 11:59 AM    Specimen: Blood   Result Value Ref Range    Protime 11.4 (L) 19.4 - 28.5 Seconds    INR 1.03 (L) 2.00 - 3.00   Basic Metabolic Panel    Collection Time: 02/11/22  7:35 PM    Specimen: Blood   Result Value Ref Range    Glucose 139 (H) 65 - 99 mg/dL    BUN 9 8 - 23 mg/dL    Creatinine 0.55 (L) 0.57 - 1.00 mg/dL    Sodium 139 136 - 145 mmol/L    Potassium 3.4 (L) 3.5 - 5.2 mmol/L    Chloride 103 98 - 107 mmol/L    CO2 25.0 22.0 - 29.0 mmol/L    Calcium 9.3 8.6 - 10.5 mg/dL    eGFR Non African Amer 109 >60 mL/min/1.73    BUN/Creatinine Ratio 16.4 7.0 - 25.0    Anion  Gap 11.0 5.0 - 15.0 mmol/L   Protime-INR    Collection Time: 02/11/22  7:35 PM    Specimen: Blood   Result Value Ref Range    Protime 11.5 9.6 - 11.7 Seconds    INR 1.04 0.93 - 1.10   aPTT    Collection Time: 02/11/22  7:35 PM    Specimen: Blood   Result Value Ref Range    PTT 53.9 (L) 61.0 - 76.5 seconds   hCG, Serum, Qualitative    Collection Time: 02/11/22  7:35 PM    Specimen: Blood   Result Value Ref Range    HCG Qualitative Negative Negative   POC Activated Clotting Time    Collection Time: 02/11/22 11:50 PM    Specimen: Blood   Result Value Ref Range    Activated Clotting Time  184 (H) 89 - 137 Seconds   POC Glucose Once    Collection Time: 02/11/22 11:52 PM    Specimen: Blood   Result Value Ref Range    Glucose 114 (H) 70 - 105 mg/dL   POC Activated Clotting Time    Collection Time: 02/12/22 12:27 AM    Specimen: Blood   Result Value Ref Range    Activated Clotting Time  160 (H) 89 - 137 Seconds   Protime-INR    Collection Time: 02/12/22 12:58 AM    Specimen: Blood   Result Value Ref Range    Protime 11.5 9.6 - 11.7 Seconds    INR 1.04 0.93 - 1.10   aPTT    Collection Time: 02/12/22 12:58 AM    Specimen: Blood   Result Value Ref Range    PTT 23.3 (L) 61.0 - 76.5 seconds   Fibrinogen    Collection Time: 02/12/22 12:58 AM    Specimen: Blood   Result Value Ref Range    Fibrinogen 416 210 - 450 mg/dL   CBC Auto Differential    Collection Time: 02/12/22 12:58 AM    Specimen: Blood   Result Value Ref Range    WBC 6.80 3.40 - 10.80 10*3/mm3    RBC 4.57 3.77 - 5.28 10*6/mm3    Hemoglobin 12.7 12.0 - 15.9 g/dL    Hematocrit 37.4 34.0 - 46.6 %    MCV 81.7 79.0 - 97.0 fL    MCH 27.7 26.6 - 33.0 pg    MCHC 33.9 31.5 - 35.7 g/dL    RDW 14.9 12.3 - 15.4 %    RDW-SD 42.4 37.0 - 54.0 fl    MPV 8.8 6.0 - 12.0 fL    Platelets 196 140 - 450 10*3/mm3    Neutrophil % 80.9 (H) 42.7 - 76.0 %    Lymphocyte % 10.4 (L) 19.6 - 45.3 %    Monocyte % 7.1 5.0 - 12.0 %    Eosinophil % 0.9 0.3 - 6.2 %    Basophil % 0.7 0.0 - 1.5 %     Neutrophils, Absolute 5.50 1.70 - 7.00 10*3/mm3    Lymphocytes, Absolute 0.70 0.70 - 3.10 10*3/mm3    Monocytes, Absolute 0.50 0.10 - 0.90 10*3/mm3    Eosinophils, Absolute 0.10 0.00 - 0.40 10*3/mm3    Basophils, Absolute 0.00 0.00 - 0.20 10*3/mm3    nRBC 0.1 0.0 - 0.2 /100 WBC   Magnesium    Collection Time: 02/12/22 12:58 AM    Specimen: Blood   Result Value Ref Range    Magnesium 1.8 1.6 - 2.4 mg/dL   Phosphorus    Collection Time: 02/12/22 12:58 AM    Specimen: Blood   Result Value Ref Range    Phosphorus 3.0 2.5 - 4.5 mg/dL   Comprehensive Metabolic Panel    Collection Time: 02/12/22 12:58 AM    Specimen: Blood   Result Value Ref Range    Glucose 122 (H) 65 - 99 mg/dL    BUN 10 8 - 23 mg/dL    Creatinine 0.59 0.57 - 1.00 mg/dL    Sodium 139 136 - 145 mmol/L    Potassium 3.2 (L) 3.5 - 5.2 mmol/L    Chloride 103 98 - 107 mmol/L    CO2 25.0 22.0 - 29.0 mmol/L    Calcium 9.2 8.6 - 10.5 mg/dL    Total Protein 6.6 6.0 - 8.5 g/dL    Albumin 4.10 3.50 - 5.20 g/dL    ALT (SGPT) 13 1 - 33 U/L    AST (SGOT) 18 1 - 32 U/L    Alkaline Phosphatase 82 39 - 117 U/L    Total Bilirubin 0.5 0.0 - 1.2 mg/dL    eGFR Non African Amer 100 >60 mL/min/1.73    Globulin 2.5 gm/dL    A/G Ratio 1.6 g/dL    BUN/Creatinine Ratio 16.9 7.0 - 25.0    Anion Gap 11.0 5.0 - 15.0 mmol/L   Calcium, Ionized    Collection Time: 02/12/22 12:58 AM    Specimen: Blood   Result Value Ref Range    Ionized Calcium 1.21 1.20 - 1.30 mmol/L   Protime-INR    Collection Time: 02/12/22  6:24 AM    Specimen: Blood   Result Value Ref Range    Protime 11.7 9.6 - 11.7 Seconds    INR 1.06 0.93 - 1.10   aPTT    Collection Time: 02/12/22  6:24 AM    Specimen: Blood   Result Value Ref Range    PTT 31.0 (L) 61.0 - 76.5 seconds   Fibrinogen    Collection Time: 02/12/22  6:24 AM    Specimen: Blood   Result Value Ref Range    Fibrinogen 386 210 - 450 mg/dL   CBC Auto Differential    Collection Time: 02/12/22  6:24 AM    Specimen: Blood   Result Value Ref Range    WBC 7.50  3.40 - 10.80 10*3/mm3    RBC 4.17 3.77 - 5.28 10*6/mm3    Hemoglobin 11.5 (L) 12.0 - 15.9 g/dL    Hematocrit 33.6 (L) 34.0 - 46.6 %    MCV 80.7 79.0 - 97.0 fL    MCH 27.7 26.6 - 33.0 pg    MCHC 34.3 31.5 - 35.7 g/dL    RDW 14.6 12.3 - 15.4 %    RDW-SD 41.6 37.0 - 54.0 fl    MPV 8.7 6.0 - 12.0 fL    Platelets 189 140 - 450 10*3/mm3    Neutrophil % 77.8 (H) 42.7 - 76.0 %    Lymphocyte % 11.2 (L) 19.6 - 45.3 %    Monocyte % 9.3 5.0 - 12.0 %    Eosinophil % 0.8 0.3 - 6.2 %    Basophil % 0.9 0.0 - 1.5 %    Neutrophils, Absolute 5.80 1.70 - 7.00 10*3/mm3    Lymphocytes, Absolute 0.80 0.70 - 3.10 10*3/mm3    Monocytes, Absolute 0.70 0.10 - 0.90 10*3/mm3    Eosinophils, Absolute 0.10 0.00 - 0.40 10*3/mm3    Basophils, Absolute 0.10 0.00 - 0.20 10*3/mm3    nRBC 0.0 0.0 - 0.2 /100 WBC   Potassium    Collection Time: 02/12/22  8:38 AM    Specimen: Blood   Result Value Ref Range    Potassium 4.4 3.5 - 5.2 mmol/L   POC Glucose Once    Collection Time: 02/12/22 10:58 AM    Specimen: Blood   Result Value Ref Range    Glucose 167 (H) 70 - 105 mg/dL        Imaging:  Cardiac Catheterization/Vascular Study  Radha Trujillo  [id number]  2/11/2022  @[facility]@    Procedure:  She underwent EKOS catheter directed thrombolytic system placement    Surgeon:  Ghazala    Indications:  Bilateral pulmonary emboli with right ventricular strain    Complications:  None immediate    Informed Consent:  The risks, benefits, complications, treatment options, and expected   outcomes were discussed with the patient. The patient and/or family   concurred with the proposed plan, giving informed consent.     Description of Procedure:  After informed consent the patient was brought to the cardiac   catheterization suite after appropriate IV hydration was begun and oral   premedication was given. She was further sedated with fentanyl and   midazolam. The patient was prepped and draped in the normal sterile   fashion.  The right femoral vein was accessed at 2  locations in close   proximity via a modified Seldinger technique.  A 7 Chadian venous sheath   was placed in the superior position and a 6 [place] venous sheath was   placed in the inferior position.  These catheters were flushed with   heparinized saline.    Next, utilizing fluoroscopic guidance, one long guidewire was advanced   through each sheath and into the left and right pulmonary artery systems   respectively. Over each wire, the EKOS delivery catheters were then   advanced and placed into the left and right pulmonary artery systems   respectively.  Finally, the ultrasonic catheters were advanced into the   delivery catheters and secured via the integrated Luer-Rajinder mechanism.    Thrombolytic and ultrasonic therapy was then initiated utilizing hospital   established protocols.    At this point, the procedure was completed.  Sheaths were sewn into place.    Heparin was initiated and administered through the venous sheaths   utilizing hospital established protocols.  The patient tolerated the   procedure well.      Conscious sedation:  Conscious sedation was performed according to protocol.  I supervised and   directed an independent trained observer with the assistance of monitoring   the patient's level of consciousness and physiologic status throughout the   procedure.  Intraoperative service time was 75 minutes.    Impressions:  Successful placement of EKOS catheter directed thrombolytic system into   right and left pulmonary artery systems.    Recommendations:  Thrombolytic and ultrasonic therapy administration for 6 hours  Monitor rate and rhythm  Supplemental oxygen as indicated  Sheath removal when ACT less than 170  Bedrest until condition allows for advancement of activity  Systemic anticoagulation with unfractionated heparin/low molecular weight   heparin beginning 30 minutes after sheath removal  Further recommendations as patient's course progresses.  Adult Transthoracic Echo Complete W/ Cont if  Necessary Per Protocol  · Estimated left ventricular EF was in agreement with the calculated left   ventricular EF. Left ventricular ejection fraction appears to be 66 - 70%.   Left ventricular systolic function is normal.  · Moderate tricuspid valve regurgitation is present.  · Estimated right ventricular systolic pressure from tricuspid   regurgitation is mildly elevated (35-45 mmHg).  · The right ventricular cavity is moderately dilated.  · Mildly reduced right ventricular systolic function noted.     Duplex Venous Lower Extremity - Bilateral CAR  · Acute right lower extremity deep vein thrombosis noted in the popliteal,   posterior tibial and gastrocnemius.  · All other veins appeared normal bilaterally.          ASSESSMENT:  Acute pulmonary embolism (HCC)    DVT  Morbid obesity with BMI of 40.0-44.9, adult (HCC)    SOLE  Asthma  Essential hypertension    Hyperlipidemia    Mixed anxiety depressive disorder      PLAN:  Anticoagulation s/p EKOS now on xarelto now  O2 support at night / BIPAP at night   Bronchodilator  Inhaled corticosteroids  Electrolytes/ glycemic control  DVT and GI prophylaxis.    Total Critical care time in direct medical management (   ) minutes, This time specifically excludes time spent performing procedures.  Feliz Preciado MD. D, ABSM.     2/12/2022  11:40 EST

## 2022-02-12 NOTE — DISCHARGE SUMMARY
PULMONARY/CRITICAL CARE DISCHARGE SUMMARY NOTE           PATIENT NAME:  Radha Trujillo             :  1950            MRN:  [id number]    DATE OF ADMISSION: 2/10/2022     DATE OF DISCHARGE: 2022    DISCHARGE DIAGNOSES:       HOSPITAL COURSE  The patient is a 71-year-old female who presented to the emergency department of [facility] on 2/10/2022 for evaluation of tachycardia and dyspnea on exertion. She reported that her symptoms began approximately 4 days prior to presentation and originally started when she was walking from her car into the [facility]. Exercise at the [facility] is a normal routine for her however the shortness of air with simple ambulation was new. She denied that she had chest pain, chest pressure, chest tightness, or palpitations. She denied fever, chills, nausea, vomiting. She denied other constitutional symptoms, expectoration, or hemoptysis. She has not had any recent surgeries or extended traveling. CT per PE protocol revealed multiple pulmonary emboli throughout the left and right segmental and subsegmental branches with possible early right heart strain. The patient received IV heparin and warfarin and was admitted to the ICU for further evaluation and treatment. Duplex venous Dopplers of the bilateral lower extremities revealed acute right lower extremity DVT in the popliteal, posterior tibial, and gastrocnemius veins. Hematology/oncology was consulted and evaluated the patient for unprovoked DVTs and pulmonary emboli. Cardiology followed the patient closely and she was deemed an appropriate candidate for EKOS which she underwent on 2022. The regimen was completed and sheaths were successfully discontinued with no hematoma or bleeding noted to the right femoral site. The patient will be started on Xarelto for anticoagulation with her first dose today and instructions for follow-up. She will be discharged home in stable condition    She reports that she feels much better  today. Her shortness of air has completely resolved. She is tolerating room air. She denies chest pain or lower extremity pain.       PROCEDURES PERFORMED    Procedure(s):  EKOS  -------------------       LABS/RADIOLOGICAL STUDIES  Lab Results (last 72 hours)       Procedure Component Value Units Date/Time    Protime-INR [[id number]]  (Normal) Collected: 02/12/22 1206    Specimen: Blood Updated: 02/12/22 1235     Protime 11.6 Seconds      INR 1.05    aPTT [[id number]]  (Abnormal) Collected: 02/12/22 1206    Specimen: Blood Updated: 02/12/22 1235     PTT 28.7 seconds     Fibrinogen [[id number]]  (Normal) Collected: 02/12/22 1206    Specimen: Blood Updated: 02/12/22 1235     Fibrinogen 404 mg/dL     CBC & Differential [[id number]]  (Abnormal) Collected: 02/12/22 1206    Specimen: Blood Updated: 02/12/22 1218    Narrative:      The following orders were created for panel order CBC & Differential.  Procedure                               Abnormality         Status                     ---------                               -----------         ------                     CBC Auto Differential[[id number]]        Abnormal            Final result                 Please view results for these tests on the individual orders.    CBC Auto Differential [[id number]]  (Abnormal) Collected: 02/12/22 1206    Specimen: Blood Updated: 02/12/22 1218     WBC 7.80 10*3/mm3      RBC 4.51 10*6/mm3      Hemoglobin 12.6 g/dL      Hematocrit 37.4 %      MCV 82.9 fL      MCH 28.0 pg      MCHC 33.8 g/dL      RDW 14.9 %      RDW-SD 42.4 fl      MPV 9.1 fL      Platelets 204 10*3/mm3      Neutrophil % 76.0 %      Lymphocyte % 12.8 %      Monocyte % 8.9 %      Eosinophil % 1.6 %      Basophil % 0.7 %      Neutrophils, Absolute 5.90 10*3/mm3      Lymphocytes, Absolute 1.00 10*3/mm3      Monocytes, Absolute 0.70 10*3/mm3      Eosinophils, Absolute 0.10 10*3/mm3      Basophils, Absolute 0.10 10*3/mm3      nRBC 0.1 /100 WBC     POC Glucose Once [[id number]]   (Abnormal) Collected: 02/12/22 1058    Specimen: Blood Updated: 02/12/22 1059     Glucose 167 mg/dL      Comment: Serial Number: [id number]:  [id number]       Potassium [[id number]]  (Normal) Collected: 02/12/22 0838    Specimen: Blood Updated: 02/12/22 0903     Potassium 4.4 mmol/L      Comment: Result checked        Fibrinogen [[id number]]  (Normal) Collected: 02/12/22 0624    Specimen: Blood Updated: 02/12/22 0653     Fibrinogen 386 mg/dL     Protime-INR [[id number]]  (Normal) Collected: 02/12/22 0624    Specimen: Blood Updated: 02/12/22 0653     Protime 11.7 Seconds      INR 1.06    aPTT [[id number]]  (Abnormal) Collected: 02/12/22 0624    Specimen: Blood Updated: 02/12/22 0653     PTT 31.0 seconds     CBC & Differential [[id number]]  (Abnormal) Collected: 02/12/22 0624    Specimen: Blood Updated: 02/12/22 0630    Narrative:      The following orders were created for panel order CBC & Differential.  Procedure                               Abnormality         Status                     ---------                               -----------         ------                     CBC Auto Differential[[id number]]        Abnormal            Final result                 Please view results for these tests on the individual orders.    CBC Auto Differential [[id number]]  (Abnormal) Collected: 02/12/22 0624    Specimen: Blood Updated: 02/12/22 0630     WBC 7.50 10*3/mm3      RBC 4.17 10*6/mm3      Hemoglobin 11.5 g/dL      Hematocrit 33.6 %      MCV 80.7 fL      MCH 27.7 pg      MCHC 34.3 g/dL      RDW 14.6 %      RDW-SD 41.6 fl      MPV 8.7 fL      Platelets 189 10*3/mm3      Neutrophil % 77.8 %      Lymphocyte % 11.2 %      Monocyte % 9.3 %      Eosinophil % 0.8 %      Basophil % 0.9 %      Neutrophils, Absolute 5.80 10*3/mm3      Lymphocytes, Absolute 0.80 10*3/mm3      Monocytes, Absolute 0.70 10*3/mm3      Eosinophils, Absolute 0.10 10*3/mm3      Basophils, Absolute 0.10 10*3/mm3      nRBC 0.0 /100 WBC     Comprehensive  Metabolic Panel [[id number]]  (Abnormal) Collected: 02/12/22 0058    Specimen: Blood Updated: 02/12/22 0121     Glucose 122 mg/dL      BUN 10 mg/dL      Creatinine 0.59 mg/dL      Sodium 139 mmol/L      Potassium 3.2 mmol/L      Chloride 103 mmol/L      CO2 25.0 mmol/L      Calcium 9.2 mg/dL      Total Protein 6.6 g/dL      Albumin 4.10 g/dL      ALT (SGPT) 13 U/L      AST (SGOT) 18 U/L      Alkaline Phosphatase 82 U/L      Total Bilirubin 0.5 mg/dL      eGFR Non African Amer 100 mL/min/1.73      Globulin 2.5 gm/dL      A/G Ratio 1.6 g/dL      BUN/Creatinine Ratio 16.9     Anion Gap 11.0 mmol/L     Narrative:      GFR Normal >60  Chronic Kidney Disease <60  Kidney Failure <15      Phosphorus [[id number]]  (Normal) Collected: 02/12/22 0058    Specimen: Blood Updated: 02/12/22 0121     Phosphorus 3.0 mg/dL     Magnesium [[id number]]  (Normal) Collected: 02/12/22 0058    Specimen: Blood Updated: 02/12/22 0121     Magnesium 1.8 mg/dL     Protime-INR [[id number]]  (Normal) Collected: 02/12/22 0058    Specimen: Blood Updated: 02/12/22 0120     Protime 11.5 Seconds      INR 1.04    Fibrinogen [[id number]]  (Normal) Collected: 02/12/22 0058    Specimen: Blood Updated: 02/12/22 0120     Fibrinogen 416 mg/dL     aPTT [[id number]]  (Abnormal) Collected: 02/12/22 0058    Specimen: Blood Updated: 02/12/22 0120     PTT 23.3 seconds     Calcium, Ionized [[id number]]  (Normal) Collected: 02/12/22 0058    Specimen: Blood Updated: 02/12/22 0112     Ionized Calcium 1.21 mmol/L     CBC & Differential [[id number]]  (Abnormal) Collected: 02/12/22 0058    Specimen: Blood Updated: 02/12/22 0107    Narrative:      The following orders were created for panel order CBC & Differential.  Procedure                               Abnormality         Status                     ---------                               -----------         ------                     CBC Auto Differential[[id number]]        Abnormal            Final result                 Please  view results for these tests on the individual orders.    CBC Auto Differential [[id number]]  (Abnormal) Collected: 02/12/22 0058    Specimen: Blood Updated: 02/12/22 0107     WBC 6.80 10*3/mm3      RBC 4.57 10*6/mm3      Hemoglobin 12.7 g/dL      Hematocrit 37.4 %      MCV 81.7 fL      MCH 27.7 pg      MCHC 33.9 g/dL      RDW 14.9 %      RDW-SD 42.4 fl      MPV 8.8 fL      Platelets 196 10*3/mm3      Neutrophil % 80.9 %      Lymphocyte % 10.4 %      Monocyte % 7.1 %      Eosinophil % 0.9 %      Basophil % 0.7 %      Neutrophils, Absolute 5.50 10*3/mm3      Lymphocytes, Absolute 0.70 10*3/mm3      Monocytes, Absolute 0.50 10*3/mm3      Eosinophils, Absolute 0.10 10*3/mm3      Basophils, Absolute 0.00 10*3/mm3      nRBC 0.1 /100 WBC     POC Activated Clotting Time [[id number]]  (Abnormal) Collected: 02/12/22 0027    Specimen: Blood Updated: 02/12/22 0031     Activated Clotting Time  160 Seconds      Comment: Serial Number: [id number]:  [id number]       POC Activated Clotting Time [[id number]]  (Abnormal) Collected: 02/11/22 2350    Specimen: Blood Updated: 02/12/22 0031     Activated Clotting Time  184 Seconds      Comment: Serial Number: [id number]:  [id number]       POC Glucose Once [[id number]]  (Abnormal) Collected: 02/11/22 2352    Specimen: Blood Updated: 02/11/22 2353     Glucose 114 mg/dL      Comment: Serial Number: [id number]:  [id number]       hCG, Serum, Qualitative [[id number]]  (Normal) Collected: 02/11/22 1935    Specimen: Blood Updated: 02/11/22 2036     HCG Qualitative Negative    Basic Metabolic Panel [[id number]]  (Abnormal) Collected: 02/11/22 1935    Specimen: Blood Updated: 02/11/22 2015     Glucose 139 mg/dL      BUN 9 mg/dL      Creatinine 0.55 mg/dL      Sodium 139 mmol/L      Potassium 3.4 mmol/L      Chloride 103 mmol/L      CO2 25.0 mmol/L      Calcium 9.3 mg/dL      eGFR Non African Amer 109 mL/min/1.73      BUN/Creatinine Ratio 16.4     Anion Gap 11.0 mmol/L     Narrative:      GFR  Normal >60  Chronic Kidney Disease <60  Kidney Failure <15      aPTT [[id number]]  (Abnormal) Collected: 02/11/22 1935    Specimen: Blood Updated: 02/11/22 2013     PTT 53.9 seconds     Protime-INR [[id number]]  (Normal) Collected: 02/11/22 1935    Specimen: Blood Updated: 02/11/22 2013     Protime 11.5 Seconds      INR 1.04    Anticardiolipin Antibody, IgG / M, Qn [[id number]] Collected: 02/11/22 1453    Specimen: Blood Updated: 02/11/22 1458    Antiphosphatidylserine IgG / M [[id number]] Collected: 02/11/22 1453    Specimen: Blood Updated: 02/11/22 1457    Factor 2 Activity [[id number]] Collected: 02/11/22 1452    Specimen: Blood Updated: 02/11/22 1457    Protein C Activity [[id number]] Collected: 02/11/22 1452    Specimen: Blood Updated: 02/11/22 1457    Protein S, Total & Free [[id number]] Collected: 02/11/22 1453    Specimen: Blood Updated: 02/11/22 1457    Antithrombin III [[id number]] Collected: 02/11/22 1452    Specimen: Blood Updated: 02/11/22 1457    Protein S Functional [[id number]] Collected: 02/11/22 1453    Specimen: Blood Updated: 02/11/22 1457    Lupus Anticoagulant [[id number]] Collected: 02/11/22 1452    Specimen: Blood Updated: 02/11/22 1457    Protime-INR [[id number]]  (Abnormal) Collected: 02/11/22 1159    Specimen: Blood Updated: 02/11/22 1238     Protime 11.4 Seconds      INR 1.03    aPTT [[id number]]  (Abnormal) Collected: 02/11/22 1159    Specimen: Blood Updated: 02/11/22 1232     PTT 51.6 seconds      Comment: Result checked        Factor II, DNA Analysis [[id number]] Collected: 02/11/22 1159    Specimen: Blood Updated: 02/11/22 1210    Beta-2 Glycoprotein Antibodies [[id number]] Collected: 02/11/22 1159    Specimen: Blood Updated: 02/11/22 1210    Factor 5 Leiden [[id number]] Collected: 02/11/22 1159    Specimen: Blood Updated: 02/11/22 1210    Hemoglobin A1c [[id number]]  (Abnormal) Collected: 02/11/22 0506    Specimen: Blood Updated: 02/11/22 1109     Hemoglobin A1C 5.7 %     Narrative:      Hemoglobin  A1C Reference Range:    <5.7 %        Normal  5.7-6.4 %     Increased risk for diabetes  > 6.4 %        Diabetes       These guidelines have been recommended by the American Diabetic Association for Hgb A1c.      The following 2010 guidelines have been recommended by the American Diabetes Association for Hemoglobin A1c.    HBA1c 5.7-6.4% Increased risk for future diabetes (pre-diabetes)  HBA1c     >6.4% Diabetes      POC Glucose Once [[id number]]  (Abnormal) Collected: 02/11/22 1001    Specimen: Blood Updated: 02/11/22 1034     Glucose 122 mg/dL      Comment: Serial Number: [id number]:  [id number]       POC Glucose Once [[id number]]  (Abnormal) Collected: 02/11/22 0738    Specimen: Blood Updated: 02/11/22 0739     Glucose 124 mg/dL      Comment: Serial Number: [id number]:  [id number]       Protime-INR [[id number]]  (Normal) Collected: 02/11/22 0506    Specimen: Blood Updated: 02/11/22 0556     Protime 11.6 Seconds      INR 1.05    aPTT [[id number]]  (Abnormal) Collected: 02/11/22 0506    Specimen: Blood Updated: 02/11/22 0556     PTT 98.9 seconds     Calcium, Ionized [[id number]]  (Normal) Collected: 02/11/22 0506    Specimen: Blood Updated: 02/11/22 0548     Ionized Calcium 1.21 mmol/L     Phosphorus [[id number]]  (Normal) Collected: 02/11/22 0506    Specimen: Blood Updated: 02/11/22 0539     Phosphorus 3.3 mg/dL     Comprehensive Metabolic Panel [[id number]]  (Abnormal) Collected: 02/11/22 0506    Specimen: Blood Updated: 02/11/22 0539     Glucose 123 mg/dL      BUN 8 mg/dL      Creatinine 0.68 mg/dL      Sodium 139 mmol/L      Potassium 3.6 mmol/L      Chloride 101 mmol/L      CO2 28.0 mmol/L      Calcium 9.5 mg/dL      Total Protein 6.6 g/dL      Albumin 4.10 g/dL      ALT (SGPT) 12 U/L      AST (SGOT) 13 U/L      Alkaline Phosphatase 80 U/L      Total Bilirubin 0.4 mg/dL      eGFR Non African Amer 85 mL/min/1.73      Globulin 2.5 gm/dL      A/G Ratio 1.6 g/dL      BUN/Creatinine Ratio 11.8     Anion Gap  10.0 mmol/L     Narrative:      GFR Normal >60  Chronic Kidney Disease <60  Kidney Failure <15      Magnesium [[id number]]  (Normal) Collected: 02/11/22 0506    Specimen: Blood Updated: 02/11/22 0539     Magnesium 1.9 mg/dL     Lipid Panel [[id number]]  (Abnormal) Collected: 02/11/22 0506    Specimen: Blood Updated: 02/11/22 0539     Total Cholesterol 181 mg/dL      Triglycerides 68 mg/dL      HDL Cholesterol 75 mg/dL      LDL Cholesterol  93 mg/dL      VLDL Cholesterol 13 mg/dL      LDL/HDL Ratio 1.23    Narrative:      Cholesterol Reference Ranges  (U.S. Department of Health and Human Services ATP III Classifications)    Desirable          <200 mg/dL  Borderline High    200-239 mg/dL  High Risk          >240 mg/dL      Triglyceride Reference Ranges  (U.S. Department of Health and Human Services ATP III Classifications)    Normal           <150 mg/dL  Borderline High  150-199 mg/dL  High             200-499 mg/dL  Very High        >500 mg/dL    HDL Reference Ranges  (U.S. Department of Health and Human Services ATP III Classifications)    Low     <40 mg/dl (major risk factor for CHD)  High    >60 mg/dl ('negative' risk factor for CHD)        LDL Reference Ranges  (U.S. Department of Health and Human Services ATP III Classifications)    Optimal          <100 mg/dL  Near Optimal     100-129 mg/dL  Borderline High  130-159 mg/dL  High             160-189 mg/dL  Very High        >189 mg/dL    CBC & Differential [[id number]]  (Abnormal) Collected: 02/11/22 0506    Specimen: Blood Updated: 02/11/22 0513    Narrative:      The following orders were created for panel order CBC & Differential.  Procedure                               Abnormality         Status                     ---------                               -----------         ------                     CBC Auto Differential[[id number]]        Abnormal            Final result                 Please view results for these tests on the individual orders.    CBC Auto  Differential [[id number]]  (Abnormal) Collected: 02/11/22 0506    Specimen: Blood Updated: 02/11/22 0513     WBC 6.50 10*3/mm3      RBC 4.59 10*6/mm3      Hemoglobin 12.4 g/dL      Hematocrit 37.1 %      MCV 80.9 fL      MCH 27.1 pg      MCHC 33.5 g/dL      RDW 14.8 %      RDW-SD 41.6 fl      MPV 8.9 fL      Platelets 191 10*3/mm3      Neutrophil % 76.4 %      Lymphocyte % 16.5 %      Monocyte % 6.0 %      Eosinophil % 0.5 %      Basophil % 0.6 %      Neutrophils, Absolute 5.00 10*3/mm3      Lymphocytes, Absolute 1.10 10*3/mm3      Monocytes, Absolute 0.40 10*3/mm3      Eosinophils, Absolute 0.00 10*3/mm3      Basophils, Absolute 0.00 10*3/mm3      nRBC 0.1 /100 WBC     TSH [[id number]]  (Normal) Collected: 02/10/22 1942    Specimen: Blood Updated: 02/10/22 2333     TSH 2.070 uIU/mL     Respiratory Panel PCR w/COVID-19(SARS-CoV-2) MEGA/SCAR/KARUNA/PAD/COR/MAD/THANG In-House, NP Swab in UTM/VTM, 3-4 HR TAT - Swab, Nasopharynx [[id number]]  (Normal) Collected: 02/10/22 1943    Specimen: Swab from Nasopharynx Updated: 02/10/22 2035     ADENOVIRUS, PCR Not Detected     Coronavirus 229E Not Detected     Coronavirus HKU1 Not Detected     Coronavirus NL63 Not Detected     Coronavirus OC43 Not Detected     COVID19 Not Detected     Human Metapneumovirus Not Detected     Human Rhinovirus/Enterovirus Not Detected     Influenza A PCR Not Detected     Influenza B PCR Not Detected     Parainfluenza Virus 1 Not Detected     Parainfluenza Virus 2 Not Detected     Parainfluenza Virus 3 Not Detected     Parainfluenza Virus 4 Not Detected     RSV, PCR Not Detected     Bordetella pertussis pcr Not Detected     Bordetella parapertussis PCR Not Detected     Chlamydophila pneumoniae PCR Not Detected     Mycoplasma pneumo by PCR Not Detected    Narrative:      In the setting of a positive respiratory panel with a viral infection PLUS a negative procalcitonin without other underlying concern for bacterial infection, consider observing off antibiotics  or discontinuation of antibiotics and continue supportive care. If the respiratory panel is positive for atypical bacterial infection (Bordetella pertussis, Chlamydophila pneumoniae, or Mycoplasma pneumoniae), consider antibiotic de-escalation to target atypical bacterial infection.    Comprehensive Metabolic Panel [[id number]]  (Abnormal) Collected: 02/10/22 1942    Specimen: Blood Updated: 02/10/22 2013     Glucose 107 mg/dL      BUN 12 mg/dL      Creatinine 0.71 mg/dL      Sodium 140 mmol/L      Potassium 3.8 mmol/L      Chloride 101 mmol/L      CO2 27.0 mmol/L      Calcium 9.8 mg/dL      Total Protein 6.9 g/dL      Albumin 4.20 g/dL      ALT (SGPT) 15 U/L      AST (SGOT) 16 U/L      Alkaline Phosphatase 84 U/L      Total Bilirubin 0.3 mg/dL      eGFR Non African Amer 81 mL/min/1.73      Globulin 2.7 gm/dL      A/G Ratio 1.6 g/dL      BUN/Creatinine Ratio 16.9     Anion Gap 12.0 mmol/L     Narrative:      GFR Normal >60  Chronic Kidney Disease <60  Kidney Failure <15      Troponin [[id number]]  (Normal) Collected: 02/10/22 1942    Specimen: Blood Updated: 02/10/22 2013     Troponin T <0.010 ng/mL     Narrative:      Troponin T Reference Range:  <= 0.03 ng/mL-   Negative for AMI  >0.03 ng/mL-     Abnormal for myocardial necrosis.  Clinicians would have to utilize clinical acumen, EKG, Troponin and serial changes to determine if it is an Acute Myocardial Infarction or myocardial injury due to an underlying chronic condition.       Results may be falsely decreased if patient taking Biotin.      BNP [[id number]]  (Normal) Collected: 02/10/22 1942    Specimen: Blood Updated: 02/10/22 2009     proBNP 167.4 pg/mL     Narrative:      Among patients with dyspnea, NT-proBNP is highly sensitive for the detection of acute congestive heart failure. In addition NT-proBNP of <300 pg/ml effectively rules out acute congestive heart failure with 99% negative predictive value.    Results may be falsely decreased if patient taking  Biotin.      CBC & Differential [[id number]]  (Abnormal) Collected: 02/10/22 1942    Specimen: Blood Updated: 02/10/22 2006    Narrative:      The following orders were created for panel order CBC & Differential.  Procedure                               Abnormality         Status                     ---------                               -----------         ------                     CBC Auto Differential[[id number]]        Abnormal            Final result                 Please view results for these tests on the individual orders.    CBC Auto Differential [[id number]]  (Abnormal) Collected: 02/10/22 1942    Specimen: Blood Updated: 02/10/22 2006     WBC 6.40 10*3/mm3      RBC 4.63 10*6/mm3      Hemoglobin 12.8 g/dL      Hematocrit 37.5 %      MCV 81.1 fL      MCH 27.6 pg      MCHC 34.1 g/dL      RDW 14.7 %      RDW-SD 41.1 fl      MPV 9.1 fL      Platelets 195 10*3/mm3      Neutrophil % 70.2 %      Lymphocyte % 18.6 %      Monocyte % 8.6 %      Eosinophil % 1.8 %      Basophil % 0.8 %      Neutrophils, Absolute 4.50 10*3/mm3      Lymphocytes, Absolute 1.20 10*3/mm3      Monocytes, Absolute 0.60 10*3/mm3      Eosinophils, Absolute 0.10 10*3/mm3      Basophils, Absolute 0.10 10*3/mm3      nRBC 0.3 /100 WBC             CT Chest Pulmonary Embolism    Result Date: 2/10/2022  There our pulmonary artery emboli throughout the right and left segmental and subsegmental branches. No pulmonary infarct is seen. There is minimal common cavity of the interventricular septum which could be seen with early right heart strain.    Electronically Signed By-Lilliam Gaspar MD On:2/10/2022 9:14 PM This report was finalized on [id number] by  Lilliam Gaspar MD.      CONSULTS   Consults       Date and Time Order Name Status Description    2/11/2022  9:22 AM Hematology & Oncology Inpatient Consult Completed     2/10/2022  9:50 PM Intensivist (on-call MD unless specified) Completed     2/10/2022  9:43 PM Cardiology (on-call MD unless specified)  "Completed             CONDITION ON DISCHARGE    Stable    VITAL SIGNS  /68   Pulse 89   Temp 98.1 °F (36.7 °C) (Oral)   Resp 18   Ht 154.9 cm (61\")   Wt 97 kg (213 lb 13.5 oz)   SpO2 94%   BMI 40.41 kg/m²     PHYSICAL EXAM  Constitutional:  Well developed, well nourished, no acute distress, non-toxic appearance   Eyes:  PERRL, conjunctiva normal, EOM grossly intact  HENT:  Atraumatic, external ears normal, nose normal. Neck-normal range of motion, no JVD, supple, trachea midline  Respiratory:  Clear and equal through out, non-labored respirations without accessory muscle use  Cardiovascular:  Regular rate, sinus rhythm, no murmurs, no gallops, no rubs   GI:  Soft, nondistended, normal bowel sounds, nontender, no rebound or guarding.  Morbid body habitus  :  deferred   Musculoskeletal:  No edema, no clubbing, no deformities  Integument:  Well hydrated, no rash.  Bedside nurse reports that the right femoral sheath site is dry and intact with no hematoma or ecchymosis  Neurologic:  Alert & oriented x 3, no lateralizing deficits noted   Psychiatric:  Speech and behavior appropriate       DISCHARGE DISPOSITION  Home or Self Care    DISCHARGE MEDICATIONS     Discharge Medications        New Medications        Instructions Start Date   rivaroxaban 15 MG tablet  Commonly known as: XARELTO   15 mg, Oral, 2 Times Daily With Meals   Start Date: February 13, 2022            Continue These Medications        Instructions Start Date   ALPRAZolam 0.25 MG tablet  Commonly known as: Xanax   0.25 mg, Oral, 2 Times Daily PRN      amLODIPine 5 MG tablet  Commonly known as: NORVASC   5 mg, Oral, Daily      aspirin 81 MG EC tablet   81 mg, Oral, Daily      azelastine 0.1 % nasal spray  Commonly known as: ASTELIN   1 spray, Nasal, 2 Times Daily      escitalopram 20 MG tablet  Commonly known as: Lexapro   20 mg, Oral, Daily      Flovent  MCG/ACT inhaler  Generic drug: fluticasone   1 puff, Inhalation, 2 Times Daily - " RT      melatonin 5 MG tablet tablet   5 mg, Oral, Nightly PRN      multivitamin capsule   1 capsule, Oral, Daily      Omeprazole 20 MG tablet delayed-release   20 mg, Oral, Daily      potassium chloride 10 MEQ CR tablet   TAKE 1 TABLET BY MOUTH THREE TIMES DAILY      valsartan-hydrochlorothiazide 320-25 MG per tablet  Commonly known as: DIOVAN-HCT   1 tablet, Oral, Daily      vitamin b complex capsule capsule   1 capsule, Oral, Daily      Vitamin C ER 1000-100 MG tablet controlled-release   1 capsule, Oral, Daily      Vitamins A & D 5000-400 units capsule   1 capsule, Oral, Daily                 DISCHARGE DIET healthy heart       ACTIVITY AT DISCHARGE do not lift over 10 pounds for 5 days  Activity Instructions    Do not lift anything greater than 10 pounds for 5 days (2/12 - 2/16).            FOLLOW-UP APPOINTMENTS  Future Appointments   Date Time Provider Department Center   3/15/2022  1:00 PM Kilo Vivar DO [facility]   3/21/2022 11:00 AM Maribel Hale, APRN [facility]            TEST RESULTS PENDING AT DISCHARGE  Pending Labs       Order Current Status    Anticardiolipin Antibody, IgG / M, Qn In process    Antiphosphatidylserine IgG / M In process    Antithrombin III In process    Beta-2 Glycoprotein Antibodies In process    Factor 2 Activity In process    Factor 5 Leiden In process    Factor II, DNA Analysis In process    Lupus Anticoagulant In process    Protein C Activity In process    Protein S Functional In process    Protein S, Total & Free In process                  Time:    Ina  Day, APRN 2/12/2022    I personally have examined  and interviewed the patient. I have reviewed the history, data, problems, assessment and plan with our NP.  Total Critical care time in direct medical management (   ) minutes, This time specifically excludes time spent performing procedures.    Feliz Preciado MD   2/12/2022  21:48 EST

## 2022-02-12 NOTE — NURSING NOTE
Post EKOS : Jose GARCÍA successfully pulls EKOS catheter. Will start monitoring ACT for final Introducer catheters to be pulled

## 2022-02-12 NOTE — SIGNIFICANT NOTE
EKOS catheter removed at bedside.  Procedure completed following EKOS representatives directions and EKOS nursing reference sheet for removal of catheters.  Patient was laid flat.  Patient was able to exhale and hold breath during removal.  No resistance was felt during the removal of either catheter.  No complications noted.  Patient stable.  She is still in place and will be removed by nursing per protocol.    Electronically signed by RICKY Pleitez, 02/11/22, 11:58 PM EST.

## 2022-02-12 NOTE — PROGRESS NOTES
Hematology/Oncology Inpatient Progress Note    PATIENT NAME: Radha Trujillo  : 1950  MRN: [id number]    CHIEF COMPLAINT: Pulmonary embolism    HISTORY OF PRESENT ILLNESS:  Radha Trujillo is a 71 y.o. female who presented to [facility] on 2/10/2022 with complaints of abnormal test from her PCP.  Reports that she has not been feeling well and has been feeling short of breath since Monday.  Patient reports that she started walking into [facility] and suddenly felt short of breath.  Denies chest pain, fever, chills, no recent COVID-19 diagnosis, dizziness, lightheadedness, diaphoresis, syncope, abnormal leg pain or swelling.  No recent surgeries or long trips.  She does report she has been more sedentary than normal.  Positive Respiratory panel with viral infection plus a negative procalcitonin without other underlying concern for bacterial infection.  CT chest PE protocol revealed Pulmonary embolism in the right and left segmental and subsegmental branches with early right heart strain. No pulmonary infarct is seen. Patient was started on IV heparin.        22  Hematology/Oncology was consulted for Pulmonary embolism in the right and left segmental and subsegmental branches with early right heart strain.  No pulmonary infarct is seen; Patient was started on IV heparin. No history of blood disorders or DVT.  Patient has no smoking history including no smokeless tobacco or illicit drug use.  Admits to occasional ETOH use.      He/She  has a past medical history of Abnormal coagulation profile, Abnormal EKG, Abnormal laboratory test, Acquired spondylolisthesis, Allergic (2021), Arthritis, Asthma, Body aches, Bronchitis, acute, Chest pain, Chronic back pain, Cough, Depression with anxiety, DJD (degenerative joint disease), Essential hypertension, Fatigue, History of radiofrequency ablation (RFA) procedure for cardiac arrhythmia (2021), Hyperlipidemia, Hypokalemia, Low back pain,  Lymphocytic colitis, Obesity, Other specified disorders of bone density and structure, other site, Panic disorder, Paroxysmal SVT (supraventricular tachycardia) (HCC), Postmenopausal, Prediabetes, SOB (shortness of breath), Unspecified injury of left Achilles tendon, initial encounter, Vitamin B deficiency (1/14/2019), Vitamin B deficiency, unspecified, and Vitamin D deficiency, unspecified.     2/12/22: Patient is doing well.  She is up in her bedside chair eating breakfast.  No new complaints.     PCP: Maribel Hale APRN  History of present illness was reviewed and is unchanged from the previous visit. 02/12/22    Subjective     ROS:  Review of Systems   Constitutional: Negative for activity change, appetite change, chills, diaphoresis, fatigue, fever and unexpected weight change.   HENT: Negative for congestion, dental problem, ear discharge, ear pain, facial swelling, hearing loss, mouth sores, nosebleeds, sore throat, tinnitus, trouble swallowing and voice change.    Eyes: Negative for photophobia and visual disturbance.   Respiratory: Negative for cough, choking, chest tightness, shortness of breath and wheezing.    Cardiovascular: Negative for chest pain, palpitations and leg swelling.   Gastrointestinal: Negative for abdominal distention, abdominal pain, constipation, diarrhea, nausea and vomiting.   Endocrine: Negative.    Genitourinary: Negative for decreased urine volume, difficulty urinating, dysuria, flank pain, frequency, hematuria and urgency.   Musculoskeletal: Negative for back pain, gait problem, joint swelling, myalgias, neck pain and neck stiffness.   Skin: Negative for color change, rash and wound.   Neurological: Negative for dizziness, tremors, syncope, speech difficulty, weakness, numbness and headaches.   Hematological: Negative.    Psychiatric/Behavioral: Negative.         MEDICATIONS:    Scheduled Meds:  amLODIPine, 5 mg, Oral, Daily  budesonide, 1 mg, Nebulization, BID -  "RT  enoxaparin, 1 mg/kg, Subcutaneous, Q12H  escitalopram, 20 mg, Oral, Daily  ipratropium-albuterol, 3 mL, Nebulization, Q4H - RT  multivitamin, 1 tablet, Oral, Daily  pantoprazole, 40 mg, Oral, QAM  senna-docusate sodium, 2 tablet, Oral, BID  sodium chloride, 10 mL, Intravenous, Q12H  warfarin, 5 mg, Oral, Daily       Continuous Infusions:  Pharmacy to dose warfarin,   sodium chloride, 35 mL/hr, Last Rate: Stopped (02/11/22 2335)  sodium chloride, 35 mL/hr, Last Rate: Stopped (02/11/22 2335)       PRN Meds:  •  acetaminophen **OR** acetaminophen  •  acetaminophen  •  ALPRAZolam  •  aluminum-magnesium hydroxide-simethicone  •  senna-docusate sodium **AND** polyethylene glycol **AND** bisacodyl **AND** bisacodyl  •  dextrose  •  dextrose  •  glucagon (human recombinant)  •  ipratropium-albuterol  •  melatonin  •  ondansetron **OR** ondansetron  •  oxyCODONE  •  Pharmacy to dose warfarin  •  potassium chloride  •  potassium chloride  •  potassium chloride  •  sodium chloride     ALLERGIES:    Allergies   Allergen Reactions   • Adhesive Tape Rash       Objective    VITALS:   /56   Pulse 87   Temp 98 °F (36.7 °C) (Oral)   Resp 18   Ht 154.9 cm (61\")   Wt 97 kg (213 lb 13.5 oz)   SpO2 98%   BMI 40.41 kg/m²     PHYSICAL EXAM: (performed by MD)  Physical Exam  Vitals and nursing note reviewed.   Constitutional:       General: She is not in acute distress.     Appearance: Normal appearance. She is obese. She is not ill-appearing, toxic-appearing or diaphoretic.   HENT:      Head: Normocephalic and atraumatic.      Right Ear: Tympanic membrane, ear canal and external ear normal.      Left Ear: Tympanic membrane, ear canal and external ear normal.      Nose: Nose normal. No congestion or rhinorrhea.      Mouth/Throat:      Mouth: Mucous membranes are moist.      Pharynx: Oropharynx is clear.   Eyes:      General:         Right eye: No discharge.         Left eye: No discharge.      Extraocular Movements: " Extraocular movements intact.      Conjunctiva/sclera: Conjunctivae normal.      Pupils: Pupils are equal, round, and reactive to light.   Neck:      Vascular: No carotid bruit.   Cardiovascular:      Rate and Rhythm: Normal rate and regular rhythm.      Pulses: Normal pulses.      Heart sounds: Normal heart sounds. No murmur heard.  No friction rub. No gallop.    Pulmonary:      Effort: Pulmonary effort is normal. No respiratory distress.      Breath sounds: Normal breath sounds. No stridor. No wheezing, rhonchi or rales.   Chest:      Chest wall: No tenderness.   Abdominal:      General: Abdomen is flat. Bowel sounds are normal. There is no distension.      Palpations: Abdomen is soft. There is no mass.      Tenderness: There is no abdominal tenderness. There is no guarding or rebound.      Hernia: No hernia is present.   Genitourinary:     Comments: External urinary catheter  Musculoskeletal:         General: No swelling, tenderness or signs of injury. Normal range of motion.      Cervical back: Normal range of motion and neck supple. No rigidity or tenderness.      Right lower leg: Edema present.      Left lower leg: Edema present.   Lymphadenopathy:      Cervical: No cervical adenopathy.   Skin:     General: Skin is warm and dry.      Capillary Refill: Capillary refill takes less than 2 seconds.      Coloration: Skin is not jaundiced.      Findings: No erythema or rash.   Neurological:      General: No focal deficit present.      Mental Status: She is alert and oriented to person, place, and time. Mental status is at baseline.      Cranial Nerves: No cranial nerve deficit.      Sensory: No sensory deficit.      Motor: No weakness.      Coordination: Coordination normal.      Gait: Gait normal.      Deep Tendon Reflexes: Reflexes normal.   Psychiatric:         Mood and Affect: Mood normal.         Behavior: Behavior normal.         Thought Content: Thought content normal.         Judgment: Judgment normal.            RECENT LABS:  Lab Results (last 24 hours)     Procedure Component Value Units Date/Time    Potassium [[id number]]  (Normal) Collected: 02/12/22 0838    Specimen: Blood Updated: 02/12/22 0903     Potassium 4.4 mmol/L      Comment: Result checked        Fibrinogen [[id number]]  (Normal) Collected: 02/12/22 0624    Specimen: Blood Updated: 02/12/22 0653     Fibrinogen 386 mg/dL     Protime-INR [[id number]]  (Normal) Collected: 02/12/22 0624    Specimen: Blood Updated: 02/12/22 0653     Protime 11.7 Seconds      INR 1.06    aPTT [[id number]]  (Abnormal) Collected: 02/12/22 0624    Specimen: Blood Updated: 02/12/22 0653     PTT 31.0 seconds     CBC & Differential [[id number]]  (Abnormal) Collected: 02/12/22 0624    Specimen: Blood Updated: 02/12/22 0630    Narrative:      The following orders were created for panel order CBC & Differential.  Procedure                               Abnormality         Status                     ---------                               -----------         ------                     CBC Auto Differential[[id number]]        Abnormal            Final result                 Please view results for these tests on the individual orders.    CBC Auto Differential [[id number]]  (Abnormal) Collected: 02/12/22 0624    Specimen: Blood Updated: 02/12/22 0630     WBC 7.50 10*3/mm3      RBC 4.17 10*6/mm3      Hemoglobin 11.5 g/dL      Hematocrit 33.6 %      MCV 80.7 fL      MCH 27.7 pg      MCHC 34.3 g/dL      RDW 14.6 %      RDW-SD 41.6 fl      MPV 8.7 fL      Platelets 189 10*3/mm3      Neutrophil % 77.8 %      Lymphocyte % 11.2 %      Monocyte % 9.3 %      Eosinophil % 0.8 %      Basophil % 0.9 %      Neutrophils, Absolute 5.80 10*3/mm3      Lymphocytes, Absolute 0.80 10*3/mm3      Monocytes, Absolute 0.70 10*3/mm3      Eosinophils, Absolute 0.10 10*3/mm3      Basophils, Absolute 0.10 10*3/mm3      nRBC 0.0 /100 WBC     Comprehensive Metabolic Panel [[id number]]  (Abnormal) Collected: 02/12/22 0058     Specimen: Blood Updated: 02/12/22 0121     Glucose 122 mg/dL      BUN 10 mg/dL      Creatinine 0.59 mg/dL      Sodium 139 mmol/L      Potassium 3.2 mmol/L      Chloride 103 mmol/L      CO2 25.0 mmol/L      Calcium 9.2 mg/dL      Total Protein 6.6 g/dL      Albumin 4.10 g/dL      ALT (SGPT) 13 U/L      AST (SGOT) 18 U/L      Alkaline Phosphatase 82 U/L      Total Bilirubin 0.5 mg/dL      eGFR Non African Amer 100 mL/min/1.73      Globulin 2.5 gm/dL      A/G Ratio 1.6 g/dL      BUN/Creatinine Ratio 16.9     Anion Gap 11.0 mmol/L     Narrative:      GFR Normal >60  Chronic Kidney Disease <60  Kidney Failure <15      Phosphorus [[id number]]  (Normal) Collected: 02/12/22 0058    Specimen: Blood Updated: 02/12/22 0121     Phosphorus 3.0 mg/dL     Magnesium [[id number]]  (Normal) Collected: 02/12/22 0058    Specimen: Blood Updated: 02/12/22 0121     Magnesium 1.8 mg/dL     Protime-INR [[id number]]  (Normal) Collected: 02/12/22 0058    Specimen: Blood Updated: 02/12/22 0120     Protime 11.5 Seconds      INR 1.04    Fibrinogen [[id number]]  (Normal) Collected: 02/12/22 0058    Specimen: Blood Updated: 02/12/22 0120     Fibrinogen 416 mg/dL     aPTT [[id number]]  (Abnormal) Collected: 02/12/22 0058    Specimen: Blood Updated: 02/12/22 0120     PTT 23.3 seconds     Calcium, Ionized [[id number]]  (Normal) Collected: 02/12/22 0058    Specimen: Blood Updated: 02/12/22 0112     Ionized Calcium 1.21 mmol/L     CBC & Differential [[id number]]  (Abnormal) Collected: 02/12/22 0058    Specimen: Blood Updated: 02/12/22 0107    Narrative:      The following orders were created for panel order CBC & Differential.  Procedure                               Abnormality         Status                     ---------                               -----------         ------                     CBC Auto Differential[[id number]]        Abnormal            Final result                 Please view results for these tests on the individual orders.    CBC Auto  Differential [[id number]]  (Abnormal) Collected: 02/12/22 0058    Specimen: Blood Updated: 02/12/22 0107     WBC 6.80 10*3/mm3      RBC 4.57 10*6/mm3      Hemoglobin 12.7 g/dL      Hematocrit 37.4 %      MCV 81.7 fL      MCH 27.7 pg      MCHC 33.9 g/dL      RDW 14.9 %      RDW-SD 42.4 fl      MPV 8.8 fL      Platelets 196 10*3/mm3      Neutrophil % 80.9 %      Lymphocyte % 10.4 %      Monocyte % 7.1 %      Eosinophil % 0.9 %      Basophil % 0.7 %      Neutrophils, Absolute 5.50 10*3/mm3      Lymphocytes, Absolute 0.70 10*3/mm3      Monocytes, Absolute 0.50 10*3/mm3      Eosinophils, Absolute 0.10 10*3/mm3      Basophils, Absolute 0.00 10*3/mm3      nRBC 0.1 /100 WBC     POC Activated Clotting Time [[id number]]  (Abnormal) Collected: 02/12/22 0027    Specimen: Blood Updated: 02/12/22 0031     Activated Clotting Time  160 Seconds      Comment: Serial Number: [id number]:  [id number]       POC Activated Clotting Time [[id number]]  (Abnormal) Collected: 02/11/22 2350    Specimen: Blood Updated: 02/12/22 0031     Activated Clotting Time  184 Seconds      Comment: Serial Number: [id number]:  [id number]       POC Glucose Once [[id number]]  (Abnormal) Collected: 02/11/22 2352    Specimen: Blood Updated: 02/11/22 2353     Glucose 114 mg/dL      Comment: Serial Number: [id number]:  [id number]       hCG, Serum, Qualitative [[id number]]  (Normal) Collected: 02/11/22 1935    Specimen: Blood Updated: 02/11/22 2036     HCG Qualitative Negative    Basic Metabolic Panel [[id number]]  (Abnormal) Collected: 02/11/22 1935    Specimen: Blood Updated: 02/11/22 2015     Glucose 139 mg/dL      BUN 9 mg/dL      Creatinine 0.55 mg/dL      Sodium 139 mmol/L      Potassium 3.4 mmol/L      Chloride 103 mmol/L      CO2 25.0 mmol/L      Calcium 9.3 mg/dL      eGFR Non African Amer 109 mL/min/1.73      BUN/Creatinine Ratio 16.4     Anion Gap 11.0 mmol/L     Narrative:      GFR Normal >60  Chronic Kidney Disease <60  Kidney Failure <15      aPTT  [[id number]]  (Abnormal) Collected: 02/11/22 1935    Specimen: Blood Updated: 02/11/22 2013     PTT 53.9 seconds     Protime-INR [[id number]]  (Normal) Collected: 02/11/22 1935    Specimen: Blood Updated: 02/11/22 2013     Protime 11.5 Seconds      INR 1.04    Anticardiolipin Antibody, IgG / M, Qn [[id number]] Collected: 02/11/22 1453    Specimen: Blood Updated: 02/11/22 1458    Antiphosphatidylserine IgG / M [[id number]] Collected: 02/11/22 1453    Specimen: Blood Updated: 02/11/22 1457    Factor 2 Activity [[id number]] Collected: 02/11/22 1452    Specimen: Blood Updated: 02/11/22 1457    Protein C Activity [[id number]] Collected: 02/11/22 1452    Specimen: Blood Updated: 02/11/22 1457    Protein S, Total & Free [[id number]] Collected: 02/11/22 1453    Specimen: Blood Updated: 02/11/22 1457    Antithrombin III [[id number]] Collected: 02/11/22 1452    Specimen: Blood Updated: 02/11/22 1457    Protein S Functional [[id number]] Collected: 02/11/22 1453    Specimen: Blood Updated: 02/11/22 1457    Lupus Anticoagulant [[id number]] Collected: 02/11/22 1452    Specimen: Blood Updated: 02/11/22 1457    Protime-INR [[id number]]  (Abnormal) Collected: 02/11/22 1159    Specimen: Blood Updated: 02/11/22 1238     Protime 11.4 Seconds      INR 1.03    aPTT [[id number]]  (Abnormal) Collected: 02/11/22 1159    Specimen: Blood Updated: 02/11/22 1232     PTT 51.6 seconds      Comment: Result checked        Factor II, DNA Analysis [[id number]] Collected: 02/11/22 1159    Specimen: Blood Updated: 02/11/22 1210    Beta-2 Glycoprotein Antibodies [[id number]] Collected: 02/11/22 1159    Specimen: Blood Updated: 02/11/22 1210    Factor 5 Leiden [[id number]] Collected: 02/11/22 1159    Specimen: Blood Updated: 02/11/22 1210    Hemoglobin A1c [[id number]]  (Abnormal) Collected: 02/11/22 0506    Specimen: Blood Updated: 02/11/22 1109     Hemoglobin A1C 5.7 %     Narrative:      Hemoglobin A1C Reference Range:    <5.7 %        Normal  5.7-6.4 %     Increased  risk for diabetes  > 6.4 %        Diabetes       These guidelines have been recommended by the American Diabetic Association for Hgb A1c.      The following 2010 guidelines have been recommended by the American Diabetes Association for Hemoglobin A1c.    HBA1c 5.7-6.4% Increased risk for future diabetes (pre-diabetes)  HBA1c     >6.4% Diabetes      POC Glucose Once [[id number]]  (Abnormal) Collected: 02/11/22 1001    Specimen: Blood Updated: 02/11/22 1034     Glucose 122 mg/dL      Comment: Serial Number: [id number]:  [id number]             PENDING RESULTS:     IMAGING REVIEWED:  CT Chest Pulmonary Embolism    Result Date: 2/10/2022  There our pulmonary artery emboli throughout the right and left segmental and subsegmental branches. No pulmonary infarct is seen. There is minimal common cavity of the interventricular septum which could be seen with early right heart strain.    Electronically Signed By-Lilliam Gaspar MD On:2/10/2022 9:14 PM This report was finalized on [id number] by  Lilliam Gaspar MD.      Assessment/Plan   ASSESSMENT:  Pulmonary embolism:   CT chest PE protocol confirmed pulmonary embolism  in the right and left segmental and subsegmental branches with early right heart strain.  No pulmonary infarct is seen; Most likely provoked PE due to increased sedentary lifestyle.and obesity  Differentials include sedentary lifestyle, obesity, heart disease, CHF, kidney disease, obesity, malignancy, immobilization, acquired causes and inherited causes such as Factor V mutation.  Patient was started on IV heparin. No history of blood disorders or DVT.  Patient has no smoking history including no smokeless tobacco or illicit drug use.  Admits to occasional ETOH use.  Pending hypercoagulation work up and start on oral anticoagulation Coumadin along with Lovenox SC until INR goal is 2-3. Pharmacy consulted. Patient had EKOS performed on 2/11/2022 per .  Pharmacy to call  to see when patient  can begin bridging Lovenox/coumadin.  Coumadin scheduled for 6pm tonight.  Discussed with patient the monitoring and bridging of Coumadin and Lovenox.  [facility]       Obesity: BMI 40.8: recommend lifestyle changes and diet modifications     Osteoarthritis:     Asthma:   Ordered ICS/bronchodilators, followed by pulmonary     HLD/HTN  Receiving amlodipine     Mixed anxiety depressive disorder  Receiving lexapro and prn Xanax     PLAN  1. EKOS procedure completed per , catheter removed per pulmonary  2. Heparin discontinued and changed to Lovenox SC  3. Will consult pharmacy to assist with dosing of Coumadin will begin with Coumadin 5mg po daily along with Lovenox SC, pharmacy to monitoring of INR with goal of 2-3  4. Will need anticoagulation outpatient consult in order to continue monitoring of INR  5. Pending hypercoagulation work up   6. Monitor for bleeding  7. Will continue to follow     Electronically signed by RICKY Elliott, 02/12/22, 10:37 AM EST.       2/12/22: Up in the chair and eating breakfast. No distress. She is feeling back to normal. On exam alert, well oriented and in no distress. No jaundice. The respirations are not labored. The neck is supple. There is no jugular vein distention. The lungs are clear and the heart is regular. The abdomen is soft and not tender. No edema. Underwent ultrasonic device assisted thrombolysis. Today she is being discharged on anticoagulation. Discussed with her. Reviewed Ms. Ramírez' not and agree with her. No change for now.     Chalino Russo MD on 2/12/22 at 11/40

## 2022-02-12 NOTE — PLAN OF CARE
Goal Outcome Evaluation:  Plan of Care Reviewed With: patient, spouse, daughter        Progress: improving

## 2022-02-12 NOTE — OUTREACH NOTE
Prep Survey      Responses   [facility] patient discharged from? [facility]   Is LACE score < 7 ? Yes   Emergency Room discharge w/ pulse ox? No   Eligibility [facility]   Date of Admission 02/10/22   Date of Discharge 02/12/22   Discharge Disposition Home or Self Care   Discharge diagnosis acute pulmonary embolism   Does the patient have one of the following disease processes/diagnoses(primary or secondary)? Other   Does the patient have Home health ordered? No   Is there a DME ordered? No   Prep survey completed? Yes          Jinny Ramírez RN

## 2022-02-13 LAB
B2 GLYCOPROT1 IGA SER-ACNC: <9 GPI IGA UNITS (ref 0–25)
B2 GLYCOPROT1 IGG SER-ACNC: <9 GPI IGG UNITS (ref 0–20)
B2 GLYCOPROT1 IGM SER-ACNC: <9 GPI IGM UNITS (ref 0–32)
PROT S ACT/NOR PPP: 74 % (ref 63–140)
PROT S AG ACT/NOR PPP IA: 88 % (ref 60–150)
PROT S FREE AG ACT/NOR PPP IA: 90 % (ref 61–136)

## 2022-02-14 ENCOUNTER — TRANSITIONAL CARE MANAGEMENT TELEPHONE ENCOUNTER (OUTPATIENT)
Dept: CALL CENTER | Facility: HOSPITAL | Age: 72
End: 2022-02-14

## 2022-02-14 LAB
APTT SCREEN TO CONFIRM RATIO: 0.87 RATIO (ref 0–1.34)
AT III PPP CHRO-ACNC: 92 % (ref 75–120)
CONFIRM APTT/NORMAL: 43.1 SEC (ref 0–47.6)
F5 GENE MUT ANL BLD/T: NORMAL
FACTOR II, DNA ANALYSIS: NORMAL
LA 2 SCREEN W REFLEX-IMP: ABNORMAL
PROT C ACT/NOR PPP: 131 % (ref 70–130)
SCREEN APTT: 35.9 SEC (ref 0–51.9)
SCREEN DRVVT: 32.9 SEC (ref 0–47)
THROMBIN TIME: >150 SEC (ref 0–23)
TT IMM NP PPP: >150 SEC (ref 0–23)
TT P HPASE PPP: 17.4 SEC (ref 0–23)

## 2022-02-14 NOTE — OUTREACH NOTE
Call Center TCM Note      Responses   [facility] patient discharged from? [facility]   Does the patient have one of the following disease processes/diagnoses(primary or secondary)? Other   TCM attempt successful? Yes   Discharge diagnosis acute pulmonary embolism   Meds reviewed with patient/caregiver? Yes   Is the patient having any side effects they believe may be caused by any medication additions or changes? No   Does the patient have all medications ordered at discharge? Yes   Is the patient taking all medications as directed (includes completed medication regime)? Yes   Does the patient have a primary care provider?  Yes   Does the patient have an appointment with their PCP within 7 days of discharge? No   Comments regarding PCP Pt will keep scheduled fwp with PCP Maribel Hale on 03/21/2022. Pt will fwp with Cardio MD and Hem/Onc ofc prior to this appt.    Has the patient kept scheduled appointments due by today? N/A   Has home health visited the patient within 72 hours of discharge? N/A   Psychosocial issues? No   Did the patient receive a copy of their discharge instructions? Yes   Nursing interventions Reviewed instructions with patient   What is the patient's perception of their health status since discharge? Improving   Is the patient/caregiver able to teach back signs and symptoms related to disease process for when to call PCP? Yes   Is the patient/caregiver able to teach back signs and symptoms related to disease process for when to call 911? Yes   Is the patient/caregiver able to teach back the hierarchy of who to call/visit for symptoms/problems? PCP, Specialist, Home health nurse, Urgent Care, ED, 911 Yes   If the patient is a current smoker, are they able to teach back resources for cessation? Not a smoker   TCM call completed? Yes   Wrap up additional comments Pt feeling better, using spirometer, Xarelto in place. Feeling a bit lightheaded but otherwise better. Taking short walks several times  daily. No questions at this time. Pt will keep scheduled fwp with PCP Maribel Hale on 03/21/2022. Pt will fwp with Cardio MD and Hem/Onc ofc prior to this appt.           Marlin Talavera MA    2/14/2022, 14:55 EST

## 2022-02-15 ENCOUNTER — TELEPHONE (OUTPATIENT)
Dept: ONCOLOGY | Facility: CLINIC | Age: 72
End: 2022-02-15

## 2022-02-15 LAB
PS IGG SER-ACNC: <9 UNITS (ref 0–30)
PS IGM SER-ACNC: <10 UNITS (ref 0–30)

## 2022-02-15 NOTE — TELEPHONE ENCOUNTER
After speaking with Dr. Russo, I called the patient to inform her that he stated she can continue receiving the allergy shots. She stated that she understood. I informed her to call and let us know if she needs anything.

## 2022-02-15 NOTE — TELEPHONE ENCOUNTER
Caller: JOSE SIDDIQUI    Relationship: SELF    Best call back number: [phone]    What is the best time to reach you: ANY    Who are you requesting to speak with (clinical staff, provider,  specific staff member): CLINICAL STAFF    What was the call regarding: PT WANTED TO DOUBLE CHECK WITH DR SIMS AND MAKE SURE IT IS OKAY FOR HER TO GET HER ALLERGY SHOTS ON THURSDAYS WHILE SHE IS TAKING XARELTO    Do you require a callback: YES

## 2022-03-03 ENCOUNTER — APPOINTMENT (OUTPATIENT)
Dept: LAB | Facility: HOSPITAL | Age: 72
End: 2022-03-03

## 2022-03-03 ENCOUNTER — TELEPHONE (OUTPATIENT)
Dept: ONCOLOGY | Facility: CLINIC | Age: 72
End: 2022-03-03

## 2022-03-03 ENCOUNTER — OFFICE VISIT (OUTPATIENT)
Dept: ONCOLOGY | Facility: CLINIC | Age: 72
End: 2022-03-03

## 2022-03-03 VITALS
DIASTOLIC BLOOD PRESSURE: 78 MMHG | BODY MASS INDEX: 40.41 KG/M2 | HEIGHT: 61 IN | SYSTOLIC BLOOD PRESSURE: 135 MMHG | TEMPERATURE: 96.9 F | HEART RATE: 68 BPM | OXYGEN SATURATION: 98 %

## 2022-03-03 DIAGNOSIS — I82.431 ACUTE DEEP VEIN THROMBOSIS (DVT) OF POPLITEAL VEIN OF RIGHT LOWER EXTREMITY: Primary | ICD-10-CM

## 2022-03-03 DIAGNOSIS — I26.94 MULTIPLE SUBSEGMENTAL PULMONARY EMBOLI WITHOUT ACUTE COR PULMONALE: Primary | ICD-10-CM

## 2022-03-03 DIAGNOSIS — I82.431 ACUTE DEEP VEIN THROMBOSIS (DVT) OF POPLITEAL VEIN OF RIGHT LOWER EXTREMITY: ICD-10-CM

## 2022-03-03 PROCEDURE — 99213 OFFICE O/P EST LOW 20 MIN: CPT | Performed by: INTERNAL MEDICINE

## 2022-03-03 NOTE — TELEPHONE ENCOUNTER
Provider: DR SIMS    Caller: JOSE    Relationship to Patient: SELF    Reason for Call: JOSE IS CALLING BECAUSE HER RX FOR XARELTO IS GOING TO BE $ 513.00 BECAUSE HER DEDUCTABLE  IS NOT MET.    SHE IS ON MEDICARE AND SHE CAN NOT USE A COUPON CARD.  IS THERE ANYTHING ELSE SHE CAN DO?    SHE ONLY  HAS ONE PILL LEFT.

## 2022-03-03 NOTE — TELEPHONE ENCOUNTER
Called patient to inform her that I will speak with Dr. Russo regarding the cost of her medication. She stated that she spoke with a member of her insurance whom informed her that she cannot use a copay card. I informed her that I will inform Reinier, our  at the [facility] center of this information to see if he can help her in any way. She stated that her insurance stated they will cover Eliquis. I asked her if she had enough medication for tomorrow, she stated no that she will take her last Xarelto tonight before bed. I informed her that after I speak with Dr. Russo I will contact her to let her know what his plan is regarding the medication. She stated she understood.

## 2022-03-03 NOTE — TELEPHONE ENCOUNTER
Called pt to let her know that we was switching her to Eliquis and we needed her to stop in at our office tomorrow to get her samples of Eliquis and so we could explain to her how the doctor wants her to take this medication. Pt voiced understanding.

## 2022-03-03 NOTE — TELEPHONE ENCOUNTER
Caller: Radha Trujillo    Relationship: Self    Best call back number: [phone]    What is the best time to reach you: ASAP    Who are you requesting to speak with (clinical staff, provider,  specific staff member): NURSE    Do you know the name of the person who called:     What was the call regarding: PT NEEDS A PA ON HENRITO, GAVE NUMBER [phone]    Do you require a callback: YES

## 2022-03-07 ENCOUNTER — TELEPHONE (OUTPATIENT)
Dept: ONCOLOGY | Facility: CLINIC | Age: 72
End: 2022-03-07

## 2022-03-07 NOTE — TELEPHONE ENCOUNTER
Recd a message patient was having trouble with her insurance paying for Xarelto.  I phoned the patient.  She states she was moved to [facility] and her insurance is paying for that.

## 2022-03-15 ENCOUNTER — OFFICE VISIT (OUTPATIENT)
Dept: CARDIOLOGY | Facility: CLINIC | Age: 72
End: 2022-03-15

## 2022-03-15 VITALS
DIASTOLIC BLOOD PRESSURE: 72 MMHG | HEART RATE: 72 BPM | BODY MASS INDEX: 41.16 KG/M2 | WEIGHT: 218 LBS | HEIGHT: 61 IN | OXYGEN SATURATION: 98 % | SYSTOLIC BLOOD PRESSURE: 148 MMHG

## 2022-03-15 DIAGNOSIS — I26.99 PULMONARY EMBOLISM ON LONG-TERM ANTICOAGULATION THERAPY: ICD-10-CM

## 2022-03-15 DIAGNOSIS — I10 ESSENTIAL HYPERTENSION: Primary | Chronic | ICD-10-CM

## 2022-03-15 DIAGNOSIS — Z79.01 PULMONARY EMBOLISM ON LONG-TERM ANTICOAGULATION THERAPY: ICD-10-CM

## 2022-03-15 DIAGNOSIS — T50.905A MEDICATION INDUCED COAGULOPATHY: ICD-10-CM

## 2022-03-15 DIAGNOSIS — D68.9 MEDICATION INDUCED COAGULOPATHY: ICD-10-CM

## 2022-03-15 DIAGNOSIS — E78.5 HYPERLIPIDEMIA, UNSPECIFIED HYPERLIPIDEMIA TYPE: Chronic | ICD-10-CM

## 2022-03-15 PROCEDURE — 99214 OFFICE O/P EST MOD 30 MIN: CPT | Performed by: INTERNAL MEDICINE

## 2022-03-15 NOTE — PROGRESS NOTES
Cardiology Office Visit      Encounter Date:  03/15/2022    Patient ID:   Radha Trujillo is a 71 y.o. female.    Reason For Followup:  Chest pain  History of AVNRT  History of pulmonary embolism    Brief Clinical History:  Dear Maribel Oconnell APRN    I had the pleasure of seeing Radha Trujillo today. As you are well aware, this is a 71 y.o. female with no established history of ischemic heart disease.  She does have a history of acid reflux, anxiety, AVNRT status post radiofrequency ablation, and antiplatelet therapy.  She presents today for the evaluation of chest pressure and lightheadedness.    Interval History:  She denies any chest pain pressure heaviness or tightness.  She denies any shortness of breath.  She denies any PND orthopnea.  She denies any syncope or near syncope.  She reports feeling well from a cardiac perspective.    As I am sure you are aware, she suffered an acute pulmonary embolism in February 2022.  She was found to have evidence of right ventricular strain and hypoxia.  As such she underwent an EKOS catheter directed thrombectomy.  The following day she reported feeling remarkably better.  In fact she was discharged home on the following day.  She is been on anticoagulation since that time.  She reports feeling quite well today.    She has followed up with hematology.  No identifiable hypercoagulable state has been identified however the pulmonary embolism/DVT was unprovoked.  As such indefinite anticoagulation has been recommended.  She is taking Eliquis.    Assessment & Plan    Impressions:  Chest pressure with typical and atypical features     Negative ischemic work-up February 2021  History of DVT/PE status post EKOS catheter directed thrombolytic therapy  Hypertension with a significant situational component  Palpitations  Dizziness and lightheadedness  Anxiety  Acid reflux  AVNRT status post radiofrequency ablation  Obesity with BMI of 41.5  Osteoarthritis of the  "knees    Recommendations:  Continuation of her current cardiovascular regimen at the present time.     This includes antihypertensives, anticoagulation  Follow-up in 6 months time sooner should there be difficulties.    Diagnoses and all orders for this visit:    1. Essential hypertension (Primary)    2. Hyperlipidemia, unspecified hyperlipidemia type    3. Pulmonary embolism on long-term anticoagulation therapy (HCC)    4. Medication induced coagulopathy (HCC)          Objective:    Vitals:  Vitals:    03/15/22 1241   BP: 148/72   Pulse: 72   SpO2: 98%   Weight: 98.9 kg (218 lb)   Height: 154.9 cm (61\")     Body mass index is 41.19 kg/m².      Physical Exam:    General: Alert, cooperative, no distress, appears stated age  Head:  Normocephalic, atraumatic, mucous membranes moist  Eyes:  Conjunctiva/corneas clear, EOM's intact     Neck:  Supple,  no bruit    Lungs: Clear to auscultation bilaterally, no wheezes rhonchi rales are noted  Chest wall: No tenderness  Heart::  Regular rate and rhythm, S1 and S2 normal, 1/6 holosystolic murmur.  No rub or gallop  Abdomen: Soft, non-tender, nondistended bowel sounds active.  Obese  Extremities: No cyanosis, clubbing, or edema  Pulses: 2+ and symmetric all extremities  Skin:  No rashes or lesions  Neuro/psych: A&O x3. CN II through XII are grossly intact with appropriate affect      Allergies:  Allergies   Allergen Reactions   • Adhesive Tape Rash       Medication Review:     Current Outpatient Medications:   •  ALPRAZolam (Xanax) 0.25 MG tablet, Take 1 tablet by mouth 2 (Two) Times a Day As Needed for Anxiety., Disp: 30 tablet, Rfl: 0  •  amLODIPine (NORVASC) 5 MG tablet, TAKE 1 TABLET BY MOUTH DAILY, Disp: 90 tablet, Rfl: 1  •  apixaban (ELIQUIS) 5 MG tablet tablet, Take 1 tablet by mouth 2 (Two) Times a Day., Disp: 60 tablet, Rfl: 0  •  azelastine (ASTELIN) 0.1 % nasal spray, 1 spray into the nostril(s) as directed by provider 2 (Two) Times a Day., Disp: , Rfl:   •  " escitalopram (Lexapro) 20 MG tablet, Take 1 tablet by mouth Daily., Disp: 30 tablet, Rfl: 1  •  fluticasone (Flovent HFA) 110 MCG/ACT inhaler, Inhale 1 puff 2 (Two) Times a Day., Disp: 1 inhaler, Rfl: 1  •  Multiple Vitamin (MULTIVITAMIN) capsule, Take 1 capsule by mouth Daily., Disp: , Rfl:   •  Omeprazole 20 MG tablet delayed-release, Take 20 mg by mouth Daily., Disp: , Rfl:   •  potassium chloride 10 MEQ CR tablet, TAKE 1 TABLET BY MOUTH THREE TIMES DAILY, Disp: 270 tablet, Rfl: 0  •  valsartan-hydrochlorothiazide (DIOVAN-HCT) 320-25 MG per tablet, Take 1 tablet by mouth Daily., Disp: 90 tablet, Rfl: 3  •  Vitamins A & D 5000-400 units capsule, Take 1 capsule by mouth Daily., Disp: , Rfl:     Family History:  Family History   Problem Relation Age of Onset   • Arthritis Mother    • Diabetes Mother    • Heart disease Mother    • Emphysema Mother    • Kidney disease Father    • Arthritis Sister    • Anxiety disorder Sister    • Arthritis Brother    • Anxiety disorder Brother    • Acute myelogenous leukemia Niece    • Myasthenia gravis Niece        Past Medical History:  Past Medical History:   Diagnosis Date   • Abnormal coagulation profile    • Abnormal EKG    • Abnormal laboratory test    • Acquired spondylolisthesis    • Allergic 7/16/2021    Start shots in 7/27/21   • Arthritis    • Asthma    • Body aches    • Bronchitis, acute    • Chest pain     PRESSURE   • Chronic back pain    • Cough    • Depression with anxiety    • DJD (degenerative joint disease)    • Essential hypertension    • Fatigue    • History of radiofrequency ablation (RFA) procedure for cardiac arrhythmia 1/5/2021   • Hyperlipidemia    • Hypokalemia    • Low back pain    • Lymphocytic colitis    • Obesity    • Other specified disorders of bone density and structure, other site    • Panic disorder    • Paroxysmal SVT (supraventricular tachycardia) (HCC)    • Postmenopausal     9 YEARS   • Prediabetes    • SOB (shortness of breath)    •  Unspecified injury of left Achilles tendon, initial encounter    • Vitamin B deficiency 1/14/2019   • Vitamin B deficiency, unspecified    • Vitamin D deficiency, unspecified        Past Surgical History:  Past Surgical History:   Procedure Laterality Date   • ACHILLES TENDON SURGERY Left 2018   • AV NODE ABLATION  08/2016    AVNRT RF ABLATION --8/16   • CARDIAC CATHETERIZATION Bilateral 2/11/2022    Procedure: EKOS;  Surgeon: Kilo Vivar DO;  Location: [facility] CATH INVASIVE LOCATION;  Service: Cardiovascular;  Laterality: Bilateral;   • COLONOSCOPY N/A 01/01/2012   • FOOT SURGERY Right     TOE/FOOT SURGERY   • KNEE SURGERY Right     2/2 TORN MENICUS   • TUBAL ABDOMINAL LIGATION         Social History:  Social History     Socioeconomic History   • Marital status:      Spouse name: Alonso Trujillo   • Number of children: 3   Tobacco Use   • Smoking status: Never Smoker   • Smokeless tobacco: Never Used   Vaping Use   • Vaping Use: Never used   Substance and Sexual Activity   • Alcohol use: Yes     Alcohol/week: 1.0 standard drink     Types: 1 Standard drinks or equivalent per week     Comment: 1 DRINK WEEKLY, IF THAT: RARE/OCC;  CAFFEINE USE + 1 COFFEE DAILY   • Drug use: No   • Sexual activity: Not Currently     Partners: Male     Birth control/protection: Other, Tubal ligation     Comment: monogamous w/ ;  s/p BTL - no high-risk sexual behaviors       Review of Systems:  The following systems were reviewed as they relate to the cardiovascular system: Constitutional, Eyes, ENT, Cardiovascular, Respiratory, Gastrointestinal, Integumentary, Neurological, Psychiatric, Hematologic, Endocrine, Musculoskeletal, and Genitourinary. The pertinent cardiovascular findings are reported above with all other cardiovascular points within those systems being negative.    Diagnostic Study Review:     Current Electrocardiogram:  Procedures no new EKG.  EKG dated February 10, 2022 demonstrates sinus rhythm  with a ventricular to 79 bpm.  Low voltage in the precordial leads.    Laboratory Data:  Lab Results   Component Value Date    GLUCOSE 122 (H) 02/12/2022    BUN 10 02/12/2022    CREATININE 0.59 02/12/2022    EGFRIFNONA 100 02/12/2022    EGFRIFAFRI 70 03/21/2017    BCR 16.9 02/12/2022    K 4.4 02/12/2022    CO2 25.0 02/12/2022    CALCIUM 9.2 02/12/2022    ALBUMIN 4.10 02/12/2022    LABIL2 1.6 05/23/2019    AST 18 02/12/2022    ALT 13 02/12/2022     Lab Results   Component Value Date    GLUCOSE 122 (H) 02/12/2022    CALCIUM 9.2 02/12/2022     02/12/2022    K 4.4 02/12/2022    CO2 25.0 02/12/2022     02/12/2022    BUN 10 02/12/2022    CREATININE 0.59 02/12/2022    EGFRIFAFRI 70 03/21/2017    EGFRIFNONA 100 02/12/2022    BCR 16.9 02/12/2022    ANIONGAP 11.0 02/12/2022     Lab Results   Component Value Date    WBC 7.80 02/12/2022    HGB 12.6 02/12/2022    HCT 37.4 02/12/2022    MCV 82.9 02/12/2022     02/12/2022     Lab Results   Component Value Date    CHOL 181 02/11/2022    TRIG 68 02/11/2022    HDL 75 (H) 02/11/2022    LDL 93 02/11/2022     Lab Results   Component Value Date    HGBA1C 5.7 (H) 02/11/2022     Lab Results   Component Value Date    INR 1.05 02/12/2022    INR 1.06 02/12/2022    INR 1.04 02/12/2022    PROTIME 11.6 02/12/2022    PROTIME 11.7 02/12/2022    PROTIME 11.5 02/12/2022       Most Recent Echo:  Results for orders placed during the hospital encounter of 02/10/22    Adult Transthoracic Echo Complete W/ Cont if Necessary Per Protocol    Interpretation Summary  · Estimated left ventricular EF was in agreement with the calculated left ventricular EF. Left ventricular ejection fraction appears to be 66 - 70%. Left ventricular systolic function is normal.  · Moderate tricuspid valve regurgitation is present.  · Estimated right ventricular systolic pressure from tricuspid regurgitation is mildly elevated (35-45 mmHg).  · The right ventricular cavity is moderately dilated.  · Mildly  reduced right ventricular systolic function noted.       Most Recent Stress Test:  Results for orders placed during the hospital encounter of 01/25/21    Stress Test With Myocardial Perfusion One Day    Interpretation Summary  · Myocardial perfusion imaging indicates a normal myocardial perfusion study with no evidence of ischemia.  · Left ventricular ejection fraction is hyperdynamic (Calculated EF > 70%). .  · Findings consistent with a normal ECG stress test.  · Impressions are consistent with a low risk study.  · There is no prior study available for comparison.       Most Recent Cardiac Catheterization:   Results for orders placed during the hospital encounter of 02/10/22    Cardiac Catheterization/Vascular Study    Narrative  Radha Trujillo  [id number]  2/11/2022  @PCP@      Procedure:  She underwent EKOS catheter directed thrombolytic system placement    Surgeon:  Ghazala    Indications:  Bilateral pulmonary emboli with right ventricular strain    Complications:  None immediate    Informed Consent:  The risks, benefits, complications, treatment options, and expected outcomes were discussed with the patient. The patient and/or family concurred with the proposed plan, giving informed consent.    Description of Procedure:  After informed consent the patient was brought to the cardiac catheterization suite after appropriate IV hydration was begun and oral premedication was given. She was further sedated with fentanyl and midazolam. The patient was prepped and draped in the normal sterile fashion.  The right femoral vein was accessed at 2 locations in close proximity via a modified Seldinger technique.  A 7 Somali venous sheath was placed in the superior position and a 6 Somali venous sheath was placed in the inferior position.  These catheters were flushed with heparinized saline.    Next, utilizing fluoroscopic guidance, one long guidewire was advanced through each sheath and into the left and right pulmonary  artery systems respectively. Over each wire, the EKOS delivery catheters were then advanced and placed into the left and right pulmonary artery systems respectively.  Finally, the ultrasonic catheters were advanced into the delivery catheters and secured via the integrated Luer-Rajinder mechanism.  Thrombolytic and ultrasonic therapy was then initiated utilizing hospital established protocols.    At this point, the procedure was completed.  Sheaths were sewn into place.  Heparin was initiated and administered through the venous sheaths utilizing hospital established protocols.  The patient tolerated the procedure well.    Conscious sedation:  Conscious sedation was performed according to protocol.  I supervised and directed an independent trained observer with the assistance of monitoring the patient's level of consciousness and physiologic status throughout the procedure.  Intraoperative service time was 75 minutes.    Impressions:  Successful placement of EKOS catheter directed thrombolytic system into right and left pulmonary artery systems.    Recommendations:  Thrombolytic and ultrasonic therapy administration for 6 hours  Monitor rate and rhythm  Supplemental oxygen as indicated  Sheath removal when ACT less than 170  Bedrest until condition allows for advancement of activity  Systemic anticoagulation with unfractionated heparin/low molecular weight heparin beginning 30 minutes after sheath removal  Further recommendations as patient's course progresses.       NOTE: The following portions of the patient's note were reviewed, confirmed and/or updated this visit as appropriate: History of present illness/Interval history, physical examination, assessment & plan, allergies, current medications, past family history, past medical history, past social history, past surgical history and problem list.

## 2022-03-24 ENCOUNTER — APPOINTMENT (OUTPATIENT)
Dept: CT IMAGING | Facility: HOSPITAL | Age: 72
End: 2022-03-24

## 2022-03-24 ENCOUNTER — HOSPITAL ENCOUNTER (EMERGENCY)
Facility: HOSPITAL | Age: 72
Discharge: HOME | End: 2022-03-24
Attending: EMERGENCY MEDICINE | Admitting: EMERGENCY MEDICINE

## 2022-03-24 ENCOUNTER — APPOINTMENT (OUTPATIENT)
Dept: GENERAL RADIOLOGY | Facility: HOSPITAL | Age: 72
End: 2022-03-24

## 2022-03-24 ENCOUNTER — TELEPHONE (OUTPATIENT)
Dept: CARDIOLOGY | Facility: CLINIC | Age: 72
End: 2022-03-24

## 2022-03-24 VITALS
BODY MASS INDEX: 41.91 KG/M2 | HEART RATE: 72 BPM | HEIGHT: 61 IN | SYSTOLIC BLOOD PRESSURE: 124 MMHG | DIASTOLIC BLOOD PRESSURE: 68 MMHG | TEMPERATURE: 97.6 F | RESPIRATION RATE: 16 BRPM | WEIGHT: 222 LBS | OXYGEN SATURATION: 99 %

## 2022-03-24 DIAGNOSIS — R07.9 CHEST PAIN, UNSPECIFIED TYPE: Primary | ICD-10-CM

## 2022-03-24 LAB
ALBUMIN SERPL-MCNC: 4.3 G/DL (ref 3.5–5.2)
ALBUMIN/GLOB SERPL: 1.6 G/DL
ALP SERPL-CCNC: 87 U/L (ref 39–117)
ALT SERPL W P-5'-P-CCNC: 16 U/L (ref 1–33)
ANION GAP SERPL CALCULATED.3IONS-SCNC: 13 MMOL/L (ref 5–15)
APTT PPP: 27.6 SECONDS (ref 61–76.5)
AST SERPL-CCNC: 15 U/L (ref 1–32)
BASOPHILS # BLD AUTO: 0 10*3/MM3 (ref 0–0.2)
BASOPHILS NFR BLD AUTO: 0.7 % (ref 0–1.5)
BILIRUB SERPL-MCNC: 0.3 MG/DL (ref 0–1.2)
BUN SERPL-MCNC: 13 MG/DL (ref 8–23)
BUN/CREAT SERPL: 15.5 (ref 7–25)
CALCIUM SPEC-SCNC: 9.4 MG/DL (ref 8.6–10.5)
CHLORIDE SERPL-SCNC: 96 MMOL/L (ref 98–107)
CO2 SERPL-SCNC: 27 MMOL/L (ref 22–29)
CREAT SERPL-MCNC: 0.84 MG/DL (ref 0.57–1)
DEPRECATED RDW RBC AUTO: 43.3 FL (ref 37–54)
EGFRCR SERPLBLD CKD-EPI 2021: 74.4 ML/MIN/1.73
EOSINOPHIL # BLD AUTO: 0 10*3/MM3 (ref 0–0.4)
EOSINOPHIL NFR BLD AUTO: 0.8 % (ref 0.3–6.2)
ERYTHROCYTE [DISTWIDTH] IN BLOOD BY AUTOMATED COUNT: 15.1 % (ref 12.3–15.4)
GLOBULIN UR ELPH-MCNC: 2.7 GM/DL
GLUCOSE SERPL-MCNC: 113 MG/DL (ref 65–99)
HCT VFR BLD AUTO: 36.9 % (ref 34–46.6)
HGB BLD-MCNC: 12.3 G/DL (ref 12–15.9)
INR PPP: 1.03 (ref 0.93–1.1)
LYMPHOCYTES # BLD AUTO: 1 10*3/MM3 (ref 0.7–3.1)
LYMPHOCYTES NFR BLD AUTO: 16.1 % (ref 19.6–45.3)
MCH RBC QN AUTO: 27.3 PG (ref 26.6–33)
MCHC RBC AUTO-ENTMCNC: 33.4 G/DL (ref 31.5–35.7)
MCV RBC AUTO: 81.7 FL (ref 79–97)
MONOCYTES # BLD AUTO: 0.4 10*3/MM3 (ref 0.1–0.9)
MONOCYTES NFR BLD AUTO: 6.4 % (ref 5–12)
NEUTROPHILS NFR BLD AUTO: 4.7 10*3/MM3 (ref 1.7–7)
NEUTROPHILS NFR BLD AUTO: 76 % (ref 42.7–76)
NRBC BLD AUTO-RTO: 0 /100 WBC (ref 0–0.2)
NT-PROBNP SERPL-MCNC: 109.5 PG/ML (ref 0–900)
PLATELET # BLD AUTO: 229 10*3/MM3 (ref 140–450)
PMV BLD AUTO: 9.2 FL (ref 6–12)
POTASSIUM SERPL-SCNC: 3.7 MMOL/L (ref 3.5–5.2)
PROT SERPL-MCNC: 7 G/DL (ref 6–8.5)
PROTHROMBIN TIME: 11.4 SECONDS (ref 9.6–11.7)
RBC # BLD AUTO: 4.52 10*6/MM3 (ref 3.77–5.28)
SARS-COV-2 RNA PNL SPEC NAA+PROBE: NOT DETECTED
SODIUM SERPL-SCNC: 136 MMOL/L (ref 136–145)
TROPONIN T SERPL-MCNC: <0.01 NG/ML (ref 0–0.03)
WBC NRBC COR # BLD: 6.1 10*3/MM3 (ref 3.4–10.8)

## 2022-03-24 PROCEDURE — 85025 COMPLETE CBC W/AUTO DIFF WBC: CPT | Performed by: NURSE PRACTITIONER

## 2022-03-24 PROCEDURE — 80053 COMPREHEN METABOLIC PANEL: CPT | Performed by: NURSE PRACTITIONER

## 2022-03-24 PROCEDURE — 93005 ELECTROCARDIOGRAM TRACING: CPT | Performed by: EMERGENCY MEDICINE

## 2022-03-24 PROCEDURE — 85610 PROTHROMBIN TIME: CPT | Performed by: NURSE PRACTITIONER

## 2022-03-24 PROCEDURE — 99283 EMERGENCY DEPT VISIT LOW MDM: CPT

## 2022-03-24 PROCEDURE — 85730 THROMBOPLASTIN TIME PARTIAL: CPT | Performed by: NURSE PRACTITIONER

## 2022-03-24 PROCEDURE — 87635 SARS-COV-2 COVID-19 AMP PRB: CPT | Performed by: NURSE PRACTITIONER

## 2022-03-24 PROCEDURE — 84484 ASSAY OF TROPONIN QUANT: CPT | Performed by: NURSE PRACTITIONER

## 2022-03-24 PROCEDURE — 36415 COLL VENOUS BLD VENIPUNCTURE: CPT

## 2022-03-24 PROCEDURE — 93005 ELECTROCARDIOGRAM TRACING: CPT

## 2022-03-24 PROCEDURE — 71045 X-RAY EXAM CHEST 1 VIEW: CPT

## 2022-03-24 PROCEDURE — 71275 CT ANGIOGRAPHY CHEST: CPT

## 2022-03-24 PROCEDURE — 83880 ASSAY OF NATRIURETIC PEPTIDE: CPT | Performed by: NURSE PRACTITIONER

## 2022-03-24 PROCEDURE — 0 IOPAMIDOL PER 1 ML: Performed by: EMERGENCY MEDICINE

## 2022-03-24 RX ORDER — SODIUM CHLORIDE 0.9 % (FLUSH) 0.9 %
10 SYRINGE (ML) INJECTION AS NEEDED
Status: DISCONTINUED | OUTPATIENT
Start: 2022-03-24 | End: 2022-03-24 | Stop reason: HOSPADM

## 2022-03-24 RX ADMIN — IOPAMIDOL 100 ML: 755 INJECTION, SOLUTION INTRAVENOUS at 17:01

## 2022-03-24 NOTE — DISCHARGE INSTRUCTIONS
Please follow-up with your primary care provider as scheduled, as well as cardiology, call for appointment  Return to the ED for new or worsening symptoms  Continue medications as prescribed

## 2022-03-24 NOTE — ED PROVIDER NOTES
Subjective    Chief Complaint   Patient presents with   • Chest Pain     Maribel Hale, APRN  No LMP recorded. (Menstrual status: Tubal ligation).  Allergies   Allergen Reactions   • Adhesive Tape Rash       Patient is a 71-year-old female presents to the ED with complaint of chest pressure, cough and intermittent dyspnea.  Patient reports this began 2 days ago, she reports it begins in her mid chest, 10 times because of her neck.  She reports this only while she is breathing.  She is not having dizziness, lightheadedness, diaphoresis or syncope.  No abnormal leg pain or swelling.  No fever chills.  She is currently taking Eliquis given her recent DVT and PE diagnosis.    Onset: 2 days  Location: Chest and neck  Duration: Now gone  Character: Pressure  Aggravating & Alleviating Factors: Worsened with breathing.  Not present while not taking deep breath  Radiation: Up the neck  Treatments Tried: None            Review of Systems   Constitutional: Negative for chills and fever.   HENT: Positive for congestion and rhinorrhea.    Respiratory: Positive for cough and shortness of breath. Negative for chest tightness.    Cardiovascular: Positive for chest pain. Negative for palpitations and leg swelling.   Gastrointestinal: Negative for abdominal pain, diarrhea, nausea and vomiting.   Genitourinary: Negative for dysuria.   Musculoskeletal: Negative for back pain and neck pain.   Skin: Negative for rash.   Neurological: Negative for dizziness, syncope, weakness, light-headedness and headaches.   Hematological: Does not bruise/bleed easily.       Past Medical History:   Diagnosis Date   • Abnormal coagulation profile    • Abnormal EKG    • Abnormal laboratory test    • Acquired spondylolisthesis    • Allergic 7/16/2021    Start shots in 7/27/21   • Arthritis    • Asthma    • Body aches    • Bronchitis, acute    • Chest pain     PRESSURE   • Chronic back pain    • Cough    • Depression with anxiety    • DJD (degenerative  joint disease)    • Essential hypertension    • Fatigue    • History of radiofrequency ablation (RFA) procedure for cardiac arrhythmia 1/5/2021   • Hyperlipidemia    • Hypokalemia    • Low back pain    • Lymphocytic colitis    • Obesity    • Other specified disorders of bone density and structure, other site    • Panic disorder    • Paroxysmal SVT (supraventricular tachycardia) (HCC)    • Postmenopausal     9 YEARS   • Prediabetes    • SOB (shortness of breath)    • Unspecified injury of left Achilles tendon, initial encounter    • Vitamin B deficiency 1/14/2019   • Vitamin B deficiency, unspecified    • Vitamin D deficiency, unspecified        Allergies   Allergen Reactions   • Adhesive Tape Rash       Past Surgical History:   Procedure Laterality Date   • ACHILLES TENDON SURGERY Left 2018   • AV NODE ABLATION  08/2016    AVNRT RF ABLATION --8/16   • CARDIAC CATHETERIZATION Bilateral 2/11/2022    Procedure: EKOS;  Surgeon: Kilo Vivar DO;  Location: [facility] INVASIVE LOCATION;  Service: Cardiovascular;  Laterality: Bilateral;   • COLONOSCOPY N/A 01/01/2012   • FOOT SURGERY Right     TOE/FOOT SURGERY   • KNEE SURGERY Right     2/2 TORN MENICUS   • TUBAL ABDOMINAL LIGATION         Family History   Problem Relation Age of Onset   • Arthritis Mother    • Diabetes Mother    • Heart disease Mother    • Emphysema Mother    • Kidney disease Father    • Arthritis Sister    • Anxiety disorder Sister    • Arthritis Brother    • Anxiety disorder Brother    • Acute myelogenous leukemia Niece    • Myasthenia gravis Niece        Social History     Socioeconomic History   • Marital status:      Spouse name: Alonso Trujillo   • Number of children: 3   Tobacco Use   • Smoking status: Never Smoker   • Smokeless tobacco: Never Used   Vaping Use   • Vaping Use: Never used   Substance and Sexual Activity   • Alcohol use: Yes     Alcohol/week: 1.0 standard drink     Types: 1 Standard drinks or equivalent per week  "    Comment: 1 DRINK WEEKLY, IF THAT: RARE/OCC;  CAFFEINE USE + 1 COFFEE DAILY   • Drug use: No   • Sexual activity: Not Currently     Partners: Male     Birth control/protection: Other, Tubal ligation     Comment: monogamous w/ ;  s/p BTL - no high-risk sexual behaviors           Objective   Physical Exam  Vitals and nursing note reviewed.   Constitutional:       General: She is not in acute distress.     Appearance: She is well-developed. She is not ill-appearing, toxic-appearing or diaphoretic.   HENT:      Head: Normocephalic and atraumatic.   Eyes:      Extraocular Movements: Extraocular movements intact.      Pupils: Pupils are equal, round, and reactive to light.   Cardiovascular:      Rate and Rhythm: Normal rate and regular rhythm.      Pulses:           Radial pulses are 2+ on the right side and 2+ on the left side.      Heart sounds: Normal heart sounds. No murmur heard.    No friction rub. No gallop.   Pulmonary:      Effort: Pulmonary effort is normal.      Breath sounds: Normal breath sounds.   Abdominal:      General: Bowel sounds are normal.      Palpations: Abdomen is soft.   Musculoskeletal:      Cervical back: Normal range of motion and neck supple.      Right lower leg: No tenderness. No edema.      Left lower leg: No tenderness. No edema.   Skin:     General: Skin is warm and dry.      Capillary Refill: Capillary refill takes less than 2 seconds.      Findings: No erythema or rash.   Neurological:      General: No focal deficit present.      Mental Status: She is alert and oriented to person, place, and time.   Psychiatric:         Mood and Affect: Mood normal.         Behavior: Behavior normal.         Procedures           ED Course  /68 (BP Location: Right arm, Patient Position: Sitting)   Pulse 72   Temp 97.6 °F (36.4 °C) (Tympanic)   Resp 16   Ht 154.9 cm (61\")   Wt 101 kg (222 lb 0.1 oz)   SpO2 99%   BMI 41.95 kg/m²   Labs Reviewed   COMPREHENSIVE METABOLIC PANEL - " Abnormal; Notable for the following components:       Result Value    Glucose 113 (*)     Chloride 96 (*)     All other components within normal limits    Narrative:     GFR Normal >60  Chronic Kidney Disease <60  Kidney Failure <15     APTT - Abnormal; Notable for the following components:    PTT 27.6 (*)     All other components within normal limits   CBC WITH AUTO DIFFERENTIAL - Abnormal; Notable for the following components:    Lymphocyte % 16.1 (*)     All other components within normal limits   COVID-19,CEPHEID/ROD,COR/KARUNA/PAD/RAYMOND IN-HOUSE,NP SWAB IN TRANSPORT MEDIA 3-4 HR TAT, RT-PCR - Normal    Narrative:     Fact sheet for providers: https://www.fda.gov/media/521533/download     Fact sheet for patients: https://www.fda.gov/media/510361/download  Fact sheet for providers: https://www.fda.gov/media/768040/download    Fact sheet for patients: https://www.[employer].gov/media/980285/download    Test performed by PCR.   BNP (IN-HOUSE) - Normal    Narrative:     Among patients with dyspnea, NT-proBNP is highly sensitive for the detection of acute congestive heart failure. In addition NT-proBNP of <300 pg/ml effectively rules out acute congestive heart failure with 99% negative predictive value.    Results may be falsely decreased if patient taking Biotin.     TROPONIN (IN-HOUSE) - Normal    Narrative:     Troponin T Reference Range:  <= 0.03 ng/mL-   Negative for AMI  >0.03 ng/mL-     Abnormal for myocardial necrosis.  Clinicians would have to utilize clinical acumen, EKG, Troponin and serial changes to determine if it is an Acute Myocardial Infarction or myocardial injury due to an underlying chronic condition.       Results may be falsely decreased if patient taking Biotin.     PROTIME-INR - Normal   CBC AND DIFFERENTIAL    Narrative:     The following orders were created for panel order CBC & Differential.  Procedure                               Abnormality         Status                     ---------                                -----------         ------                     CBC Auto Differential[[id number]]        Abnormal            Final result                 Please view results for these tests on the individual orders.     Medications   iopamidol (ISOVUE-370) 76 % injection 100 mL (100 mL Intravenous Given 3/24/22 1701)     XR Chest 1 View    Result Date: 3/24/2022  Bibasilar linear consolidation suggesting most likely atelectasis. Pneumonia felt less likely.  Electronically Signed By-Fercho Elliott MD On:3/24/2022 4:06 PM This report was finalized on [id number] by  Fercho Elliott MD.    CT Chest Pulmonary Embolism    Result Date: 3/24/2022  1. Negative for pulmonary emboli. The pulmonary emboli that were demonstrated on 02/10/2022 are no longer seen. 2. No evidence of significant active lung disease.  Electronically Signed By-Susan Renee MD On:3/24/2022 5:38 PM This report was finalized on [id number] by  Susan Renee MD.      ED Course as of 03/25/22 0021   u Mar 24, 2022   1808 Spoke with patient at the bedside [LB]      ED Course User Index  [LB] Patti Tapia, APRN        EKG reviewed by MD and I, our findings are: Sinus rhythm rate of 71 compared to previous 3/10/2022.  No acute ST changes.                                             MDM  Number of Diagnoses or Management Options  Chest pain, unspecified type  Diagnosis management comments: Chart review: Patient discharged to February 12, 2022, patient had previously been admitted and seen by myself here in the ED for chest pain, dyspnea, noted to have bilateral PEs, she underwent EKOS procedure tolerated procedure well.    Appropriate PPE was worn during the duration of the care for this patient while in the emergency department per [facility] Policy  Differentials: Angina, STEMI, NSTEMI, PE  This list is not all inclusive and does not constitute the entireity of considered causes.   Patient was brought back to the emergency department room for  evaluation and placed on appropriate monitoring.    Patient had IV established and blood work obtained  Imaging: Given patient's recent PEs, CT chest PE protocol was ordered, negative for pulmonary emboli, the pulmonary emboli that were demonstrated on 2/10/2020 showed no longer seen.  No evidence of significant active lung disease.  Labs: CMP unremarkable.  CBC unremarkable.  BNP normal, troponin normal COVID-19 is negative.  EKG: As above  ED Course, Workup, Summary and Disposition: Patient is a 71-year-old female presents the ED with complaint of chest pressure and tightness with taking deep breaths.  She did with the above exam work-up, and her work-up here in the ED is reassuring.  She was recently admitted for PE, underwent EKOS procedure.  Her troponin is negative.  EKG shows no acute changes.  I spoke with the patient at the bedside and offer her admission to the observation unit for further cardiac rule out, however patient does prefer to be discharged home, she is seeing her primary care provider on Tuesday, she has seen Dr. Phillips in the office last week, and she would like to be discharged.    I spoke with the patient at the bedside regarding their plan of care, discharge instruction, home care, prescriptions, and importance follow-up.  We discussed test results at the bedside, including incidental abnormal labs, radiological findings, understands need for follow-up with primary care or specialist if indicated.      Patient was made aware of indications to return to the emergency department.  Patient agrees with the current plan of care for discharge, verbalized understanding of all instructions    Pt is aware that discharge does not mean that nothing is wrong but it indicates no emergency is present and they must continue care with follow-up as given below or physician of their choice                                     Amount and/or Complexity of Data Reviewed  Clinical lab tests: reviewed  Tests in  the radiology section of CPT®: reviewed  Tests in the medicine section of CPT®: reviewed        Final diagnoses:   Chest pain, unspecified type       ED Disposition  ED Disposition     ED Disposition   Discharge    Condition   Stable    Comment   --             [facility] EMERGENCY DEPARTMENT  [address] [address] [phone]  Schedule an appointment as soon as possible for a visit       Maribel Hale, APRN  [address]  [place] [id number] [phone]    Schedule an appointment as soon as possible for a visit            Medication List      No changes were made to your prescriptions during this visit.          Patti Tapia, APRN  03/25/22 0021

## 2022-03-24 NOTE — TELEPHONE ENCOUNTER
Pt reports some fatigue and chest tightness on Eliquis. I spoke with Dr Vivar and he wants her to go to the ER to make sure there is no further clot burden with her recent history of DVT/PE. I spoke with the patient and she reports she is feeling better and she only feels the tightness when she takes a deep breath but is able to walk distances without complications. She did not want to go to the ER, I re-emphasized that Dr Vivar would like for her to go to be evaluated. She says she will see how she is feeling and may go later.

## 2022-03-25 LAB — QT INTERVAL: 389 MS

## 2022-03-29 ENCOUNTER — OFFICE VISIT (OUTPATIENT)
Dept: FAMILY MEDICINE CLINIC | Facility: CLINIC | Age: 72
End: 2022-03-29

## 2022-03-29 VITALS
TEMPERATURE: 97.3 F | RESPIRATION RATE: 17 BRPM | SYSTOLIC BLOOD PRESSURE: 148 MMHG | WEIGHT: 218 LBS | OXYGEN SATURATION: 98 % | DIASTOLIC BLOOD PRESSURE: 80 MMHG | HEIGHT: 61 IN | HEART RATE: 72 BPM | BODY MASS INDEX: 41.16 KG/M2

## 2022-03-29 DIAGNOSIS — I10 ESSENTIAL HYPERTENSION: Primary | Chronic | ICD-10-CM

## 2022-03-29 DIAGNOSIS — Z79.01 PULMONARY EMBOLISM ON LONG-TERM ANTICOAGULATION THERAPY: ICD-10-CM

## 2022-03-29 DIAGNOSIS — I26.99 PULMONARY EMBOLISM ON LONG-TERM ANTICOAGULATION THERAPY: ICD-10-CM

## 2022-03-29 PROCEDURE — 99213 OFFICE O/P EST LOW 20 MIN: CPT | Performed by: NURSE PRACTITIONER

## 2022-03-29 NOTE — PROGRESS NOTES
"Chief Complaint  Hypertension  Subjective        Radha Trujillo presents to [facility]  Pt comes in today for follow up on BP and also f/u on recent events of hospital stay. Was in hospital in Feb with DVT and mult PE. Had called the office with c/o DYLON, then saw Liberty Funk NP and was sent for testing. Determined elevated d-dimer. Was sent to ER for workup.   Currently on eliquis. Had EKOS procedure with Dr. Mac.   Doing ok. Worries a lot though.  Saw hematology for workup.         Objective     Vital Signs:   /80   Pulse 72   Temp 97.3 °F (36.3 °C)   Resp 17   Ht 154.9 cm (61\")   Wt 98.9 kg (218 lb)   SpO2 98%   BMI 41.19 kg/m²       BP Readings from Last 3 Encounters:   03/29/22 148/80   03/24/22 124/68   03/15/22 148/72       Wt Readings from Last 3 Encounters:   03/29/22 98.9 kg (218 lb)   03/24/22 101 kg (222 lb 0.1 oz)   03/15/22 98.9 kg (218 lb)     Physical Exam  Constitutional:       Appearance: She is well-developed. She is obese.   Eyes:      Pupils: Pupils are equal, round, and reactive to light.   Cardiovascular:      Rate and Rhythm: Normal rate and regular rhythm.   Pulmonary:      Effort: Pulmonary effort is normal.      Breath sounds: Normal breath sounds.   Neurological:      Mental Status: She is alert and oriented to person, place, and time.        Result Review :                 Assessment and Plan    Diagnoses and all orders for this visit:    1. Essential hypertension (Primary)  Assessment & Plan:  Hypertension is improving with treatment.  Continue current treatment regimen.  Blood pressure will be reassessed at the next regular appointment.      2. Pulmonary embolism on long-term anticoagulation therapy (HCC)    During this office visit, we discussed the pertinent aspects of the visit and treatment recommendations. Pt verbalizes understanding. Follow up was discussed. Patient was given the opportunity to ask questions and discuss other " concerns.         Follow Up   Return in about 6 months (around 9/29/2022) for Medicare Wellness.  Patient was given instructions and counseling regarding her condition or for health maintenance advice. Please see specific information pulled into the AVS if appropriate.

## 2022-03-31 DIAGNOSIS — F41.9 ANXIETY: ICD-10-CM

## 2022-03-31 RX ORDER — ESCITALOPRAM OXALATE 20 MG/1
TABLET ORAL
Qty: 30 TABLET | Refills: 1 | Status: SHIPPED | OUTPATIENT
Start: 2022-03-31 | End: 2022-05-31

## 2022-04-06 DIAGNOSIS — I82.431 ACUTE DEEP VEIN THROMBOSIS (DVT) OF POPLITEAL VEIN OF RIGHT LOWER EXTREMITY: ICD-10-CM

## 2022-04-07 DIAGNOSIS — I82.431 ACUTE DEEP VEIN THROMBOSIS (DVT) OF POPLITEAL VEIN OF RIGHT LOWER EXTREMITY: ICD-10-CM

## 2022-04-07 NOTE — TELEPHONE ENCOUNTER
Rx Refill Note  Requested Prescriptions     Signed Prescriptions Disp Refills   • apixaban (Eliquis) 5 MG tablet tablet 60 tablet 0     Sig: Take 1 tablet by mouth 2 (Two) Times a Day.     Authorizing Provider: ABIEL SIMS     Ordering User: NOLAN ROSS      Last office visit with prescribing clinician: 3/3/2022      Next office visit with prescribing clinician: 7/7/2022            Nolan Purcell MA  04/07/22, 10:07 EDT

## 2022-05-02 DIAGNOSIS — I82.431 ACUTE DEEP VEIN THROMBOSIS (DVT) OF POPLITEAL VEIN OF RIGHT LOWER EXTREMITY: ICD-10-CM

## 2022-05-03 RX ORDER — POTASSIUM CHLORIDE 750 MG/1
TABLET, FILM COATED, EXTENDED RELEASE ORAL
Qty: 270 TABLET | Refills: 0 | Status: SHIPPED | OUTPATIENT
Start: 2022-05-03 | End: 2022-08-24 | Stop reason: SDUPTHER

## 2022-05-20 ENCOUNTER — PATIENT MESSAGE (OUTPATIENT)
Dept: FAMILY MEDICINE CLINIC | Facility: CLINIC | Age: 72
End: 2022-05-20

## 2022-05-20 DIAGNOSIS — I10 ESSENTIAL HYPERTENSION: ICD-10-CM

## 2022-05-22 RX ORDER — AMLODIPINE BESYLATE 5 MG/1
5 TABLET ORAL DAILY
Qty: 90 TABLET | Refills: 1 | Status: SHIPPED | OUTPATIENT
Start: 2022-05-22 | End: 2022-11-04

## 2022-05-23 DIAGNOSIS — I10 ESSENTIAL HYPERTENSION: ICD-10-CM

## 2022-05-23 RX ORDER — AMLODIPINE BESYLATE 5 MG/1
5 TABLET ORAL DAILY
Qty: 90 TABLET | Refills: 1 | OUTPATIENT
Start: 2022-05-23

## 2022-05-23 NOTE — TELEPHONE ENCOUNTER
From: Radha Trujillo  To: RICKY Mahoney  Sent: 5/20/2022 5:14 PM EDT  Subject: Amlodipine Besylate 5 MG tablets    Wilber López, recently I transferred my prescriptions from [facility] to [facility] in [place] on [address] @[phone]. The Rx above needs to be refilled. Could you please send the refill to [facility] as soon as possible? Thank you.    Radha Trujillo  BD: 1950

## 2022-05-31 DIAGNOSIS — I82.431 ACUTE DEEP VEIN THROMBOSIS (DVT) OF POPLITEAL VEIN OF RIGHT LOWER EXTREMITY: ICD-10-CM

## 2022-05-31 DIAGNOSIS — F41.9 ANXIETY: ICD-10-CM

## 2022-05-31 RX ORDER — ESCITALOPRAM OXALATE 20 MG/1
TABLET ORAL
Qty: 30 TABLET | Refills: 1 | Status: SHIPPED | OUTPATIENT
Start: 2022-05-31 | End: 2022-07-28

## 2022-05-31 NOTE — TELEPHONE ENCOUNTER
Rx Refill Note  Requested Prescriptions     Pending Prescriptions Disp Refills   • apixaban (Eliquis) 5 MG tablet tablet 60 tablet 0     Sig: Take 1 tablet by mouth 2 (Two) Times a Day.      Last office visit with prescribing clinician: 3/29/2022      Next office visit with prescribing clinician: 10/4/2022            Mohini Purcell MA  05/31/22, 13:16 EDT

## 2022-06-14 ENCOUNTER — OFFICE VISIT (OUTPATIENT)
Dept: FAMILY MEDICINE CLINIC | Facility: CLINIC | Age: 72
End: 2022-06-14

## 2022-06-14 ENCOUNTER — LAB (OUTPATIENT)
Dept: FAMILY MEDICINE CLINIC | Facility: CLINIC | Age: 72
End: 2022-06-14

## 2022-06-14 VITALS
HEIGHT: 61 IN | BODY MASS INDEX: 42.67 KG/M2 | HEART RATE: 70 BPM | OXYGEN SATURATION: 97 % | RESPIRATION RATE: 16 BRPM | TEMPERATURE: 97.5 F | SYSTOLIC BLOOD PRESSURE: 140 MMHG | DIASTOLIC BLOOD PRESSURE: 74 MMHG | WEIGHT: 226 LBS

## 2022-06-14 DIAGNOSIS — Z86.2 HISTORY OF ANEMIA: ICD-10-CM

## 2022-06-14 DIAGNOSIS — R53.83 FATIGUE, UNSPECIFIED TYPE: ICD-10-CM

## 2022-06-14 DIAGNOSIS — Z79.01 PULMONARY EMBOLISM ON LONG-TERM ANTICOAGULATION THERAPY: ICD-10-CM

## 2022-06-14 DIAGNOSIS — E55.9 VITAMIN D DEFICIENCY: ICD-10-CM

## 2022-06-14 DIAGNOSIS — R53.83 FATIGUE, UNSPECIFIED TYPE: Primary | ICD-10-CM

## 2022-06-14 DIAGNOSIS — I26.99 PULMONARY EMBOLISM ON LONG-TERM ANTICOAGULATION THERAPY: ICD-10-CM

## 2022-06-14 DIAGNOSIS — Z79.01 CHRONIC ANTICOAGULATION: ICD-10-CM

## 2022-06-14 LAB
BASOPHILS # BLD AUTO: 0.04 10*3/MM3 (ref 0–0.2)
BASOPHILS NFR BLD AUTO: 0.6 % (ref 0–1.5)
DEPRECATED RDW RBC AUTO: 41.9 FL (ref 37–54)
EOSINOPHIL # BLD AUTO: 0.11 10*3/MM3 (ref 0–0.4)
EOSINOPHIL NFR BLD AUTO: 1.8 % (ref 0.3–6.2)
ERYTHROCYTE [DISTWIDTH] IN BLOOD BY AUTOMATED COUNT: 13.9 % (ref 12.3–15.4)
HCT VFR BLD AUTO: 40.5 % (ref 34–46.6)
HGB BLD-MCNC: 12.8 G/DL (ref 12–15.9)
IMM GRANULOCYTES # BLD AUTO: 0.02 10*3/MM3 (ref 0–0.05)
IMM GRANULOCYTES NFR BLD AUTO: 0.3 % (ref 0–0.5)
LYMPHOCYTES # BLD AUTO: 1.21 10*3/MM3 (ref 0.7–3.1)
LYMPHOCYTES NFR BLD AUTO: 19.4 % (ref 19.6–45.3)
MCH RBC QN AUTO: 26.4 PG (ref 26.6–33)
MCHC RBC AUTO-ENTMCNC: 31.6 G/DL (ref 31.5–35.7)
MCV RBC AUTO: 83.7 FL (ref 79–97)
MONOCYTES # BLD AUTO: 0.61 10*3/MM3 (ref 0.1–0.9)
MONOCYTES NFR BLD AUTO: 9.8 % (ref 5–12)
NEUTROPHILS NFR BLD AUTO: 4.24 10*3/MM3 (ref 1.7–7)
NEUTROPHILS NFR BLD AUTO: 68.1 % (ref 42.7–76)
NRBC BLD AUTO-RTO: 0 /100 WBC (ref 0–0.2)
PLATELET # BLD AUTO: 266 10*3/MM3 (ref 140–450)
PMV BLD AUTO: 11.4 FL (ref 6–12)
RBC # BLD AUTO: 4.84 10*6/MM3 (ref 3.77–5.28)
WBC NRBC COR # BLD: 6.23 10*3/MM3 (ref 3.4–10.8)

## 2022-06-14 PROCEDURE — 82607 VITAMIN B-12: CPT | Performed by: NURSE PRACTITIONER

## 2022-06-14 PROCEDURE — 85025 COMPLETE CBC W/AUTO DIFF WBC: CPT | Performed by: NURSE PRACTITIONER

## 2022-06-14 PROCEDURE — 82306 VITAMIN D 25 HYDROXY: CPT | Performed by: NURSE PRACTITIONER

## 2022-06-14 PROCEDURE — 84443 ASSAY THYROID STIM HORMONE: CPT | Performed by: NURSE PRACTITIONER

## 2022-06-14 PROCEDURE — 80053 COMPREHEN METABOLIC PANEL: CPT | Performed by: NURSE PRACTITIONER

## 2022-06-14 PROCEDURE — 99213 OFFICE O/P EST LOW 20 MIN: CPT | Performed by: NURSE PRACTITIONER

## 2022-06-14 PROCEDURE — 36415 COLL VENOUS BLD VENIPUNCTURE: CPT

## 2022-06-14 PROCEDURE — 82728 ASSAY OF FERRITIN: CPT | Performed by: NURSE PRACTITIONER

## 2022-06-14 NOTE — PROGRESS NOTES
"Chief Complaint  Fatigue  Subjective        Radha Trujillo presents to [facility]  Pt comes in today with c/o fatigue.   Seems to have worsened since being hospitalized for PE in Feb. Uses CPAP nightly and feels like she sleeps well.   Was concerned that eliquis is making her fatigue.   She is also on lexapro, but working well for her.   She is not sure if something is off, or just lifestyle. She states she is not very active and gets bored easily.     Review of Systems     Objective     Vital Signs:   /74   Pulse 70   Temp 97.5 °F (36.4 °C)   Resp 16   Ht 154.9 cm (61\")   Wt 103 kg (226 lb)   SpO2 97%   BMI 42.70 kg/m²       BP Readings from Last 3 Encounters:   06/14/22 140/74   03/29/22 148/80   03/24/22 124/68       Wt Readings from Last 3 Encounters:   06/14/22 103 kg (226 lb)   03/29/22 98.9 kg (218 lb)   03/24/22 101 kg (222 lb 0.1 oz)     Physical Exam  Constitutional:       Appearance: She is well-developed.   Eyes:      Pupils: Pupils are equal, round, and reactive to light.   Cardiovascular:      Rate and Rhythm: Normal rate and regular rhythm.   Pulmonary:      Effort: Pulmonary effort is normal.      Breath sounds: Normal breath sounds.   Neurological:      Mental Status: She is alert and oriented to person, place, and time.        Result Review :                 Assessment and Plan    Diagnoses and all orders for this visit:    1. Fatigue, unspecified type (Primary)  -     TSH; Future  -     Vitamin B12; Future  -     Vitamin D 25 Hydroxy; Future  -     Ferritin; Future  -     CBC & Differential; Future  -     Comprehensive Metabolic Panel; Future    2. Vitamin D deficiency  -     Vitamin D 25 Hydroxy; Future    3. Chronic anticoagulation    4. Pulmonary embolism on long-term anticoagulation therapy (HCC)  -     Ferritin; Future    5. History of anemia  -     Ferritin; Future    check labs  Increase activity   During this office visit, we discussed the " pertinent aspects of the visit and treatment recommendations. Pt verbalizes understanding. Follow up was discussed. Patient was given the opportunity to ask questions and discuss other concerns.         Follow Up   Return in about 3 months (around 9/14/2022), or if symptoms worsen or fail to improve, for Medicare Wellness.  Patient was given instructions and counseling regarding her condition or for health maintenance advice. Please see specific information pulled into the AVS if appropriate.

## 2022-06-15 LAB
25(OH)D3 SERPL-MCNC: 51.3 NG/ML (ref 30–100)
ALBUMIN SERPL-MCNC: 4.5 G/DL (ref 3.5–5.2)
ALBUMIN/GLOB SERPL: 2 G/DL
ALP SERPL-CCNC: 83 U/L (ref 39–117)
ALT SERPL W P-5'-P-CCNC: 23 U/L (ref 1–33)
ANION GAP SERPL CALCULATED.3IONS-SCNC: 13.9 MMOL/L (ref 5–15)
AST SERPL-CCNC: 23 U/L (ref 1–32)
BILIRUB SERPL-MCNC: 0.2 MG/DL (ref 0–1.2)
BUN SERPL-MCNC: 12 MG/DL (ref 8–23)
BUN/CREAT SERPL: 16.4 (ref 7–25)
CALCIUM SPEC-SCNC: 9.7 MG/DL (ref 8.6–10.5)
CHLORIDE SERPL-SCNC: 98 MMOL/L (ref 98–107)
CO2 SERPL-SCNC: 25.1 MMOL/L (ref 22–29)
CREAT SERPL-MCNC: 0.73 MG/DL (ref 0.57–1)
EGFRCR SERPLBLD CKD-EPI 2021: 88 ML/MIN/1.73
FERRITIN SERPL-MCNC: 52.8 NG/ML (ref 13–150)
GLOBULIN UR ELPH-MCNC: 2.3 GM/DL
GLUCOSE SERPL-MCNC: 92 MG/DL (ref 65–99)
POTASSIUM SERPL-SCNC: 3.7 MMOL/L (ref 3.5–5.2)
PROT SERPL-MCNC: 6.8 G/DL (ref 6–8.5)
SODIUM SERPL-SCNC: 137 MMOL/L (ref 136–145)
TSH SERPL DL<=0.05 MIU/L-ACNC: 1.5 UIU/ML (ref 0.27–4.2)
VIT B12 BLD-MCNC: 786 PG/ML (ref 211–946)

## 2022-06-20 DIAGNOSIS — I10 ESSENTIAL HYPERTENSION: ICD-10-CM

## 2022-06-20 RX ORDER — VALSARTAN AND HYDROCHLOROTHIAZIDE 320; 25 MG/1; MG/1
TABLET, FILM COATED ORAL
Qty: 90 TABLET | Refills: 3 | Status: SHIPPED | OUTPATIENT
Start: 2022-06-20

## 2022-06-30 DIAGNOSIS — I82.431 ACUTE DEEP VEIN THROMBOSIS (DVT) OF POPLITEAL VEIN OF RIGHT LOWER EXTREMITY: ICD-10-CM

## 2022-07-07 ENCOUNTER — APPOINTMENT (OUTPATIENT)
Dept: LAB | Facility: HOSPITAL | Age: 72
End: 2022-07-07

## 2022-07-07 ENCOUNTER — OFFICE VISIT (OUTPATIENT)
Dept: ONCOLOGY | Facility: CLINIC | Age: 72
End: 2022-07-07

## 2022-07-07 VITALS
BODY MASS INDEX: 43.61 KG/M2 | DIASTOLIC BLOOD PRESSURE: 85 MMHG | OXYGEN SATURATION: 96 % | HEART RATE: 70 BPM | WEIGHT: 231 LBS | TEMPERATURE: 96.9 F | SYSTOLIC BLOOD PRESSURE: 153 MMHG | HEIGHT: 61 IN

## 2022-07-07 DIAGNOSIS — I82.431 EMBOLISM AND THROMBOSIS OF RIGHT POPLITEAL VEIN: Primary | ICD-10-CM

## 2022-07-07 DIAGNOSIS — I82.431 ACUTE DEEP VEIN THROMBOSIS (DVT) OF POPLITEAL VEIN OF RIGHT LOWER EXTREMITY: Primary | ICD-10-CM

## 2022-07-07 LAB
ALBUMIN SERPL-MCNC: 4.4 G/DL (ref 3.5–5.2)
ALBUMIN/GLOB SERPL: 1.6 G/DL
ALP SERPL-CCNC: 77 U/L (ref 39–117)
ALT SERPL W P-5'-P-CCNC: 26 U/L (ref 1–33)
ANION GAP SERPL CALCULATED.3IONS-SCNC: 12 MMOL/L (ref 5–15)
AST SERPL-CCNC: 20 U/L (ref 1–32)
BASOPHILS # BLD AUTO: 0.03 10*3/MM3 (ref 0–0.2)
BASOPHILS NFR BLD AUTO: 0.6 % (ref 0–1.5)
BILIRUB SERPL-MCNC: 0.2 MG/DL (ref 0–1.2)
BUN SERPL-MCNC: 13 MG/DL (ref 8–23)
BUN/CREAT SERPL: 17.6 (ref 7–25)
CALCIUM SPEC-SCNC: 9.5 MG/DL (ref 8.6–10.5)
CHLORIDE SERPL-SCNC: 99 MMOL/L (ref 98–107)
CO2 SERPL-SCNC: 27 MMOL/L (ref 22–29)
CREAT SERPL-MCNC: 0.74 MG/DL (ref 0.57–1)
DEPRECATED RDW RBC AUTO: 44.8 FL (ref 37–54)
EGFRCR SERPLBLD CKD-EPI 2021: 86.6 ML/MIN/1.73
EOSINOPHIL # BLD AUTO: 0.12 10*3/MM3 (ref 0–0.4)
EOSINOPHIL NFR BLD AUTO: 2.4 % (ref 0.3–6.2)
ERYTHROCYTE [DISTWIDTH] IN BLOOD BY AUTOMATED COUNT: 14.8 % (ref 12.3–15.4)
GLOBULIN UR ELPH-MCNC: 2.7 GM/DL
GLUCOSE SERPL-MCNC: 106 MG/DL (ref 65–99)
HCT VFR BLD AUTO: 36 % (ref 34–46.6)
HGB BLD-MCNC: 12.1 G/DL (ref 12–15.9)
HOLD SPECIMEN: NORMAL
LYMPHOCYTES # BLD AUTO: 1.11 10*3/MM3 (ref 0.7–3.1)
LYMPHOCYTES NFR BLD AUTO: 22.2 % (ref 19.6–45.3)
MCH RBC QN AUTO: 28.3 PG (ref 26.6–33)
MCHC RBC AUTO-ENTMCNC: 33.6 G/DL (ref 31.5–35.7)
MCV RBC AUTO: 84.3 FL (ref 79–97)
MONOCYTES # BLD AUTO: 0.46 10*3/MM3 (ref 0.1–0.9)
MONOCYTES NFR BLD AUTO: 9.2 % (ref 5–12)
NEUTROPHILS NFR BLD AUTO: 3.27 10*3/MM3 (ref 1.7–7)
NEUTROPHILS NFR BLD AUTO: 65.6 % (ref 42.7–76)
PLATELET # BLD AUTO: 232 10*3/MM3 (ref 140–450)
PMV BLD AUTO: 10.6 FL (ref 6–12)
POTASSIUM SERPL-SCNC: 3.8 MMOL/L (ref 3.5–5.2)
PROT SERPL-MCNC: 7.1 G/DL (ref 6–8.5)
RBC # BLD AUTO: 4.27 10*6/MM3 (ref 3.77–5.28)
SODIUM SERPL-SCNC: 138 MMOL/L (ref 136–145)
WBC NRBC COR # BLD: 4.99 10*3/MM3 (ref 3.4–10.8)
WHOLE BLOOD HOLD COAG: NORMAL

## 2022-07-07 PROCEDURE — 99213 OFFICE O/P EST LOW 20 MIN: CPT | Performed by: INTERNAL MEDICINE

## 2022-07-07 PROCEDURE — 80053 COMPREHEN METABOLIC PANEL: CPT | Performed by: INTERNAL MEDICINE

## 2022-07-07 PROCEDURE — 36415 COLL VENOUS BLD VENIPUNCTURE: CPT | Performed by: INTERNAL MEDICINE

## 2022-07-07 PROCEDURE — 85025 COMPLETE CBC W/AUTO DIFF WBC: CPT | Performed by: INTERNAL MEDICINE

## 2022-07-07 NOTE — PROGRESS NOTES
Hematology/Oncology Outpatient Follow Up    PATIENT NAME:Radha Trujillo  :1950  MRN: [id number]  PRIMARY CARE PHYSICIAN: Maribel Hale APRN  REFERRING PHYSICIAN: Maribel Hale APRN         HISTORY OF PRESENT ILLNESS:   Radha Trujillo is a 71 y.o. female who presented to [facility] on 2/10/2022 with complaints of abnormal test from her PCP.  Reports that she has not been feeling well and has been feeling short of breath since Monday.  Patient reports that she started walking into [facility] and suddenly felt short of breath.  Denies chest pain, fever, chills, no recent COVID-19 diagnosis, dizziness, lightheadedness, diaphoresis, syncope, abnormal leg pain or swelling.  No recent surgeries or long trips.  She does report she has been more sedentary than normal.  Positive Respiratory panel with viral infection plus a negative procalcitonin without other underlying concern for bacterial infection.  CT chest PE protocol revealed Pulmonary embolism in the right and left segmental and subsegmental branches with early right heart strain. No pulmonary infarct is seen. Patient was started on IV heparin.        22  Hematology/Oncology was consulted for Pulmonary embolism in the right and left segmental and subsegmental branches with early right heart strain.  No pulmonary infarct is seen; Patient was started on IV heparin. No history of blood disorders or DVT.  Patient has no smoking history.    3/3/2022 Ms. Trujillo returned to the clinic for follow-up.  She was feeling well and had tolerated the rivaroxaban without any difficulties.  She had no bleeding.  All her respiratory symptoms were completely resolved.  The physical exam revealed only significant obesity but there was no other abnormalities.  Plans to continue with the same anticoagulant were made.    2022: Back in the office for follow-up.  Compliant with the apixaban and no obvious side effects from it.  Had gained some weight.   Discussed the importance of weight management to reduce her risk of thrombosis.  She was asked to continue on the same anticoagulant.  A new prescription was sent.  She was also asked to return in 6 months.     He/She  has a past medical history of Abnormal coagulation profile, Abnormal EKG, Abnormal laboratory test, Acquired spondylolisthesis, Allergic (7/16/2021), Arthritis, Asthma, Body aches, Bronchitis, acute, Chest pain, Chronic back pain, Cough, Depression with anxiety, DJD (degenerative joint disease), Essential hypertension, Fatigue, History of radiofrequency ablation (RFA) procedure for cardiac arrhythmia (1/5/2021), Hyperlipidemia, Hypokalemia, Low back pain, Lymphocytic colitis, Obesity, Other specified disorders of bone density and structure, other site, Panic disorder, Paroxysmal SVT (supraventricular tachycardia) (HCC), Postmenopausal, Prediabetes, SOB (shortness of breath), Unspecified injury of left Achilles tendon, initial encounter, Vitamin B deficiency (1/14/2019), Vitamin B deficiency, unspecified, and Vitamin D deficiency, unspecified.      PCP: Maribel Hale APRN  History of present illness was reviewed and is unchanged from the previous visit. 02/12/22    Past Medical History:   Diagnosis Date   • Abnormal coagulation profile    • Abnormal EKG    • Abnormal laboratory test    • Acquired spondylolisthesis    • Allergic 07/16/2021    Start shots in 7/27/21   • Arthritis    • Asthma    • Body aches    • Bronchitis, acute    • Chest pain     PRESSURE   • Chronic back pain    • Cough    • Deep vein thrombosis (HCC) 02/2022    Taking Eliquis   • Depression with anxiety    • DJD (degenerative joint disease)    • Essential hypertension    • Fatigue    • History of radiofrequency ablation (RFA) procedure for cardiac arrhythmia 01/05/2021   • Hyperlipidemia    • Hypokalemia    • Low back pain    • Lymphocytic colitis    • Obesity    • Other specified disorders of bone density and structure, other  site    • Panic disorder    • Paroxysmal SVT (supraventricular tachycardia) (HCC)    • Postmenopausal     9 YEARS   • Prediabetes    • Pulmonary embolism (HCC) Feb. 2022    Treated   • SOB (shortness of breath)    • Unspecified injury of left Achilles tendon, initial encounter    • Vitamin B deficiency 01/14/2019   • Vitamin B deficiency, unspecified    • Vitamin D deficiency, unspecified      Past Surgical History:   Procedure Laterality Date   • ACHILLES TENDON SURGERY Left 2018   • AV NODE ABLATION  08/2016    AVNRT RF ABLATION --8/16   • CARDIAC CATHETERIZATION Bilateral 2/11/2022    Procedure: EKOS;  Surgeon: Kilo Vivar DO;  Location: [facility] INVASIVE LOCATION;  Service: Cardiovascular;  Laterality: Bilateral;   • COLONOSCOPY N/A 01/01/2012   • FOOT SURGERY Right     TOE/FOOT SURGERY   • KNEE SURGERY Right     2/2 TORN MENICUS   • TUBAL ABDOMINAL LIGATION         Current Outpatient Medications:   •  ALPRAZolam (Xanax) 0.25 MG tablet, Take 1 tablet by mouth 2 (Two) Times a Day As Needed for Anxiety., Disp: 30 tablet, Rfl: 0  •  amLODIPine (NORVASC) 5 MG tablet, Take 1 tablet by mouth Daily., Disp: 90 tablet, Rfl: 1  •  apixaban (ELIQUIS) 5 MG tablet tablet, Take 1 tablet by mouth 2 (Two) Times a Day., Disp: 60 tablet, Rfl: 0  •  azelastine (ASTELIN) 0.1 % nasal spray, 1 spray into the nostril(s) as directed by provider 2 (Two) Times a Day., Disp: , Rfl:   •  escitalopram (LEXAPRO) 20 MG tablet, TAKE 1 TABLET BY MOUTH EVERY DAY, Disp: 30 tablet, Rfl: 1  •  fluticasone (Flovent HFA) 110 MCG/ACT inhaler, Inhale 1 puff 2 (Two) Times a Day., Disp: 1 inhaler, Rfl: 1  •  Multiple Vitamin (MULTIVITAMIN) capsule, Take 1 capsule by mouth Daily., Disp: , Rfl:   •  Omeprazole 20 MG tablet delayed-release, Take 20 mg by mouth Daily., Disp: , Rfl:   •  potassium chloride 10 MEQ CR tablet, TAKE 1 TABLET BY MOUTH THREE TIMES DAILY, Disp: 270 tablet, Rfl: 0  •  valsartan-hydrochlorothiazide (DIOVAN-HCT)  320-25 MG per tablet, TAKE 1 TABLET BY MOUTH EVERY DAY, Disp: 90 tablet, Rfl: 3  •  VITAMIN B COMPLEX-C PO, Take  by mouth., Disp: , Rfl:   •  Vitamins A & D 5000-400 units capsule, Take 1 capsule by mouth Daily., Disp: , Rfl:     Allergies   Allergen Reactions   • Adhesive Tape Rash     Family History   Problem Relation Age of Onset   • Arthritis Mother    • Diabetes Mother    • Heart disease Mother    • Emphysema Mother    • Kidney disease Father    • Arthritis Sister    • Anxiety disorder Sister    • Arthritis Brother    • Anxiety disorder Brother    • Acute myelogenous leukemia Niece    • Myasthenia gravis Niece      Cancer-related family history is not on file.    Social History     Tobacco Use   • Smoking status: Never Smoker   • Smokeless tobacco: Never Used   Vaping Use   • Vaping Use: Never used   Substance Use Topics   • Alcohol use: Not Currently     Comment: 1 DRINK WEEKLY, IF THAT: RARE/OCC;  CAFFEINE USE + 1 COFFEE DAILY   • Drug use: No       HPI, ROS and PFSH have been reviewed and confirmed on 7/7/2022.     SUBJECTIVE:  7/7/2022: Back in the office for follow-up.  Reports persistent tendency to edema of the lower extremities, not particularly 1 side over the other.  She has been wearing compression stockings that do help.  She is eating well and has gained some more weight.  She has not been very active because of the changes in temperature.  She denies any more dyspnea than usual and has had no chest pains or palpitations.  She has no abdominal pain or diarrhea and has had no dysuria.   No bleeding.      REVIEW OF SYSTEMS:  Review of Systems   Constitutional: Negative for activity change, appetite change, chills, diaphoresis, fatigue, fever and unexpected weight change.   HENT: Negative for congestion, dental problem, drooling, ear discharge, ear pain, facial swelling, hearing loss, mouth sores, nosebleeds, postnasal drip, rhinorrhea, sinus pressure, sinus pain, sneezing, sore throat, tinnitus,  "trouble swallowing and voice change.    Eyes: Negative for photophobia, pain, discharge, redness, itching and visual disturbance.   Respiratory: Negative for apnea, cough, choking, chest tightness, shortness of breath, wheezing and stridor.    Cardiovascular: Negative for chest pain, palpitations and leg swelling.   Gastrointestinal: Negative for abdominal distention, abdominal pain, anal bleeding, blood in stool, constipation, diarrhea, nausea, rectal pain and vomiting.   Endocrine: Negative for cold intolerance, heat intolerance, polydipsia and polyuria.   Genitourinary: Negative for decreased urine volume, difficulty urinating, dysuria, flank pain, frequency, genital sores, hematuria and urgency.   Musculoskeletal: Negative for arthralgias, back pain, gait problem, joint swelling, myalgias, neck pain and neck stiffness.   Skin: Negative for color change, pallor and rash.   Neurological: Negative for dizziness, tremors, seizures, syncope, facial asymmetry, speech difficulty, weakness, light-headedness, numbness and headaches.   Hematological: Negative for adenopathy. Does not bruise/bleed easily.   Psychiatric/Behavioral: Negative for agitation, behavioral problems, confusion, decreased concentration, hallucinations, self-injury, sleep disturbance and suicidal ideas. The patient is not nervous/anxious.        OBJECTIVE:    Vitals:    07/07/22 1322   BP: 153/85   Pulse: 70   Temp: 96.9 °F (36.1 °C)   SpO2: 96%   Weight: 105 kg (231 lb)   Height: 154.9 cm (61\")   PainSc: 0-No pain     Body mass index is 43.65 kg/m².    ECOG  (0) Fully active, able to carry on all predisease performance without restriction    Physical Exam  Constitutional:       General: She is not in acute distress.     Appearance: She is obese. She is not ill-appearing, toxic-appearing or diaphoretic.      Comments: Well-built and well oriented.  No distress.  Morbidly obese.   HENT:      Head: Normocephalic and atraumatic.      Right Ear: External " ear normal.      Left Ear: External ear normal.      Nose: Nose normal. No congestion or rhinorrhea.      Mouth/Throat:      Mouth: Mucous membranes are moist.      Pharynx: Oropharynx is clear. No oropharyngeal exudate or posterior oropharyngeal erythema.   Eyes:      General: No scleral icterus.        Right eye: No discharge.         Left eye: No discharge.      Conjunctiva/sclera: Conjunctivae normal.      Pupils: Pupils are equal, round, and reactive to light.   Cardiovascular:      Rate and Rhythm: Normal rate and regular rhythm.      Pulses: Normal pulses.      Heart sounds: No murmur heard.    No friction rub. No gallop.   Pulmonary:      Effort: Pulmonary effort is normal. No respiratory distress.      Breath sounds: Normal breath sounds. No stridor. No wheezing, rhonchi or rales.   Abdominal:      General: Abdomen is flat. Bowel sounds are normal. There is no distension.      Palpations: Abdomen is soft. There is no mass.      Tenderness: There is no abdominal tenderness. There is no right CVA tenderness, left CVA tenderness, guarding or rebound.      Hernia: No hernia is present.   Musculoskeletal:         General: No swelling, tenderness, deformity or signs of injury.      Cervical back: No rigidity or tenderness.      Right lower leg: No edema.      Left lower leg: No edema.   Lymphadenopathy:      Cervical: No cervical adenopathy.   Skin:     Coloration: Skin is not jaundiced or pale.      Findings: No bruising, lesion or rash.   Neurological:      General: No focal deficit present.      Mental Status: She is alert and oriented to person, place, and time.      Cranial Nerves: No cranial nerve deficit.      Motor: No weakness.      Gait: Gait normal.   Psychiatric:         Mood and Affect: Mood normal.         Behavior: Behavior normal.         Thought Content: Thought content normal.         Judgment: Judgment normal.     Chalino Russo MD performed a physical exam on 7/7/2022 as documented  above    RECENT LABS  WBC   Date Value Ref Range Status   07/07/2022 4.99 3.40 - 10.80 10*3/mm3 Final     RBC   Date Value Ref Range Status   07/07/2022 4.27 3.77 - 5.28 10*6/mm3 Final     Hemoglobin   Date Value Ref Range Status   07/07/2022 12.1 12.0 - 15.9 g/dL Final     Hematocrit   Date Value Ref Range Status   07/07/2022 36.0 34.0 - 46.6 % Final     MCV   Date Value Ref Range Status   07/07/2022 84.3 79.0 - 97.0 fL Final     MCH   Date Value Ref Range Status   07/07/2022 28.3 26.6 - 33.0 pg Final     MCHC   Date Value Ref Range Status   07/07/2022 33.6 31.5 - 35.7 g/dL Final     RDW   Date Value Ref Range Status   07/07/2022 14.8 12.3 - 15.4 % Final     RDW-SD   Date Value Ref Range Status   07/07/2022 44.8 37.0 - 54.0 fl Final     MPV   Date Value Ref Range Status   07/07/2022 10.6 6.0 - 12.0 fL Final     Platelets   Date Value Ref Range Status   07/07/2022 232 140 - 450 10*3/mm3 Final     Neutrophil %   Date Value Ref Range Status   07/07/2022 65.6 42.7 - 76.0 % Final     Lymphocyte %   Date Value Ref Range Status   07/07/2022 22.2 19.6 - 45.3 % Final     Monocyte %   Date Value Ref Range Status   07/07/2022 9.2 5.0 - 12.0 % Final     Eosinophil %   Date Value Ref Range Status   07/07/2022 2.4 0.3 - 6.2 % Final     Basophil %   Date Value Ref Range Status   07/07/2022 0.6 0.0 - 1.5 % Final     Immature Grans %   Date Value Ref Range Status   06/14/2022 0.3 0.0 - 0.5 % Final     Neutrophils, Absolute   Date Value Ref Range Status   07/07/2022 3.27 1.70 - 7.00 10*3/mm3 Final     Lymphocytes, Absolute   Date Value Ref Range Status   07/07/2022 1.11 0.70 - 3.10 10*3/mm3 Final     Monocytes, Absolute   Date Value Ref Range Status   07/07/2022 0.46 0.10 - 0.90 10*3/mm3 Final     Eosinophils, Absolute   Date Value Ref Range Status   07/07/2022 0.12 0.00 - 0.40 10*3/mm3 Final     Basophils, Absolute   Date Value Ref Range Status   07/07/2022 0.03 0.00 - 0.20 10*3/mm3 Final     Immature Grans, Absolute   Date Value  Ref Range Status   06/14/2022 0.02 0.00 - 0.05 10*3/mm3 Final     nRBC   Date Value Ref Range Status   06/14/2022 0.0 0.0 - 0.2 /100 WBC Final       Lab Results   Component Value Date    GLUCOSE 92 06/14/2022    BUN 12 06/14/2022    CREATININE 0.73 06/14/2022    EGFRIFNONA 100 02/12/2022    EGFRIFAFRI 70 03/21/2017    BCR 16.4 06/14/2022    K 3.7 06/14/2022    CO2 25.1 06/14/2022    CALCIUM 9.7 06/14/2022    ALBUMIN 4.50 06/14/2022    LABIL2 1.6 05/23/2019    AST 23 06/14/2022    ALT 23 06/14/2022       ASSESSMENT:  1.  Unprovoked pulmonary embolism: T discussed with her.  Continue with anticoagulation with the same medication for now.  2.  Morbid obesity discussed again the importance of weight management to reduce her risk of thrombosis.  3.  Sent prescription for apixaban to her pharmacy.  4.  She is to see me in approximately 6 months.       PLAN  As above    Chalino Russo MD on July 7, 2022 at 1400

## 2022-07-28 DIAGNOSIS — F41.9 ANXIETY: ICD-10-CM

## 2022-07-28 RX ORDER — ESCITALOPRAM OXALATE 20 MG/1
TABLET ORAL
Qty: 30 TABLET | Refills: 1 | Status: SHIPPED | OUTPATIENT
Start: 2022-07-28 | End: 2022-08-24

## 2022-08-24 DIAGNOSIS — F41.9 ANXIETY: ICD-10-CM

## 2022-08-24 RX ORDER — ESCITALOPRAM OXALATE 20 MG/1
TABLET ORAL
Qty: 30 TABLET | Refills: 1 | Status: SHIPPED | OUTPATIENT
Start: 2022-08-24 | End: 2022-09-20

## 2022-08-25 RX ORDER — POTASSIUM CHLORIDE 750 MG/1
10 TABLET, FILM COATED, EXTENDED RELEASE ORAL 3 TIMES DAILY
Qty: 270 TABLET | Refills: 0 | Status: SHIPPED | OUTPATIENT
Start: 2022-08-25 | End: 2022-11-20

## 2022-09-14 ENCOUNTER — LAB (OUTPATIENT)
Dept: FAMILY MEDICINE CLINIC | Facility: CLINIC | Age: 72
End: 2022-09-14

## 2022-09-14 ENCOUNTER — OFFICE VISIT (OUTPATIENT)
Dept: FAMILY MEDICINE CLINIC | Facility: CLINIC | Age: 72
End: 2022-09-14

## 2022-09-14 VITALS
RESPIRATION RATE: 15 BRPM | SYSTOLIC BLOOD PRESSURE: 149 MMHG | HEART RATE: 85 BPM | DIASTOLIC BLOOD PRESSURE: 79 MMHG | OXYGEN SATURATION: 96 % | BODY MASS INDEX: 43.61 KG/M2 | WEIGHT: 231 LBS | HEIGHT: 61 IN | TEMPERATURE: 97.5 F

## 2022-09-14 DIAGNOSIS — M25.551 BILATERAL HIP PAIN: ICD-10-CM

## 2022-09-14 DIAGNOSIS — M25.552 BILATERAL HIP PAIN: ICD-10-CM

## 2022-09-14 DIAGNOSIS — R31.0 GROSS HEMATURIA: Primary | ICD-10-CM

## 2022-09-14 DIAGNOSIS — R30.0 DYSURIA: ICD-10-CM

## 2022-09-14 DIAGNOSIS — R31.0 GROSS HEMATURIA: ICD-10-CM

## 2022-09-14 LAB
BILIRUB BLD-MCNC: NEGATIVE MG/DL
CLARITY, POC: ABNORMAL
COLOR UR: YELLOW
EXPIRATION DATE: ABNORMAL
GLUCOSE UR STRIP-MCNC: NEGATIVE MG/DL
KETONES UR QL: NEGATIVE
LEUKOCYTE EST, POC: ABNORMAL
Lab: ABNORMAL
NITRITE UR-MCNC: NEGATIVE MG/ML
PH UR: 7.5 [PH] (ref 5–8)
PROT UR STRIP-MCNC: NEGATIVE MG/DL
RBC # UR STRIP: ABNORMAL /UL
SP GR UR: 1.01 (ref 1–1.03)
UROBILINOGEN UR QL: NORMAL

## 2022-09-14 PROCEDURE — 99213 OFFICE O/P EST LOW 20 MIN: CPT | Performed by: NURSE PRACTITIONER

## 2022-09-14 PROCEDURE — 81003 URINALYSIS AUTO W/O SCOPE: CPT | Performed by: NURSE PRACTITIONER

## 2022-09-14 PROCEDURE — 87086 URINE CULTURE/COLONY COUNT: CPT | Performed by: NURSE PRACTITIONER

## 2022-09-14 NOTE — PROGRESS NOTES
"Chief Complaint  Urinary Tract Infection  Subjective        Radha Trujillo presents to [facility]  History of Present Illness  Pt comes in today with c/o poss UTI. Having some burning and pressure in bladder area. No dysuria, hesitancy. Drinking more water. No hematuria. No fever or chills. No N/V/D.     Also with c/o ellen hip and groin pain. This has her confused with possible UTI.   Takes tylenol otc as needed.   Has chronic back pain. Tries to move and walk as much as possible.   Pt comes in today with c/o poss UTI. Having some burning and pressure in bladder area. No dysuria, hesitancy. Drinking more water. No hematuria. No fever or chills. No N/V/D.     Also with c/o ellen hip and groin pain. This has her confused with possible UTI.   Takes tylenol otc as needed.   Has chronic back pain. Tries to move and walk as much as possible.        Objective     Vital Signs:   /79   Pulse 85   Temp 97.5 °F (36.4 °C)   Resp 15   Ht 154.9 cm (61\")   Wt 105 kg (231 lb)   SpO2 96%   BMI 43.65 kg/m²       BP Readings from Last 3 Encounters:   09/14/22 149/79   07/07/22 153/85   06/14/22 140/74       Wt Readings from Last 3 Encounters:   09/14/22 105 kg (231 lb)   07/07/22 105 kg (231 lb)   06/14/22 103 kg (226 lb)     Physical Exam  Constitutional:       Appearance: She is well-developed.   Eyes:      Pupils: Pupils are equal, round, and reactive to light.   Cardiovascular:      Rate and Rhythm: Normal rate and regular rhythm.   Pulmonary:      Effort: Pulmonary effort is normal.      Breath sounds: Normal breath sounds.   Neurological:      Mental Status: She is alert and oriented to person, place, and time.        Result Review :                 Assessment and Plan    Diagnoses and all orders for this visit:    1. Gross hematuria (Primary)  -     POCT urinalysis dipstick, automated  -     Urine Culture - Urine, Urine, Clean Catch; Future    2. Dysuria  -     Urine Culture - Urine, " Urine, Clean Catch; Future    3. Bilateral hip pain  -     XR Hips Bilateral With or Without Pelvis 2 View; Future    culture urine  Push fluids   Xray ellen hips  During this office visit, we discussed the pertinent aspects of the visit and treatment recommendations. Pt verbalizes understanding. Follow up was discussed. Patient was given the opportunity to ask questions and discuss other concerns.         Follow Up   Return if symptoms worsen or fail to improve, for Next scheduled follow up.  Patient was given instructions and counseling regarding her condition or for health maintenance advice. Please see specific information pulled into the AVS if appropriate.       Answers for HPI/ROS submitted by the patient on 9/12/2022  Please describe your symptoms.: A discomfort.  No pain when I urinate, however.  Do fear having  a UTI though  Have you had these symptoms before?: No  How long have you been having these symptoms?: 5-7 days  Please list any medications you are currently taking for this condition.: A probiotic  Please describe any probable cause for these symptoms. : I do not know except weight gain and putting pressure on my bladder, maybe.  What is the primary reason for your visit?: Other

## 2022-09-15 DIAGNOSIS — M25.551 BILATERAL HIP PAIN: Primary | ICD-10-CM

## 2022-09-15 DIAGNOSIS — M25.551 BILATERAL HIP PAIN: ICD-10-CM

## 2022-09-15 DIAGNOSIS — M25.552 BILATERAL HIP PAIN: Primary | ICD-10-CM

## 2022-09-15 DIAGNOSIS — M25.552 BILATERAL HIP PAIN: ICD-10-CM

## 2022-09-15 LAB — BACTERIA SPEC AEROBE CULT: NORMAL

## 2022-09-20 DIAGNOSIS — F41.9 ANXIETY: ICD-10-CM

## 2022-09-20 RX ORDER — ESCITALOPRAM OXALATE 20 MG/1
TABLET ORAL
Qty: 30 TABLET | Refills: 1 | Status: SHIPPED | OUTPATIENT
Start: 2022-09-20 | End: 2022-10-17

## 2022-09-27 ENCOUNTER — OFFICE VISIT (OUTPATIENT)
Dept: CARDIOLOGY | Facility: CLINIC | Age: 72
End: 2022-09-27

## 2022-09-27 VITALS
HEART RATE: 64 BPM | OXYGEN SATURATION: 98 % | WEIGHT: 234 LBS | SYSTOLIC BLOOD PRESSURE: 142 MMHG | HEIGHT: 61 IN | BODY MASS INDEX: 44.18 KG/M2 | DIASTOLIC BLOOD PRESSURE: 84 MMHG

## 2022-09-27 DIAGNOSIS — I26.99 PULMONARY EMBOLISM ON LONG-TERM ANTICOAGULATION THERAPY: ICD-10-CM

## 2022-09-27 DIAGNOSIS — Z79.01 PULMONARY EMBOLISM ON LONG-TERM ANTICOAGULATION THERAPY: ICD-10-CM

## 2022-09-27 DIAGNOSIS — I10 ESSENTIAL HYPERTENSION: Primary | ICD-10-CM

## 2022-09-27 DIAGNOSIS — D68.9 MEDICATION INDUCED COAGULOPATHY: ICD-10-CM

## 2022-09-27 DIAGNOSIS — E78.2 MIXED HYPERLIPIDEMIA: ICD-10-CM

## 2022-09-27 DIAGNOSIS — T50.905A MEDICATION INDUCED COAGULOPATHY: ICD-10-CM

## 2022-09-27 PROCEDURE — 93000 ELECTROCARDIOGRAM COMPLETE: CPT | Performed by: INTERNAL MEDICINE

## 2022-09-27 PROCEDURE — 99214 OFFICE O/P EST MOD 30 MIN: CPT | Performed by: INTERNAL MEDICINE

## 2022-10-04 ENCOUNTER — OFFICE VISIT (OUTPATIENT)
Dept: FAMILY MEDICINE CLINIC | Facility: CLINIC | Age: 72
End: 2022-10-04

## 2022-10-04 VITALS
OXYGEN SATURATION: 98 % | HEART RATE: 62 BPM | BODY MASS INDEX: 44.18 KG/M2 | WEIGHT: 234 LBS | SYSTOLIC BLOOD PRESSURE: 125 MMHG | HEIGHT: 61 IN | RESPIRATION RATE: 15 BRPM | DIASTOLIC BLOOD PRESSURE: 75 MMHG | TEMPERATURE: 97.5 F

## 2022-10-04 DIAGNOSIS — I10 ESSENTIAL HYPERTENSION: Chronic | ICD-10-CM

## 2022-10-04 DIAGNOSIS — Z79.01 PULMONARY EMBOLISM ON LONG-TERM ANTICOAGULATION THERAPY: ICD-10-CM

## 2022-10-04 DIAGNOSIS — I26.99 PULMONARY EMBOLISM ON LONG-TERM ANTICOAGULATION THERAPY: ICD-10-CM

## 2022-10-04 DIAGNOSIS — Z00.00 MEDICARE ANNUAL WELLNESS VISIT, SUBSEQUENT: Primary | ICD-10-CM

## 2022-10-04 DIAGNOSIS — Z12.11 SCREENING FOR COLON CANCER: ICD-10-CM

## 2022-10-04 DIAGNOSIS — Z12.31 ENCOUNTER FOR SCREENING MAMMOGRAM FOR MALIGNANT NEOPLASM OF BREAST: ICD-10-CM

## 2022-10-04 PROCEDURE — G0439 PPPS, SUBSEQ VISIT: HCPCS | Performed by: NURSE PRACTITIONER

## 2022-10-04 PROCEDURE — 1160F RVW MEDS BY RX/DR IN RCRD: CPT | Performed by: NURSE PRACTITIONER

## 2022-10-04 PROCEDURE — 1170F FXNL STATUS ASSESSED: CPT | Performed by: NURSE PRACTITIONER

## 2022-10-04 NOTE — PROGRESS NOTES
The ABCs of the Annual Wellness Visit  Subsequent Medicare Wellness Visit    Chief Complaint   Patient presents with   • Medicare Wellness-subsequent      Subjective    History of Present Illness:  Radha Trujillo is a 72 y.o. female who presents for a Subsequent Medicare Wellness Visit.    The following portions of the patient's history were reviewed and   updated as appropriate: allergies, current medications, past family history, past medical history, past social history, past surgical history and problem list.    Compared to one year ago, the patient feels her physical   health is the same.    Compared to one year ago, the patient feels her mental   health is the same.    Recent Hospitalizations:  This patient has had a [facility] admission record on file within the last 365 days.    Current Medical Providers:  Patient Care Team:  Maribel Hale APRN as PCP - General (Nurse Practitioner)  Kilo Vivar DO as Consulting Physician (Cardiology)  Chalino Russo MD as Consulting Physician (Hematology and Oncology)  Gala Solares PA as Physician Assistant (Physician Assistant)    Outpatient Medications Prior to Visit   Medication Sig Dispense Refill   • ALPRAZolam (Xanax) 0.25 MG tablet Take 1 tablet by mouth 2 (Two) Times a Day As Needed for Anxiety. 30 tablet 0   • amLODIPine (NORVASC) 5 MG tablet Take 1 tablet by mouth Daily. 90 tablet 1   • apixaban (ELIQUIS) 5 MG tablet tablet Take 1 tablet by mouth 2 (Two) Times a Day. 60 tablet 11   • azelastine (ASTELIN) 0.1 % nasal spray 1 spray into the nostril(s) as directed by provider 2 (Two) Times a Day.     • escitalopram (LEXAPRO) 20 MG tablet TAKE 1 TABLET BY MOUTH EVERY DAY 30 tablet 1   • fluticasone (Flovent HFA) 110 MCG/ACT inhaler Inhale 1 puff 2 (Two) Times a Day. 1 inhaler 1   • Multiple Vitamin (MULTIVITAMIN) capsule Take 1 capsule by mouth Daily.     • Omeprazole 20 MG tablet delayed-release Take 20 mg by mouth Daily.    "  • potassium chloride 10 MEQ CR tablet Take 1 tablet by mouth 3 (Three) Times a Day. 270 tablet 0   • valsartan-hydrochlorothiazide (DIOVAN-HCT) 320-25 MG per tablet TAKE 1 TABLET BY MOUTH EVERY DAY 90 tablet 3   • VITAMIN B COMPLEX-C PO Take  by mouth.     • Vitamins A & D 5000-400 units capsule Take 1 capsule by mouth Daily.       No facility-administered medications prior to visit.       No opioid medication identified on active medication list. I have reviewed chart for other potential  high risk medication/s and harmful drug interactions in the elderly.          Aspirin is not on active medication list.  Aspirin use is not indicated based on review of current medical condition/s. Risk of harm outweighs potential benefits.  .    Patient Active Problem List   Diagnosis   • Morbid obesity with BMI of 40.0-44.9, adult (HCC)   • Essential hypertension   • Hyperlipidemia   • Mixed anxiety depressive disorder   • Acute pulmonary embolism (HCC)   • Pulmonary embolism on long-term anticoagulation therapy (HCC)   • Medication induced coagulopathy (HCC)     Advance Care Planning  Advance Directive is on file.  ACP discussion was held with the patient during this visit. Patient has an advance directive in EMR which is still valid.           Objective    Vitals:    10/04/22 1310   BP: 125/75   Pulse: 62   Resp: 15   Temp: 97.5 °F (36.4 °C)   SpO2: 98%   Weight: 106 kg (234 lb)   Height: 154.9 cm (61\")     Estimated body mass index is 44.21 kg/m² as calculated from the following:    Height as of this encounter: 154.9 cm (61\").    Weight as of this encounter: 106 kg (234 lb).    Class 3 Severe Obesity (BMI >=40). Obesity-related health conditions include the following: hypertension, diabetes mellitus, dyslipidemias and osteoarthritis. Obesity is unchanged. BMI is is above average; BMI management plan is completed. We discussed portion control and increasing exercise.      Does the patient have evidence of cognitive " impairment? No    Physical Exam  Constitutional:       Appearance: She is well-developed.   HENT:      Head: Normocephalic.   Eyes:      Conjunctiva/sclera: Conjunctivae normal.      Pupils: Pupils are equal, round, and reactive to light.   Neck:      Thyroid: No thyromegaly.   Cardiovascular:      Rate and Rhythm: Normal rate and regular rhythm.      Heart sounds: No murmur heard.  Pulmonary:      Effort: Pulmonary effort is normal.      Breath sounds: Normal breath sounds.   Abdominal:      General: Bowel sounds are normal.      Palpations: Abdomen is soft. There is no mass.      Tenderness: There is no abdominal tenderness.   Musculoskeletal:      Cervical back: Neck supple.   Skin:     General: Skin is warm and dry.      Findings: No lesion.   Neurological:      Mental Status: She is alert and oriented to person, place, and time.   Psychiatric:         Behavior: Behavior normal.             HEALTH RISK ASSESSMENT    Smoking Status:  Social History     Tobacco Use   Smoking Status Never Smoker   Smokeless Tobacco Never Used     Alcohol Consumption:  Social History     Substance and Sexual Activity   Alcohol Use Not Currently    Comment: 1 DRINK WEEKLY, IF THAT: RARE/OCC;  CAFFEINE USE + 1 COFFEE DAILY     Fall Risk Screen:    [facility] Fall Risk Assessment was completed, and patient is at MODERATE risk for falls. Assessment completed on:10/4/2022    Depression Screening:  PHQ-2/PHQ-9 Depression Screening 10/4/2022   Retired PHQ-9 Total Score -   Retired Total Score -   Little Interest or Pleasure in Doing Things 0-->not at all   Feeling Down, Depressed or Hopeless 0-->not at all   PHQ-9: Brief Depression Severity Measure Score 0       Health Habits and Functional and Cognitive Screening:  Functional & Cognitive Status 10/4/2022   Do you have difficulty preparing food and eating? No   Do you have difficulty bathing yourself, getting dressed or grooming yourself? No   Do you have difficulty using the toilet? No   Do  you have difficulty moving around from place to place? No   Do you have trouble with steps or getting out of a bed or a chair? Yes   Current Diet Limited Junk Food   Dental Exam Up to date   Eye Exam Up to date   Exercise (times per week) 3 times per week   Current Exercises Include Walking   Current Exercise Activities Include -   Do you need help using the phone?  No   Are you deaf or do you have serious difficulty hearing?  No   Do you need help with transportation? No   Do you need help shopping? No   Do you need help preparing meals?  No   Do you need help with housework?  No   Do you need help with laundry? No   Do you need help taking your medications? No   Do you need help managing money? No   Do you ever drive or ride in a car without wearing a seat belt? No   Have you felt unusual stress, anger or loneliness in the last month? No   Who do you live with? Spouse   If you need help, do you have trouble finding someone available to you? No   Have you been bothered in the last four weeks by sexual problems? No   Do you have difficulty concentrating, remembering or making decisions? No       Age-appropriate Screening Schedule:  Refer to the list below for future screening recommendations based on patient's age, sex and/or medical conditions. Orders for these recommended tests are listed in the plan section. The patient has been provided with a written plan.    Health Maintenance   Topic Date Due   • TDAP/TD VACCINES (1 - Tdap) 10/04/2022 (Originally 8/28/1969)   • ZOSTER VACCINE (1 of 2) 10/04/2022 (Originally 8/28/2000)   • INFLUENZA VACCINE  03/31/2023 (Originally 8/1/2022)   • MAMMOGRAM  10/06/2022   • LIPID PANEL  02/11/2023   • DXA SCAN  10/06/2023              Assessment & Plan   CMS Preventative Services Quick Reference  Risk Factors Identified During Encounter  Chronic Pain   Depression/Dysphoria  Immunizations Discussed/Encouraged (specific Immunizations;  Influenza  Inactivity/Sedentary  Obesity/Overweight   The above risks/problems have been discussed with the patient.  Follow up actions/plans if indicated are seen below in the Assessment/Plan Section.  Pertinent information has been shared with the patient in the After Visit Summary.    Diagnoses and all orders for this visit:    1. Medicare annual wellness visit, subsequent (Primary)    2. Essential hypertension    3. Pulmonary embolism on long-term anticoagulation therapy (HCC)    4. Screening for colon cancer  -     Cologuard - Stool, Per Rectum; Future    5. Encounter for screening mammogram for malignant neoplasm of breast  -     Mammo Screening Digital Tomosynthesis Bilateral With CAD; Future    no labs needed today  cologuard  Mammogram  During this visit for their annual exam, we reviewed their personal history, social history and family history. We went over their medications and all the recommended health maintenance items for their age group. They were given the opportunity to ask questions and discuss other concerns.   Discussed importance of regular exercise and recommended starting or continuing a regular exercise program for good health. The patient was also encouraged to lose weight for better health.         Follow Up:   Return in about 6 months (around 4/4/2023) for HTN follow up.     An After Visit Summary and PPPS were made available to the patient.

## 2022-10-05 ENCOUNTER — TREATMENT (OUTPATIENT)
Dept: PHYSICAL THERAPY | Facility: CLINIC | Age: 72
End: 2022-10-05

## 2022-10-05 DIAGNOSIS — M25.559 PAIN, HIP: Primary | ICD-10-CM

## 2022-10-05 PROCEDURE — 97162 PT EVAL MOD COMPLEX 30 MIN: CPT | Performed by: PHYSICAL THERAPIST

## 2022-10-05 PROCEDURE — 97110 THERAPEUTIC EXERCISES: CPT | Performed by: PHYSICAL THERAPIST

## 2022-10-05 NOTE — PROGRESS NOTES
Physical Therapy Initial Evaluation and Plan of Care    Patient: Radha Trujillo   : 1950  Diagnosis/ICD-10 Code:  Pain, hip [M25.559]  Referring practitioner: RICKY Pineda  Date of initial visit : 10/5/2022  Today's Date: 10/5/2022  Patient seen for 1  session    Visit Diagnoses:    ICD-10-CM ICD-9-CM   1. Pain, hip  M25.559 719.45        Subjective Evaluation    History of Present Illness  Mechanism of injury: Patient is a 72 year old female who presents with a diagnosis of bilateral hip pain.  She reports increased pain in he hips especially with walking and standing over the past year.  She reports has had recent imaging which shows some degenerative changes in her hips.  Reports also has lumbar spinal stenosis with prior treatment at chiropractor but has not resumed per multiple blood clots in her lungs and legs.  Reports is now on Eliquis and instructed to avoid massages and chiropractic treatments.     HR: 64  O2 saturation: 95 %  BP: 160/84 mmHG    Subjective comment: Oxford Hip Score - 54%  Patient Occupation: Retired  Pain  Current pain ratin  At best pain ratin  At worst pain ratin  Pain location: Bilateral hips   Quality: dull ache (cramp )  Alleviating factors: sitting, rest   Aggravating factors: ambulation  Progression: no change    Social Support  Lives in: multiple-level home  Lives with: spouse    Diagnostic Tests  X-ray: abnormal (Degenerative changes per report - see media)    Treatments  Previous treatment: chiropractic (Chiropractic care for lower back pain)  Current treatment: physical therapy  Patient Goals  Patient goals for therapy: decreased pain, independence with ADLs/IADLs and increased motion             Objective          Active Range of Motion   Left Hip   Flexion: 70 degrees   External rotation (90/90): 20 degrees   Internal rotation (prone): 10 degrees     Right Hip   Flexion: 75 degrees   External rotation (90/90): 25 degrees   Internal rotation  (90/90): 15 degrees     Strength/Myotome Testing     Left Hip   Planes of Motion   Flexion: 4+  External rotation: 4- (Painful)    Right Hip   Planes of Motion   Flexion: 4+  External rotation: 4+    Ambulation     Comments   Decreased step length, arm swing, decreased stance time L more than R    Functional Assessment     Comments  Single leg balance w/o UE support - unable w/o UE support R/L   FTSST - unable to complete w/o UE per pain          Assessment & Plan     Assessment  Impairments: abnormal gait, abnormal or restricted ROM, activity intolerance, impaired balance, impaired physical strength, lacks appropriate home exercise program, pain with function and weight-bearing intolerance  Functional Limitations: carrying objects, lifting, walking and standing  Assessment details: Presents with limits in bilateral hip ROM with pain at endrange IR/ER/FL/EXT, altered gait mechanics, decreased strength with resistive testing, pain to palpation lateral hip and activity limits including standing/walking/sleeping.  She would benefit from skilled PT to address the above and restore to the highest level of prior function.   Prognosis: good    Goals  Plan Goals: ST. Independent and compliant with HEP over 2 weeks.   2. Bilateral hip pain decreased to 10% or greater over 2 weeks.   3. Gait mechanics improved over 2 weeks with increased arm swing/trunk rotation and decreased frontal plane compensation.     LT. Giles Hip Score improved to 75% or greater over 8 weeks.   2. Bilateral hip pain decreased to 3/10 at worst with prior level of activity over 8 weeks.   3. Five Time Sit to Stand at or below age/sex adjusted norms within 8 weeks.   4. Gait speed at or above age/sex adjusted normals over 8 weeks.   5. Single leg balance R/L 5 seconds or greater w/o UE support over 8 weeks.     Plan  Therapy options: will be seen for skilled therapy services  Planned modality interventions: cryotherapy and thermotherapy  (hydrocollator packs)  Planned therapy interventions: manual therapy, balance/weight-bearing training, neuromuscular re-education, flexibility, functional ROM exercises, strengthening, gait training, therapeutic activities, stretching and joint mobilization  Frequency: 2x week  Duration in weeks: 8  Treatment plan discussed with: patient        History # of Personal Factors and/or Comorbidities: MODERATE (1-2)  Examination of Body System(s): # of elements: MODERATE (3)  Clinical Presentation: EVOLVING  Clinical Decision Making: MODERATE       Timed:           Therapeutic Exercise:    15     mins  95603;       Un-Timed:  Mod Eval     30     Mins  69152    Timed Treatment:   15   mins   Total Treatment:     45   mins          PT SIGNATURE: Beck Loyola PT, DPT   [place] Lic #[id number]    DATE TREATMENT INITIATED: 10/5/2022    Medicare Initial Certification  Certification Period: 1/3/2023  I certify that the therapy services are furnished while this patient is under my care.  The services outlined above are required by this patient, and will be reviewed every 90 days.     PHYSICIAN: Maribel Hael, RICKY      DATE:     Please sign and return via fax to [phone].. Thank you, [facility].

## 2022-10-11 ENCOUNTER — TREATMENT (OUTPATIENT)
Dept: PHYSICAL THERAPY | Facility: CLINIC | Age: 72
End: 2022-10-11

## 2022-10-11 DIAGNOSIS — M25.559 PAIN, HIP: Primary | ICD-10-CM

## 2022-10-11 PROCEDURE — 97140 MANUAL THERAPY 1/> REGIONS: CPT | Performed by: PHYSICAL THERAPIST

## 2022-10-11 PROCEDURE — 97110 THERAPEUTIC EXERCISES: CPT | Performed by: PHYSICAL THERAPIST

## 2022-10-11 NOTE — PROGRESS NOTES
Physical Therapy Daily Treatment Note  Visit: 2    Radha Trujillo reports: more sore in her lower back and both hips today - no change to home activity.  Has done HEP consistently.   YOB: 1950  Referring practitioner : RICKY Pineda  Date of Initial: Type: THERAPY  Noted: 10/5/2022  Today's date: 10/11/2022  Patient seen for 2 sessions    Visit Diagnoses:    ICD-10-CM ICD-9-CM   1. Pain, hip  M25.559 719.45       Subjective       Objective   See Exercise, Manual, and Modality Logs for complete treatment.       Assessment/Plan     Decreased bilateral hip and lumbar spine pain post tx.   Light aerobic and LE strengthening well tolerated w/o pain .     Plan:    Continue current           Timed:         Manual Therapy:    12     mins  11898;     Therapeutic Exercise:    33     mins  99275;         Timed Treatment:   45   mins   Total Treatment:     45   mins         Beck Loyola PT, DPT  Physical Therapist  [place] Lic #[id number]

## 2022-10-13 ENCOUNTER — TREATMENT (OUTPATIENT)
Dept: PHYSICAL THERAPY | Facility: CLINIC | Age: 72
End: 2022-10-13

## 2022-10-13 DIAGNOSIS — M25.559 PAIN, HIP: Primary | ICD-10-CM

## 2022-10-13 PROCEDURE — 97530 THERAPEUTIC ACTIVITIES: CPT | Performed by: PHYSICAL THERAPIST

## 2022-10-13 PROCEDURE — 97140 MANUAL THERAPY 1/> REGIONS: CPT | Performed by: PHYSICAL THERAPIST

## 2022-10-13 PROCEDURE — 97110 THERAPEUTIC EXERCISES: CPT | Performed by: PHYSICAL THERAPIST

## 2022-10-13 NOTE — PROGRESS NOTES
Physical Therapy Daily Treatment Note  Visit: 3    Radha Trujillo reports: did well with most recent visit with some improvement in ability to bend forward/dress afterwards.  Reports stiff again this AM.   YOB: 1950  Referring practitioner : RICKY Pineda  Date of Initial: Type: THERAPY  Noted: 10/5/2022  Today's date: 10/13/2022  Patient seen for 3 sessions    Visit Diagnoses:  No diagnosis found.    Subjective       Objective   See Exercise, Manual, and Modality Logs for complete treatment.       Assessment/Plan     Improved hip rotation bilaterally post tx.   Tenderness lateral hip/thigh R> L but pain free with strengthening and ROM exercise.     Plan:    Continue current               Timed:         Manual Therapy:    11     mins  49872;     Therapeutic Exercise:    22     mins  61033;   ;    Therapeutic Activity:     9     mins  59194;       Timed Treatment:   42   mins   Total Treatment:     42   mins         Beck Loyola PT, DPT  Physical Therapist  [place] Lic #[id number]

## 2022-10-16 DIAGNOSIS — F41.9 ANXIETY: ICD-10-CM

## 2022-10-17 ENCOUNTER — TREATMENT (OUTPATIENT)
Dept: PHYSICAL THERAPY | Facility: CLINIC | Age: 72
End: 2022-10-17

## 2022-10-17 DIAGNOSIS — R19.5 POSITIVE COLORECTAL CANCER SCREENING USING COLOGUARD TEST: Primary | ICD-10-CM

## 2022-10-17 DIAGNOSIS — Z12.31 ENCOUNTER FOR SCREENING MAMMOGRAM FOR MALIGNANT NEOPLASM OF BREAST: ICD-10-CM

## 2022-10-17 DIAGNOSIS — M25.559 PAIN, HIP: Primary | ICD-10-CM

## 2022-10-17 PROCEDURE — 97110 THERAPEUTIC EXERCISES: CPT | Performed by: PHYSICAL THERAPIST

## 2022-10-17 PROCEDURE — 97140 MANUAL THERAPY 1/> REGIONS: CPT | Performed by: PHYSICAL THERAPIST

## 2022-10-17 RX ORDER — ESCITALOPRAM OXALATE 20 MG/1
TABLET ORAL
Qty: 30 TABLET | Refills: 1 | Status: SHIPPED | OUTPATIENT
Start: 2022-10-17 | End: 2022-11-15

## 2022-10-17 NOTE — PROGRESS NOTES
Physical Therapy Daily Treatment Note  Visit: 4    Radha Trujillo reports: did well with last visit - no new issues today.   YOB: 1950  Referring practitioner : RICKY Pineda  Date of Initial: Type: THERAPY  Noted: 10/5/2022  Today's date: 10/17/2022  Patient seen for 4 sessions    Visit Diagnoses:    ICD-10-CM ICD-9-CM   1. Pain, hip  M25.559 719.45       Subjective       Objective   See Exercise, Manual, and Modality Logs for complete treatment.       Assessment/Plan     Decreased cueing needed for PPT and lumbopelvic motor control activities.  Hip rotation AROM improved post tx R > L.     Plan:  Continue current           Timed:         Manual Therapy:    14     mins  56201;     Therapeutic Exercise:    25     mins  51201;     Neuromuscular Mary:    5    mins  16913;        Timed Treatment:   44   mins   Total Treatment:     44   mins         Beck Loyola PT, DPT  Physical Therapist  [place] Lic #[id number]

## 2022-10-20 ENCOUNTER — TELEPHONE (OUTPATIENT)
Dept: ONCOLOGY | Facility: CLINIC | Age: 72
End: 2022-10-20

## 2022-10-20 NOTE — TELEPHONE ENCOUNTER
Received a voicemail from Mrs. Trujillo on 10/19/22 regarding an upcoming procedure that she is scheduled to have. She stated she is scheduled to have a colonoscopy and is wondering if she needs to stop taking her Eliquis prior to the procedure. She requested a callback to inform her if she will need to hold the medication or not.     After speaking with Dr. Russo, I contacted Mrs. Trujillo. I asked Mrs. Trujillo when she is scheduled for the colonoscopy; she stated she is not scheduled yet but she was informed that she has to have one. She stated she recently completed a Cologuard and her gastro MD stated that something was found therefore she will need to have a colonoscopy. I requested that she contact our office once she is scheduled for the colonoscopy so that we are aware. I stated that per Dr. Russo, she is to stop taking Eliquis 48 hrs prior to the colonoscopy and restarting taking Eliquis after the colonoscopy on the same day at the performing MD's discretion. Patient voiced understanding. I advised her to contact our office if she needs anything or if she has any questions. She confirmed.

## 2022-10-21 ENCOUNTER — TREATMENT (OUTPATIENT)
Dept: PHYSICAL THERAPY | Facility: CLINIC | Age: 72
End: 2022-10-21

## 2022-10-21 DIAGNOSIS — M25.559 PAIN, HIP: Primary | ICD-10-CM

## 2022-10-21 PROCEDURE — 97110 THERAPEUTIC EXERCISES: CPT | Performed by: PHYSICAL THERAPIST

## 2022-10-21 PROCEDURE — 97140 MANUAL THERAPY 1/> REGIONS: CPT | Performed by: PHYSICAL THERAPIST

## 2022-10-21 NOTE — PROGRESS NOTES
Physical Therapy Daily Treatment Note  Visit: 5    Radha Trujillo reports: she feels better with PT visit and has had some decrease in pain in both hips.   YOB: 1950  Referring practitioner : RICKY Pineda  Date of Initial: Type: THERAPY  Noted: 10/5/2022  Today's date: 10/21/2022  Patient seen for 5 sessions    Visit Diagnoses:    ICD-10-CM ICD-9-CM   1. Pain, hip  M25.559 719.45       Subjective       Objective   See Exercise, Manual, and Modality Logs for complete treatment.       Assessment/Plan     Progressing well - hip ROM improving, pain decreasing - quality of gait mechanics better.     Plan:  Continue current             Timed:         Manual Therapy:    12     mins  98841;     Therapeutic Exercise:    26     mins  21576;     Neuromuscular Mary:    5    mins  65511;          Timed Treatment:   43   mins   Total Treatment:     43   mins         Beck Loyola PT, DPT  Physical Therapist  [place] Lic #[id number]

## 2022-10-25 ENCOUNTER — TREATMENT (OUTPATIENT)
Dept: PHYSICAL THERAPY | Facility: CLINIC | Age: 72
End: 2022-10-25

## 2022-10-25 DIAGNOSIS — M25.559 PAIN, HIP: Primary | ICD-10-CM

## 2022-10-25 PROCEDURE — 97530 THERAPEUTIC ACTIVITIES: CPT | Performed by: PHYSICAL THERAPIST

## 2022-10-25 PROCEDURE — 97110 THERAPEUTIC EXERCISES: CPT | Performed by: PHYSICAL THERAPIST

## 2022-10-25 PROCEDURE — 97140 MANUAL THERAPY 1/> REGIONS: CPT | Performed by: PHYSICAL THERAPIST

## 2022-10-25 NOTE — PROGRESS NOTES
Physical Therapy Daily Treatment Note  Visit: 6    Radha Trujillo reports: she is noticing improved ability to be on her feet and some decrease in pain in both hips.   YOB: 1950  Referring practitioner : RICKY Pineda  Date of Initial: Type: THERAPY  Noted: 10/5/2022  Today's date: 10/25/2022  Patient seen for 6 sessions    Visit Diagnoses:    ICD-10-CM ICD-9-CM   1. Pain, hip  M25.559 719.45       Subjective       Objective   See Exercise, Manual, and Modality Logs for complete treatment.       Assessment/Plan   Gait mechanics, sit to stand ability and hip AROM improving with decreased pain in both hips and lower back.     Plan:  Continue current           Timed:         Manual Therapy:    14     mins  93234;     Therapeutic Exercise:    22     mins  27362;      Therapeutic Activity:     9     mins  41452;         Timed Treatment:   45   mins   Total Treatment:     45   mins         Beck Loyola PT, DPT  Physical Therapist  [place] Lic #[id number]

## 2022-10-27 ENCOUNTER — TREATMENT (OUTPATIENT)
Dept: PHYSICAL THERAPY | Facility: CLINIC | Age: 72
End: 2022-10-27

## 2022-10-27 DIAGNOSIS — M25.559 PAIN, HIP: Primary | ICD-10-CM

## 2022-10-27 PROCEDURE — 97110 THERAPEUTIC EXERCISES: CPT | Performed by: PHYSICAL THERAPIST

## 2022-10-27 PROCEDURE — 97140 MANUAL THERAPY 1/> REGIONS: CPT | Performed by: PHYSICAL THERAPIST

## 2022-10-27 PROCEDURE — 97530 THERAPEUTIC ACTIVITIES: CPT | Performed by: PHYSICAL THERAPIST

## 2022-10-27 NOTE — PROGRESS NOTES
Physical Therapy Daily Treatment Note  Visit: 7    Radha Trujillo reports: is feeling better overall with less pain in both of her hips and improving strength in her legs.   YOB: 1950  Referring practitioner : RICKY Pineda  Date of Initial: Type: THERAPY  Noted: 10/5/2022  Today's date: 10/27/2022  Patient seen for 7 sessions    Visit Diagnoses:    ICD-10-CM ICD-9-CM   1. Pain, hip  M25.559 719.45       Subjective       Objective   See Exercise, Manual, and Modality Logs for complete treatment.       Assessment/Plan     Progressing well - decreased bilateral hip pain and gait mechanics/standing tolerance improved.   Some right knee pain with progression of duration of partial squatting but pain free with regression of time.     Plan:  Continue current         Timed:         Manual Therapy:    12     mins  92916;     Therapeutic Exercise:    21     mins  61766;       Therapeutic Activity:     10     mins  47805;           Timed Treatment:   43   mins   Total Treatment:     43   mins         Beck Loyola PT, DPT  Physical Therapist  [place] Lic #[id number]

## 2022-11-02 ENCOUNTER — TREATMENT (OUTPATIENT)
Dept: PHYSICAL THERAPY | Facility: CLINIC | Age: 72
End: 2022-11-02

## 2022-11-02 DIAGNOSIS — M25.559 PAIN, HIP: Primary | ICD-10-CM

## 2022-11-02 PROCEDURE — 97110 THERAPEUTIC EXERCISES: CPT | Performed by: PHYSICAL THERAPIST

## 2022-11-02 PROCEDURE — 97140 MANUAL THERAPY 1/> REGIONS: CPT | Performed by: PHYSICAL THERAPIST

## 2022-11-02 PROCEDURE — 97530 THERAPEUTIC ACTIVITIES: CPT | Performed by: PHYSICAL THERAPIST

## 2022-11-03 DIAGNOSIS — I10 ESSENTIAL HYPERTENSION: ICD-10-CM

## 2022-11-03 NOTE — PROGRESS NOTES
Physical Therapy Daily Treatment Note  Visit: 8    Radha Trujillo reports: more sore today both in her knees and hips after climbing stairs multiple times helping her daughter move.   YOB: 1950  Referring practitioner : RICKY Pineda  Date of Initial: Type: THERAPY  Noted: 10/5/2022  Today's date: 11/3/2022  Patient seen for 8 sessions    Visit Diagnoses:    ICD-10-CM ICD-9-CM   1. Pain, hip  M25.559 719.45       Subjective       Objective   See Exercise, Manual, and Modality Logs for complete treatment.       Assessment/Plan     Limited standing activity today per knee/hip pain. Gait mechanics worse today per knee pain with more frontal plane compensation and shorter step length.  Small decrease in pain levels post treatment.     Plan:  Continue current           Timed:         Manual Therapy:    16     mins  98850;     Therapeutic Exercise:    15     mins  48058;     Therapeutic Activity:     9     mins  37654;           Timed Treatment:   40   mins   Total Treatment:     40   mins         Beck Loyola PT, DPT  Physical Therapist  [place] Lic #[id number]

## 2022-11-04 ENCOUNTER — TREATMENT (OUTPATIENT)
Dept: PHYSICAL THERAPY | Facility: CLINIC | Age: 72
End: 2022-11-04

## 2022-11-04 DIAGNOSIS — M25.559 PAIN, HIP: Primary | ICD-10-CM

## 2022-11-04 PROCEDURE — 97140 MANUAL THERAPY 1/> REGIONS: CPT | Performed by: PHYSICAL THERAPIST

## 2022-11-04 PROCEDURE — 97530 THERAPEUTIC ACTIVITIES: CPT | Performed by: PHYSICAL THERAPIST

## 2022-11-04 PROCEDURE — 97110 THERAPEUTIC EXERCISES: CPT | Performed by: PHYSICAL THERAPIST

## 2022-11-04 RX ORDER — AMLODIPINE BESYLATE 5 MG/1
TABLET ORAL
Qty: 90 TABLET | Refills: 1 | Status: SHIPPED | OUTPATIENT
Start: 2022-11-04 | End: 2023-01-17

## 2022-11-07 NOTE — PROGRESS NOTES
Physical Therapy Daily Treatment Note  Visit: 9    Radha Trujillo reports: less pain in her hip/knees that at outset of last visit but still some pain from climbing multiple flights of stairs last weekend.   YOB: 1950  Referring practitioner : RICKY Pineda  Date of Initial: Type: THERAPY  Noted: 10/5/2022  Today's date: 11/6/2022  Patient seen for 9 sessions    Visit Diagnoses:    ICD-10-CM ICD-9-CM   1. Pain, hip  M25.559 719.45       Subjective       Objective   See Exercise, Manual, and Modality Logs for complete treatment.       Assessment/Plan     Improved gait mechanics, activity tolerance and decreased hip/knee pain with LE exercise.  Had to still limited squatting volume on [employer] gym per knee pain at end of visit.  Discussed resumption of more standing LE strength and balance activity next visit if symptoms improved.     Plan:    Continue             Timed:         Manual Therapy:    13     mins  94649;     Therapeutic Exercise:    21     mins  95824;     Therapeutic Activity:     9     mins  90756;           Timed Treatment:   42   mins   Total Treatment:     42   mins         Beck Loyola PT, DPT  Physical Therapist  [place] Lic #[id number]

## 2022-11-09 ENCOUNTER — TREATMENT (OUTPATIENT)
Dept: PHYSICAL THERAPY | Facility: CLINIC | Age: 72
End: 2022-11-09

## 2022-11-09 DIAGNOSIS — M25.559 PAIN, HIP: Primary | ICD-10-CM

## 2022-11-09 PROCEDURE — 97530 THERAPEUTIC ACTIVITIES: CPT | Performed by: PHYSICAL THERAPIST

## 2022-11-09 PROCEDURE — 97110 THERAPEUTIC EXERCISES: CPT | Performed by: PHYSICAL THERAPIST

## 2022-11-09 PROCEDURE — 97140 MANUAL THERAPY 1/> REGIONS: CPT | Performed by: PHYSICAL THERAPIST

## 2022-11-10 ENCOUNTER — TREATMENT (OUTPATIENT)
Dept: PHYSICAL THERAPY | Facility: CLINIC | Age: 72
End: 2022-11-10

## 2022-11-10 DIAGNOSIS — M25.559 PAIN, HIP: Primary | ICD-10-CM

## 2022-11-10 PROCEDURE — 97140 MANUAL THERAPY 1/> REGIONS: CPT | Performed by: PHYSICAL THERAPIST

## 2022-11-10 PROCEDURE — 97110 THERAPEUTIC EXERCISES: CPT | Performed by: PHYSICAL THERAPIST

## 2022-11-10 PROCEDURE — 97530 THERAPEUTIC ACTIVITIES: CPT | Performed by: PHYSICAL THERAPIST

## 2022-11-10 NOTE — PROGRESS NOTES
Physical Therapy Daily Treatment Note  Visit: 10    Radha Trujillo reports: feeling better overall - no new issues today.   YOB: 1950  Referring practitioner : RICKY Pineda  Date of Initial: Type: THERAPY  Noted: 10/5/2022  Today's date: 11/10/2022  Patient seen for 10 sessions    Visit Diagnoses:    ICD-10-CM ICD-9-CM   1. Pain, hip  M25.559 719.45       Subjective       Objective   See Exercise, Manual, and Modality Logs for complete treatment.       Assessment/Plan    Progressing well with hip ROM/strength and pain improving.  Some exacerbation per recent assist of family moving but near prior level of improvement with plan to progress activity as tolerated.     Plan:    Continue current         Timed:         Manual Therapy:    13     mins  36999;     Therapeutic Exercise:    21     mins  63496;       Therapeutic Activity:     9     mins  23977;         Timed Treatment:   42   mins   Total Treatment:     42   mins         Beck Loyola PT, DPT  Physical Therapist  [place] Lic #[id number]

## 2022-11-10 NOTE — PROGRESS NOTES
Re-Assessment / Progress Note    Patient: Radha Trujillo   : 1950  Diagnosis/ICD-10 Code:  Pain, hip [M25.559]  Referring practitioner: RICKY Pineda  Date of Initial Visit: Episode Type: THERAPY  Noted: 10/5/2022    Today's Date: 11/10/2022  Patient seen for 10 sessions.      Subjective:   Radha Trujillo reports: she is doing better with most activities in standing with decreased hip/back pain and can tell her strength is increasing.  Reports still some intermittent bilateral hip and knee pain and is scheduled for injections in her knees next week which have helped with pain in the past.   Subjective Questionnaire: [place] Hip Score: to complete next visit  Clinical Progress: improved  Home Program Compliance: Yes  Treatment has included: therapeutic exercise, neuromuscular re-education, manual therapy, therapeutic activity and moist heat    Subjective   Objective   Assessment & Plan     Assessment  Impairments: abnormal coordination, abnormal gait, abnormal or restricted ROM, activity intolerance, impaired balance, impaired physical strength and pain with function  Functional Limitations: carrying objects, lifting, walking, pulling, pushing and standing  Assessment details: Presents with continued but improved limits in bilateral hip, lumbar spine, knee ROM, LE strength both in MMT and functional testing with sit to stand ability and ability for daily tasks per the above.  Had some regression of symptoms after repeated stair climbing several weeks ago but has returned to prior level of improvement this visit.  She would benefit from continued skilled PT to return to or past prior level of function with minimum level of pain.   Prognosis: good      Progress toward previous goals: All met or progressed towards    Goals    Short-term goals (STG):     1. Independent and compliant with HEP over 2 weeks. - Met  2. Bilateral hip pain decreased to 10% or greater over 2 weeks. - Met  3. Gait mechanics  improved over 2 weeks with increased arm swing/trunk rotation and decreased frontal plane compensation. - Met      Long-term goals (LTG):     1. [place] Hip Score improved to 75% or greater over 8 weeks. - Progressed towards  2. Bilateral hip pain decreased to 3/10 at worst with prior level of activity over 8 weeks. - Progressed towards   3. Five Time Sit to Stand at or below age/sex adjusted norms within 8 weeks. - Progressed towards  4. Gait speed at or above age/sex adjusted normals over 8 weeks. - Progressed towards  5. Single leg balance R/L 5 seconds or greater w/o UE support over 8 weeks. - Progressed towards       Recommendations: Continue with recommendations to continue current treatment with progression to more WB and standing activity to progress towards meeting LTG and prior level of function  Timeframe: 1 month  Prognosis to achieve goals: good    PT Signature: Beck Loyola, PT, DPT          Based upon review of the patient's progress and continued therapy plan, it is my medical opinion that Radha Trujillo should continue physical therapy treatment at [facility] PHYSICAL THERAPY  [address] [place] [address] [address] [address] [phone].        Timed:         Manual Therapy:    12     mins  59156;     Therapeutic Exercise:    20     mins  37381;     Therapeutic Activity:     12     mins  70203;         Timed Treatment:   44   mins   Total Treatment:     44   mins

## 2022-11-15 DIAGNOSIS — F41.9 ANXIETY: ICD-10-CM

## 2022-11-15 RX ORDER — ESCITALOPRAM OXALATE 20 MG/1
TABLET ORAL
Qty: 30 TABLET | Refills: 1 | Status: SHIPPED | OUTPATIENT
Start: 2022-11-15 | End: 2022-11-20

## 2022-11-16 ENCOUNTER — TREATMENT (OUTPATIENT)
Dept: PHYSICAL THERAPY | Facility: CLINIC | Age: 72
End: 2022-11-16

## 2022-11-16 DIAGNOSIS — M25.559 PAIN, HIP: Primary | ICD-10-CM

## 2022-11-16 PROCEDURE — 97140 MANUAL THERAPY 1/> REGIONS: CPT | Performed by: PHYSICAL THERAPIST

## 2022-11-16 PROCEDURE — 97110 THERAPEUTIC EXERCISES: CPT | Performed by: PHYSICAL THERAPIST

## 2022-11-16 NOTE — PROGRESS NOTES
Physical Therapy Daily Treatment Note  Visit: 12    Radha Trujillo reports: did well with last visit immediately afterward but had pain in her left knee the next day.  Report saw orthopedic about potential injections but reported her knee was bone on bone and likely would require a joint replacement eventually.   YOB: 1950  Referring practitioner : RICKY Pineda  Date of Initial: Type: THERAPY  Noted: 10/5/2022  Today's date: 11/16/2022  Patient seen for 12 sessions    Visit Diagnoses:    ICD-10-CM ICD-9-CM   1. Pain, hip  M25.559 719.45       Subjective       Objective   See Exercise, Manual, and Modality Logs for complete treatment.       Assessment/Plan     Regressed resistive activity per recent L knee pain with decreased pain and improved L knee and hip ROM post tx.     Plan:    Continue current         Timed:         Manual Therapy:    12     mins  40600;     Therapeutic Exercise:    31     mins  21939;         Timed Treatment:   43   mins   Total Treatment:     43   mins         Beck Loyola PT, DPT  Physical Therapist  [place] Lic #[id number]

## 2022-11-18 ENCOUNTER — TREATMENT (OUTPATIENT)
Dept: PHYSICAL THERAPY | Facility: CLINIC | Age: 72
End: 2022-11-18

## 2022-11-18 DIAGNOSIS — M25.559 PAIN, HIP: Primary | ICD-10-CM

## 2022-11-18 PROCEDURE — 97110 THERAPEUTIC EXERCISES: CPT | Performed by: PHYSICAL THERAPIST

## 2022-11-18 PROCEDURE — 97140 MANUAL THERAPY 1/> REGIONS: CPT | Performed by: PHYSICAL THERAPIST

## 2022-11-19 NOTE — PROGRESS NOTES
Physical Therapy Daily Treatment Note  Visit: 13    Radha Trujillo reports: some left knee pain after last visit but decreased quickly.  Reports she would like to continue with lighter activity today per the soreness in her knees.   YOB: 1950  Referring practitioner : RICKY Pineda  Date of Initial: Type: THERAPY  Noted: 10/5/2022  Today's date: 11/19/2022  Patient seen for 13 sessions    Visit Diagnoses:    ICD-10-CM ICD-9-CM   1. Pain, hip  M25.559 719.45       Subjective       Objective   See Exercise, Manual, and Modality Logs for complete treatment.       Assessment/Plan     Knee pain limiting standing strength and balance progressions.  Good tolerance to manual therapy and light resistive activity and trial of moist heat to bilateral knees at end of visit.     Plan:    Continue current           Timed:         Manual Therapy:    9     mins  98951;     Therapeutic Exercise:    31     mins  18755;           Timed Treatment:   40   mins   Total Treatment:     40   mins         Beck Loyola PT, DPT  Physical Therapist  [place] Lic #[id number]

## 2022-11-20 RX ORDER — POTASSIUM CHLORIDE 750 MG/1
TABLET, FILM COATED, EXTENDED RELEASE ORAL
Qty: 270 TABLET | Refills: 0 | Status: SHIPPED | OUTPATIENT
Start: 2022-11-20 | End: 2023-02-19

## 2022-11-21 ENCOUNTER — TREATMENT (OUTPATIENT)
Dept: PHYSICAL THERAPY | Facility: CLINIC | Age: 72
End: 2022-11-21

## 2022-11-21 DIAGNOSIS — M25.559 PAIN, HIP: Primary | ICD-10-CM

## 2022-11-21 PROCEDURE — 97140 MANUAL THERAPY 1/> REGIONS: CPT | Performed by: PHYSICAL THERAPIST

## 2022-11-21 PROCEDURE — 97110 THERAPEUTIC EXERCISES: CPT | Performed by: PHYSICAL THERAPIST

## 2022-11-21 PROCEDURE — 97530 THERAPEUTIC ACTIVITIES: CPT | Performed by: PHYSICAL THERAPIST

## 2022-11-22 NOTE — PROGRESS NOTES
Physical Therapy Daily Treatment Note  Visit: 14    Radha Trujillo reports: did well last visit with less knee pain.  Reports feels comfortable trying more standing activity today.   YOB: 1950  Referring practitioner : RICKY Pineda  Date of Initial: Type: THERAPY  Noted: 10/5/2022  Today's date: 11/22/2022  Patient seen for 14 sessions    Visit Diagnoses:    ICD-10-CM ICD-9-CM   1. Pain, hip  M25.559 719.45       Subjective       Objective   See Exercise, Manual, and Modality Logs for complete treatment.       Assessment/Plan     Improved LE WB tolerance today with less pain with resumption of squatting on total gym.  Plan to progress LE strength and balance activity in standing more next visit with good tolerance today.     Plan:    Continue           Timed:         Manual Therapy:    14     mins  33190;     Therapeutic Exercise:    18     mins  06880;     Neuromuscular Mary:    3    mins  82567;    Therapeutic Activity:     9     mins  07589;         Timed Treatment:   44   mins   Total Treatment:     44   mins         Beck Loyola PT, DPT  Physical Therapist  [place] Lic #[id number]

## 2022-11-23 ENCOUNTER — TREATMENT (OUTPATIENT)
Dept: PHYSICAL THERAPY | Facility: CLINIC | Age: 72
End: 2022-11-23

## 2022-11-23 DIAGNOSIS — M25.559 PAIN, HIP: Primary | ICD-10-CM

## 2022-11-23 PROCEDURE — 97530 THERAPEUTIC ACTIVITIES: CPT | Performed by: PHYSICAL THERAPIST

## 2022-11-23 PROCEDURE — 97110 THERAPEUTIC EXERCISES: CPT | Performed by: PHYSICAL THERAPIST

## 2022-11-23 PROCEDURE — 97140 MANUAL THERAPY 1/> REGIONS: CPT | Performed by: PHYSICAL THERAPIST

## 2022-11-23 NOTE — PROGRESS NOTES
Physical Therapy Daily Treatment Note  Visit: 15    Radha Trujillo reports: feeling more back to normal with little knee pain and some right hip pain today.   YOB: 1950  Referring practitioner : RICKY Pineda  Date of Initial: Type: THERAPY  Noted: 10/5/2022  Today's date: 11/23/2022  Patient seen for 15 sessions    Visit Diagnoses:    ICD-10-CM ICD-9-CM   1. Pain, hip  M25.559 719.45       Subjective       Objective   See Exercise, Manual, and Modality Logs for complete treatment.       Assessment/Plan     All activity well tolerated and pain free other than 3rd set of Total Gym squats with some minor L knee pain.  Improved fluidity of movement with gait and transfers.     Plan:  Continue current           Timed:         Manual Therapy:    12     mins  14193;     Therapeutic Exercise:    16     mins  21511;     Neuromuscular Mary:    5    mins  75329;    Therapeutic Activity:     9     mins  32692;           Timed Treatment:   42   mins   Total Treatment:     42   mins         Beck Loyola PT, DPT  Physical Therapist  [place] Lic #[id number]

## 2022-11-29 ENCOUNTER — TREATMENT (OUTPATIENT)
Dept: PHYSICAL THERAPY | Facility: CLINIC | Age: 72
End: 2022-11-29

## 2022-11-29 DIAGNOSIS — M25.559 PAIN, HIP: Primary | ICD-10-CM

## 2022-11-29 PROCEDURE — 97140 MANUAL THERAPY 1/> REGIONS: CPT | Performed by: PHYSICAL THERAPIST

## 2022-11-29 PROCEDURE — 97112 NEUROMUSCULAR REEDUCATION: CPT | Performed by: PHYSICAL THERAPIST

## 2022-11-29 PROCEDURE — 97110 THERAPEUTIC EXERCISES: CPT | Performed by: PHYSICAL THERAPIST

## 2022-11-30 NOTE — PROGRESS NOTES
Physical Therapy Daily Treatment Note  Visit: 16    Radha Trujillo reports: right hip is more sore today after climbing stairs/ladder and putting up [place] decorations.   YOB: 1950  Referring practitioner : RICKY Pineda  Date of Initial: Type: THERAPY  Noted: 10/5/2022  Today's date: 11/30/2022  Patient seen for 16 sessions    Visit Diagnoses:    ICD-10-CM ICD-9-CM   1. Pain, hip  M25.559 719.45       Subjective       Objective   See Exercise, Manual, and Modality Logs for complete treatment.       Assessment/Plan     R hip IR decreased and pain increased initially but ROM improved and pain decreased post treatment.   Educated on need for gradual resumption of activity with squatting/steps per symptoms to maximize improvement.    Plan:    Continue current           Timed:         Manual Therapy:    14     mins  11412;     Therapeutic Exercise:    21     mins  04223;     Neuromuscular Mary:    10    mins  61301;          Timed Treatment:   45   mins   Total Treatment:     45   mins         Beck Loyola PT, DPT  Physical Therapist  [place] Lic #[id number]

## 2022-12-02 ENCOUNTER — TREATMENT (OUTPATIENT)
Dept: PHYSICAL THERAPY | Facility: CLINIC | Age: 72
End: 2022-12-02

## 2022-12-02 DIAGNOSIS — M25.559 PAIN, HIP: Primary | ICD-10-CM

## 2022-12-02 PROCEDURE — 97140 MANUAL THERAPY 1/> REGIONS: CPT | Performed by: PHYSICAL THERAPIST

## 2022-12-02 PROCEDURE — 97112 NEUROMUSCULAR REEDUCATION: CPT | Performed by: PHYSICAL THERAPIST

## 2022-12-02 PROCEDURE — 97110 THERAPEUTIC EXERCISES: CPT | Performed by: PHYSICAL THERAPIST

## 2022-12-02 NOTE — PROGRESS NOTES
Physical Therapy Daily Treatment Note  Visit: 17    Radha Trujillo reports: sore in her lower back after sleeping on massaging unit for her back.  Reports did well with last visit w/o any pain in her hips/knees afterwards.   YOB: 1950  Referring practitioner : RICKY Pineda  Date of Initial: Type: THERAPY  Noted: 10/5/2022  Today's date: 12/2/2022  Patient seen for 17 sessions    Visit Diagnoses:    ICD-10-CM ICD-9-CM   1. Pain, hip  M25.559 719.45       Subjective       Objective   See Exercise, Manual, and Modality Logs for complete treatment.       Assessment/Plan     Moderate paraspinal guarding and limited ability for full upright posture beginning of session - both improved post tx.     Plan:    Continue current           Timed:         Manual Therapy:    12     mins  04076;     Therapeutic Exercise:   20     mins  05237;     Neuromuscular Mary:    12    mins  71839;          Timed Treatment:   44   mins   Total Treatment:     44   mins         Beck Loyola PT, DPT  Physical Therapist  [place] Lic #[id number]

## 2022-12-05 ENCOUNTER — TREATMENT (OUTPATIENT)
Dept: PHYSICAL THERAPY | Facility: CLINIC | Age: 72
End: 2022-12-05

## 2022-12-05 DIAGNOSIS — M25.559 PAIN, HIP: Primary | ICD-10-CM

## 2022-12-05 PROCEDURE — 97110 THERAPEUTIC EXERCISES: CPT | Performed by: PHYSICAL THERAPIST

## 2022-12-05 PROCEDURE — 97140 MANUAL THERAPY 1/> REGIONS: CPT | Performed by: PHYSICAL THERAPIST

## 2022-12-05 PROCEDURE — 97112 NEUROMUSCULAR REEDUCATION: CPT | Performed by: PHYSICAL THERAPIST

## 2022-12-05 NOTE — PROGRESS NOTES
Physical Therapy Daily Treatment Note  Visit: 18    Radha Trujillo reports: her left knee is sore after extended time on her feet this weekend.  Reports she felt good after last visit.   YOB: 1950  Referring practitioner : RICKY Pineda  Date of Initial: Type: THERAPY  Noted: 10/5/2022  Today's date: 12/5/2022  Patient seen for 18 sessions    Visit Diagnoses:    ICD-10-CM ICD-9-CM   1. Pain, hip  M25.559 719.45       Subjective       Objective   See Exercise, Manual, and Modality Logs for complete treatment.       Assessment/Plan     Still limited WB in standing per L knee pain but minimal pain post tx.   Improved hip ER/IR and knee FL AROM post tx.     Plan:    Continue current           Timed:         Manual Therapy:    15     mins  48588;     Therapeutic Exercise:    21     mins  49774;     Neuromuscular Mary:    9    mins  75043;          Timed Treatment:   45   mins   Total Treatment:     45   mins         Beck Loyola PT, DPT  Physical Therapist  [place] Lic #[id number]

## 2022-12-07 ENCOUNTER — TREATMENT (OUTPATIENT)
Dept: PHYSICAL THERAPY | Facility: CLINIC | Age: 72
End: 2022-12-07

## 2022-12-07 DIAGNOSIS — M25.559 PAIN, HIP: Primary | ICD-10-CM

## 2022-12-07 PROCEDURE — 97110 THERAPEUTIC EXERCISES: CPT | Performed by: PHYSICAL THERAPIST

## 2022-12-07 PROCEDURE — 97140 MANUAL THERAPY 1/> REGIONS: CPT | Performed by: PHYSICAL THERAPIST

## 2022-12-07 NOTE — PROGRESS NOTES
Physical Therapy Daily Treatment Note  Visit: 19    Radha Trujillo reports: feeling better today - less pain in both hips, left knee, lower back.  Reports is able to walk more without increased pain.   YOB: 1950  Referring practitioner : RICKY Pineda  Date of Initial: Type: THERAPY  Noted: 10/5/2022  Today's date: 12/7/2022  Patient seen for 19 sessions    Visit Diagnoses:    ICD-10-CM ICD-9-CM   1. Pain, hip  M25.559 719.45       Subjective       Objective   See Exercise, Manual, and Modality Logs for complete treatment.       Assessment/Plan     Progressing well with improving hip/knee ROM, gait mechanics, LE strength .  Plan to increase standing strength and balance progressions if pain levels continue to lessen.            Timed:         Manual Therapy:    14     mins  26793;     Therapeutic Exercise:    30     mins  72892;           Timed Treatment:   44   mins   Total Treatment:     44   mins         Beck Loyola PT, DPT  Physical Therapist  [place] Lic #[id number]

## 2022-12-12 ENCOUNTER — TREATMENT (OUTPATIENT)
Dept: PHYSICAL THERAPY | Facility: CLINIC | Age: 72
End: 2022-12-12

## 2022-12-12 DIAGNOSIS — M25.559 PAIN, HIP: Primary | ICD-10-CM

## 2022-12-12 PROCEDURE — 97140 MANUAL THERAPY 1/> REGIONS: CPT | Performed by: PHYSICAL THERAPIST

## 2022-12-12 PROCEDURE — 97112 NEUROMUSCULAR REEDUCATION: CPT | Performed by: PHYSICAL THERAPIST

## 2022-12-12 PROCEDURE — 97110 THERAPEUTIC EXERCISES: CPT | Performed by: PHYSICAL THERAPIST

## 2022-12-12 NOTE — PROGRESS NOTES
Re-Assessment / Re-Certification        Patient: Radha Trujillo   : 1950  Diagnosis/ICD-10 Code:  Pain, hip [M25.559]  Referring practitioner: RICKY Pineda  Date of Initial Visit: Type: THERAPY  Noted: 10/5/2022  Today's Date: 2022  Patient seen for 20 sessions      Subjective:   Radha Trujillo reports: her back is sore today but is feeling much better overall with decreased pain in both hips and improved ability to be active. Continues to have pain especially after sitting or prolonged positioning in her lower back/hip/knees.   Reports can tell improvement in most activities from PT.   Subjective Questionnaire: [place] Hip : 75%  Clinical Progress: improved  Home Program Compliance: Yes  Treatment has included: therapeutic exercise, neuromuscular re-education, manual therapy, therapeutic activity, moist heat and cryotherapy    Subjective Evaluation    Pain  Current pain ratin  At best pain ratin  At worst pain ratin  Pain location: Lower back/knees/hips.         Objective          Active Range of Motion   Left Hip   Flexion: 81 degrees   External rotation (90/90): 33 degrees   Internal rotation (90/90): 22 degrees     Right Hip   Flexion: 85 degrees   External rotation (90/90): 35 degrees   Internal rotation (90/90): 19 degrees     Strength/Myotome Testing     Left Hip   Planes of Motion   Flexion: 4+  Extension: 4+  Abduction: 4+  External rotation: 4+    Right Hip   Planes of Motion   Flexion: 4+  Extension: 5  Abduction: 5  External rotation: 4+    Ambulation     Comments   Improved trunk and UE rotation and step length from initial visit    Functional Assessment     Comments  Single leg balance w/o UE support - R 4 seconds L 2 seconds  FTSST - 16.5 seconds       Assessment & Plan     Assessment  Impairments: abnormal gait, abnormal or restricted ROM, activity intolerance, impaired physical strength, lacks appropriate home exercise program, pain with function and weight-bearing  intolerance  Functional Limitations: carrying objects, lifting, walking, uncomfortable because of pain and standing  Assessment details: Presents with continued but improved limits in bilateral hip, lumbar spine and left knee ROM, LE strength, balance, gait mechanics and activity tolerance.  She has demonstrated good improvement in functional strength per FTSST, gait mechanics, hip ROM and strength along with decreased pain levels in her LE and increased activity tolerance before provoking pain.  Plan to continue treatment with decrease in frequency per improved symptoms and independence with HEP.   Prognosis: good    Plan  Frequency: 2 x week 2 weeks then 1 x week 4 weeks.  Duration in weeks: 6      Progress toward previous goals: All short term goals met and all long term goals met or progressed towards     New Goals  Short-term goals (STG):   1. Independent and compliant with HEP over 2 weeks. - Met  2. Bilateral hip pain decreased to 10% or greater over 2 weeks. - Met  3. Gait mechanics improved over 2 weeks with increased arm swing/trunk rotation and decreased frontal plane compensation. - Met    Long-term goals (LTG):   1. [place] Hip Score improved to 75% or greater over 8 weeks. - Met  2. Bilateral hip pain decreased to 3/10 at worst with prior level of activity over 8 weeks. - Progressed towards   3. Five Time Sit to Stand at or below age/sex adjusted norms within 8 weeks. - Progressed towards  4. Gait speed at or above age/sex adjusted normals over 8 weeks. - Progressed towards  5. Single leg balance R/L 5 seconds or greater w/o UE support over 8 weeks. - Progressed towards                 Recommendations: Continue with recommendations to continue treatment 2 x per week for 1 week with decreased frequency to 1 x per week for 4 weeks to progress towards LTG  Timeframe: 6 weeks  Prognosis to achieve goals: good    PT Signature: Beck Loyola, PT, DPT      Based upon review of the patient's progress and  continued therapy plan, it is my medical opinion that Radha Trujillo should continue physical therapy treatment at [facility] PHYS[facility] THERAPY  [address] [facility] [address] [place] [address] [phone].    Signature: __________________________________  Maribel Hale APRN    Timed:         Manual Therapy:    15     mins  46552;     Therapeutic Exercise:    10     mins  13068;     Neuromuscular Mary:    13    mins  97585;    Therapeutic Activity:     9     mins  28106;           Timed Treatment:   47   mins   Total Treatment:     47   mins

## 2022-12-14 ENCOUNTER — TREATMENT (OUTPATIENT)
Dept: PHYSICAL THERAPY | Facility: CLINIC | Age: 72
End: 2022-12-14

## 2022-12-14 DIAGNOSIS — M25.559 PAIN, HIP: Primary | ICD-10-CM

## 2022-12-14 PROCEDURE — 97110 THERAPEUTIC EXERCISES: CPT | Performed by: PHYSICAL THERAPIST

## 2022-12-14 PROCEDURE — 97530 THERAPEUTIC ACTIVITIES: CPT | Performed by: PHYSICAL THERAPIST

## 2022-12-14 PROCEDURE — 97140 MANUAL THERAPY 1/> REGIONS: CPT | Performed by: PHYSICAL THERAPIST

## 2022-12-15 NOTE — PROGRESS NOTES
Physical Therapy Daily Treatment Note  Visit: 21    Radha Trujillo reports: feeling good today with lower pain levels in both hips/left knee/lower back.   YOB: 1950  Referring practitioner : RICKY Pineda  Date of Initial: Type: THERAPY  Noted: 10/5/2022  Today's date: 12/15/2022  Patient seen for 21 sessions    Visit Diagnoses:    ICD-10-CM ICD-9-CM   1. Pain, hip  M25.559 719.45       Subjective       Objective   See Exercise, Manual, and Modality Logs for complete treatment.       Assessment/Plan     Good gait mechanics with improved weight shifting and acceptance on L LE. Progressed standing balance and strengthening w/o knee/hip pain.  Reviewed plan to decrease visit frequency and Radha agreed.     Plan:    Decrease visit frequency to 1 x per week after next week.            Timed:         Manual Therapy:    10     mins  63911;     Therapeutic Exercise:    21     mins  88767;     Neuromuscular Mary:    5    mins  85006;    Therapeutic Activity:     9     mins  99072;         Timed Treatment:   45   mins   Total Treatment:     45   mins         Beck Loyola PT, DPT  Physical Therapist  [place] Lic #[id number]

## 2022-12-19 ENCOUNTER — TREATMENT (OUTPATIENT)
Dept: PHYSICAL THERAPY | Facility: CLINIC | Age: 72
End: 2022-12-19

## 2022-12-19 DIAGNOSIS — M25.559 PAIN, HIP: Primary | ICD-10-CM

## 2022-12-19 PROCEDURE — 97140 MANUAL THERAPY 1/> REGIONS: CPT | Performed by: PHYSICAL THERAPIST

## 2022-12-19 PROCEDURE — 97110 THERAPEUTIC EXERCISES: CPT | Performed by: PHYSICAL THERAPIST

## 2022-12-19 PROCEDURE — 97112 NEUROMUSCULAR REEDUCATION: CPT | Performed by: PHYSICAL THERAPIST

## 2022-12-19 NOTE — PROGRESS NOTES
Physical Therapy Daily Treatment Note  Visit: 22    Radha Trujillo reports: some soreness in both legs today after more time on her feet cleaning her feet this weekend.   YOB: 1950  Referring practitioner : RICKY Pineda  Date of Initial: Type: THERAPY  Noted: 10/5/2022  Today's date: 12/19/2022  Patient seen for 22 sessions    Visit Diagnoses:    ICD-10-CM ICD-9-CM   1. Pain, hip  M25.559 719.45       Subjective       Objective   See Exercise, Manual, and Modality Logs for complete treatment.       Assessment/Plan    Improved bilateral hip pain, gait mechanics post tx.         Plan:  Progress standing LE strength/balance progression next visit if less sore at outset of visit.            Timed:         Manual Therapy:    11     mins  69389;     Therapeutic Exercise:    21     mins  34810;     Neuromuscular Mary:    12    mins  90524;        Timed Treatment:   44   mins   Total Treatment:     44   mins         Beck Loyola PT, DPT  Physical Therapist  [place] Lic #[id number]

## 2022-12-21 ENCOUNTER — TREATMENT (OUTPATIENT)
Dept: PHYSICAL THERAPY | Facility: CLINIC | Age: 72
End: 2022-12-21

## 2022-12-21 DIAGNOSIS — M25.559 PAIN, HIP: Primary | ICD-10-CM

## 2022-12-21 PROCEDURE — 97140 MANUAL THERAPY 1/> REGIONS: CPT | Performed by: PHYSICAL THERAPIST

## 2022-12-21 PROCEDURE — 97112 NEUROMUSCULAR REEDUCATION: CPT | Performed by: PHYSICAL THERAPIST

## 2022-12-21 PROCEDURE — 97110 THERAPEUTIC EXERCISES: CPT | Performed by: PHYSICAL THERAPIST

## 2022-12-22 NOTE — PROGRESS NOTES
Physical Therapy Daily Treatment Note  Visit: 23    Radha Trujillo reports: no new issues today.   YOB: 1950  Referring practitioner : RICKY Pienda  Date of Initial: Type: THERAPY  Noted: 10/5/2022  Today's date: 12/22/2022  Patient seen for 23 sessions    Visit Diagnoses:    ICD-10-CM ICD-9-CM   1. Pain, hip  M25.559 719.45       Subjective       Objective   See Exercise, Manual, and Modality Logs for complete treatment.       Assessment/Plan     Progressing standing strength and balance activities with low pain levels in both hips and L knee.  Movement quality with ability for reciprocal motion during gait improving.     Plan:    Continue current           Timed:         Manual Therapy:    12     mins  02063;     Therapeutic Exercise:    16     mins  09922;     Neuromuscular Mary:    10    mins  79765;    Therapeutic Activity:     6     mins  46965;           Timed Treatment:   44   mins   Total Treatment:     44   mins         Beck Loyola PT, DPT  Physical Therapist  [place] Lic #[id number]

## 2023-01-04 ENCOUNTER — TREATMENT (OUTPATIENT)
Dept: PHYSICAL THERAPY | Facility: CLINIC | Age: 73
End: 2023-01-04
Payer: MEDICARE

## 2023-01-04 DIAGNOSIS — M25.559 PAIN, HIP: Primary | ICD-10-CM

## 2023-01-04 PROCEDURE — 97110 THERAPEUTIC EXERCISES: CPT | Performed by: PHYSICAL THERAPIST

## 2023-01-04 PROCEDURE — 97140 MANUAL THERAPY 1/> REGIONS: CPT | Performed by: PHYSICAL THERAPIST

## 2023-01-04 PROCEDURE — 97112 NEUROMUSCULAR REEDUCATION: CPT | Performed by: PHYSICAL THERAPIST

## 2023-01-05 NOTE — PROGRESS NOTES
Physical Therapy Daily Treatment Note  Visit: 24    Radha Trujillo reports: her lower back is sore today and feels related to increased time on her feet including putting up [place] decorations.   YOB: 1950  Referring practitioner : RICKY Pineda  Date of Initial: Type: THERAPY  Noted: 10/5/2022  Today's date: 1/5/2023  Patient seen for 24 sessions    Visit Diagnoses:    ICD-10-CM ICD-9-CM   1. Pain, hip  M25.559 719.45       Subjective       Objective   See Exercise, Manual, and Modality Logs for complete treatment.       Assessment/Plan     Limited standing activity today per LBP with decreased back pain post tx with hip ROM and flexion biased trunk exercise.     Plan:  Continue current           Timed:         Manual Therapy:    14     mins  73780;     Therapeutic Exercise:    20     mins  53717;     Neuromuscular Mary:    11    mins  73405;          Timed Treatment:   45   mins   Total Treatment:     45   mins         Beck Loyola PT, DPT  Physical Therapist  [place] Lic #[id number]

## 2023-01-06 ENCOUNTER — TREATMENT (OUTPATIENT)
Dept: PHYSICAL THERAPY | Facility: CLINIC | Age: 73
End: 2023-01-06
Payer: MEDICARE

## 2023-01-06 DIAGNOSIS — M25.559 PAIN, HIP: Primary | ICD-10-CM

## 2023-01-06 PROCEDURE — 97112 NEUROMUSCULAR REEDUCATION: CPT | Performed by: PHYSICAL THERAPIST

## 2023-01-06 PROCEDURE — 97110 THERAPEUTIC EXERCISES: CPT | Performed by: PHYSICAL THERAPIST

## 2023-01-06 PROCEDURE — 97140 MANUAL THERAPY 1/> REGIONS: CPT | Performed by: PHYSICAL THERAPIST

## 2023-01-06 NOTE — PROGRESS NOTES
Physical Therapy Daily Treatment Note  Visit: 25    Radha Trujillo reports: still stiff/sore in AM but improved from last visit.  Reports would like to continue PT if still likely to benefit.   YOB: 1950  Referring practitioner : RICKY Pineda  Date of Initial: Type: THERAPY  Noted: 10/5/2022  Today's date: 1/6/2023  Patient seen for 25 sessions    Visit Diagnoses:    ICD-10-CM ICD-9-CM   1. Pain, hip  M25.559 719.45       Subjective       Objective   See Exercise, Manual, and Modality Logs for complete treatment.       Assessment/Plan     Improved standing tolerance with ability for standing cross connect activity w/o knee/back pain at end of tx.  Discussed continued care with plan to trial decreased frequency to 1 x per weeks after next week.     Plan:    Continue current           Timed:         Manual Therapy:    12     mins  08778;     Therapeutic Exercise:    20     mins  06661;     Neuromuscular Mary:    9    mins  80101;        Timed Treatment:   41   mins   Total Treatment:     41   mins         Beck Loyola PT, DPT  Physical Therapist  [place] Lic #[id number]

## 2023-01-10 ENCOUNTER — OFFICE (OUTPATIENT)
Dept: URBAN - METROPOLITAN AREA CLINIC 64 | Facility: CLINIC | Age: 73
End: 2023-01-10

## 2023-01-10 VITALS
HEIGHT: 61 IN | WEIGHT: 239 LBS | DIASTOLIC BLOOD PRESSURE: 77 MMHG | HEART RATE: 80 BPM | SYSTOLIC BLOOD PRESSURE: 141 MMHG

## 2023-01-10 DIAGNOSIS — G47.33 OBSTRUCTIVE SLEEP APNEA (ADULT) (PEDIATRIC): ICD-10-CM

## 2023-01-10 DIAGNOSIS — R19.5 OTHER FECAL ABNORMALITIES: ICD-10-CM

## 2023-01-10 DIAGNOSIS — Z92.29 PERSONAL HISTORY OF OTHER DRUG THERAPY: ICD-10-CM

## 2023-01-10 PROCEDURE — 99204 OFFICE O/P NEW MOD 45 MIN: CPT | Performed by: INTERNAL MEDICINE

## 2023-01-11 NOTE — PROGRESS NOTES
Hematology/Oncology Outpatient Follow Up    PATIENT NAME:Radha Trujillo  :1950  MRN: [id number]  PRIMARY CARE PHYSICIAN: Maribel Hale APRN  REFERRING PHYSICIAN: Maribel Hale APRN         HISTORY OF PRESENT ILLNESS:   Radha Trujillo is a 71 y.o. female who presented to [facility] on 2/10/2022 with complaints of abnormal test from her PCP.  Reports that she has not been feeling well and has been feeling short of breath since Monday.  Patient reports that she started walking into [facility] and suddenly felt short of breath.  Denies chest pain, fever, chills, no recent COVID-19 diagnosis, dizziness, lightheadedness, diaphoresis, syncope, abnormal leg pain or swelling.  No recent surgeries or long trips.  She does report she has been more sedentary than normal.  Positive Respiratory panel with viral infection plus a negative procalcitonin without other underlying concern for bacterial infection.  CT chest PE protocol revealed Pulmonary embolism in the right and left segmental and subsegmental branches with early right heart strain. No pulmonary infarct is seen. Patient was started on IV heparin.        22  Hematology/Oncology was consulted for Pulmonary embolism in the right and left segmental and subsegmental branches with early right heart strain.  No pulmonary infarct is seen; Patient was started on IV heparin. No history of blood disorders or DVT.  Patient has no smoking history.    3/3/2022 Ms. Trujillo returned to the clinic for follow-up.  She was feeling well and had tolerated the rivaroxaban without any difficulties.  She had no bleeding.  All her respiratory symptoms were completely resolved.  The physical exam revealed only significant obesity but there was no other abnormalities.  Plans to continue with the same anticoagulant were made.    2022: Back in the office for follow-up.  Compliant with the apixaban and no obvious side effects from it.  Had gained some weight.   Discussed the importance of weight management to reduce her risk of thrombosis.  She was asked to continue on the same anticoagulant.  A new prescription was sent.  She was also asked to return in 6 months.    1/12/2023: Feeling well.  Anxious but had decided to discontinue the antidepressant and was feeling better.  Sleeping less.  Working on losing weight.  Receiving physical therapy for persistent low back pain and bilateral knee pain.  No bleeding.  Remained compliant with the apixaban.  Has had no dyspnea, chest pains or cough.  Also no lower extremity edema or pain.     He/She  has a past medical history of Abnormal coagulation profile, Abnormal EKG, Abnormal laboratory test, Acquired spondylolisthesis, Allergic (7/16/2021), Arthritis, Asthma, Body aches, Bronchitis, acute, Chest pain, Chronic back pain, Cough, Depression with anxiety, DJD (degenerative joint disease), Essential hypertension, Fatigue, History of radiofrequency ablation (RFA) procedure for cardiac arrhythmia (1/5/2021), Hyperlipidemia, Hypokalemia, Low back pain, Lymphocytic colitis, Obesity, Other specified disorders of bone density and structure, other site, Panic disorder, Paroxysmal SVT (supraventricular tachycardia) (HCC), Postmenopausal, Prediabetes, SOB (shortness of breath), Unspecified injury of left Achilles tendon, initial encounter, Vitamin B deficiency (1/14/2019), Vitamin B deficiency, unspecified, and Vitamin D deficiency, unspecified.      PCP: Maribel Hale APRN  History of present illness was reviewed and is unchanged from the previous visit. 02/12/22    Past Medical History:   Diagnosis Date   • Abnormal coagulation profile    • Abnormal EKG    • Abnormal laboratory test    • Acquired spondylolisthesis    • Allergic 07/16/2021    Start shots in 7/27/21   • Arthritis    • Asthma    • Body aches    • Bronchitis, acute    • Chest pain     PRESSURE   • Chronic back pain    • Cough    • Deep vein thrombosis (HCC) 02/2022     Taking Eliquis   • Depression with anxiety    • DJD (degenerative joint disease)    • Essential hypertension    • Fatigue    • History of radiofrequency ablation (RFA) procedure for cardiac arrhythmia 01/05/2021   • Hyperlipidemia    • Hypokalemia    • Low back pain    • Lymphocytic colitis    • Obesity    • Other specified disorders of bone density and structure, other site    • Panic disorder    • Paroxysmal SVT (supraventricular tachycardia) (HCC)    • Postmenopausal     9 YEARS   • Prediabetes    • Pulmonary embolism (HCC) Feb. 2022    Treated   • Sleep apnea    • SOB (shortness of breath)    • Unspecified injury of left Achilles tendon, initial encounter    • Vitamin B deficiency 01/14/2019   • Vitamin B deficiency, unspecified    • Vitamin D deficiency, unspecified      Past Surgical History:   Procedure Laterality Date   • ACHILLES TENDON SURGERY Left 2018   • AV NODE ABLATION  08/2016    AVNRT RF ABLATION --8/16   • CARDIAC CATHETERIZATION Bilateral 02/11/2022    Procedure: EKOS;  Surgeon: Kilo Vivar DO;  Location: [facility] INVASIVE LOCATION;  Service: Cardiovascular;  Laterality: Bilateral;   • COLONOSCOPY N/A 01/01/2012   • FOOT SURGERY Right     TOE/FOOT SURGERY   • KNEE SURGERY Right     2/2 TORN MENICUS   • TUBAL ABDOMINAL LIGATION         Current Outpatient Medications:   •  ALPRAZolam (Xanax) 0.25 MG tablet, Take 1 tablet by mouth 2 (Two) Times a Day As Needed for Anxiety., Disp: 30 tablet, Rfl: 0  •  amLODIPine (NORVASC) 5 MG tablet, TAKE 1 TABLET BY MOUTH EVERY DAY, Disp: 90 tablet, Rfl: 1  •  apixaban (ELIQUIS) 5 MG tablet tablet, Take 1 tablet by mouth 2 (Two) Times a Day., Disp: 60 tablet, Rfl: 11  •  azelastine (ASTELIN) 0.1 % nasal spray, 1 spray into the nostril(s) as directed by provider 2 (Two) Times a Day., Disp: , Rfl:   •  fluticasone (Flovent HFA) 110 MCG/ACT inhaler, Inhale 1 puff 2 (Two) Times a Day., Disp: 1 inhaler, Rfl: 1  •  L-Lysine 1000 MG tablet, , Disp: ,  Rfl:   •  melatonin 5 MG tablet tablet, Take 5 mg by mouth., Disp: , Rfl:   •  Multiple Vitamin (MULTIVITAMIN) capsule, Take 1 capsule by mouth Daily., Disp: , Rfl:   •  potassium chloride 10 MEQ CR tablet, TAKE 1 TABLET BY MOUTH THREE TIMES A DAY, Disp: 270 tablet, Rfl: 0  •  valsartan-hydrochlorothiazide (DIOVAN-HCT) 320-25 MG per tablet, TAKE 1 TABLET BY MOUTH EVERY DAY, Disp: 90 tablet, Rfl: 3  •  VITAMIN B COMPLEX-C PO, Take  by mouth., Disp: , Rfl:   •  Vitamins A & D 5000-400 units capsule, Take 1 capsule by mouth Daily., Disp: , Rfl:     Allergies   Allergen Reactions   • Adhesive Tape Rash     Family History   Problem Relation Age of Onset   • Arthritis Mother    • Diabetes Mother    • Heart disease Mother    • Emphysema Mother    • Kidney disease Father    • Arthritis Sister    • Anxiety disorder Sister    • Arthritis Brother    • Anxiety disorder Brother    • Acute myelogenous leukemia Niece    • Myasthenia gravis Niece      Cancer-related family history is not on file.    Social History     Tobacco Use   • Smoking status: Never   • Smokeless tobacco: Never   Vaping Use   • Vaping Use: Never used   Substance Use Topics   • Alcohol use: Not Currently     Comment: 1 DRINK WEEKLY, IF THAT: RARE/OCC;  CAFFEINE USE + 1 COFFEE DAILY   • Drug use: No       HPI, ROS and PFSH have been reviewed and confirmed on 1/12/2023.     SUBJECTIVE:  1/12/2023: Underwent a fecal molecular test for screening for colon cancer which was reported positive and was getting ready to undergo a screening colonoscopy.  Had remained reasonably active although was still limited by her back pain and bilateral knee pain.  Weight was stable and she had been able to eat well.  Had been afebrile and without nocturnal diaphoresis.  Denied any bleeding.  Had not had any chest pains, cough, hemoptysis.  Denied as well dysphagia.  No abdominal pain or changes in bowel activity and no dysuria or hematuria.      REVIEW OF SYSTEMS:  Review of  "Systems   Constitutional: Negative for activity change, appetite change, chills, diaphoresis, fatigue, fever and unexpected weight change.   HENT: Negative for congestion, dental problem, drooling, ear discharge, ear pain, facial swelling, hearing loss, mouth sores, nosebleeds, postnasal drip, rhinorrhea, sinus pressure, sinus pain, sneezing, sore throat, tinnitus, trouble swallowing and voice change.    Eyes: Negative for photophobia, pain, discharge, redness, itching and visual disturbance.   Respiratory: Negative for apnea, cough, choking, chest tightness, shortness of breath, wheezing and stridor.    Cardiovascular: Negative for chest pain, palpitations and leg swelling.   Gastrointestinal: Negative for abdominal distention, abdominal pain, anal bleeding, blood in stool, constipation, diarrhea, nausea, rectal pain and vomiting.   Endocrine: Negative for cold intolerance, heat intolerance, polydipsia and polyuria.   Genitourinary: Negative for decreased urine volume, difficulty urinating, dysuria, flank pain, frequency, genital sores, hematuria and urgency.   Musculoskeletal: Negative for arthralgias, back pain, gait problem, joint swelling, myalgias, neck pain and neck stiffness.   Skin: Negative for color change, pallor and rash.   Neurological: Negative for dizziness, tremors, seizures, syncope, facial asymmetry, speech difficulty, weakness, light-headedness, numbness and headaches.   Hematological: Negative for adenopathy. Does not bruise/bleed easily.   Psychiatric/Behavioral: Negative for agitation, behavioral problems, confusion, decreased concentration, hallucinations, self-injury, sleep disturbance and suicidal ideas. The patient is not nervous/anxious.        OBJECTIVE:    Vitals:    01/12/23 1320   BP: 147/82   Pulse: 68   Temp: 98.2 °F (36.8 °C)   TempSrc: Oral   SpO2: 98%   Weight: 107 kg (236 lb 9.6 oz)   Height: 154.9 cm (61\")   PainSc: 0-No pain     Body mass index is 44.71 kg/m².    ECOG  (0) " Fully active, able to carry on all predisease performance without restriction    Physical Exam  Constitutional:       General: She is not in acute distress.     Appearance: She is not ill-appearing, toxic-appearing or diaphoretic.      Comments: Well-built, well oriented, in good spirits and in no distress.  BMI greater than 40.   HENT:      Head: Normocephalic and atraumatic.      Right Ear: External ear normal.      Left Ear: External ear normal.      Nose: Nose normal. No congestion or rhinorrhea.      Mouth/Throat:      Mouth: Mucous membranes are moist.      Pharynx: Oropharynx is clear. No oropharyngeal exudate or posterior oropharyngeal erythema.   Eyes:      General: No scleral icterus.        Right eye: No discharge.         Left eye: No discharge.      Conjunctiva/sclera: Conjunctivae normal.      Pupils: Pupils are equal, round, and reactive to light.   Cardiovascular:      Rate and Rhythm: Normal rate and regular rhythm.      Pulses: Normal pulses.      Heart sounds: No murmur heard.    No friction rub. No gallop.   Pulmonary:      Effort: Pulmonary effort is normal. No respiratory distress.      Breath sounds: Normal breath sounds. No stridor. No wheezing, rhonchi or rales.   Abdominal:      General: Abdomen is flat. Bowel sounds are normal. There is no distension.      Palpations: Abdomen is soft. There is no mass.      Tenderness: There is no abdominal tenderness. There is no right CVA tenderness, left CVA tenderness, guarding or rebound.      Hernia: No hernia is present.   Musculoskeletal:         General: No swelling, tenderness, deformity or signs of injury.      Cervical back: No rigidity or tenderness.      Right lower leg: No edema.      Left lower leg: No edema.   Lymphadenopathy:      Cervical: No cervical adenopathy.   Skin:     Coloration: Skin is not jaundiced or pale.      Findings: No bruising, lesion or rash.   Neurological:      General: No focal deficit present.      Mental Status:  She is alert and oriented to person, place, and time.      Cranial Nerves: No cranial nerve deficit.      Motor: No weakness.      Gait: Gait normal.   Psychiatric:         Mood and Affect: Mood normal.         Behavior: Behavior normal.         Thought Content: Thought content normal.         Judgment: Judgment normal.     Chalino Russo MD performed a physical exam on 1/12/2023 as documented above    RECENT LABS  WBC   Date Value Ref Range Status   01/12/2023 6.61 3.40 - 10.80 10*3/mm3 Final     RBC   Date Value Ref Range Status   01/12/2023 4.74 3.77 - 5.28 10*6/mm3 Final     Hemoglobin   Date Value Ref Range Status   01/12/2023 13.4 12.0 - 15.9 g/dL Final     Hematocrit   Date Value Ref Range Status   01/12/2023 39.9 34.0 - 46.6 % Final     MCV   Date Value Ref Range Status   01/12/2023 84.2 79.0 - 97.0 fL Final     MCH   Date Value Ref Range Status   01/12/2023 28.3 26.6 - 33.0 pg Final     MCHC   Date Value Ref Range Status   01/12/2023 33.6 31.5 - 35.7 g/dL Final     RDW   Date Value Ref Range Status   01/12/2023 15.0 12.3 - 15.4 % Final     RDW-SD   Date Value Ref Range Status   01/12/2023 45.1 37.0 - 54.0 fl Final     MPV   Date Value Ref Range Status   01/12/2023 10.4 6.0 - 12.0 fL Final     Platelets   Date Value Ref Range Status   01/12/2023 282 140 - 450 10*3/mm3 Final     Neutrophil %   Date Value Ref Range Status   01/12/2023 66.1 42.7 - 76.0 % Final     Lymphocyte %   Date Value Ref Range Status   01/12/2023 23.4 19.6 - 45.3 % Final     Monocyte %   Date Value Ref Range Status   01/12/2023 8.6 5.0 - 12.0 % Final     Eosinophil %   Date Value Ref Range Status   01/12/2023 1.4 0.3 - 6.2 % Final     Basophil %   Date Value Ref Range Status   01/12/2023 0.5 0.0 - 1.5 % Final     Immature Grans %   Date Value Ref Range Status   06/14/2022 0.3 0.0 - 0.5 % Final     Neutrophils, Absolute   Date Value Ref Range Status   01/12/2023 4.37 1.70 - 7.00 10*3/mm3 Final     Lymphocytes, Absolute   Date Value Ref  Range Status   01/12/2023 1.55 0.70 - 3.10 10*3/mm3 Final     Monocytes, Absolute   Date Value Ref Range Status   01/12/2023 0.57 0.10 - 0.90 10*3/mm3 Final     Eosinophils, Absolute   Date Value Ref Range Status   01/12/2023 0.09 0.00 - 0.40 10*3/mm3 Final     Basophils, Absolute   Date Value Ref Range Status   01/12/2023 0.03 0.00 - 0.20 10*3/mm3 Final     Immature Grans, Absolute   Date Value Ref Range Status   06/14/2022 0.02 0.00 - 0.05 10*3/mm3 Final     nRBC   Date Value Ref Range Status   06/14/2022 0.0 0.0 - 0.2 /100 WBC Final       Lab Results   Component Value Date    GLUCOSE 106 (H) 07/07/2022    BUN 13 07/07/2022    CREATININE 0.74 07/07/2022    EGFRIFNONA 100 02/12/2022    EGFRIFAFRI 70 03/21/2017    BCR 17.6 07/07/2022    K 3.8 07/07/2022    CO2 27.0 07/07/2022    CALCIUM 9.5 07/07/2022    ALBUMIN 4.40 07/07/2022    LABIL2 1.6 05/23/2019    AST 20 07/07/2022    ALT 26 07/07/2022       ASSESSMENT:  1.  Unprovoked pulmonary embolism: Continue anticoagulation.  Discussed with her.  Explained the need to stop the anticoagulant in preparation for colonoscopy.  Gave instructions.  Sent communication to her gastroenterologist, Dr. De La O.  2.  Discussed with her the significance of her fecal malignancy screening.  The importance of colonoscopy was described.  3.  Continue apixaban.  4.  She will see me again in approximately 3 months.       PLAN  As above    Chalino Russo MD on 1/12/2023 at 1411

## 2023-01-12 ENCOUNTER — OFFICE VISIT (OUTPATIENT)
Dept: ONCOLOGY | Facility: CLINIC | Age: 73
End: 2023-01-12
Payer: MEDICARE

## 2023-01-12 ENCOUNTER — LAB (OUTPATIENT)
Dept: LAB | Facility: HOSPITAL | Age: 73
End: 2023-01-12
Payer: MEDICARE

## 2023-01-12 VITALS
OXYGEN SATURATION: 98 % | SYSTOLIC BLOOD PRESSURE: 147 MMHG | HEART RATE: 68 BPM | HEIGHT: 61 IN | TEMPERATURE: 98.2 F | BODY MASS INDEX: 44.67 KG/M2 | WEIGHT: 236.6 LBS | DIASTOLIC BLOOD PRESSURE: 82 MMHG

## 2023-01-12 DIAGNOSIS — I82.431 EMBOLISM AND THROMBOSIS OF RIGHT POPLITEAL VEIN: Primary | ICD-10-CM

## 2023-01-12 DIAGNOSIS — I82.431 ACUTE DEEP VEIN THROMBOSIS (DVT) OF POPLITEAL VEIN OF RIGHT LOWER EXTREMITY: Primary | ICD-10-CM

## 2023-01-12 DIAGNOSIS — I82.431 ACUTE DEEP VEIN THROMBOSIS (DVT) OF POPLITEAL VEIN OF RIGHT LOWER EXTREMITY: ICD-10-CM

## 2023-01-12 LAB
ALBUMIN SERPL-MCNC: 4.5 G/DL (ref 3.5–5.2)
ALBUMIN/GLOB SERPL: 1.7 G/DL
ALP SERPL-CCNC: 101 U/L (ref 39–117)
ALT SERPL W P-5'-P-CCNC: 28 U/L (ref 1–33)
ANION GAP SERPL CALCULATED.3IONS-SCNC: 12 MMOL/L (ref 5–15)
AST SERPL-CCNC: 28 U/L (ref 1–32)
BASOPHILS # BLD AUTO: 0.03 10*3/MM3 (ref 0–0.2)
BASOPHILS NFR BLD AUTO: 0.5 % (ref 0–1.5)
BILIRUB SERPL-MCNC: 0.4 MG/DL (ref 0–1.2)
BUN SERPL-MCNC: 15 MG/DL (ref 8–23)
BUN/CREAT SERPL: 18.1 (ref 7–25)
CALCIUM SPEC-SCNC: 10.1 MG/DL (ref 8.6–10.5)
CHLORIDE SERPL-SCNC: 99 MMOL/L (ref 98–107)
CO2 SERPL-SCNC: 28 MMOL/L (ref 22–29)
CREAT SERPL-MCNC: 0.83 MG/DL (ref 0.57–1)
DEPRECATED RDW RBC AUTO: 45.1 FL (ref 37–54)
EGFRCR SERPLBLD CKD-EPI 2021: 75 ML/MIN/1.73
EOSINOPHIL # BLD AUTO: 0.09 10*3/MM3 (ref 0–0.4)
EOSINOPHIL NFR BLD AUTO: 1.4 % (ref 0.3–6.2)
ERYTHROCYTE [DISTWIDTH] IN BLOOD BY AUTOMATED COUNT: 15 % (ref 12.3–15.4)
GLOBULIN UR ELPH-MCNC: 2.7 GM/DL
GLUCOSE SERPL-MCNC: 98 MG/DL (ref 65–99)
HCT VFR BLD AUTO: 39.9 % (ref 34–46.6)
HGB BLD-MCNC: 13.4 G/DL (ref 12–15.9)
HOLD SPECIMEN: NORMAL
LYMPHOCYTES # BLD AUTO: 1.55 10*3/MM3 (ref 0.7–3.1)
LYMPHOCYTES NFR BLD AUTO: 23.4 % (ref 19.6–45.3)
MCH RBC QN AUTO: 28.3 PG (ref 26.6–33)
MCHC RBC AUTO-ENTMCNC: 33.6 G/DL (ref 31.5–35.7)
MCV RBC AUTO: 84.2 FL (ref 79–97)
MONOCYTES # BLD AUTO: 0.57 10*3/MM3 (ref 0.1–0.9)
MONOCYTES NFR BLD AUTO: 8.6 % (ref 5–12)
NEUTROPHILS NFR BLD AUTO: 4.37 10*3/MM3 (ref 1.7–7)
NEUTROPHILS NFR BLD AUTO: 66.1 % (ref 42.7–76)
PLATELET # BLD AUTO: 282 10*3/MM3 (ref 140–450)
PMV BLD AUTO: 10.4 FL (ref 6–12)
POTASSIUM SERPL-SCNC: 3.7 MMOL/L (ref 3.5–5.2)
PROT SERPL-MCNC: 7.2 G/DL (ref 6–8.5)
RBC # BLD AUTO: 4.74 10*6/MM3 (ref 3.77–5.28)
SODIUM SERPL-SCNC: 139 MMOL/L (ref 136–145)
WBC NRBC COR # BLD: 6.61 10*3/MM3 (ref 3.4–10.8)
WHOLE BLOOD HOLD COAG: NORMAL

## 2023-01-12 PROCEDURE — 85025 COMPLETE CBC W/AUTO DIFF WBC: CPT

## 2023-01-12 PROCEDURE — 80053 COMPREHEN METABOLIC PANEL: CPT

## 2023-01-12 PROCEDURE — 36415 COLL VENOUS BLD VENIPUNCTURE: CPT

## 2023-01-12 PROCEDURE — 99213 OFFICE O/P EST LOW 20 MIN: CPT | Performed by: INTERNAL MEDICINE

## 2023-01-12 RX ORDER — CHOLECALCIFEROL (VITAMIN D3) 125 MCG
5 CAPSULE ORAL
COMMUNITY

## 2023-01-13 ENCOUNTER — TREATMENT (OUTPATIENT)
Dept: PHYSICAL THERAPY | Facility: CLINIC | Age: 73
End: 2023-01-13
Payer: MEDICARE

## 2023-01-13 DIAGNOSIS — M25.559 PAIN, HIP: Primary | ICD-10-CM

## 2023-01-13 PROCEDURE — 97110 THERAPEUTIC EXERCISES: CPT | Performed by: PHYSICAL THERAPIST

## 2023-01-13 PROCEDURE — 97140 MANUAL THERAPY 1/> REGIONS: CPT | Performed by: PHYSICAL THERAPIST

## 2023-01-13 PROCEDURE — 97112 NEUROMUSCULAR REEDUCATION: CPT | Performed by: PHYSICAL THERAPIST

## 2023-01-13 NOTE — PROGRESS NOTES
Physical Therapy Daily Treatment Note  Visit: 26    Radha Turjillo reports: knee/hip pain improved is lower back is sore today.  YOB: 1950  Referring practitioner : RICKY Pineda  Date of Initial: Type: THERAPY  Noted: 10/5/2022  Today's date: 1/13/2023  Patient seen for 26 sessions    Visit Diagnoses:    ICD-10-CM ICD-9-CM   1. Pain, hip  M25.559 719.45       Subjective       Objective   See Exercise, Manual, and Modality Logs for complete treatment.       Assessment/Plan     Trial lumbar manual traction at low force with some symptom relief noted.  Progressing well with initial symptoms with plan to transition to HEP only soon.     Plan:    Continue current           Timed:         Manual Therapy:    15     mins  35872;     Therapeutic Exercise:    20     mins  15181;     Neuromuscular Mary:    10    mins  56303;        Timed Treatment:   45   mins   Total Treatment:     45   mins         Beck Loyola PT, DPT  Physical Therapist  [place] Lic #[id number]

## 2023-01-17 ENCOUNTER — OFFICE VISIT (OUTPATIENT)
Dept: FAMILY MEDICINE CLINIC | Facility: CLINIC | Age: 73
End: 2023-01-17
Payer: MEDICARE

## 2023-01-17 ENCOUNTER — HOSPITAL ENCOUNTER (OUTPATIENT)
Dept: GENERAL RADIOLOGY | Facility: HOSPITAL | Age: 73
Discharge: HOME | End: 2023-01-17
Admitting: NURSE PRACTITIONER
Payer: MEDICARE

## 2023-01-17 VITALS
HEART RATE: 92 BPM | DIASTOLIC BLOOD PRESSURE: 84 MMHG | RESPIRATION RATE: 16 BRPM | WEIGHT: 238 LBS | SYSTOLIC BLOOD PRESSURE: 160 MMHG | HEIGHT: 61 IN | OXYGEN SATURATION: 98 % | BODY MASS INDEX: 44.93 KG/M2 | TEMPERATURE: 97.5 F

## 2023-01-17 DIAGNOSIS — I10 ESSENTIAL HYPERTENSION: ICD-10-CM

## 2023-01-17 DIAGNOSIS — F41.8 MIXED ANXIETY DEPRESSIVE DISORDER: Primary | Chronic | ICD-10-CM

## 2023-01-17 DIAGNOSIS — R07.89 FEELING OF CHEST TIGHTNESS: ICD-10-CM

## 2023-01-17 DIAGNOSIS — R06.09 DYSPNEA ON EXERTION: ICD-10-CM

## 2023-01-17 PROCEDURE — 71046 X-RAY EXAM CHEST 2 VIEWS: CPT

## 2023-01-17 PROCEDURE — 93000 ELECTROCARDIOGRAM COMPLETE: CPT | Performed by: NURSE PRACTITIONER

## 2023-01-17 PROCEDURE — 99214 OFFICE O/P EST MOD 30 MIN: CPT | Performed by: NURSE PRACTITIONER

## 2023-01-17 RX ORDER — POLYETHYLENE GLYCOL 3350, SODIUM SULFATE ANHYDROUS, SODIUM BICARBONATE, SODIUM CHLORIDE, POTASSIUM CHLORIDE 236; 22.74; 6.74; 5.86; 2.97 G/4L; G/4L; G/4L; G/4L; G/4L
POWDER, FOR SOLUTION ORAL SEE ADMIN INSTRUCTIONS
COMMUNITY
Start: 2023-01-10 | End: 2023-02-06

## 2023-01-17 RX ORDER — BUPROPION HYDROCHLORIDE 150 MG/1
150 TABLET ORAL DAILY
Qty: 30 TABLET | Refills: 3 | Status: SHIPPED | OUTPATIENT
Start: 2023-01-17 | End: 2023-02-13

## 2023-01-17 RX ORDER — METOCLOPRAMIDE 5 MG/1
TABLET ORAL
COMMUNITY
Start: 2023-01-10 | End: 2023-02-06

## 2023-01-17 RX ORDER — AMLODIPINE BESYLATE 10 MG/1
10 TABLET ORAL DAILY
Qty: 90 TABLET | Refills: 1 | Status: SHIPPED | OUTPATIENT
Start: 2023-01-17

## 2023-01-17 NOTE — ASSESSMENT & PLAN NOTE
Hypertension is worsening.  Medication changes per orders.  Blood pressure will be reassessed in 4 weeks.  Repeat /94. Will increase amlodipine to 10mg daily. Monitor BP and keep diary.

## 2023-01-17 NOTE — PROGRESS NOTES
"Chief Complaint  Shortness of Breath and Chest Pain (Tightness when taking deep breaths and coughs)  Subjective        Radha Trujillo presents to [facility]  History of Present Illness  Pt comes in today with c/o chest tightness and exertional SOA. Started about 2 days ago. Feels like it may be URI. Has trouble with year round allergies. Has PND, slight cough, nasal congestion. Nothing necessarily worse over the past 2 days. Has tried mucinex and her inhaler and may have helped a little.  Denies any fever or chills. No ST. No NVD. No body aches or HA.   Feels more worried and anxious about it now.   No hx of CAD, but has had ablation for SVT in the past.   Hx of PE as well and is on eliquis   Has been having ore difficulty with depression and anxiety. Has been more weepy. Stopped lexapro a little while ago 2/2 side effects of drowsiness and weight gain. Feels lonely and like she has nothing to do. No hobbies. Recognizes that she is getting older and children are getting older and it bothers her.        Objective     Vital Signs:   /84   Pulse 92   Temp 97.5 °F (36.4 °C)   Resp 16   Ht 154.9 cm (61\")   Wt 108 kg (238 lb)   SpO2 98%   BMI 44.97 kg/m²       BP Readings from Last 3 Encounters:   01/17/23 160/84   01/12/23 147/82   10/04/22 125/75       Wt Readings from Last 3 Encounters:   01/17/23 108 kg (238 lb)   01/12/23 107 kg (236 lb 9.6 oz)   10/04/22 106 kg (234 lb)     Physical Exam  Constitutional:       Appearance: She is well-developed.   Eyes:      Pupils: Pupils are equal, round, and reactive to light.   Cardiovascular:      Rate and Rhythm: Normal rate and regular rhythm.   Pulmonary:      Effort: Pulmonary effort is normal.      Breath sounds: Normal breath sounds. No decreased breath sounds, wheezing or rhonchi.   Neurological:      Mental Status: She is alert and oriented to person, place, and time.   Psychiatric:         Mood and Affect: Mood is anxious. " Affect is tearful.        Result Review :            ECG 12 Lead    Date/Time: 1/17/2023 4:42 PM  Performed by: Maribel Hale APRN  Authorized by: Maribel Hale APRN   Previous ECG: no previous ECG available  Rhythm: sinus rhythm    Clinical impression: normal ECG              Assessment and Plan    Diagnoses and all orders for this visit:    1. Mixed anxiety depressive disorder (Primary)  -     buPROPion XL (Wellbutrin XL) 150 MG 24 hr tablet; Take 1 tablet by mouth Daily.  Dispense: 30 tablet; Refill: 3    2. Essential hypertension  Assessment & Plan:  Hypertension is worsening.  Medication changes per orders.  Blood pressure will be reassessed in 4 weeks.  Repeat /94. Will increase amlodipine to 10mg daily. Monitor BP and keep diary.     Orders:  -     amLODIPine (NORVASC) 10 MG tablet; Take 1 tablet by mouth Daily.  Dispense: 90 tablet; Refill: 1    3. Dyspnea on exertion  -     XR Chest PA & Lateral; Future    4. Feeling of chest tightness  -     XR Chest PA & Lateral; Future    Other orders  -     ECG 12 Lead    EKG normal  Will get CXR  I do feel her symptoms are most likely 2/2 anxiety. States she had a busy and overwhelming weekend with family in town. Then feels anxious and sad about everyone getting older and she has nothing to do. Stopped lexapro recently and starting to feel effects. Took xanax the last 2 nights and that helped her symptoms.   Will start wellbutrin and see how she does  Increase amlodipine to 10mg daily  During this office visit, we discussed the pertinent aspects of the visit and treatment recommendations. Pt verbalizes understanding. Follow up was discussed. Patient was given the opportunity to ask questions and discuss other concerns.         Follow Up   Return in about 6 weeks (around 2/28/2023) for HTN follow up.  Patient was given instructions and counseling regarding her condition or for health maintenance advice. Please see specific information pulled into the AVS  if appropriate.

## 2023-01-18 ENCOUNTER — TELEPHONE (OUTPATIENT)
Dept: PHYSICAL THERAPY | Facility: CLINIC | Age: 73
End: 2023-01-18

## 2023-01-19 ENCOUNTER — TELEPHONE (OUTPATIENT)
Dept: ONCOLOGY | Facility: CLINIC | Age: 73
End: 2023-01-19

## 2023-01-19 NOTE — TELEPHONE ENCOUNTER
Caller: Radha Trujillo    Relationship to patient: SELF    Best call back number: [phone]    Patient is needing: A CALL FROM NUTRITIONIST TO SET UP AN APPT.

## 2023-01-23 ENCOUNTER — DOCUMENTATION (OUTPATIENT)
Dept: ONCOLOGY | Facility: CLINIC | Age: 73
End: 2023-01-23
Payer: MEDICARE

## 2023-01-23 ENCOUNTER — NUTRITION (OUTPATIENT)
Dept: ONCOLOGY | Facility: CLINIC | Age: 73
End: 2023-01-23
Payer: MEDICARE

## 2023-01-24 ENCOUNTER — PATIENT MESSAGE (OUTPATIENT)
Dept: FAMILY MEDICINE CLINIC | Facility: CLINIC | Age: 73
End: 2023-01-24
Payer: MEDICARE

## 2023-01-24 RX ORDER — ALPRAZOLAM 0.25 MG/1
0.25 TABLET ORAL 2 TIMES DAILY PRN
Qty: 30 TABLET | Refills: 0 | Status: SHIPPED | OUTPATIENT
Start: 2023-01-24

## 2023-01-24 NOTE — TELEPHONE ENCOUNTER
From: Radha Trujillo  To: Mairbel Hale  Sent: 1/24/2023 1:27 PM EST  Subject: Anxiety    Maribel, how long does the Wellbutrin take to work? I still feel anxious and at times shortness of breath. I've been taking my xanax in the evening and it helps. I still have that bronchial feeling in my upper chest area. Thoughts?

## 2023-01-25 ENCOUNTER — TREATMENT (OUTPATIENT)
Dept: PHYSICAL THERAPY | Facility: CLINIC | Age: 73
End: 2023-01-25
Payer: MEDICARE

## 2023-01-25 DIAGNOSIS — M25.559 PAIN, HIP: Primary | ICD-10-CM

## 2023-01-25 PROCEDURE — 97112 NEUROMUSCULAR REEDUCATION: CPT | Performed by: PHYSICAL THERAPIST

## 2023-01-25 PROCEDURE — 97140 MANUAL THERAPY 1/> REGIONS: CPT | Performed by: PHYSICAL THERAPIST

## 2023-01-25 PROCEDURE — 97110 THERAPEUTIC EXERCISES: CPT | Performed by: PHYSICAL THERAPIST

## 2023-01-26 NOTE — PROGRESS NOTES
Physical Therapy Daily Treatment Note  Visit: 27    Radha Trujillo reports: had issues with anxiety last week and unable to attend PT.  Reports on several new medications and feeling better this week.   YOB: 1950  Referring practitioner : RICKY Pineda  Date of Initial: Type: THERAPY  Noted: 10/5/2022  Today's date: 1/26/2023  Patient seen for 27 sessions    Visit Diagnoses:    ICD-10-CM ICD-9-CM   1. Pain, hip  M25.559 719.45       Subjective       Objective   See Exercise, Manual, and Modality Logs for complete treatment.       Assessment/Plan     Limited strength progressions per increased lumbar symptoms today - improved post tx.      Plan:    Continue current           Timed:         Manual Therapy:    13     mins  40543;     Therapeutic Exercise:    20     mins  16194;     Neuromuscular Mary:    9    mins  73549;          Timed Treatment:   42   mins   Total Treatment:     42   mins         Beck Loyola PT, DPT  Physical Therapist  [place] Lic #[id number]

## 2023-01-27 VITALS — HEIGHT: 61 IN | BODY MASS INDEX: 44.71 KG/M2 | WEIGHT: 236.8 LBS

## 2023-01-27 NOTE — PROGRESS NOTES
"                 Nutrition Assessment  Radha Trujillo    Height: 5'1\"  Weight: 236.8#  BMI: 44.7  Weight Hx:   Wt Readings from Last 20 Encounters:   01/27/23 107 kg (236 lb 12.8 oz)   01/17/23 108 kg (238 lb)   01/12/23 107 kg (236 lb 9.6 oz)   10/04/22 106 kg (234 lb)   09/27/22 106 kg (234 lb)   09/14/22 105 kg (231 lb)   07/07/22 105 kg (231 lb)   06/14/22 103 kg (226 lb)   03/29/22 98.9 kg (218 lb)   03/24/22 101 kg (222 lb 0.1 oz)   03/15/22 98.9 kg (218 lb)   02/12/22 97 kg (213 lb 13.5 oz)   02/10/22 96.3 kg (212 lb 3.2 oz)   10/30/21 95.7 kg (211 lb)   09/21/21 97.3 kg (214 lb 9.6 oz)   07/28/21 97.5 kg (215 lb)   04/29/21 99 kg (218 lb 3.2 oz)   04/01/21 99.9 kg (220 lb 3.2 oz)   03/10/21 98 kg (216 lb)   01/25/21 99.8 kg (220 lb)     IBW: 105# (225.5%)    Nutrition Questionnaire    Food Allergies: Pt denied allergies at this time    Chewing Problems: Pt denied chewing problems at this time.     Swallowing Problems: Pt denied problems swallowing at this time.    Who does the cooking & shopping: Pt does, she lives with her .    Nausea/Vomiting/Diarrhea: Pt denies n/v/d/c    Appetite: (poor) 1 2 3 4 5 6 7 8 9 10 (excellent): 10    24-Hour Diet Recall:  Breakfast - 2 boiled eggs, toast x2 w/butter, water, coffee w/creamer and brown sugar  Lunch - 2 cheese sticks, water  Dinner - great northern beans soup w/ham & onion, BBQ pulled pork, water  Water - > 64 oz/day    Discussion: Met the pt to provide weight-management education. Pt has struggled with managing her weight for several years. Pt has had success with Weight Watchers, was able to lose 30#, but states she gets bored with the food after a while. Pt unable to do a lot of exercise due to lumbar spinal stenosis, DDD, knee pain and is need of a knee replacement. Pt has a membership at the [employer] [facility] [employer] and has been going to water classes. She used to take Silver [employer]ne[employer] classes, but has not in quite some time.    Pt w/anxiety and started taking Wellbutrin " two weeks ago. Pt said she has noticed some positive changes in her anxiety and has also noticed it has helped to decrease her appetite some.     Aside from doing WW in the past, the pt has never learned about proper diet and nutrition. I educated her on how to achieve a balanced diet, how to create a balanced plate, and optimal foods for meals and snacking that will be good for her health as well as promote weight loss, pt verbalized understanding.     The pt would benefit from regular f/u's with a weight-management RD or doing WW or a similar program for education and accountability, discussed this with pt.     All questions and concerns were addressed and answered. I provided my contact information and encouraged her to call or schedule an appointment as needed.       Eduardo Ha, MS,RD,LD-[place],CD-[place]  Registered Dietitian

## 2023-02-02 ENCOUNTER — TELEPHONE (OUTPATIENT)
Dept: CARDIOLOGY | Facility: CLINIC | Age: 73
End: 2023-02-02

## 2023-02-02 NOTE — TELEPHONE ENCOUNTER
Caller: Radha Trujillo    Relationship: Self    Best call back number: [phone]    PER THE PATIENT, SHE WAS ADVISED TO CALL IN FOR AN APPOINTMENT. PLEASE CALL PATIENT AND ADVISE.

## 2023-02-03 ENCOUNTER — TREATMENT (OUTPATIENT)
Dept: PHYSICAL THERAPY | Facility: CLINIC | Age: 73
End: 2023-02-03
Payer: MEDICARE

## 2023-02-03 DIAGNOSIS — M25.559 PAIN, HIP: Primary | ICD-10-CM

## 2023-02-03 PROCEDURE — 97110 THERAPEUTIC EXERCISES: CPT | Performed by: PHYSICAL THERAPIST

## 2023-02-03 PROCEDURE — 97140 MANUAL THERAPY 1/> REGIONS: CPT | Performed by: PHYSICAL THERAPIST

## 2023-02-03 NOTE — PROGRESS NOTES
Physical Therapy Daily Treatment Note  Visit: 28    Radha Trujillo reports: has had some issues with shortness of breath and heart palpitations but relates to recent anxiety issues.  Reports scheduled with cardiologist Monday.   YOB: 1950  Referring practitioner : RICKY Pineda  Date of Initial: Type: THERAPY  Noted: 10/5/2022  Today's date: 2/3/2023  Patient seen for 28 sessions    Visit Diagnoses:    ICD-10-CM ICD-9-CM   1. Pain, hip  M25.559 719.45       Subjective       Objective   See Exercise, Manual, and Modality Logs for complete treatment.       Assessment/Plan     All activity well tolerated and vitals monitored and WNL.  Discussed continued transition to [facility] over next few weeks and Radha agrees.     Plan:    Continue current             Timed:         Manual Therapy:    9     mins  04947;     Therapeutic Exercise:    29     mins  80097;     Neuromuscular Mary:    5    mins  80409;          Timed Treatment:   43   mins   Total Treatment:     43   mins         Beck Loyola PT, DPT  Physical Therapist  [place] Lic #[id number]

## 2023-02-06 ENCOUNTER — OFFICE VISIT (OUTPATIENT)
Dept: CARDIOLOGY | Facility: CLINIC | Age: 73
End: 2023-02-06
Payer: MEDICARE

## 2023-02-06 ENCOUNTER — LAB (OUTPATIENT)
Dept: LAB | Facility: HOSPITAL | Age: 73
End: 2023-02-06
Payer: MEDICARE

## 2023-02-06 VITALS
HEIGHT: 61 IN | SYSTOLIC BLOOD PRESSURE: 122 MMHG | OXYGEN SATURATION: 99 % | DIASTOLIC BLOOD PRESSURE: 68 MMHG | BODY MASS INDEX: 43.99 KG/M2 | WEIGHT: 233 LBS | HEART RATE: 82 BPM

## 2023-02-06 DIAGNOSIS — R00.2 PALPITATIONS: Primary | ICD-10-CM

## 2023-02-06 DIAGNOSIS — R00.2 PALPITATIONS: ICD-10-CM

## 2023-02-06 LAB — TSH SERPL DL<=0.05 MIU/L-ACNC: 1.34 UIU/ML (ref 0.27–4.2)

## 2023-02-06 PROCEDURE — 99214 OFFICE O/P EST MOD 30 MIN: CPT | Performed by: NURSE PRACTITIONER

## 2023-02-06 PROCEDURE — 93000 ELECTROCARDIOGRAM COMPLETE: CPT | Performed by: NURSE PRACTITIONER

## 2023-02-06 PROCEDURE — 84443 ASSAY THYROID STIM HORMONE: CPT

## 2023-02-06 RX ORDER — AMLODIPINE BESYLATE 5 MG/1
TABLET ORAL
COMMUNITY
Start: 2023-02-04 | End: 2023-02-06 | Stop reason: ALTCHOICE

## 2023-02-06 NOTE — PROGRESS NOTES
Cardiology Office Follow Up Visit      Primary Care Provider:  Maribel Hale APRN    Reason for f/u:     C/o Palpitations      Subjective       History of Present Illness       Radha Trujillo is a 72 y.o. female seen in clinic today for c/o palpitations.  She has a past cardiac history of AVNRT s/p ablation in 2016.  Last 2D echo 2/2022 showed an EF = 66-70% with mild RV dysfunction and moderateTR.  Last nuclear stress test 1/2021 showed no ischemia.  PMH includes HTN, HLD, and PE/DVT s/p EKOS in 2/2022.      Patient is tearful today recounting issues she has had with recent anxiety.  She reports she stopped lexapro recently d/t weight gain and sleepiness and was started wellbutrin.  However, since she started wellbutrin she has had frequent intermittent palpitations accompanied by dyspnea and occasional tightness in her throat.  They last a few seconds but cause her anxiety.  She denies chest pain otherwise.  She has joined the [email or web address] and started water aerobics and has no unusual difficulty with this.  She has noticed that after her work-outs her heart rate stays in the 100s for 10-15 minutes.  She denies any dizziness or lightheadedness.  This does not feel like her prior SVT.  She denies PND/orthopnea and notes no change to chronic BLE edema.  She plans to stop the Wellbutrin and seek assistance from a psychiatrist.          ASSESSMENT/PLAN:      Diagnoses and all orders for this visit:    1. Palpitations (Primary)  -     Mobile Cardiac Outpatient Telemetry; Future  -     ECG 12 Lead  -     TSH; Future            MEDICAL DECISION MAKING:    Will check a 2 week ambulatory monitor and a TSH level.        RTC in 3 months    Past Medical History:   Diagnosis Date   • Abnormal coagulation profile    • Abnormal EKG    • Abnormal laboratory test    • Acquired spondylolisthesis    • Allergic 07/16/2021    Start shots in 7/27/21   • Arthritis    • Asthma    • Body aches    • Bronchitis, acute    • Chest pain      PRESSURE   • Chronic back pain    • Cough    • Deep vein thrombosis (HCC) 02/2022    Taking Eliquis   • Depression with anxiety    • DJD (degenerative joint disease)    • Essential hypertension    • Fatigue    • History of radiofrequency ablation (RFA) procedure for cardiac arrhythmia 01/05/2021   • Hyperlipidemia    • Hypokalemia    • Low back pain    • Lymphocytic colitis    • Obesity    • Other specified disorders of bone density and structure, other site    • Panic disorder    • Paroxysmal SVT (supraventricular tachycardia) (HCC)    • Postmenopausal     9 YEARS   • Prediabetes    • Pulmonary embolism (HCC) Feb. 2022    Treated   • Sleep apnea    • SOB (shortness of breath)    • Unspecified injury of left Achilles tendon, initial encounter    • Vitamin B deficiency 01/14/2019   • Vitamin B deficiency, unspecified    • Vitamin D deficiency, unspecified        Past Surgical History:   Procedure Laterality Date   • ACHILLES TENDON SURGERY Left 2018   • AV NODE ABLATION  08/2016    AVNRT RF ABLATION --8/16   • CARDIAC CATHETERIZATION Bilateral 02/11/2022    Procedure: EKOS;  Surgeon: Kilo Vivar DO;  Location: [facility] INVASIVE LOCATION;  Service: Cardiovascular;  Laterality: Bilateral;   • COLONOSCOPY N/A 01/01/2012   • FOOT SURGERY Right     TOE/FOOT SURGERY   • KNEE SURGERY Right     2/2 TORN MENICUS   • TUBAL ABDOMINAL LIGATION           Current Outpatient Medications:   •  ALPRAZolam (Xanax) 0.25 MG tablet, Take 1 tablet by mouth 2 (Two) Times a Day As Needed for Anxiety., Disp: 30 tablet, Rfl: 0  •  amLODIPine (NORVASC) 10 MG tablet, Take 1 tablet by mouth Daily., Disp: 90 tablet, Rfl: 1  •  apixaban (ELIQUIS) 5 MG tablet tablet, Take 1 tablet by mouth 2 (Two) Times a Day., Disp: 60 tablet, Rfl: 11  •  azelastine (ASTELIN) 0.1 % nasal spray, 1 spray into the nostril(s) as directed by provider 2 (Two) Times a Day., Disp: , Rfl:   •  buPROPion XL (Wellbutrin XL) 150 MG 24 hr tablet, Take 1  "tablet by mouth Daily., Disp: 30 tablet, Rfl: 3  •  fluticasone (Flovent HFA) 110 MCG/ACT inhaler, Inhale 1 puff 2 (Two) Times a Day., Disp: 1 inhaler, Rfl: 1  •  melatonin 5 MG tablet tablet, Take 5 mg by mouth., Disp: , Rfl:   •  Multiple Vitamin (MULTIVITAMIN) capsule, Take 1 capsule by mouth Daily., Disp: , Rfl:   •  potassium chloride 10 MEQ CR tablet, TAKE 1 TABLET BY MOUTH THREE TIMES A DAY, Disp: 270 tablet, Rfl: 0  •  valsartan-hydrochlorothiazide (DIOVAN-HCT) 320-25 MG per tablet, TAKE 1 TABLET BY MOUTH EVERY DAY, Disp: 90 tablet, Rfl: 3  •  VITAMIN B COMPLEX-C PO, Take  by mouth., Disp: , Rfl:   •  Vitamins A & D 5000-400 units capsule, Take 1 capsule by mouth Daily., Disp: , Rfl:     Social History     Socioeconomic History   • Marital status:      Spouse name: Alonso Trujillo   • Number of children: 3   Tobacco Use   • Smoking status: Never   • Smokeless tobacco: Never   Vaping Use   • Vaping Use: Never used   Substance and Sexual Activity   • Alcohol use: Not Currently     Comment: 1 DRINK WEEKLY, IF THAT: RARE/OCC;  CAFFEINE USE + 1 COFFEE DAILY   • Drug use: No   • Sexual activity: Not Currently     Partners: Male     Birth control/protection: None, Tubal ligation     Comment: monogamous w/ ;  s/p BTL - no high-risk sexual behaviors       Family History   Problem Relation Age of Onset   • Arthritis Mother    • Diabetes Mother    • Heart disease Mother    • Emphysema Mother    • Kidney disease Father    • Arthritis Sister    • Anxiety disorder Sister    • Arthritis Brother    • Anxiety disorder Brother    • Acute myelogenous leukemia Niece    • Myasthenia gravis Niece        The following portions of the patient's history were reviewed and updated as appropriate: allergies, current medications, past family history, past medical history, past social history, past surgical history and problem list.    ROS  /68   Pulse 82   Ht 154.9 cm (61\")   Wt 106 kg (233 lb)   SpO2 99%   BMI " 44.02 kg/m² .  Objective     Physical Exam    Physical Exam:  Neuro:  CV:  Resp:  GI:  Ext:  Pysch: AAOx3, no gross deficits  S1S2 RRR, no murmur  Non-labored, CTA  BS+, abd  Pedal pulses palp, 1+ BLE edema  Tearful, cooperative       Procedures    EKG ordered by and reviewed by me in office  EKG shows normal sinus rhythm with non-specific T wave abnormalities.

## 2023-02-07 ENCOUNTER — TREATMENT (OUTPATIENT)
Dept: PHYSICAL THERAPY | Facility: CLINIC | Age: 73
End: 2023-02-07
Payer: MEDICARE

## 2023-02-07 DIAGNOSIS — M25.559 PAIN, HIP: Primary | ICD-10-CM

## 2023-02-07 PROCEDURE — 97140 MANUAL THERAPY 1/> REGIONS: CPT | Performed by: PHYSICAL THERAPIST

## 2023-02-07 PROCEDURE — 97110 THERAPEUTIC EXERCISES: CPT | Performed by: PHYSICAL THERAPIST

## 2023-02-07 PROCEDURE — 97530 THERAPEUTIC ACTIVITIES: CPT | Performed by: PHYSICAL THERAPIST

## 2023-02-07 NOTE — PROGRESS NOTES
Physical Therapy Daily Treatment Note  Visit: 29    Radha Trujillo reports: had episode of anxiety last week and is going to wear heart monitor next week for 2 weeks. Reports she is was taken off of Wellbutrin and had multiple side effects per her research.   YOB: 1950  Referring practitioner : RICKY Pineda  Date of Initial: Type: THERAPY  Noted: 10/5/2022  Today's date: 2/7/2023  Patient seen for 29 sessions    Visit Diagnoses:    ICD-10-CM ICD-9-CM   1. Pain, hip  M25.559 719.45       Subjective       Objective   See Exercise, Manual, and Modality Logs for complete treatment.       Assessment/Plan     Improved gait mechanics and ability for upright posture today per decreased bilateral hip and lower back pain.  Discussed progression towards HEP and transition to activity at [facility] only after PT discharge.            Timed:         Manual Therapy:    12     mins  73744;     Therapeutic Exercise:    21     mins  12260;     Therapeutic Activity:     9     mins  41204;           Timed Treatment:   42   mins   Total Treatment:     42   mins         Beck Loyola PT, DPT  Physical Therapist  [place] Lic #[id number]

## 2023-02-13 ENCOUNTER — OFFICE VISIT (OUTPATIENT)
Dept: FAMILY MEDICINE CLINIC | Facility: CLINIC | Age: 73
End: 2023-02-13
Payer: MEDICARE

## 2023-02-13 VITALS
OXYGEN SATURATION: 99 % | TEMPERATURE: 97.5 F | SYSTOLIC BLOOD PRESSURE: 139 MMHG | DIASTOLIC BLOOD PRESSURE: 81 MMHG | HEIGHT: 61 IN | HEART RATE: 84 BPM | RESPIRATION RATE: 14 BRPM | BODY MASS INDEX: 43.99 KG/M2 | WEIGHT: 233 LBS

## 2023-02-13 DIAGNOSIS — F41.8 MIXED ANXIETY DEPRESSIVE DISORDER: Primary | Chronic | ICD-10-CM

## 2023-02-13 DIAGNOSIS — E66.01 MORBID OBESITY WITH BMI OF 40.0-44.9, ADULT: ICD-10-CM

## 2023-02-13 PROBLEM — D68.9 MEDICATION INDUCED COAGULOPATHY: Status: RESOLVED | Noted: 2022-03-15 | Resolved: 2023-02-13

## 2023-02-13 PROBLEM — T50.905A MEDICATION INDUCED COAGULOPATHY: Status: RESOLVED | Noted: 2022-03-15 | Resolved: 2023-02-13

## 2023-02-13 PROCEDURE — 99214 OFFICE O/P EST MOD 30 MIN: CPT | Performed by: NURSE PRACTITIONER

## 2023-02-13 NOTE — PROGRESS NOTES
"Chief Complaint  Hypertension and Anxiety  Subjective        Radha Trujillo presents to [facility]  History of Present Illness  Pt comes in today for follow up on anxiety. Has been having some palpitations associated with it. Saw cardiology last week and will have holter monitor.  She used to take lexapro, but stopped it due to weight gain and drowsiness. We tried wellbutrin, but had several negative side effects. Had issues with SOA, palpitations, constipation, and feeling hopeless.  She really feels that most of her issues is struggling with \"getting older\". states her brain feels like she is in her 40's, but body is telling her otherwise.   Sine I have seen her she started WW and joined the [employer] and doing water aerobics.   is being supportive. Took her out for a day trip yesterday.   Cries often.  Concerned about starting new medication.   Also trying to stay busy with going to [employer] study, art projects at home.  States her weight and all of it are bothering her.   Will take xanax prn. Will also sometimes drink a little beer and that helps her calm down some.   Does have an appt next week with counselor. She will have virtual appt.   Hypertension  Associated symptoms include anxiety and shortness of breath. Pertinent negatives include no chest pain, headaches, neck pain, orthopnea or PND.   Anxiety  Symptoms include shortness of breath. Patient reports no chest pain.       Shortness of Breath  This is a recurrent problem. The current episode started 1 to 4 weeks ago. The problem occurs intermittently. The problem has been gradually improving. The average episode lasts 10 Minutes. Associated symptoms include coryza. Pertinent negatives include no abdominal pain, chest pain, claudication, ear pain, fever, headaches, hemoptysis, leg pain, leg swelling, neck pain, orthopnea, PND, rash, rhinorrhea, sore throat, sputum production, swollen glands, syncope, vomiting or wheezing. " "The symptoms are aggravated by emotional upset.     Review of Systems   Constitutional: Negative for fever.   HENT: Negative for ear pain, rhinorrhea, sore throat and swollen glands.    Respiratory: Positive for shortness of breath. Negative for hemoptysis, sputum production and wheezing.    Cardiovascular: Negative for chest pain, orthopnea, claudication, leg swelling, syncope and PND.   Gastrointestinal: Negative for abdominal pain and vomiting.   Musculoskeletal: Negative for neck pain.   Skin: Negative for rash.     Objective     Vital Signs:   /81   Pulse 84   Temp 97.5 °F (36.4 °C)   Resp 14   Ht 154.9 cm (61\")   Wt 106 kg (233 lb)   SpO2 99%   BMI 44.02 kg/m²       BP Readings from Last 3 Encounters:   02/13/23 139/81   02/06/23 122/68   01/17/23 160/84       Wt Readings from Last 3 Encounters:   02/13/23 106 kg (233 lb)   02/06/23 106 kg (233 lb)   01/27/23 107 kg (236 lb 12.8 oz)     Physical Exam  Constitutional:       Appearance: She is well-developed.   Eyes:      Pupils: Pupils are equal, round, and reactive to light.   Cardiovascular:      Rate and Rhythm: Normal rate and regular rhythm.   Pulmonary:      Effort: Pulmonary effort is normal.      Breath sounds: Normal breath sounds.   Neurological:      Mental Status: She is alert and oriented to person, place, and time.   Psychiatric:         Mood and Affect: Mood is anxious.         Speech: Speech is tangential.        Result Review :                 Assessment and Plan    Diagnoses and all orders for this visit:    1. Mixed anxiety depressive disorder (Primary)    2. Morbid obesity with BMI of 40.0-44.9, adult (HCC)    pt is not interested in starting a new medication at this time. Would like to wait and see how the holter monitor goes and speak with counselor.   We discussed a couple of options and I think the next best choice would be either pristiq or effexor. She will think about it and talk with counselor  She will cont with xanax " prn for now.   Discussed importance of regular exercise and recommended starting or continuing a regular exercise program for good health. The patient was also encouraged to lose weight for better health.   During this office visit, we discussed the pertinent aspects of the visit and treatment recommendations. Pt verbalizes understanding. Follow up was discussed. Patient was given the opportunity to ask questions and discuss other concerns.     I spent 33 minutes caring for Radha on this date of service. This time includes time spent by me in the following activities:preparing for the visit, obtaining and/or reviewing a separately obtained history, counseling and educating the patient/family/caregiver and documenting information in the medical record    Follow Up   Return in about 3 months (around 5/13/2023).  Patient was given instructions and counseling regarding her condition or for health maintenance advice. Please see specific information pulled into the AVS if appropriate.

## 2023-02-14 ENCOUNTER — TREATMENT (OUTPATIENT)
Dept: PHYSICAL THERAPY | Facility: CLINIC | Age: 73
End: 2023-02-14
Payer: MEDICARE

## 2023-02-14 ENCOUNTER — HOSPITAL ENCOUNTER (OUTPATIENT)
Dept: CARDIOLOGY | Facility: HOSPITAL | Age: 73
Discharge: HOME | End: 2023-02-14
Payer: MEDICARE

## 2023-02-14 DIAGNOSIS — R00.2 PALPITATIONS: ICD-10-CM

## 2023-02-14 DIAGNOSIS — M25.559 PAIN, HIP: Primary | ICD-10-CM

## 2023-02-14 PROCEDURE — 97140 MANUAL THERAPY 1/> REGIONS: CPT | Performed by: PHYSICAL THERAPIST

## 2023-02-14 PROCEDURE — 97112 NEUROMUSCULAR REEDUCATION: CPT | Performed by: PHYSICAL THERAPIST

## 2023-02-14 PROCEDURE — 97110 THERAPEUTIC EXERCISES: CPT | Performed by: PHYSICAL THERAPIST

## 2023-02-16 NOTE — PROGRESS NOTES
Physical Therapy Daily Treatment Note  Visit: 30    Radha Trujillo reports: has halter monitor on today and wearing for 2 weeks.  Reports had anxiety attack last week again but feeling better today.  Reports feels stiff and that exercise in clinic is helpful both for her pain levels and decreases her anxiety afterwards.   YOB: 1950  Referring practitioner : RICKY Pineda  Date of Initial: Type: THERAPY  Noted: 10/5/2022  Today's date: 2/16/2023  Patient seen for 30 sessions    Visit Diagnoses:    ICD-10-CM ICD-9-CM   1. Pain, hip  M25.559 719.45       Subjective       Objective   See Exercise, Manual, and Modality Logs for complete treatment.       Assessment/Plan     Improved pain levels in both hips and lumbar spine along with better gait mechanics post treatment with increased step length and arm swing bilaterally.  Discussed transition to HEP only at the [facility] with plan to do so with continued improvement over the next several weeks.     Plan:    Continue               Timed:         Manual Therapy:    11     mins  65594;     Therapeutic Exercise:    21     mins  72534;     Neuromuscular Mary:    10    mins  07838;          Timed Treatment:   42   mins   Total Treatment:     42   mins         Beck Loyola PT, DPT  Physical Therapist  [place] Lic #[id number]

## 2023-02-19 RX ORDER — POTASSIUM CHLORIDE 750 MG/1
TABLET, FILM COATED, EXTENDED RELEASE ORAL
Qty: 270 TABLET | Refills: 0 | Status: SHIPPED | OUTPATIENT
Start: 2023-02-19

## 2023-02-21 ENCOUNTER — TREATMENT (OUTPATIENT)
Dept: PHYSICAL THERAPY | Facility: CLINIC | Age: 73
End: 2023-02-21
Payer: MEDICARE

## 2023-02-21 ENCOUNTER — TELEPHONE (OUTPATIENT)
Dept: ONCOLOGY | Facility: CLINIC | Age: 73
End: 2023-02-21

## 2023-02-21 DIAGNOSIS — M25.559 PAIN, HIP: Primary | ICD-10-CM

## 2023-02-21 PROCEDURE — 97110 THERAPEUTIC EXERCISES: CPT | Performed by: PHYSICAL THERAPIST

## 2023-02-21 NOTE — TELEPHONE ENCOUNTER
Caller: Radha Trujillo    Relationship: Self    Best call back number:[phone]      What was the call regarding: PT WANTED TO KNOW IF SHE COULD TAKE HERBAL SUPPLEMENTS THAT CONTAIN TUMERIC AND CUMIN IN IT. SHE WANTED TO TAKE FOR INFLAMMATION     Do you require a callback:YES

## 2023-02-25 NOTE — PROGRESS NOTES
Physical Therapy Daily Treatment Note  Visit: 31    Radha Trujillo reports: felt better with decreased R/L hip pain and LBP last treatment.  Still limited with activity per anxiety and experimenting with various approaches to manage.   YOB: 1950  Referring practitioner : RICKY Pineda  Date of Initial: Type: THERAPY  Noted: 10/5/2022  Today's date: 2/25/2023  Patient seen for 31 sessions    Visit Diagnoses:    ICD-10-CM ICD-9-CM   1. Pain, hip  M25.559 719.45       Subjective       Objective   See Exercise, Manual, and Modality Logs for complete treatment.       Assessment/Plan     Bilateral hip and LBP improved with treatment.  Limited standing progression per fatigue today.     Plan:    Continue current             Timed:         Manual Therapy:    5     mins  37245;     Therapeutic Exercise:    27     mins  65631;         Timed Treatment:   32   mins   Total Treatment:     32   mins         Beck Loyola PT, DPT  Physical Therapist  [place] Lic #[id number]

## 2023-02-28 ENCOUNTER — TREATMENT (OUTPATIENT)
Dept: PHYSICAL THERAPY | Facility: CLINIC | Age: 73
End: 2023-02-28
Payer: MEDICARE

## 2023-02-28 DIAGNOSIS — M25.559 PAIN, HIP: Primary | ICD-10-CM

## 2023-02-28 PROCEDURE — 97112 NEUROMUSCULAR REEDUCATION: CPT | Performed by: PHYSICAL THERAPIST

## 2023-02-28 PROCEDURE — 97110 THERAPEUTIC EXERCISES: CPT | Performed by: PHYSICAL THERAPIST

## 2023-02-28 PROCEDURE — 97140 MANUAL THERAPY 1/> REGIONS: CPT | Performed by: PHYSICAL THERAPIST

## 2023-03-02 NOTE — PROGRESS NOTES
Physical Therapy Daily Treatment Note  Visit: 32    Radha Trujillo reports: did with last visit and finished with wearing cardiac monitor.  Reports still having some issues with anxiety but lessening some.   YOB: 1950  Referring practitioner : RICKY Pineda  Date of Initial: Type: THERAPY  Noted: 10/5/2022  Today's date: 3/2/2023  Patient seen for 32 sessions    Visit Diagnoses:    ICD-10-CM ICD-9-CM   1. Pain, hip  M25.559 719.45       Subjective       Objective   See Exercise, Manual, and Modality Logs for complete treatment.       Assessment/Plan     Gait mechanics/hip/LBP improved post tx.  [facility] over the next few weeks.     Plan:    Continue current           Timed:         Manual Therapy:    12     mins  78225;     Therapeutic Exercise:    20     mins  76470;     Neuromuscular Mary:    10    mins  97525;        Timed Treatment:   42   mins   Total Treatment:     42   mins         Beck Loyola PT, DPT  Physical Therapist  [place] Lic #[id number]

## 2023-03-03 LAB
MAXIMAL PREDICTED HEART RATE: 148 BPM
STRESS TARGET HR: 126 BPM

## 2023-03-03 PROCEDURE — 93228 REMOTE 30 DAY ECG REV/REPORT: CPT | Performed by: INTERNAL MEDICINE

## 2023-03-06 ENCOUNTER — TREATMENT (OUTPATIENT)
Dept: PHYSICAL THERAPY | Facility: CLINIC | Age: 73
End: 2023-03-06
Payer: MEDICARE

## 2023-03-06 DIAGNOSIS — M25.559 PAIN, HIP: Primary | ICD-10-CM

## 2023-03-06 PROCEDURE — 97110 THERAPEUTIC EXERCISES: CPT | Performed by: PHYSICAL THERAPIST

## 2023-03-06 PROCEDURE — 97140 MANUAL THERAPY 1/> REGIONS: CPT | Performed by: PHYSICAL THERAPIST

## 2023-03-06 PROCEDURE — 97530 THERAPEUTIC ACTIVITIES: CPT | Performed by: PHYSICAL THERAPIST

## 2023-03-06 NOTE — PROGRESS NOTES
Physical Therapy Daily Treatment Note  Visit: 33    Radha Trujillo reports: did aquatic exercise today and felt good during but some stiffness now in her back and both hips.   YOB: 1950  Referring practitioner : RICKY Pineda  Date of Initial: Type: THERAPY  Noted: 10/5/2022  Today's date: 3/6/2023  Patient seen for 33 sessions    Visit Diagnoses:    ICD-10-CM ICD-9-CM   1. Pain, hip  M25.559 719.45       Subjective       Objective     LT. Oxford Hip Score improved to 75% or greater over 8 weeks. - Met  2. Bilateral hip pain decreased to 3/10 at worst with prior level of activity over 8 weeks. - Met  3. Five Time Sit to Stand at or below age/sex adjusted norms within 8 weeks. - Progressed towards  4. Gait speed at or above age/sex adjusted normals over 8 weeks. - Met prior - some regression recently per increased knee pain  5. Single leg balance R/L 5 seconds or greater w/o UE support over 8 weeks. - Progressed towards     See Exercise, Manual, and Modality Logs for complete treatment.       Assessment/Plan     Discussed transition to HEP with Radha and she would like to continue PT for a few more visits as she can tell pain relief and improved function for 1-2 days after treatment at minimum.  Knee pain limiting standing activity progressions but scheduled for injections soon to address.     Plan:    Continue            Timed:         Manual Therapy:    9     mins  52662;     Therapeutic Exercise:    20     mins  94430;      Therapeutic Activity:     10     mins  36290;           Timed Treatment:   39   mins   Total Treatment:     39   mins         Beck Loyola PT, DPT  Physical Therapist  [place] Lic #[id number]

## 2023-03-13 ENCOUNTER — OFFICE (OUTPATIENT)
Dept: URBAN - METROPOLITAN AREA PATHOLOGY 4 | Facility: PATHOLOGY | Age: 73
End: 2023-03-13

## 2023-03-13 ENCOUNTER — ON CAMPUS - OUTPATIENT (OUTPATIENT)
Dept: URBAN - METROPOLITAN AREA HOSPITAL 2 | Facility: HOSPITAL | Age: 73
End: 2023-03-13

## 2023-03-13 ENCOUNTER — OFFICE (OUTPATIENT)
Dept: URBAN - METROPOLITAN AREA PATHOLOGY 4 | Facility: PATHOLOGY | Age: 73
End: 2023-03-13
Payer: COMMERCIAL

## 2023-03-13 VITALS
SYSTOLIC BLOOD PRESSURE: 134 MMHG | TEMPERATURE: 97.8 F | DIASTOLIC BLOOD PRESSURE: 88 MMHG | HEART RATE: 84 BPM | DIASTOLIC BLOOD PRESSURE: 82 MMHG | HEART RATE: 81 BPM | OXYGEN SATURATION: 100 % | SYSTOLIC BLOOD PRESSURE: 170 MMHG | HEART RATE: 68 BPM | DIASTOLIC BLOOD PRESSURE: 80 MMHG | SYSTOLIC BLOOD PRESSURE: 142 MMHG | HEART RATE: 76 BPM | OXYGEN SATURATION: 99 % | DIASTOLIC BLOOD PRESSURE: 70 MMHG | OXYGEN SATURATION: 96 % | HEART RATE: 83 BPM | RESPIRATION RATE: 18 BRPM | DIASTOLIC BLOOD PRESSURE: 63 MMHG | SYSTOLIC BLOOD PRESSURE: 138 MMHG | HEIGHT: 61 IN | HEART RATE: 75 BPM | HEART RATE: 77 BPM | OXYGEN SATURATION: 98 % | SYSTOLIC BLOOD PRESSURE: 114 MMHG | DIASTOLIC BLOOD PRESSURE: 74 MMHG | SYSTOLIC BLOOD PRESSURE: 153 MMHG | HEART RATE: 88 BPM | SYSTOLIC BLOOD PRESSURE: 151 MMHG | WEIGHT: 231 LBS | DIASTOLIC BLOOD PRESSURE: 77 MMHG | HEART RATE: 82 BPM | DIASTOLIC BLOOD PRESSURE: 75 MMHG | RESPIRATION RATE: 17 BRPM

## 2023-03-13 DIAGNOSIS — D12.2 BENIGN NEOPLASM OF ASCENDING COLON: ICD-10-CM

## 2023-03-13 DIAGNOSIS — R19.5 OTHER FECAL ABNORMALITIES: ICD-10-CM

## 2023-03-13 DIAGNOSIS — Z12.11 ENCOUNTER FOR SCREENING FOR MALIGNANT NEOPLASM OF COLON: ICD-10-CM

## 2023-03-13 PROBLEM — K63.5 POLYP OF COLON: Status: ACTIVE | Noted: 2023-03-13

## 2023-03-13 LAB
GI HISTOLOGY: A. UNSPECIFIED: (no result)
GI HISTOLOGY: PDF REPORT: (no result)

## 2023-03-13 PROCEDURE — 45385 COLONOSCOPY W/LESION REMOVAL: CPT | Mod: KX,PT | Performed by: INTERNAL MEDICINE

## 2023-03-13 PROCEDURE — 88305 TISSUE EXAM BY PATHOLOGIST: CPT | Mod: 26 | Performed by: INTERNAL MEDICINE

## 2023-03-13 PROCEDURE — 45385 COLONOSCOPY W/LESION REMOVAL: CPT | Mod: PT,KX | Performed by: INTERNAL MEDICINE

## 2023-03-15 ENCOUNTER — TREATMENT (OUTPATIENT)
Dept: PHYSICAL THERAPY | Facility: CLINIC | Age: 73
End: 2023-03-15
Payer: MEDICARE

## 2023-03-15 DIAGNOSIS — M25.559 PAIN, HIP: Primary | ICD-10-CM

## 2023-03-15 PROCEDURE — 97110 THERAPEUTIC EXERCISES: CPT | Performed by: PHYSICAL THERAPIST

## 2023-03-15 PROCEDURE — 97140 MANUAL THERAPY 1/> REGIONS: CPT | Performed by: PHYSICAL THERAPIST

## 2023-03-15 NOTE — PROGRESS NOTES
Physical Therapy Daily Treatment Note  Visit: 34    Radha Trujillo reports: no new issues today.   YOB: 1950  Referring practitioner : RICKY Pineda  Date of Initial: Type: THERAPY  Noted: 10/5/2022  Today's date: 3/15/2023  Patient seen for 34 sessions    Visit Diagnoses:    ICD-10-CM ICD-9-CM   1. Pain, hip  M25.559 719.45       Subjective       Objective   See Exercise, Manual, and Modality Logs for complete treatment.       Assessment/Plan     Improved SLR and hip IR with nasal breathing hooklying with medial foot contacts.  More fluidity with gait post tx.     Plan:    Continue - progress towards DC over next few visits.            Timed:         Manual Therapy:    10     mins  69891;     Therapeutic Exercise:    20     mins  27856;         Timed Treatment:   30   mins   Total Treatment:     30   mins         Beck Loyola PT, DPT  Physical Therapist  [place] Lic #[id number]

## 2023-03-22 ENCOUNTER — TREATMENT (OUTPATIENT)
Dept: PHYSICAL THERAPY | Facility: CLINIC | Age: 73
End: 2023-03-22
Payer: MEDICARE

## 2023-03-22 DIAGNOSIS — M25.559 PAIN, HIP: Primary | ICD-10-CM

## 2023-03-22 PROCEDURE — 97140 MANUAL THERAPY 1/> REGIONS: CPT | Performed by: PHYSICAL THERAPIST

## 2023-03-22 PROCEDURE — 97530 THERAPEUTIC ACTIVITIES: CPT | Performed by: PHYSICAL THERAPIST

## 2023-03-22 PROCEDURE — 97110 THERAPEUTIC EXERCISES: CPT | Performed by: PHYSICAL THERAPIST

## 2023-03-23 NOTE — PROGRESS NOTES
Physical Therapy Daily Treatment Note  Visit: 35    Radha Trujillo reports: is fatigued today as she has begun water exercise again per PT recommendations.  Reports she does feels consistent improvement in her lower back pain with PT treatments she does not get with HEP only. and would like to continue as long as beneficial.   YOB: 1950  Referring practitioner : RICKY Pineda  Date of Initial: Type: THERAPY  Noted: 10/5/2022  Today's date: 3/23/2023  Patient seen for 35 sessions    Visit Diagnoses:    ICD-10-CM ICD-9-CM   1. Pain, hip  M25.559 719.45       Subjective       Objective   See Exercise, Manual, and Modality Logs for complete treatment.       Assessment/Plan     Improving activity tolerance and bilateral hip/LBP.  To continue PT 1 x per week per continued pain relief and progress towards HEP only.                Timed:         Manual Therapy:    11     mins  70560;     Therapeutic Exercise:    18     mins  35444;     Therapeutic Activity:     9     mins  05773;           Timed Treatment:   38   mins   Total Treatment:     38   mins         Beck Loyola PT, DPT  Physical Therapist  [place] Lic #[id number]

## 2023-03-27 ENCOUNTER — TREATMENT (OUTPATIENT)
Dept: PHYSICAL THERAPY | Facility: CLINIC | Age: 73
End: 2023-03-27
Payer: MEDICARE

## 2023-03-27 DIAGNOSIS — M25.559 PAIN, HIP: Primary | ICD-10-CM

## 2023-03-27 PROCEDURE — 97140 MANUAL THERAPY 1/> REGIONS: CPT | Performed by: PHYSICAL THERAPIST

## 2023-03-27 PROCEDURE — 97110 THERAPEUTIC EXERCISES: CPT | Performed by: PHYSICAL THERAPIST

## 2023-03-27 NOTE — PROGRESS NOTES
Re-Assessment / Re-Certification        Patient: Radha Trujillo   : 1950  Diagnosis/ICD-10 Code:  Pain, hip [M25.559]  Referring practitioner: RICKY Pineda  Date of Initial Visit: Type: THERAPY  Noted: 10/5/2022  Today's Date: 3/27/2023  Patient seen for 36 sessions      Subjective:   Radha Trujillo reports: little to no pain at rest but increased after short duration on her feet greater than 5-10 minutes both with walking or standing in place per hip pain.  Reports still notes relief from PT visits for a period of time.  Would like to continue PT per pain relief between visits she does not receive with HEP only.   Subjective Questionnaire: Oxford Hip: 71% Oswestry  Clinical Progress: improved  Home Program Compliance: Yes  Treatment has included: therapeutic exercise, neuromuscular re-education, manual therapy, therapeutic activity, electrical stimulation, moist heat and cryotherapy    Subjective Evaluation    Pain  Current pain ratin (0/10 at rest - 6/10 when on feet more than a short duration)  At best pain ratin  At worst pain ratin  Quality: dull ache         Objective          Functional Assessment     Comments  Single leg balance w/o UE support - R 4 seconds L 2 seconds  FTSST - 11.72 seconds        Assessment & Plan       Plan  Therapy options: will be seen for skilled therapy services  Planned modality interventions: cryotherapy, electrical stimulation/Russian stimulation, high voltage pulsed current (dermal wound therapy) and traction  Planned therapy interventions: manual therapy, neuromuscular re-education, soft tissue mobilization, flexibility, spinal/joint mobilization, strengthening, functional ROM exercises, gait training, stretching, therapeutic activities and joint mobilization  Frequency: 1x week  Duration in weeks: 6  Treatment plan discussed with: patient      Progress toward previous goals: All met or progressed towards     New Goals  Short-term goals (STG):     1.  Independent and compliant with HEP over 2 weeks. - Met  2. Bilateral hip pain decreased to 10% or greater over 2 weeks. - Met  3. Gait mechanics improved over 2 weeks with increased arm swing/trunk rotation and decreased frontal plane compensation. - Met      Long-term goals (LTG):   1. Haines Hip Score improved to 75% or greater over 8 weeks. - Met prior  2. Bilateral hip pain decreased to 3/10 at worst with prior level of activity over 8 weeks. - Progressed towards   3. Five Time Sit to Stand at or below age/sex adjusted norms within 8 weeks. - Progressed towards  4. Gait speed at or above age/sex adjusted normals over 8 weeks. - Progressed towards  5. Single leg balance R/L 5 seconds or greater w/o UE support over 8 weeks. - Progressed towards          Recommendations: Continue with recommendations to continue treatment 1 x per week per symptom relief noted with LBP and hip pain not received with HEP only  Timeframe: 6 weeks  Prognosis to achieve goals: good    PT Signature: Beck Loyola, PT , DPT    Based upon review of the patient's progress and continued therapy plan, it is my medical opinion that Radha Trujillo should continue physical therapy treatment at [facility] PHYSICAL THERAPY  [address] [place] [address] [phone].    Signature: __________________________________  Maribel Hale, RICKY    Timed:         Manual Therapy:    14     mins  [id number];     Therapeutic Exercise:    25     mins  01731;         Timed Treatment:   39   mins   Total Treatment:     39   mins

## 2023-04-11 NOTE — PROGRESS NOTES
Hematology/Oncology Outpatient Follow Up    PATIENT NAME:Radha Trujillo  :1950  MRN: [id number]  PRIMARY CARE PHYSICIAN: Maribel Hale APRN  REFERRING PHYSICIAN: Maribel Hale APRN         HISTORY OF PRESENT ILLNESS:   Radha Trujillo is a 71 y.o. female who presented to [facility] on 2/10/2022 with complaints of abnormal test from her PCP.  Reports that she has not been feeling well and has been feeling short of breath since Monday.  Patient reports that she started walking into [facility] and suddenly felt short of breath.  Denies chest pain, fever, chills, no recent COVID-19 diagnosis, dizziness, lightheadedness, diaphoresis, syncope, abnormal leg pain or swelling.  No recent surgeries or long trips.  She does report she has been more sedentary than normal.  Positive Respiratory panel with viral infection plus a negative procalcitonin without other underlying concern for bacterial infection.  CT chest PE protocol revealed Pulmonary embolism in the right and left segmental and subsegmental branches with early right heart strain. No pulmonary infarct is seen. Patient was started on IV heparin.        22  Hematology/Oncology was consulted for Pulmonary embolism in the right and left segmental and subsegmental branches with early right heart strain.  No pulmonary infarct is seen; Patient was started on IV heparin. No history of blood disorders or DVT.  Patient has no smoking history.    3/3/2022 Ms. Trujillo returned to the clinic for follow-up.  She was feeling well and had tolerated the rivaroxaban without any difficulties.  She had no bleeding.  All her respiratory symptoms were completely resolved.  The physical exam revealed only significant obesity but there was no other abnormalities.  Plans to continue with the same anticoagulant were made.    2022: Back in the office for follow-up.  Compliant with the apixaban and no obvious side effects from it.  Had gained some weight.   Discussed the importance of weight management to reduce her risk of thrombosis.  She was asked to continue on the same anticoagulant.  A new prescription was sent.  She was also asked to return in 6 months.    1/12/2023: Feeling well.  Anxious but had decided to discontinue the antidepressant and was feeling better.  Sleeping less.  Working on losing weight.  Receiving physical therapy for persistent low back pain and bilateral knee pain.  No bleeding.  Remained compliant with the apixaban.  Has had no dyspnea, chest pains or cough.  Also no lower extremity edema or pain.    4/13/2023: Back to her normal routine.  Enrolled again in weight watchers and had lost approximately 4 kg.  Her appetite had remained adequate and she was more energetic.  [facility].  She had no more dyspnea than before and had not been coughing more.  She had persisted with edema of the lower extremities but was wearing compression stockings.  The exam revealed no changes.  The laboratory exams were unremarkable.  She had undergone a colonoscopy that disclosed only a small polyp but no evidence of malignancy.  She was asked to continue on the anticoagulant and to return to see me 6 months later.     He/She  has a past medical history of Abnormal coagulation profile, Abnormal EKG, Abnormal laboratory test, Acquired spondylolisthesis, Allergic (7/16/2021), Arthritis, Asthma, Body aches, Bronchitis, acute, Chest pain, Chronic back pain, Cough, Depression with anxiety, DJD (degenerative joint disease), Essential hypertension, Fatigue, History of radiofrequency ablation (RFA) procedure for cardiac arrhythmia (1/5/2021), Hyperlipidemia, Hypokalemia, Low back pain, Lymphocytic colitis, Obesity, Other specified disorders of bone density and structure, other site, Panic disorder, Paroxysmal SVT (supraventricular tachycardia) (HCC), Postmenopausal, Prediabetes, SOB (shortness of breath), Unspecified injury of  left Achilles tendon, initial encounter, Vitamin B deficiency (1/14/2019), Vitamin B deficiency, unspecified, and Vitamin D deficiency, unspecified.      PCP: Maribel Hale APRN  History of present illness was reviewed and is unchanged from the previous visit.  1/12/2023    Past Medical History:   Diagnosis Date   • Abnormal coagulation profile    • Abnormal EKG    • Abnormal laboratory test    • Acquired spondylolisthesis    • Allergic 07/16/2021    Start shots in 7/27/21   • Arthritis    • Asthma    • Body aches    • Bronchitis, acute    • Chest pain     PRESSURE   • Chronic back pain    • Cough    • Deep vein thrombosis 02/2022    Taking Eliquis   • Depression with anxiety    • DJD (degenerative joint disease)    • Essential hypertension    • Fatigue    • History of radiofrequency ablation (RFA) procedure for cardiac arrhythmia 01/05/2021   • Hyperlipidemia    • Hypokalemia    • Low back pain    • Lymphocytic colitis    • Obesity    • Other specified disorders of bone density and structure, other site    • Panic disorder    • Paroxysmal SVT (supraventricular tachycardia)    • Postmenopausal     9 YEARS   • Prediabetes    • Pulmonary embolism Feb. 2022    Treated   • Sleep apnea    • SOB (shortness of breath)    • Unspecified injury of left Achilles tendon, initial encounter    • Vitamin B deficiency 01/14/2019   • Vitamin B deficiency, unspecified    • Vitamin D deficiency, unspecified      Past Surgical History:   Procedure Laterality Date   • ACHILLES TENDON SURGERY Left 2018   • AV NODE ABLATION  08/2016    AVNRT RF ABLATION --8/16   • CARDIAC CATHETERIZATION Bilateral 02/11/2022    Procedure: EKOS;  Surgeon: Kilo Vivar DO;  Location: [facility] INVASIVE LOCATION;  Service: Cardiovascular;  Laterality: Bilateral;   • COLONOSCOPY N/A 01/01/2012   • FOOT SURGERY Right     TOE/FOOT SURGERY   • KNEE SURGERY Right     2/2 TORN MENICUS   • TUBAL ABDOMINAL LIGATION         Current Outpatient  Medications:   •  ALPRAZolam (Xanax) 0.25 MG tablet, Take 1 tablet by mouth 2 (Two) Times a Day As Needed for Anxiety., Disp: 30 tablet, Rfl: 0  •  amLODIPine (NORVASC) 10 MG tablet, Take 1 tablet by mouth Daily., Disp: 90 tablet, Rfl: 1  •  apixaban (ELIQUIS) 5 MG tablet tablet, Take 1 tablet by mouth 2 (Two) Times a Day., Disp: 60 tablet, Rfl: 11  •  azelastine (ASTELIN) 0.1 % nasal spray, 1 spray into the nostril(s) as directed by provider 2 (Two) Times a Day., Disp: , Rfl:   •  ELDERBERRY PO, Take  by mouth., Disp: , Rfl:   •  fluticasone (Flovent HFA) 110 MCG/ACT inhaler, Inhale 1 puff 2 (Two) Times a Day., Disp: 1 inhaler, Rfl: 1  •  Multiple Vitamin (MULTIVITAMIN) capsule, Take 1 capsule by mouth Daily., Disp: , Rfl:   •  potassium chloride 10 MEQ CR tablet, TAKE 1 TABLET BY MOUTH THREE TIMES A DAY, Disp: 270 tablet, Rfl: 0  •  valsartan-hydrochlorothiazide (DIOVAN-HCT) 320-25 MG per tablet, TAKE 1 TABLET BY MOUTH EVERY DAY, Disp: 90 tablet, Rfl: 3  •  VITAMIN B COMPLEX-C PO, Take  by mouth., Disp: , Rfl:   •  vitamin B-12 (CYANOCOBALAMIN) 500 MCG tablet, , Disp: , Rfl:   •  Vitamins A & D 5000-400 units capsule, Take 1 capsule by mouth Daily., Disp: , Rfl:     Allergies   Allergen Reactions   • Adhesive Tape Rash   • Wellbutrin [Bupropion] Shortness Of Breath     Family History   Problem Relation Age of Onset   • Arthritis Mother    • Diabetes Mother    • Heart disease Mother    • Emphysema Mother    • Kidney disease Father    • Arthritis Sister    • Anxiety disorder Sister    • Arthritis Brother    • Anxiety disorder Brother    • Acute myelogenous leukemia Niece    • Myasthenia gravis Niece      Cancer-related family history is not on file.    Social History     Tobacco Use   • Smoking status: Never   • Smokeless tobacco: Never   Vaping Use   • Vaping Use: Never used   Substance Use Topics   • Alcohol use: Not Currently     Comment: 1 DRINK WEEKLY, IF THAT: RARE/OCC;  CAFFEINE USE + 1 COFFEE DAILY   • Drug  use: No     HPI, ROS and PFSH have been reviewed and confirmed on 4/13/2023.     SUBJECTIVE:  4/13/2023: Active and energetic.  Good appetite and losing weight intentionally.  Afebrile and without nocturnal diaphoresis.  Had remained free of cough or chest pains.  Had not experienced any abdominal pain and had suffered no changes in bowel habits.  No dysuria or hematuria.  She had persisted with peripheral edema but this was adequately managed with physical measures including compression stockings.  No skin rash.    REVIEW OF SYSTEMS:  Review of Systems   Constitutional: Negative for activity change, appetite change, chills, diaphoresis, fatigue, fever and unexpected weight change.   HENT: Negative for congestion, dental problem, drooling, ear discharge, ear pain, facial swelling, hearing loss, mouth sores, nosebleeds, postnasal drip, rhinorrhea, sinus pressure, sinus pain, sneezing, sore throat, tinnitus, trouble swallowing and voice change.    Eyes: Negative for photophobia, pain, discharge, redness, itching and visual disturbance.   Respiratory: Negative for apnea, cough, choking, chest tightness, shortness of breath, wheezing and stridor.    Cardiovascular: Negative for chest pain, palpitations and leg swelling.   Gastrointestinal: Negative for abdominal distention, abdominal pain, anal bleeding, blood in stool, constipation, diarrhea, nausea, rectal pain and vomiting.   Endocrine: Negative for cold intolerance, heat intolerance, polydipsia and polyuria.   Genitourinary: Negative for decreased urine volume, difficulty urinating, dysuria, flank pain, frequency, genital sores, hematuria and urgency.   Musculoskeletal: Negative for arthralgias, back pain, gait problem, joint swelling, myalgias, neck pain and neck stiffness.   Skin: Negative for color change, pallor and rash.   Neurological: Negative for dizziness, tremors, seizures, syncope, facial asymmetry, speech difficulty, weakness, light-headedness, numbness  "and headaches.   Hematological: Negative for adenopathy. Does not bruise/bleed easily.   Psychiatric/Behavioral: Negative for agitation, behavioral problems, confusion, decreased concentration, hallucinations, self-injury, sleep disturbance and suicidal ideas. The patient is not nervous/anxious.      OBJECTIVE:    Vitals:    04/13/23 1352   BP: 136/81   Pulse: 68   Temp: 97.6 °F (36.4 °C)   TempSrc: Oral   SpO2: 99%   Weight: 104 kg (228 lb 3.2 oz)   Height: 154.9 cm (61\")   PainSc: 0-No pain     Body mass index is 43.12 kg/m².    ECOG  (0) Fully active, able to carry on all predisease performance without restriction    Physical Exam  Constitutional:       General: She is not in acute distress.     Appearance: She is not ill-appearing, toxic-appearing or diaphoretic.      Comments: Conversant, oriented and in good spirits.  Body mass index greater than 43 kg/m².   HENT:      Head: Normocephalic and atraumatic.      Right Ear: External ear normal.      Left Ear: External ear normal.      Nose: Nose normal. No congestion or rhinorrhea.      Mouth/Throat:      Mouth: Mucous membranes are moist.      Pharynx: Oropharynx is clear. No oropharyngeal exudate or posterior oropharyngeal erythema.   Eyes:      General: No scleral icterus.        Right eye: No discharge.         Left eye: No discharge.      Conjunctiva/sclera: Conjunctivae normal.      Pupils: Pupils are equal, round, and reactive to light.   Cardiovascular:      Rate and Rhythm: Normal rate and regular rhythm.      Pulses: Normal pulses.      Heart sounds: No murmur heard.    No friction rub. No gallop.   Pulmonary:      Effort: Pulmonary effort is normal. No respiratory distress.      Breath sounds: Normal breath sounds. No stridor. No wheezing, rhonchi or rales.   Abdominal:      General: Abdomen is flat. Bowel sounds are normal. There is no distension.      Palpations: Abdomen is soft. There is no mass.      Tenderness: There is no abdominal tenderness. " There is no right CVA tenderness, left CVA tenderness, guarding or rebound.      Hernia: No hernia is present.   Musculoskeletal:         General: No swelling, tenderness, deformity or signs of injury.      Cervical back: No rigidity or tenderness.      Right lower leg: No edema.      Left lower leg: No edema.   Lymphadenopathy:      Cervical: No cervical adenopathy.   Skin:     Coloration: Skin is not jaundiced or pale.      Findings: No bruising, lesion or rash.   Neurological:      General: No focal deficit present.      Mental Status: She is alert and oriented to person, place, and time.      Cranial Nerves: No cranial nerve deficit.      Motor: No weakness.      Gait: Gait normal.   Psychiatric:         Mood and Affect: Mood normal.         Behavior: Behavior normal.         Thought Content: Thought content normal.         Judgment: Judgment normal.     Chalino Russo MD performed a physical exam on 4/13/2023 as documented above.    RECENT LABS  WBC   Date Value Ref Range Status   04/13/2023 6.92 3.40 - 10.80 10*3/mm3 Final     RBC   Date Value Ref Range Status   04/13/2023 4.57 3.77 - 5.28 10*6/mm3 Final     Hemoglobin   Date Value Ref Range Status   04/13/2023 12.6 12.0 - 15.9 g/dL Final     Hematocrit   Date Value Ref Range Status   04/13/2023 38.9 34.0 - 46.6 % Final     MCV   Date Value Ref Range Status   04/13/2023 85.1 79.0 - 97.0 fL Final     MCH   Date Value Ref Range Status   04/13/2023 27.6 26.6 - 33.0 pg Final     MCHC   Date Value Ref Range Status   04/13/2023 32.4 31.5 - 35.7 g/dL Final     RDW   Date Value Ref Range Status   04/13/2023 15.0 12.3 - 15.4 % Final     RDW-SD   Date Value Ref Range Status   04/13/2023 45.4 37.0 - 54.0 fl Final     MPV   Date Value Ref Range Status   04/13/2023 11.2 6.0 - 12.0 fL Final     Platelets   Date Value Ref Range Status   04/13/2023 251 140 - 450 10*3/mm3 Final     Neutrophil %   Date Value Ref Range Status   04/13/2023 72.7 42.7 - 76.0 % Final     Lymphocyte  %   Date Value Ref Range Status   04/13/2023 18.5 (L) 19.6 - 45.3 % Final     Monocyte %   Date Value Ref Range Status   04/13/2023 7.2 5.0 - 12.0 % Final     Eosinophil %   Date Value Ref Range Status   04/13/2023 1.3 0.3 - 6.2 % Final     Basophil %   Date Value Ref Range Status   04/13/2023 0.3 0.0 - 1.5 % Final     Immature Grans %   Date Value Ref Range Status   06/14/2022 0.3 0.0 - 0.5 % Final     Neutrophils, Absolute   Date Value Ref Range Status   04/13/2023 5.03 1.70 - 7.00 10*3/mm3 Final     Lymphocytes, Absolute   Date Value Ref Range Status   04/13/2023 1.28 0.70 - 3.10 10*3/mm3 Final     Monocytes, Absolute   Date Value Ref Range Status   04/13/2023 0.50 0.10 - 0.90 10*3/mm3 Final     Eosinophils, Absolute   Date Value Ref Range Status   04/13/2023 0.09 0.00 - 0.40 10*3/mm3 Final     Basophils, Absolute   Date Value Ref Range Status   04/13/2023 0.02 0.00 - 0.20 10*3/mm3 Final     Immature Grans, Absolute   Date Value Ref Range Status   06/14/2022 0.02 0.00 - 0.05 10*3/mm3 Final     nRBC   Date Value Ref Range Status   06/14/2022 0.0 0.0 - 0.2 /100 WBC Final       Lab Results   Component Value Date    GLUCOSE 98 01/12/2023    BUN 15 01/12/2023    CREATININE 0.83 01/12/2023    EGFRIFNONA 100 02/12/2022    EGFRIFAFRI 70 03/21/2017    BCR 18.1 01/12/2023    K 3.7 01/12/2023    CO2 28.0 01/12/2023    CALCIUM 10.1 01/12/2023    ALBUMIN 4.5 01/12/2023    LABIL2 1.6 05/23/2019    AST 28 01/12/2023    ALT 28 01/12/2023       ASSESSMENT:  1.  Unprovoked pulmonary embolism: Continue anticoagulation.  Discussed with her.  2.  Her colonoscopy was unremarkable.  No intervention at this point.  3.  Reviewed all laboratory exams including blood counts, chemistries and lipid profile.  Discussed with her.  4.  Continue apixaban and see me approximately 6 months from now.       PLAN  1.  As above.    Chalino Russo MD on 4/13/2023 at 1426

## 2023-04-13 ENCOUNTER — LAB (OUTPATIENT)
Dept: LAB | Facility: HOSPITAL | Age: 73
End: 2023-04-13
Payer: MEDICARE

## 2023-04-13 ENCOUNTER — OFFICE VISIT (OUTPATIENT)
Dept: ONCOLOGY | Facility: CLINIC | Age: 73
End: 2023-04-13
Payer: MEDICARE

## 2023-04-13 VITALS
BODY MASS INDEX: 43.08 KG/M2 | HEART RATE: 68 BPM | HEIGHT: 61 IN | DIASTOLIC BLOOD PRESSURE: 81 MMHG | OXYGEN SATURATION: 99 % | TEMPERATURE: 97.6 F | SYSTOLIC BLOOD PRESSURE: 136 MMHG | WEIGHT: 228.2 LBS

## 2023-04-13 DIAGNOSIS — I82.431 ACUTE DEEP VEIN THROMBOSIS (DVT) OF POPLITEAL VEIN OF RIGHT LOWER EXTREMITY: ICD-10-CM

## 2023-04-13 DIAGNOSIS — I82.431 ACUTE DEEP VEIN THROMBOSIS (DVT) OF POPLITEAL VEIN OF RIGHT LOWER EXTREMITY: Primary | ICD-10-CM

## 2023-04-13 DIAGNOSIS — I82.431 EMBOLISM AND THROMBOSIS OF RIGHT POPLITEAL VEIN: Primary | ICD-10-CM

## 2023-04-13 LAB
ALBUMIN SERPL-MCNC: 4.3 G/DL (ref 3.5–5.2)
ALBUMIN/GLOB SERPL: 1.6 G/DL
ALP SERPL-CCNC: 94 U/L (ref 39–117)
ALT SERPL W P-5'-P-CCNC: 19 U/L (ref 1–33)
ANION GAP SERPL CALCULATED.3IONS-SCNC: 13 MMOL/L (ref 5–15)
AST SERPL-CCNC: 21 U/L (ref 1–32)
BASOPHILS # BLD AUTO: 0.02 10*3/MM3 (ref 0–0.2)
BASOPHILS NFR BLD AUTO: 0.3 % (ref 0–1.5)
BILIRUB SERPL-MCNC: 0.2 MG/DL (ref 0–1.2)
BUN SERPL-MCNC: 13 MG/DL (ref 8–23)
BUN/CREAT SERPL: 17.1 (ref 7–25)
CALCIUM SPEC-SCNC: 9.7 MG/DL (ref 8.6–10.5)
CHLORIDE SERPL-SCNC: 102 MMOL/L (ref 98–107)
CO2 SERPL-SCNC: 27 MMOL/L (ref 22–29)
CREAT SERPL-MCNC: 0.76 MG/DL (ref 0.57–1)
DEPRECATED RDW RBC AUTO: 45.4 FL (ref 37–54)
EGFRCR SERPLBLD CKD-EPI 2021: 83.4 ML/MIN/1.73
EOSINOPHIL # BLD AUTO: 0.09 10*3/MM3 (ref 0–0.4)
EOSINOPHIL NFR BLD AUTO: 1.3 % (ref 0.3–6.2)
ERYTHROCYTE [DISTWIDTH] IN BLOOD BY AUTOMATED COUNT: 15 % (ref 12.3–15.4)
GLOBULIN UR ELPH-MCNC: 2.7 GM/DL
GLUCOSE SERPL-MCNC: 86 MG/DL (ref 65–99)
HCT VFR BLD AUTO: 38.9 % (ref 34–46.6)
HGB BLD-MCNC: 12.6 G/DL (ref 12–15.9)
HOLD SPECIMEN: NORMAL
LYMPHOCYTES # BLD AUTO: 1.28 10*3/MM3 (ref 0.7–3.1)
LYMPHOCYTES NFR BLD AUTO: 18.5 % (ref 19.6–45.3)
MCH RBC QN AUTO: 27.6 PG (ref 26.6–33)
MCHC RBC AUTO-ENTMCNC: 32.4 G/DL (ref 31.5–35.7)
MCV RBC AUTO: 85.1 FL (ref 79–97)
MONOCYTES # BLD AUTO: 0.5 10*3/MM3 (ref 0.1–0.9)
MONOCYTES NFR BLD AUTO: 7.2 % (ref 5–12)
NEUTROPHILS NFR BLD AUTO: 5.03 10*3/MM3 (ref 1.7–7)
NEUTROPHILS NFR BLD AUTO: 72.7 % (ref 42.7–76)
PLATELET # BLD AUTO: 251 10*3/MM3 (ref 140–450)
PMV BLD AUTO: 11.2 FL (ref 6–12)
POTASSIUM SERPL-SCNC: 3.7 MMOL/L (ref 3.5–5.2)
PROT SERPL-MCNC: 7 G/DL (ref 6–8.5)
RBC # BLD AUTO: 4.57 10*6/MM3 (ref 3.77–5.28)
SODIUM SERPL-SCNC: 142 MMOL/L (ref 136–145)
WBC NRBC COR # BLD: 6.92 10*3/MM3 (ref 3.4–10.8)

## 2023-04-13 PROCEDURE — 36415 COLL VENOUS BLD VENIPUNCTURE: CPT

## 2023-04-13 PROCEDURE — 85025 COMPLETE CBC W/AUTO DIFF WBC: CPT

## 2023-04-13 PROCEDURE — 80053 COMPREHEN METABOLIC PANEL: CPT | Performed by: INTERNAL MEDICINE

## 2023-04-13 RX ORDER — CHOLECALCIFEROL (VITAMIN D3) 125 MCG
CAPSULE ORAL
COMMUNITY
Start: 2023-02-05

## 2023-04-19 ENCOUNTER — TREATMENT (OUTPATIENT)
Dept: PHYSICAL THERAPY | Facility: CLINIC | Age: 73
End: 2023-04-19
Payer: MEDICARE

## 2023-04-19 DIAGNOSIS — M25.552 BILATERAL HIP PAIN: Primary | ICD-10-CM

## 2023-04-19 DIAGNOSIS — M25.551 BILATERAL HIP PAIN: Primary | ICD-10-CM

## 2023-04-19 PROCEDURE — 97110 THERAPEUTIC EXERCISES: CPT | Performed by: PHYSICAL THERAPIST

## 2023-04-19 NOTE — PROGRESS NOTES
Re-Assessment / Progress Note    Patient: Radha Trujillo   : 1950  Diagnosis/ICD-10 Code:  Bilateral hip pain [M25.551, M25.552]  Referring practitioner: RICKY Pineda  Date of Initial Visit: Episode Type: THERAPY  Noted: 10/5/2022    Today's Date: 2023  Patient seen for 37 sessions.      Subjective:   Radha Trujillo reports: doing well overall with good improvement in hip/back pain with PT and has continued with water exercise and HEP per PT recommendation.  Subjective Questionnaire: Oswestry: 30%, Oxford Hip Score - 75%  Clinical Progress: improved  Home Program Compliance: Yes  Treatment has included: therapeutic exercise, neuromuscular re-education, manual therapy, therapeutic activity, electrical stimulation, moist heat and cryotherapy    Subjective   Objective   Assessment/Plan  Progress toward previous goals: All met or progressed towards    Goals    Short-term goals (STG):   . Independent and compliant with HEP over 2 weeks. - Met  2. Bilateral hip pain decreased to 10% or greater over 2 weeks. - Met  3. Gait mechanics improved over 2 weeks with increased arm swing/trunk rotation and decreased frontal plane compensation. - Met    Long-term goals (LTG):     1. War Hip Score improved to 75% or greater over 8 weeks. - Met   2. Bilateral hip pain decreased to 3/10 at worst with prior level of activity over 8 weeks. - Met  3. Five Time Sit to Stand at or below age/sex adjusted norms within 8 weeks. - Progressed towards  4. Gait speed at or above age/sex adjusted normals over 8 weeks. - Progressed towards  5. Single leg balance R/L 5 seconds or greater w/o UE support over 8 weeks. - Progressed towards         Recommendations: Continue with recommendations for trial HEP only with discharge if no regression over next 2 weeks.   Timeframe: 2 weeks  Prognosis to achieve goals: good    PT Signature: Beck Loyola, PT, DPT          Based upon review of the patient's progress and continued therapy  plan, it is my medical opinion that Radha Trujillo should continue physical therapy treatment at [facility] PHYSICAL THERAPY  [address] [place] [address] [phone].        Timed:         Manual Therapy:    5     mins  68055;     Therapeutic Exercise:    25     mins  30849;             Timed Treatment:   30   mins   Total Treatment:     30   mins

## 2023-05-03 DIAGNOSIS — I10 ESSENTIAL HYPERTENSION: ICD-10-CM

## 2023-05-03 RX ORDER — AMLODIPINE BESYLATE 5 MG/1
TABLET ORAL
Qty: 90 TABLET | Refills: 1 | Status: SHIPPED | OUTPATIENT
Start: 2023-05-03

## 2023-05-15 ENCOUNTER — OFFICE VISIT (OUTPATIENT)
Dept: FAMILY MEDICINE CLINIC | Facility: CLINIC | Age: 73
End: 2023-05-15
Payer: MEDICARE

## 2023-05-15 VITALS
SYSTOLIC BLOOD PRESSURE: 126 MMHG | DIASTOLIC BLOOD PRESSURE: 72 MMHG | OXYGEN SATURATION: 97 % | HEART RATE: 82 BPM | HEIGHT: 61 IN | TEMPERATURE: 98 F | BODY MASS INDEX: 42.67 KG/M2 | WEIGHT: 226 LBS | RESPIRATION RATE: 16 BRPM

## 2023-05-15 DIAGNOSIS — I10 ESSENTIAL HYPERTENSION: Chronic | ICD-10-CM

## 2023-05-15 DIAGNOSIS — F41.8 MIXED ANXIETY DEPRESSIVE DISORDER: Primary | Chronic | ICD-10-CM

## 2023-05-15 PROCEDURE — 99213 OFFICE O/P EST LOW 20 MIN: CPT | Performed by: NURSE PRACTITIONER

## 2023-05-15 PROCEDURE — 1159F MED LIST DOCD IN RCRD: CPT | Performed by: NURSE PRACTITIONER

## 2023-05-15 PROCEDURE — 1160F RVW MEDS BY RX/DR IN RCRD: CPT | Performed by: NURSE PRACTITIONER

## 2023-05-15 PROCEDURE — 3078F DIAST BP <80 MM HG: CPT | Performed by: NURSE PRACTITIONER

## 2023-05-15 PROCEDURE — 3074F SYST BP LT 130 MM HG: CPT | Performed by: NURSE PRACTITIONER

## 2023-05-15 NOTE — PROGRESS NOTES
"Chief Complaint  Hypertension  Subjective        Radha Trujillo presents to [facility]  History of Present Illness  Pt comes in today for follow up on depression/anxiety/palpitations/HTN.   She has been having issues with anxiety and depression. She had tried zoloft and lexapro. States she was crying all the time. She is no longer on any medications. At last appt we had ordered a holter monitor which was normal.   She wasn't interested in starting any new medications until results were back.  She still is doing ok without medication.   Not interested in anything at this time.   States she is weepy at times, but handles it ok.   Feels like she struggles with getting older.   Has been under some stress recently with  and his health issues. He had to have carotid surgery and then developed a small stroke. She managed to get through that ok, but has been stressful taking care of him.  Denies any SI/HI.   Takes xanax prn.        Objective     Vital Signs:   /72   Pulse 82   Temp 98 °F (36.7 °C)   Resp 16   Ht 154.9 cm (61\")   Wt 103 kg (226 lb)   SpO2 97%   BMI 42.70 kg/m²       BP Readings from Last 3 Encounters:   05/15/23 126/72   04/13/23 136/81   02/13/23 139/81       Wt Readings from Last 3 Encounters:   05/15/23 103 kg (226 lb)   04/13/23 104 kg (228 lb 3.2 oz)   02/13/23 106 kg (233 lb)     Physical Exam  Constitutional:       Appearance: She is well-developed.   Eyes:      Pupils: Pupils are equal, round, and reactive to light.   Cardiovascular:      Rate and Rhythm: Normal rate and regular rhythm.   Pulmonary:      Effort: Pulmonary effort is normal.      Breath sounds: Normal breath sounds.   Neurological:      Mental Status: She is alert and oriented to person, place, and time.   Psychiatric:         Speech: Speech is tangential.        Result Review :                 Assessment and Plan    Diagnoses and all orders for this visit:    1. Mixed anxiety " depressive disorder (Primary)  Assessment & Plan:  Patient's depression is recurrent and is mild without psychosis. Their depression is currently in partial remission and the condition is unchanged. This will be reassessed at the next regular appointment. F/U as described:patient will continue current medication therapy.  Not interested in new medication. Will cont xanax prn.       2. Essential hypertension  Assessment & Plan:  Hypertension is improving with treatment.  Continue current treatment regimen.  Dietary sodium restriction.  Blood pressure will be reassessed at the next regular appointment.    During this office visit, we discussed the pertinent aspects of the visit and treatment recommendations. Pt verbalizes understanding. Follow up was discussed. Patient was given the opportunity to ask questions and discuss other concerns.         Follow Up   Return in about 5 months (around 10/15/2023) for Medicare Wellness.  Patient was given instructions and counseling regarding her condition or for health maintenance advice. Please see specific information pulled into the AVS if appropriate.       Answers for HPI/ROS submitted by the patient on 5/8/2023  Please describe your symptoms.: Anxiety issues  Have you had these symptoms before?: Yes  How long have you been having these symptoms?: Greater than 2 weeks  Please list any medications you are currently taking for this condition.: None now.  Feel much better.  What is the primary reason for your visit?: Other

## 2023-05-16 NOTE — ASSESSMENT & PLAN NOTE
Patient's depression is recurrent and is mild without psychosis. Their depression is currently in partial remission and the condition is unchanged. This will be reassessed at the next regular appointment. F/U as described:patient will continue current medication therapy.  Not interested in new medication. Will cont xanax prn.

## 2023-05-19 RX ORDER — POTASSIUM CHLORIDE 750 MG/1
TABLET, FILM COATED, EXTENDED RELEASE ORAL
Qty: 270 TABLET | Refills: 0 | Status: SHIPPED | OUTPATIENT
Start: 2023-05-19

## 2023-06-14 ENCOUNTER — PATIENT MESSAGE (OUTPATIENT)
Dept: FAMILY MEDICINE CLINIC | Facility: CLINIC | Age: 73
End: 2023-06-14
Payer: MEDICARE

## 2023-06-14 RX ORDER — ALPRAZOLAM 0.25 MG/1
0.25 TABLET ORAL 2 TIMES DAILY PRN
Qty: 30 TABLET | Refills: 0 | Status: SHIPPED | OUTPATIENT
Start: 2023-06-14 | End: 2023-06-16 | Stop reason: SDUPTHER

## 2023-06-14 RX ORDER — ALPRAZOLAM 0.25 MG/1
0.25 TABLET ORAL 2 TIMES DAILY PRN
Qty: 30 TABLET | Refills: 0 | Status: SHIPPED | OUTPATIENT
Start: 2023-06-14 | End: 2023-06-14 | Stop reason: SDUPTHER

## 2023-06-14 RX ORDER — PAROXETINE 10 MG/1
10 TABLET, FILM COATED ORAL EVERY MORNING
Qty: 90 TABLET | Refills: 1 | Status: SHIPPED | OUTPATIENT
Start: 2023-06-14 | End: 2023-06-14 | Stop reason: SDUPTHER

## 2023-06-14 RX ORDER — PAROXETINE 10 MG/1
10 TABLET, FILM COATED ORAL EVERY MORNING
Qty: 90 TABLET | Refills: 1 | Status: SHIPPED | OUTPATIENT
Start: 2023-06-14 | End: 2023-06-16 | Stop reason: SDUPTHER

## 2023-06-14 NOTE — TELEPHONE ENCOUNTER
From: Radha Trujillo  To: Maribel Hale  Sent: 6/14/2023 9:32 AM EDT  Subject: Xamikex    Maribel, would you please refill my Rx of Xanax? Thank you.  Also, what would you think of Paxil for me? I believe I took it years ago when Alonso got diagnosed with cancer and it seemed to help, if I recall correctly. I'm pretty stressed about Alonso's current health issues. I am seeking some counseling starting tomorrow actually.

## 2023-06-16 ENCOUNTER — PATIENT MESSAGE (OUTPATIENT)
Dept: FAMILY MEDICINE CLINIC | Facility: CLINIC | Age: 73
End: 2023-06-16
Payer: MEDICARE

## 2023-06-16 RX ORDER — PAROXETINE 10 MG/1
10 TABLET, FILM COATED ORAL EVERY MORNING
Qty: 90 TABLET | Refills: 1 | Status: SHIPPED | OUTPATIENT
Start: 2023-06-16

## 2023-06-16 RX ORDER — ALPRAZOLAM 0.25 MG/1
0.25 TABLET ORAL 2 TIMES DAILY PRN
Qty: 30 TABLET | Refills: 0 | Status: SHIPPED | OUTPATIENT
Start: 2023-06-16

## 2023-06-16 NOTE — TELEPHONE ENCOUNTER
From: Radha Trujillo  To: Maribel Hale  Sent: 6/16/2023 10:30 AM EDT  Subject: Rxs    [facility] on [facility] has not yet received the Xanax and Paxil Rxs. Could you please send to them. Thank you.    Radha Trujillo

## 2023-06-19 DIAGNOSIS — I10 ESSENTIAL HYPERTENSION: ICD-10-CM

## 2023-06-19 RX ORDER — VALSARTAN AND HYDROCHLOROTHIAZIDE 320; 25 MG/1; MG/1
TABLET, FILM COATED ORAL
Qty: 90 TABLET | Refills: 1 | Status: SHIPPED | OUTPATIENT
Start: 2023-06-19

## 2023-07-23 DIAGNOSIS — I10 ESSENTIAL HYPERTENSION: ICD-10-CM

## 2023-07-24 RX ORDER — AMLODIPINE BESYLATE 10 MG/1
TABLET ORAL
Qty: 90 TABLET | Refills: 1 | Status: SHIPPED | OUTPATIENT
Start: 2023-07-24

## 2023-08-21 RX ORDER — POTASSIUM CHLORIDE 750 MG/1
TABLET, FILM COATED, EXTENDED RELEASE ORAL
Qty: 270 TABLET | Refills: 0 | Status: SHIPPED | OUTPATIENT
Start: 2023-08-21

## 2023-09-27 ENCOUNTER — OFFICE VISIT (OUTPATIENT)
Dept: CARDIOLOGY | Facility: CLINIC | Age: 73
End: 2023-09-27
Payer: MEDICARE

## 2023-09-27 VITALS
HEART RATE: 72 BPM | SYSTOLIC BLOOD PRESSURE: 143 MMHG | HEIGHT: 61 IN | BODY MASS INDEX: 41.44 KG/M2 | DIASTOLIC BLOOD PRESSURE: 79 MMHG | WEIGHT: 219.5 LBS | OXYGEN SATURATION: 98 %

## 2023-09-27 DIAGNOSIS — F41.8 MIXED ANXIETY DEPRESSIVE DISORDER: Chronic | ICD-10-CM

## 2023-09-27 DIAGNOSIS — E78.2 MIXED HYPERLIPIDEMIA: Chronic | ICD-10-CM

## 2023-09-27 DIAGNOSIS — I10 ESSENTIAL HYPERTENSION: Primary | Chronic | ICD-10-CM

## 2023-09-27 DIAGNOSIS — Z79.01 PULMONARY EMBOLISM ON LONG-TERM ANTICOAGULATION THERAPY: ICD-10-CM

## 2023-09-27 DIAGNOSIS — I26.99 PULMONARY EMBOLISM ON LONG-TERM ANTICOAGULATION THERAPY: ICD-10-CM

## 2023-09-27 NOTE — PROGRESS NOTES
Cardiology Office Visit      Encounter Date:  09/27/2023    Patient ID:   Radha Trujillo is a 73 y.o. female.    Reason For Followup:  Chest pain  History of AVNRT  History of pulmonary embolism    Brief Clinical History:  Dear Maribel Oconnell APRN    I had the pleasure of seeing Radha Trujillo today. As you are well aware, this is a 73 y.o. female with no established history of ischemic heart disease.  She does have a history of acid reflux, anxiety, AVNRT status post radiofrequency ablation, and antiplatelet therapy.  She presents today for the evaluation of chest pressure and lightheadedness.    Interval History:  She denies any chest pain pressure heaviness or tightness.  She denies any shortness of breath.  She denies any PND orthopnea.  She denies any syncope or near syncope.  She reports feeling well from a cardiac perspective.    Her blood pressure is elevated today but she reports better readings at home.  Her home readings are averaging less than 140/90.  She is also quite anxious today.  She was even tearful at times.  She reports her anxiety has continued to be a problem for her especially since her  had a stroke after his carotid surgery.  She is working with a counselor to try and help with this.    As I am sure you will recall, she suffered an acute pulmonary embolism in February 2022.  She was found to have evidence of right ventricular strain and hypoxia.  As such she underwent an EKOS catheter directed thrombectomy.  The following day she reported feeling remarkably better.  In fact she was discharged home on the following day.  She is been on anticoagulation since that time.    She is reporting that she might be considering surgery.  She was concerned about her potential for DVT formation.  We briefly discussed IVC filters as a preventative measure while she was off anticoagulation.    Assessment & Plan    Impressions:  Chest pressure with typical and atypical features     Negative  "ischemic work-up February 2021  History of DVT/PE status post EKOS catheter directed thrombolytic therapy  Hypertension with a significant situational component  Palpitations  Dizziness and lightheadedness  Anxiety  Acid reflux  AVNRT status post radiofrequency ablation  Obesity  Osteoarthritis of the knees    Recommendations:  Continuation of her current cardiovascular regimen at the present time.     This includes antihypertensives, anticoagulation  Follow-up in 1 years time sooner should there be difficulties.    Diagnoses and all orders for this visit:    1. Essential hypertension (Primary)  -     ECG 12 Lead    2. Mixed hyperlipidemia  -     ECG 12 Lead    3. Pulmonary embolism on long-term anticoagulation therapy  -     ECG 12 Lead    4. Mixed anxiety depressive disorder  -     ECG 12 Lead            Objective:    Vitals:  Vitals:    09/27/23 1300   BP: 143/79   Pulse: 72   SpO2: 98%   Weight: 99.6 kg (219 lb 8 oz)   Height: 154.9 cm (61\")     Body mass index is 41.47 kg/m².      Physical Exam:    General: Alert, cooperative, no distress, appears stated age  Head:  Normocephalic, atraumatic, mucous membranes moist  Eyes:  Conjunctiva/corneas clear, EOM's intact     Neck:  Supple,  no bruit    Lungs: Clear to auscultation bilaterally, no wheezes rhonchi rales are noted  Chest wall: No tenderness  Heart::  Regular rate and rhythm, S1 and S2 normal, 1/6 holosystolic murmur.  No rub or gallop  Abdomen: Soft, non-tender, nondistended bowel sounds active.  Obese  Extremities: No cyanosis, clubbing, or edema  Pulses: 2+ and symmetric all extremities  Skin:  No rashes or lesions  Neuro/psych: A&O x3. CN II through XII are grossly intact with appropriate affect      Allergies:  Allergies   Allergen Reactions    Adhesive Tape Rash    Wellbutrin [Bupropion] Shortness Of Breath       Medication Review:     Current Outpatient Medications:     ALPRAZolam (Xanax) 0.25 MG tablet, Take 1 tablet by mouth 2 (Two) Times a Day As " Needed for Anxiety., Disp: 30 tablet, Rfl: 0    amLODIPine (NORVASC) 10 MG tablet, TAKE 1 TABLET BY MOUTH EVERY DAY, Disp: 90 tablet, Rfl: 1    ELDERBERRY PO, Take  by mouth., Disp: , Rfl:     Eliquis 5 MG tablet tablet, TAKE 1 TABLET BY MOUTH TWICE A DAY, Disp: 60 tablet, Rfl: 11    fluticasone (Flovent HFA) 110 MCG/ACT inhaler, Inhale 1 puff 2 (Two) Times a Day., Disp: 1 inhaler, Rfl: 1    Multiple Vitamin (MULTIVITAMIN) capsule, Take 1 capsule by mouth Daily., Disp: , Rfl:     potassium chloride 10 MEQ CR tablet, TAKE 1 TABLET BY MOUTH THREE TIMES A DAY, Disp: 270 tablet, Rfl: 0    valsartan-hydrochlorothiazide (DIOVAN-HCT) 320-25 MG per tablet, TAKE 1 TABLET BY MOUTH EVERY DAY, Disp: 90 tablet, Rfl: 1    VITAMIN B COMPLEX-C PO, Take  by mouth., Disp: , Rfl:     vitamin B-12 (CYANOCOBALAMIN) 500 MCG tablet, , Disp: , Rfl:     Vitamins A & D 5000-400 units capsule, Take 1 capsule by mouth Daily., Disp: , Rfl:     Family History:  Family History   Problem Relation Age of Onset    Arthritis Mother     Diabetes Mother     Heart disease Mother     Emphysema Mother     Kidney disease Father     Arthritis Sister     Anxiety disorder Sister     Arthritis Brother     Anxiety disorder Brother     Acute myelogenous leukemia Niece     Myasthenia gravis Niece        Past Medical History:  Past Medical History:   Diagnosis Date    Abnormal coagulation profile     Abnormal EKG     Abnormal laboratory test     Acquired spondylolisthesis     Allergic 07/16/2021    Start shots in 7/27/21    Arthritis     Asthma     Body aches     Bronchitis, acute     Chest pain     PRESSURE    Chronic back pain     Colon polyp     Had colonoscopy to remove    Cough     Deep vein thrombosis 02/2022    Taking Eliquis    Depression with anxiety     DJD (degenerative joint disease)     Essential hypertension     Fatigue     History of radiofrequency ablation (RFA) procedure for cardiac arrhythmia 01/05/2021    Hyperlipidemia     Hypokalemia     Low  back pain     Lymphocytic colitis     Obesity     Other specified disorders of bone density and structure, other site     Panic disorder     Paroxysmal SVT (supraventricular tachycardia)     Postmenopausal     9 YEARS    Prediabetes     Pulmonary embolism Feb. 2022    Treated    Sleep apnea     SOB (shortness of breath)     Unspecified injury of left Achilles tendon, initial encounter     Vitamin B deficiency 01/14/2019    Vitamin B deficiency, unspecified     Vitamin D deficiency, unspecified        Past Surgical History:  Past Surgical History:   Procedure Laterality Date    ACHILLES TENDON SURGERY Left 2018    AV NODE ABLATION  08/2016    AVNRT RF ABLATION --8/16    CARDIAC CATHETERIZATION Bilateral 02/11/2022    Procedure: EKOS;  Surgeon: Kilo Vivar DO;  Location: [facility] CATH INVASIVE LOCATION;  Service: Cardiovascular;  Laterality: Bilateral;    COLONOSCOPY N/A 01/01/2012    FOOT SURGERY Right     TOE/FOOT SURGERY    KNEE SURGERY Right     2/2 TORN MENICUS    SUBTOTAL HYSTERECTOMY      TUBAL ABDOMINAL LIGATION         Social History:  Social History     Socioeconomic History    Marital status:      Spouse name: Alonso Trujillo    Number of children: 3   Tobacco Use    Smoking status: Never    Smokeless tobacco: Never   Vaping Use    Vaping Use: Never used   Substance and Sexual Activity    Alcohol use: Not Currently     Comment: 1 DRINK WEEKLY, IF THAT: RARE/OCC;  CAFFEINE USE + 1 COFFEE DAILY    Drug use: No    Sexual activity: Not Currently     Partners: Male     Birth control/protection: None, Tubal ligation     Comment: monogamous w/ ;  s/p BTL - no high-risk sexual behaviors       Review of Systems:  The following systems were reviewed as they relate to the cardiovascular system: Constitutional, Eyes, ENT, Cardiovascular, Respiratory, Gastrointestinal, Integumentary, Neurological, Psychiatric, Hematologic, Endocrine, Musculoskeletal, and Genitourinary. The pertinent  cardiovascular findings are reported above with all other cardiovascular points within those systems being negative.    Diagnostic Study Review:     Current Electrocardiogram:    ECG 12 Lead    Date/Time: 9/27/2023 1:50 PM  Performed by: Kilo Vivar DO  Authorized by: Kilo Vivar DO   Comparison: not compared with previous ECG   Previous ECG: no previous ECG available  Comments: Normal sinus rhythm with a ventricular rate of 72 bpm.  Low voltage in the precordial leads.  Normal QT and QTc intervals.  Normal QRS axis.         Laboratory Data:  Lab Results   Component Value Date    GLUCOSE 86 04/13/2023    BUN 13 04/13/2023    CREATININE 0.76 04/13/2023    EGFRIFNONA 100 02/12/2022    EGFRIFAFRI 70 03/21/2017    BCR 17.1 04/13/2023    K 3.7 04/13/2023    CO2 27.0 04/13/2023    CALCIUM 9.7 04/13/2023    ALBUMIN 4.3 04/13/2023    LABIL2 1.6 05/23/2019    AST 21 04/13/2023    ALT 19 04/13/2023     Lab Results   Component Value Date    GLUCOSE 86 04/13/2023    CALCIUM 9.7 04/13/2023     04/13/2023    K 3.7 04/13/2023    CO2 27.0 04/13/2023     04/13/2023    BUN 13 04/13/2023    CREATININE 0.76 04/13/2023    EGFRIFAFRI 70 03/21/2017    EGFRIFNONA 100 02/12/2022    BCR 17.1 04/13/2023    ANIONGAP 13.0 04/13/2023     Lab Results   Component Value Date    WBC 6.92 04/13/2023    HGB 12.6 04/13/2023    HCT 38.9 04/13/2023    MCV 85.1 04/13/2023     04/13/2023     Lab Results   Component Value Date    CHOL 181 02/11/2022    TRIG 68 02/11/2022    HDL 75 (H) 02/11/2022    LDL 93 02/11/2022     Lab Results   Component Value Date    HGBA1C 5.7 (H) 02/11/2022     Lab Results   Component Value Date    INR 1.03 03/24/2022    INR 1.05 02/12/2022    INR 1.06 02/12/2022    PROTIME 11.4 03/24/2022    PROTIME 11.6 02/12/2022    PROTIME 11.7 02/12/2022       Most Recent Echo:  Results for orders placed during the hospital encounter of 02/10/22    Adult Transthoracic Echo Complete W/ Cont  if Necessary Per Protocol    Interpretation Summary  · Estimated left ventricular EF was in agreement with the calculated left ventricular EF. Left ventricular ejection fraction appears to be 66 - 70%. Left ventricular systolic function is normal.  · Moderate tricuspid valve regurgitation is present.  · Estimated right ventricular systolic pressure from tricuspid regurgitation is mildly elevated (35-45 mmHg).  · The right ventricular cavity is moderately dilated.  · Mildly reduced right ventricular systolic function noted.       Most Recent Stress Test:  Results for orders placed during the hospital encounter of 01/25/21    Stress Test With Myocardial Perfusion One Day    Interpretation Summary  · Myocardial perfusion imaging indicates a normal myocardial perfusion study with no evidence of ischemia.  · Left ventricular ejection fraction is hyperdynamic (Calculated EF > 70%). .  · Findings consistent with a normal ECG stress test.  · Impressions are consistent with a low risk study.  · There is no prior study available for comparison.       Most Recent Cardiac Catheterization:   Results for orders placed during the hospital encounter of 02/10/22    Cardiac Catheterization/Vascular Study    Narrative  Radha Trujillo  [id number]  2/11/2022  @PCP@      Procedure:  She underwent EKOS catheter directed thrombolytic system placement    Surgeon:  Ghazala    Indications:  Bilateral pulmonary emboli with right ventricular strain    Complications:  None immediate    Informed Consent:  The risks, benefits, complications, treatment options, and expected outcomes were discussed with the patient. The patient and/or family concurred with the proposed plan, giving informed consent.    Description of Procedure:  After informed consent the patient was brought to the cardiac catheterization suite after appropriate IV hydration was begun and oral premedication was given. She was further sedated with fentanyl and midazolam. The  patient was prepped and draped in the normal sterile fashion.  The right femoral vein was accessed at 2 locations in close proximity via a modified Seldinger technique.  A 7 Liberian venous sheath was placed in the superior position and a 6 [place]ian venous sheath was placed in the inferior position.  These catheters were flushed with heparinized saline.    Next, utilizing fluoroscopic guidance, one long guidewire was advanced through each sheath and into the left and right pulmonary artery systems respectively. Over each wire, the EKOS delivery catheters were then advanced and placed into the left and right pulmonary artery systems respectively.  Finally, the ultrasonic catheters were advanced into the delivery catheters and secured via the integrated Luer-Rajinder mechanism.  Thrombolytic and ultrasonic therapy was then initiated utilizing hospital established protocols.    At this point, the procedure was completed.  Sheaths were sewn into place.  Heparin was initiated and administered through the venous sheaths utilizing hospital established protocols.  The patient tolerated the procedure well.    Conscious sedation:  Conscious sedation was performed according to protocol.  I supervised and directed an independent trained observer with the assistance of monitoring the patient's level of consciousness and physiologic status throughout the procedure.  Intraoperative service time was 75 minutes.    Impressions:  Successful placement of EKOS catheter directed thrombolytic system into right and left pulmonary artery systems.    Recommendations:  Thrombolytic and ultrasonic therapy administration for 6 hours  Monitor rate and rhythm  Supplemental oxygen as indicated  Sheath removal when ACT less than 170  Bedrest until condition allows for advancement of activity  Systemic anticoagulation with unfractionated heparin/low molecular weight heparin beginning 30 minutes after sheath removal  Further recommendations as patient's  course progresses.       NOTE: The following portions of the patient's note were reviewed, confirmed and/or updated this visit as appropriate: History of present illness/Interval history, physical examination, assessment & plan, allergies, current medications, past family history, past medical history, past social history, past surgical history and problem list.

## 2023-10-24 ENCOUNTER — OFFICE VISIT (OUTPATIENT)
Dept: FAMILY MEDICINE CLINIC | Facility: CLINIC | Age: 73
End: 2023-10-24
Payer: MEDICARE

## 2023-10-24 ENCOUNTER — LAB (OUTPATIENT)
Dept: FAMILY MEDICINE CLINIC | Facility: CLINIC | Age: 73
End: 2023-10-24
Payer: MEDICARE

## 2023-10-24 VITALS
BODY MASS INDEX: 41.54 KG/M2 | HEIGHT: 61 IN | HEART RATE: 80 BPM | RESPIRATION RATE: 18 BRPM | OXYGEN SATURATION: 98 % | DIASTOLIC BLOOD PRESSURE: 80 MMHG | WEIGHT: 220 LBS | SYSTOLIC BLOOD PRESSURE: 128 MMHG

## 2023-10-24 DIAGNOSIS — Z12.31 ENCOUNTER FOR SCREENING MAMMOGRAM FOR MALIGNANT NEOPLASM OF BREAST: ICD-10-CM

## 2023-10-24 DIAGNOSIS — Z00.00 MEDICARE ANNUAL WELLNESS VISIT, SUBSEQUENT: ICD-10-CM

## 2023-10-24 DIAGNOSIS — Z78.0 POSTMENOPAUSE: ICD-10-CM

## 2023-10-24 DIAGNOSIS — Z00.00 MEDICARE ANNUAL WELLNESS VISIT, SUBSEQUENT: Primary | ICD-10-CM

## 2023-10-24 LAB
ALBUMIN SERPL-MCNC: 4.5 G/DL (ref 3.5–5.2)
ALBUMIN/GLOB SERPL: 1.9 G/DL
ALP SERPL-CCNC: 96 U/L (ref 39–117)
ALT SERPL W P-5'-P-CCNC: 16 U/L (ref 1–33)
ANION GAP SERPL CALCULATED.3IONS-SCNC: 12.2 MMOL/L (ref 5–15)
AST SERPL-CCNC: 18 U/L (ref 1–32)
BASOPHILS # BLD AUTO: 0.03 10*3/MM3 (ref 0–0.2)
BASOPHILS NFR BLD AUTO: 0.6 % (ref 0–1.5)
BILIRUB SERPL-MCNC: 0.4 MG/DL (ref 0–1.2)
BUN SERPL-MCNC: 13 MG/DL (ref 8–23)
BUN/CREAT SERPL: 14.9 (ref 7–25)
CALCIUM SPEC-SCNC: 10 MG/DL (ref 8.6–10.5)
CHLORIDE SERPL-SCNC: 100 MMOL/L (ref 98–107)
CHOLEST SERPL-MCNC: 212 MG/DL (ref 0–200)
CO2 SERPL-SCNC: 28.8 MMOL/L (ref 22–29)
CREAT SERPL-MCNC: 0.87 MG/DL (ref 0.57–1)
DEPRECATED RDW RBC AUTO: 40.9 FL (ref 37–54)
EGFRCR SERPLBLD CKD-EPI 2021: 70.5 ML/MIN/1.73
EOSINOPHIL # BLD AUTO: 0.11 10*3/MM3 (ref 0–0.4)
EOSINOPHIL NFR BLD AUTO: 2.2 % (ref 0.3–6.2)
ERYTHROCYTE [DISTWIDTH] IN BLOOD BY AUTOMATED COUNT: 13.4 % (ref 12.3–15.4)
GLOBULIN UR ELPH-MCNC: 2.4 GM/DL
GLUCOSE SERPL-MCNC: 107 MG/DL (ref 65–99)
HBA1C MFR BLD: 6 % (ref 4.8–5.6)
HCT VFR BLD AUTO: 39.2 % (ref 34–46.6)
HDLC SERPL-MCNC: 78 MG/DL (ref 40–60)
HGB BLD-MCNC: 12.9 G/DL (ref 12–15.9)
IMM GRANULOCYTES # BLD AUTO: 0.01 10*3/MM3 (ref 0–0.05)
IMM GRANULOCYTES NFR BLD AUTO: 0.2 % (ref 0–0.5)
LDLC SERPL CALC-MCNC: 120 MG/DL (ref 0–100)
LDLC/HDLC SERPL: 1.52 {RATIO}
LYMPHOCYTES # BLD AUTO: 1.15 10*3/MM3 (ref 0.7–3.1)
LYMPHOCYTES NFR BLD AUTO: 23.1 % (ref 19.6–45.3)
MCH RBC QN AUTO: 27.3 PG (ref 26.6–33)
MCHC RBC AUTO-ENTMCNC: 32.9 G/DL (ref 31.5–35.7)
MCV RBC AUTO: 83.1 FL (ref 79–97)
MONOCYTES # BLD AUTO: 0.38 10*3/MM3 (ref 0.1–0.9)
MONOCYTES NFR BLD AUTO: 7.6 % (ref 5–12)
NEUTROPHILS NFR BLD AUTO: 3.3 10*3/MM3 (ref 1.7–7)
NEUTROPHILS NFR BLD AUTO: 66.3 % (ref 42.7–76)
NRBC BLD AUTO-RTO: 0.6 /100 WBC (ref 0–0.2)
PLATELET # BLD AUTO: 239 10*3/MM3 (ref 140–450)
PMV BLD AUTO: 11.9 FL (ref 6–12)
POTASSIUM SERPL-SCNC: 3.9 MMOL/L (ref 3.5–5.2)
PROT SERPL-MCNC: 6.9 G/DL (ref 6–8.5)
RBC # BLD AUTO: 4.72 10*6/MM3 (ref 3.77–5.28)
SODIUM SERPL-SCNC: 141 MMOL/L (ref 136–145)
TRIGL SERPL-MCNC: 78 MG/DL (ref 0–150)
TSH SERPL DL<=0.05 MIU/L-ACNC: 1.2 UIU/ML (ref 0.27–4.2)
VLDLC SERPL-MCNC: 14 MG/DL (ref 5–40)
WBC NRBC COR # BLD: 4.98 10*3/MM3 (ref 3.4–10.8)

## 2023-10-24 PROCEDURE — 84443 ASSAY THYROID STIM HORMONE: CPT | Performed by: NURSE PRACTITIONER

## 2023-10-24 PROCEDURE — 36415 COLL VENOUS BLD VENIPUNCTURE: CPT

## 2023-10-24 PROCEDURE — 80053 COMPREHEN METABOLIC PANEL: CPT | Performed by: NURSE PRACTITIONER

## 2023-10-24 PROCEDURE — 85025 COMPLETE CBC W/AUTO DIFF WBC: CPT | Performed by: NURSE PRACTITIONER

## 2023-10-24 PROCEDURE — 83036 HEMOGLOBIN GLYCOSYLATED A1C: CPT | Performed by: NURSE PRACTITIONER

## 2023-10-24 PROCEDURE — 80061 LIPID PANEL: CPT | Performed by: NURSE PRACTITIONER

## 2023-10-24 RX ORDER — ELECTROLYTES/DEXTROSE
SOLUTION, ORAL ORAL
COMMUNITY
Start: 2023-10-06

## 2023-10-24 NOTE — PROGRESS NOTES
The ABCs of the Annual Wellness Visit  Subsequent Medicare Wellness Visit    Subjective    Radha Trujillo is a 73 y.o. female who presents for a Subsequent Medicare Wellness Visit.    The following portions of the patient's history were reviewed and   updated as appropriate: allergies, current medications, past family history, past medical history, past social history, past surgical history, and problem list.    Compared to one year ago, the patient feels her physical   health is the same.    Compared to one year ago, the patient feels her mental   health is the same.    Recent Hospitalizations:  She was not admitted to the hospital during the last year.       Current Medical Providers:  Patient Care Team:  Maribel Hale APRN as PCP - General (Nurse Practitioner)  Kilo Vivar DO as Consulting Physician (Cardiology)  Chalino Russo MD as Consulting Physician (Hematology and Oncology)  Gala Solares PA as Physician Assistant (Physician Assistant)    Outpatient Medications Prior to Visit   Medication Sig Dispense Refill    ALPRAZolam (Xanax) 0.25 MG tablet Take 1 tablet by mouth 2 (Two) Times a Day As Needed for Anxiety. 30 tablet 0    amLODIPine (NORVASC) 10 MG tablet TAKE 1 TABLET BY MOUTH EVERY DAY 90 tablet 1    ELDERBERRY PO Take  by mouth.      Eliquis 5 MG tablet tablet TAKE 1 TABLET BY MOUTH TWICE A DAY 60 tablet 11    fluticasone (Flovent HFA) 110 MCG/ACT inhaler Inhale 1 puff 2 (Two) Times a Day. 1 inhaler 1    Multiple Vitamin (MULTIVITAMIN) capsule Take 1 capsule by mouth Daily.      potassium chloride 10 MEQ CR tablet TAKE 1 TABLET BY MOUTH THREE TIMES A  tablet 0    Pyridoxine HCl (Vitamin B6) 100 MG tablet       valsartan-hydrochlorothiazide (DIOVAN-HCT) 320-25 MG per tablet TAKE 1 TABLET BY MOUTH EVERY DAY 90 tablet 1    VITAMIN B COMPLEX-C PO Take  by mouth.      vitamin B-12 (CYANOCOBALAMIN) 500 MCG tablet       Vitamins A & D 5000-400 units capsule Take 1  "capsule by mouth Daily.       No facility-administered medications prior to visit.       No opioid medication identified on active medication list. I have reviewed chart for other potential  high risk medication/s and harmful drug interactions in the elderly.        Aspirin is not on active medication list.  Aspirin use is not indicated based on review of current medical condition/s. Risk of harm outweighs potential benefits.  .    Patient Active Problem List   Diagnosis    Palpitations    Morbid obesity with BMI of 40.0-44.9, adult    Essential hypertension    Hyperlipidemia    Mixed anxiety depressive disorder    Acute pulmonary embolism    Pulmonary embolism on long-term anticoagulation therapy     Advance Care Planning   Advance Care Planning     Advance Directive is on file.  ACP discussion was held with the patient during this visit. Patient has an advance directive in EMR which is still valid.      Objective    Vitals:    10/24/23 0814   BP: 128/80   BP Location: Left arm   Pulse: 80   Resp: 18   SpO2: 98%   Weight: 99.8 kg (220 lb)   Height: 154.9 cm (60.98\")     Estimated body mass index is 41.59 kg/m² as calculated from the following:    Height as of this encounter: 154.9 cm (60.98\").    Weight as of this encounter: 99.8 kg (220 lb).           Does the patient have evidence of cognitive impairment? Yes          HEALTH RISK ASSESSMENT    Smoking Status:  Social History     Tobacco Use   Smoking Status Never   Smokeless Tobacco Never     Alcohol Consumption:  Social History     Substance and Sexual Activity   Alcohol Use Not Currently    Comment: 1 DRINK WEEKLY, IF THAT: RARE/OCC;  CAFFEINE USE + 1 COFFEE DAILY     Fall Risk Screen:    MUNIRA Fall Risk Assessment was completed, and patient is at LOW risk for falls.Assessment completed on:5/15/2023    Depression Screenin/13/2023     1:57 PM   PHQ-2/PHQ-9 Depression Screening   Little Interest or Pleasure in Doing Things 0-->not at all   Feeling Down, " Depressed or Hopeless 0-->not at all   PHQ-9: Brief Depression Severity Measure Score 0       Health Habits and Functional and Cognitive Screening:      10/24/2023     8:15 AM   Functional & Cognitive Status   Do you have difficulty preparing food and eating? No   Do you have difficulty bathing yourself, getting dressed or grooming yourself? No   Do you have difficulty using the toilet? No   Do you have difficulty moving around from place to place? No   Do you have trouble with steps or getting out of a bed or a chair? No   Current Diet Well Balanced Diet   Dental Exam Up to date   Eye Exam Up to date   Exercise (times per week) 2 times per week   Current Exercises Include Swimming   Do you need help using the phone?  No   Are you deaf or do you have serious difficulty hearing?  No   Do you need help to go to places out of walking distance? No   Do you need help shopping? No   Do you need help preparing meals?  No   Do you need help with housework?  No   Do you need help with laundry? No   Do you need help taking your medications? No   Do you need help managing money? No   Do you ever drive or ride in a car without wearing a seat belt? No   Have you felt unusual stress, anger or loneliness in the last month? No   Who do you live with? Spouse   If you need help, do you have trouble finding someone available to you? No   Have you been bothered in the last four weeks by sexual problems? No   Do you have difficulty concentrating, remembering or making decisions? No       Age-appropriate Screening Schedule:  Refer to the list below for future screening recommendations based on patient's age, sex and/or medical conditions. Orders for these recommended tests are listed in the plan section. The patient has been provided with a written plan.    Health Maintenance   Topic Date Due    LIPID PANEL  02/11/2023    ANNUAL WELLNESS VISIT  10/04/2023    DXA SCAN  10/06/2023    MAMMOGRAM  10/17/2023    TDAP/TD VACCINES (1 - Tdap)  "10/24/2023 (Originally 8/28/1969)    BMI FOLLOWUP  10/25/2023 (Originally 10/4/2023)    COVID-19 Vaccine (4 - 2023-24 season) 10/26/2023 (Originally 9/1/2023)    INFLUENZA VACCINE  03/31/2024 (Originally 8/1/2023)    ZOSTER VACCINE (1 of 2) 05/15/2024 (Originally 8/28/2000)    COLORECTAL CANCER SCREENING  03/13/2033    HEPATITIS C SCREENING  Completed    Pneumococcal Vaccine 65+  Completed                  CMS Preventative Services Quick Reference  Risk Factors Identified During Encounter  None Identified  The above risks/problems have been discussed with the patient.  Pertinent information has been shared with the patient in the After Visit Summary.  An After Visit Summary and PPPS were made available to the patient.    Follow Up:   Next Medicare Wellness visit to be scheduled in 1 year.       Additional E&M Note during same encounter follows:  Patient has multiple medical problems which are significant and separately identifiable that require additional work above and beyond the Medicare Wellness Visit.      Chief Complaint  Medicare Wellness-subsequent    Subjective        HPI  Radha Trujillo is also being seen today for MWV. Has a lot of anxiety. Was prescribed paxil at 1 time, but not taking it. Has had a lot of stress recently at home with  and recent illness/stroke. He is starting to do better which is making her anxiety and stress better. She is hesitant to take the paxil if not needed.   Has OA knees and trying to get in to see Dr. Cortez.   Sees heme for hx of DVT/PE and on eliquis. Takes meds as prescribed.   Due for mammo and dexa         Objective   Vital Signs:  /80 (BP Location: Left arm)   Pulse 80   Resp 18   Ht 154.9 cm (60.98\")   Wt 99.8 kg (220 lb)   SpO2 98%   BMI 41.59 kg/m²     Physical Exam  Constitutional:       Appearance: She is well-developed.   HENT:      Head: Normocephalic.   Eyes:      Conjunctiva/sclera: Conjunctivae normal.      Pupils: Pupils are equal, round, " and reactive to light.   Neck:      Thyroid: No thyromegaly.   Cardiovascular:      Rate and Rhythm: Normal rate and regular rhythm.      Heart sounds: No murmur heard.  Pulmonary:      Effort: Pulmonary effort is normal.      Breath sounds: Normal breath sounds.   Abdominal:      General: Bowel sounds are normal.      Palpations: Abdomen is soft. There is no mass.      Tenderness: There is no abdominal tenderness.   Musculoskeletal:      Cervical back: Neck supple.   Skin:     General: Skin is warm and dry.      Findings: No lesion.   Neurological:      Mental Status: She is alert and oriented to person, place, and time.   Psychiatric:         Behavior: Behavior normal.                         Assessment and Plan   Diagnoses and all orders for this visit:    1. Medicare annual wellness visit, subsequent (Primary)  -     CBC & Differential; Future  -     Comprehensive Metabolic Panel; Future  -     Hemoglobin A1c; Future  -     Lipid Panel; Future  -     TSH; Future    2. Postmenopause  -     DEXA Bone Density Axial; Future    3. Encounter for screening mammogram for malignant neoplasm of breast  -     Mammo Screening Digital Tomosynthesis Bilateral With CAD; Future    Check labs  Dexa  Mamogram  During this visit for their annual exam, we reviewed their personal history, social history and family history. We went over their medications and all the recommended health maintenance items for their age group. They were given the opportunity to ask questions and discuss other concerns.   Discussed importance of regular exercise and recommended starting or continuing a regular exercise program for good health. The patient was also encouraged to lose weight for better health.              Follow Up   Return in about 6 months (around 4/24/2024) for HTN follow up.  Patient was given instructions and counseling regarding her condition or for health maintenance advice. Please see specific information pulled into the AVS if  appropriate.

## 2023-10-25 DIAGNOSIS — E78.00 PURE HYPERCHOLESTEROLEMIA: Primary | ICD-10-CM

## 2023-10-25 RX ORDER — ROSUVASTATIN CALCIUM 10 MG/1
10 TABLET, COATED ORAL DAILY
Qty: 90 TABLET | Refills: 1 | Status: SHIPPED | OUTPATIENT
Start: 2023-10-25

## 2023-10-25 NOTE — PROGRESS NOTES
A1C 6.0. prediabetic. Work on diet and try to avoid sweets.   Lipids are elevated and her cardiovascular risk is high. I want her to start crestor nightly and repeat labs in 3 months: CMP and lipid panel.   I will call in rx.   The 10-year ASCVD risk score (Cynthia FRYE, et al., 2019) is: 17%    Values used to calculate the score:      Age: 73 years      Sex: Female      Is Non- : No      Diabetic: No      Tobacco smoker: No      Systolic Blood Pressure: 128 mmHg      Is BP treated: Yes      HDL Cholesterol: 78 mg/dL      Total Cholesterol: 212 mg/dL

## 2023-10-26 ENCOUNTER — TELEPHONE (OUTPATIENT)
Dept: FAMILY MEDICINE CLINIC | Facility: CLINIC | Age: 73
End: 2023-10-26
Payer: MEDICARE

## 2023-10-26 NOTE — TELEPHONE ENCOUNTER
----- Message from RICKY Pineda sent at 10/25/2023 12:28 PM EDT -----  A1C 6.0. prediabetic. Work on diet and try to avoid sweets.   Lipids are elevated and her cardiovascular risk is high. I want her to start crestor nightly and repeat labs in 3 months: CMP and lipid panel.   I will call in rx.   The 10-year ASCVD risk score (Cynthia FRYE, et al., 2019) is: 17%    Values used to calculate the score:      Age: 73 years      Sex: Female      Is Non- : No      Diabetic: No      Tobacco smoker: No      Systolic Blood Pressure: 128 mmHg      Is BP treated: Yes      HDL Cholesterol: 78 mg/dL      Total Cholesterol: 212 mg/dL

## 2023-10-30 ENCOUNTER — TRANSCRIBE ORDERS (OUTPATIENT)
Dept: ADMINISTRATIVE | Facility: HOSPITAL | Age: 73
End: 2023-10-30
Payer: MEDICARE

## 2023-10-30 DIAGNOSIS — Z13.9 DUE FOR SCREENING: Primary | ICD-10-CM

## 2023-10-31 NOTE — PROGRESS NOTES
Hematology/Oncology Outpatient Follow Up    PATIENT NAME:Radha Trujillo  :1950  MRN: [id number]  PRIMARY CARE PHYSICIAN: Maribel Hale APRN  REFERRING PHYSICIAN: Maribel Hale APRN         HISTORY OF PRESENT ILLNESS:   Radha Trujillo is a 71 y.o. female who presented to [facility] on 2/10/2022 with complaints of abnormal test from her PCP.  Reports that she has not been feeling well and has been feeling short of breath since Monday.  Patient reports that she started walking into [facility] and suddenly felt short of breath.  Denies chest pain, fever, chills, no recent COVID-19 diagnosis, dizziness, lightheadedness, diaphoresis, syncope, abnormal leg pain or swelling.  No recent surgeries or long trips.  She does report she has been more sedentary than normal.  Positive Respiratory panel with viral infection plus a negative procalcitonin without other underlying concern for bacterial infection.  CT chest PE protocol revealed Pulmonary embolism in the right and left segmental and subsegmental branches with early right heart strain. No pulmonary infarct is seen. Patient was started on IV heparin.        22  Hematology/Oncology was consulted for Pulmonary embolism in the right and left segmental and subsegmental branches with early right heart strain.  No pulmonary infarct is seen; Patient was started on IV heparin. No history of blood disorders or DVT.  Patient has no smoking history.    3/3/2022 Ms. Trujillo returned to the clinic for follow-up.  She was feeling well and had tolerated the rivaroxaban without any difficulties.  She had no bleeding.  All her respiratory symptoms were completely resolved.  The physical exam revealed only significant obesity but there was no other abnormalities.  Plans to continue with the same anticoagulant were made.    2022: Back in the office for follow-up.  Compliant with the apixaban and no obvious side effects from it.  Had gained some weight.   Discussed the importance of weight management to reduce her risk of thrombosis.  She was asked to continue on the same anticoagulant.  A new prescription was sent.  She was also asked to return in 6 months.    1/12/2023: Feeling well.  Anxious but had decided to discontinue the antidepressant and was feeling better.  Sleeping less.  Working on losing weight.  Receiving physical therapy for persistent low back pain and bilateral knee pain.  No bleeding.  Remained compliant with the apixaban.  Has had no dyspnea, chest pains or cough.  Also no lower extremity edema or pain.    4/13/2023: Back to her normal routine.  Enrolled again in weight watchers and had lost approximately 4 kg.  Her appetite had remained adequate and she was more energetic.  [facility] [place] [facility].  She had no more dyspnea than before and had not been coughing more.  She had persisted with edema of the lower extremities but was wearing compression stockings.  The exam revealed no changes.  The laboratory exams were unremarkable.  She had undergone a colonoscopy that disclosed only a small polyp but no evidence of malignancy.  She was asked to continue on the anticoagulant and to return to see me 6 months later.    11/3/2023: Not feeling as well as at the time of the last visit.  Her spouse had a stroke and she has been having to care for him.  This has resulted in fatigue.  However generally she feels well.  She is eating well.  She has gained weight.  She has had persistent hip pain but she has been told that prior to surgery she has to lose some weight.  She has had no abdominal pain or diarrhea and she has not had any bleeding.  On exam I did not find any changes.  Laboratory exams were reviewed and discussed with her.  I asked her to remain on the same anticoagulant and to see me with results of laboratory exams in approximately 6 months.  She is to continue following with her primary care nurse practitioner.      He/She  has a past medical history of Abnormal coagulation profile, Abnormal EKG, Abnormal laboratory test, Acquired spondylolisthesis, Allergic (7/16/2021), Arthritis, Asthma, Body aches, Bronchitis, acute, Chest pain, Chronic back pain, Cough, Depression with anxiety, DJD (degenerative joint disease), Essential hypertension, Fatigue, History of radiofrequency ablation (RFA) procedure for cardiac arrhythmia (1/5/2021), Hyperlipidemia, Hypokalemia, Low back pain, Lymphocytic colitis, Obesity, Other specified disorders of bone density and structure, other site, Panic disorder, Paroxysmal SVT (supraventricular tachycardia) (HCC), Postmenopausal, Prediabetes, SOB (shortness of breath), Unspecified injury of left Achilles tendon, initial encounter, Vitamin B deficiency (1/14/2019), Vitamin B deficiency, unspecified, and Vitamin D deficiency, unspecified.      PCP: Maribel Hale APRN  History of present illness was reviewed and is unchanged from the previous visit.  1/12/2023    Past Medical History:   Diagnosis Date    Abnormal coagulation profile     Abnormal EKG     Abnormal laboratory test     Acquired spondylolisthesis     Allergic 07/16/2021    Start shots in 7/27/21    Arthritis     Asthma     Body aches     Bronchitis, acute     Chest pain     PRESSURE    Chronic back pain     Colon polyp     Had colonoscopy to remove    Cough     Deep vein thrombosis 02/2022    Taking Eliquis    Depression with anxiety     DJD (degenerative joint disease)     Essential hypertension     Fatigue     History of radiofrequency ablation (RFA) procedure for cardiac arrhythmia 01/05/2021    Hyperlipidemia     Hypokalemia     Low back pain     Lymphocytic colitis     Obesity     Other specified disorders of bone density and structure, other site     Panic disorder     Paroxysmal SVT (supraventricular tachycardia)     Postmenopausal     9 YEARS    Prediabetes     Pulmonary embolism Feb. 2022    Treated    Sleep apnea     SOB  (shortness of breath)     Unspecified injury of left Achilles tendon, initial encounter     Vitamin B deficiency 01/14/2019    Vitamin B deficiency, unspecified     Vitamin D deficiency, unspecified      Past Surgical History:   Procedure Laterality Date    ACHILLES TENDON SURGERY Left 2018    AV NODE ABLATION  08/2016    AVNRT RF ABLATION --8/16    CARDIAC CATHETERIZATION Bilateral 02/11/2022    Procedure: EKOS;  Surgeon: Kilo Vivar DO;  Location: [facility] CATH INVASIVE LOCATION;  Service: Cardiovascular;  Laterality: Bilateral;    COLONOSCOPY N/A 01/01/2012    FOOT SURGERY Right     TOE/FOOT SURGERY    KNEE SURGERY Right     2/2 TORN MENICUS    TUBAL ABDOMINAL LIGATION         Current Outpatient Medications:     ALPRAZolam (Xanax) 0.25 MG tablet, Take 1 tablet by mouth 2 (Two) Times a Day As Needed for Anxiety., Disp: 30 tablet, Rfl: 0    amLODIPine (NORVASC) 10 MG tablet, TAKE 1 TABLET BY MOUTH EVERY DAY, Disp: 90 tablet, Rfl: 1    ELDERBERRY PO, Take  by mouth., Disp: , Rfl:     Eliquis 5 MG tablet tablet, TAKE 1 TABLET BY MOUTH TWICE A DAY, Disp: 60 tablet, Rfl: 11    fluticasone (Flovent HFA) 110 MCG/ACT inhaler, Inhale 1 puff 2 (Two) Times a Day., Disp: 1 inhaler, Rfl: 1    Multiple Vitamin (MULTIVITAMIN) capsule, Take 1 capsule by mouth Daily., Disp: , Rfl:     Omega-3 Fatty Acids (Fish Oil Triple Strength) 1400 MG capsule, , Disp: , Rfl:     potassium chloride 10 MEQ CR tablet, TAKE 1 TABLET BY MOUTH THREE TIMES A DAY, Disp: 270 tablet, Rfl: 0    Pyridoxine HCl (Vitamin B6) 100 MG tablet, , Disp: , Rfl:     Red Yeast Rice Extract 600 MG capsule, Take 2 tablets by mouth 2 (Two) Times a Day., Disp: , Rfl:     valsartan-hydrochlorothiazide (DIOVAN-HCT) 320-25 MG per tablet, TAKE 1 TABLET BY MOUTH EVERY DAY, Disp: 90 tablet, Rfl: 1    VITAMIN B COMPLEX-C PO, Take  by mouth., Disp: , Rfl:     vitamin B-12 (CYANOCOBALAMIN) 500 MCG tablet, , Disp: , Rfl:     rosuvastatin (Crestor) 10 MG tablet,  Take 1 tablet by mouth Daily., Disp: 90 tablet, Rfl: 1    Vitamins A & D 5000-400 units capsule, Take 1 capsule by mouth Daily. (Patient not taking: Reported on 11/3/2023), Disp: , Rfl:     Allergies   Allergen Reactions    Adhesive Tape Rash    Wellbutrin [Bupropion] Shortness Of Breath     Family History   Problem Relation Age of Onset    Arthritis Mother     Diabetes Mother     Heart disease Mother     Emphysema Mother     Kidney disease Father     Arthritis Sister     Anxiety disorder Sister     Arthritis Brother     Anxiety disorder Brother     Acute myelogenous leukemia Niece     Myasthenia gravis Niece      Cancer-related family history is not on file.    Social History     Tobacco Use    Smoking status: Never    Smokeless tobacco: Never   Vaping Use    Vaping Use: Never used   Substance Use Topics    Alcohol use: Not Currently     Comment: 1 DRINK WEEKLY, IF THAT: RARE/OCC;  CAFFEINE USE + 1 COFFEE DAILY    Drug use: No     HPI, ROS and PFSH have been reviewed and confirmed on 11/3/2023.     SUBJECTIVE:  11/3/2023: Generally feeling well.  No new symptoms.  Fatigued and sleepy but she has been having to care for her spouse.  She is eating well and has had no nausea or vomiting.  She has had no chest pains and has also been without cough.  No abdominal pain or diarrhea and no dysuria.  No peripheral edema and no skin rash.    REVIEW OF SYSTEMS:  Review of Systems   Constitutional:  Negative for activity change, appetite change, chills, diaphoresis, fatigue, fever and unexpected weight change.   HENT:  Negative for congestion, dental problem, drooling, ear discharge, ear pain, facial swelling, hearing loss, mouth sores, nosebleeds, postnasal drip, rhinorrhea, sinus pressure, sinus pain, sneezing, sore throat, tinnitus, trouble swallowing and voice change.    Eyes:  Negative for photophobia, pain, discharge, redness, itching and visual disturbance.   Respiratory:  Negative for apnea, cough, choking, chest  "tightness, shortness of breath, wheezing and stridor.    Cardiovascular:  Negative for chest pain, palpitations and leg swelling.   Gastrointestinal:  Negative for abdominal distention, abdominal pain, anal bleeding, blood in stool, constipation, diarrhea, nausea, rectal pain and vomiting.   Endocrine: Negative for cold intolerance, heat intolerance, polydipsia and polyuria.   Genitourinary:  Negative for decreased urine volume, difficulty urinating, dysuria, flank pain, frequency, genital sores, hematuria and urgency.   Musculoskeletal:  Negative for arthralgias, back pain, gait problem, joint swelling, myalgias, neck pain and neck stiffness.   Skin:  Negative for color change, pallor and rash.   Neurological:  Negative for dizziness, tremors, seizures, syncope, facial asymmetry, speech difficulty, weakness, light-headedness, numbness and headaches.   Hematological:  Negative for adenopathy. Does not bruise/bleed easily.   Psychiatric/Behavioral:  Negative for agitation, behavioral problems, confusion, decreased concentration, hallucinations, self-injury, sleep disturbance and suicidal ideas. The patient is not nervous/anxious.      OBJECTIVE:    Vitals:    11/03/23 1333   BP: 135/75   Pulse: 79   SpO2: 97%   Weight: 101 kg (223 lb 6.4 oz)   Height: 154.9 cm (60.98\")   PainSc: 0-No pain     Body mass index is 42.24 kg/m².    ECOG  (0) Fully active, able to carry on all predisease performance without restriction    Physical Exam  Constitutional:       General: She is not in acute distress.     Appearance: She is not ill-appearing, toxic-appearing or diaphoretic.      Comments: Conversant, oriented and in good spirits.  Body mass index greater than 43 kg/m².   HENT:      Head: Normocephalic and atraumatic.      Right Ear: External ear normal.      Left Ear: External ear normal.      Nose: Nose normal. No congestion or rhinorrhea.      Mouth/Throat:      Mouth: Mucous membranes are moist.      Pharynx: Oropharynx is " clear. No oropharyngeal exudate or posterior oropharyngeal erythema.   Eyes:      General: No scleral icterus.        Right eye: No discharge.         Left eye: No discharge.      Conjunctiva/sclera: Conjunctivae normal.      Pupils: Pupils are equal, round, and reactive to light.   Cardiovascular:      Rate and Rhythm: Normal rate and regular rhythm.      Pulses: Normal pulses.      Heart sounds: No murmur heard.     No friction rub. No gallop.   Pulmonary:      Effort: Pulmonary effort is normal. No respiratory distress.      Breath sounds: Normal breath sounds. No stridor. No wheezing, rhonchi or rales.   Abdominal:      General: Abdomen is flat. Bowel sounds are normal. There is no distension.      Palpations: Abdomen is soft. There is no mass.      Tenderness: There is no abdominal tenderness. There is no right CVA tenderness, left CVA tenderness, guarding or rebound.      Hernia: No hernia is present.   Musculoskeletal:         General: No swelling, tenderness, deformity or signs of injury.      Cervical back: No rigidity or tenderness.      Right lower leg: No edema.      Left lower leg: No edema.   Lymphadenopathy:      Cervical: No cervical adenopathy.   Skin:     Coloration: Skin is not jaundiced or pale.      Findings: No bruising, lesion or rash.   Neurological:      General: No focal deficit present.      Mental Status: She is alert and oriented to person, place, and time.      Cranial Nerves: No cranial nerve deficit.      Motor: No weakness.      Gait: Gait normal.   Psychiatric:         Mood and Affect: Mood normal.         Behavior: Behavior normal.         Thought Content: Thought content normal.         Judgment: Judgment normal.     Chalino Russo MD performed a physical exam on 11/3/2023 as documented above.    RECENT LABS  WBC   Date Value Ref Range Status   10/24/2023 4.98 3.40 - 10.80 10*3/mm3 Final     RBC   Date Value Ref Range Status   10/24/2023 4.72 3.77 - 5.28 10*6/mm3 Final      Hemoglobin   Date Value Ref Range Status   10/24/2023 12.9 12.0 - 15.9 g/dL Final     Hematocrit   Date Value Ref Range Status   10/24/2023 39.2 34.0 - 46.6 % Final     MCV   Date Value Ref Range Status   10/24/2023 83.1 79.0 - 97.0 fL Final     MCH   Date Value Ref Range Status   10/24/2023 27.3 26.6 - 33.0 pg Final     MCHC   Date Value Ref Range Status   10/24/2023 32.9 31.5 - 35.7 g/dL Final     RDW   Date Value Ref Range Status   10/24/2023 13.4 12.3 - 15.4 % Final     RDW-SD   Date Value Ref Range Status   10/24/2023 40.9 37.0 - 54.0 fl Final     MPV   Date Value Ref Range Status   10/24/2023 11.9 6.0 - 12.0 fL Final     Platelets   Date Value Ref Range Status   10/24/2023 239 140 - 450 10*3/mm3 Final     Neutrophil %   Date Value Ref Range Status   10/24/2023 66.3 42.7 - 76.0 % Final     Lymphocyte %   Date Value Ref Range Status   10/24/2023 23.1 19.6 - 45.3 % Final     Monocyte %   Date Value Ref Range Status   10/24/2023 7.6 5.0 - 12.0 % Final     Eosinophil %   Date Value Ref Range Status   10/24/2023 2.2 0.3 - 6.2 % Final     Basophil %   Date Value Ref Range Status   10/24/2023 0.6 0.0 - 1.5 % Final     Immature Grans %   Date Value Ref Range Status   10/24/2023 0.2 0.0 - 0.5 % Final     Neutrophils, Absolute   Date Value Ref Range Status   10/24/2023 3.30 1.70 - 7.00 10*3/mm3 Final     Lymphocytes, Absolute   Date Value Ref Range Status   10/24/2023 1.15 0.70 - 3.10 10*3/mm3 Final     Monocytes, Absolute   Date Value Ref Range Status   10/24/2023 0.38 0.10 - 0.90 10*3/mm3 Final     Eosinophils, Absolute   Date Value Ref Range Status   10/24/2023 0.11 0.00 - 0.40 10*3/mm3 Final     Basophils, Absolute   Date Value Ref Range Status   10/24/2023 0.03 0.00 - 0.20 10*3/mm3 Final     Immature Grans, Absolute   Date Value Ref Range Status   10/24/2023 0.01 0.00 - 0.05 10*3/mm3 Final     nRBC   Date Value Ref Range Status   10/24/2023 0.6 (H) 0.0 - 0.2 /100 WBC Final       Lab Results   Component Value  Date    GLUCOSE 107 (H) 10/24/2023    BUN 13 10/24/2023    CREATININE 0.87 10/24/2023    EGFRIFNONA 100 02/12/2022    EGFRIFAFRI 70 03/21/2017    BCR 14.9 10/24/2023    K 3.9 10/24/2023    CO2 28.8 10/24/2023    CALCIUM 10.0 10/24/2023    ALBUMIN 4.5 10/24/2023    LABIL2 1.6 05/23/2019    AST 18 10/24/2023    ALT 16 10/24/2023       ASSESSMENT:  1.  Unprovoked pulmonary embolism: Tolerating anticoagulation well.  No need for intervention at this time.  2.  Reviewed blood counts, chemistries as well as notes from primary care.  Discussed the results with her.  4.  Continue treatment with apixaban.  See me in approximately 6 months.       PLAN  1.  As above.    Chalino Russo MD on 11/3/2023 at 1708.

## 2023-11-02 ENCOUNTER — TELEPHONE (OUTPATIENT)
Dept: ONCOLOGY | Facility: CLINIC | Age: 73
End: 2023-11-02

## 2023-11-02 NOTE — TELEPHONE ENCOUNTER
Caller: Radha Trujillo    Relationship: Self    Best call back number: [phone]    What is the best time to reach you: ASAP    Who are you requesting to speak with (clinical staff, provider,  specific staff member): CLINICAL    What was the call regarding: PT HAD LABS DONE ON 10-24 FOR HER MEDICARE WELLNESS CHECKUP RESULTS ARE IN MYCHART, WILL SHE STILL NEED TO HAVE THEM DONE TOMORROW   PLEASE CALL TO ADVISE    Is it okay if the provider responds through MyChart: N/A

## 2023-11-03 ENCOUNTER — OFFICE VISIT (OUTPATIENT)
Dept: ONCOLOGY | Facility: CLINIC | Age: 73
End: 2023-11-03
Payer: MEDICARE

## 2023-11-03 VITALS
OXYGEN SATURATION: 97 % | HEIGHT: 61 IN | HEART RATE: 79 BPM | BODY MASS INDEX: 42.18 KG/M2 | WEIGHT: 223.4 LBS | DIASTOLIC BLOOD PRESSURE: 75 MMHG | SYSTOLIC BLOOD PRESSURE: 135 MMHG

## 2023-11-03 DIAGNOSIS — I82.431 ACUTE DEEP VEIN THROMBOSIS (DVT) OF POPLITEAL VEIN OF RIGHT LOWER EXTREMITY: Primary | ICD-10-CM

## 2023-11-03 RX ORDER — CRANBERRY FRUIT EXTRACT 200 MG
2 CAPSULE ORAL 2 TIMES DAILY
COMMUNITY
Start: 2023-10-27

## 2023-11-03 RX ORDER — MAG HYDROX/ALUMINUM HYD/SIMETH 400-400-40
SUSPENSION, ORAL (FINAL DOSE FORM) ORAL
COMMUNITY
Start: 2023-10-02

## 2023-11-13 ENCOUNTER — TELEPHONE (OUTPATIENT)
Dept: FAMILY MEDICINE CLINIC | Facility: CLINIC | Age: 73
End: 2023-11-13

## 2023-11-13 NOTE — TELEPHONE ENCOUNTER
Caller: Radha Trujillo    Relationship to patient: Self    Best call back number: [phone]    Patient is needing:     HAS FOUND A SPOT ON HER UPPER THIGH, A LITTLE LUMP THAT CAN BE A LITTLE PAINFUL. SHE WOULD LIKE TO KNOW IF SHE SHOULD SCHEDULE AN APPOINTMENT WITH LIZETH CHARLES OR GO AND SEE HER DERMATOLOGIST.     PLEASE CALL TO DISCUSS.

## 2023-11-16 DIAGNOSIS — I10 ESSENTIAL HYPERTENSION: ICD-10-CM

## 2023-11-16 RX ORDER — VALSARTAN AND HYDROCHLOROTHIAZIDE 320; 25 MG/1; MG/1
TABLET, FILM COATED ORAL
Qty: 90 TABLET | Refills: 1 | Status: SHIPPED | OUTPATIENT
Start: 2023-11-16

## 2023-11-17 RX ORDER — POTASSIUM CHLORIDE 750 MG/1
TABLET, FILM COATED, EXTENDED RELEASE ORAL
Qty: 270 TABLET | Refills: 0 | Status: SHIPPED | OUTPATIENT
Start: 2023-11-17

## 2023-12-06 ENCOUNTER — HOSPITAL ENCOUNTER (OUTPATIENT)
Dept: CARDIOLOGY | Facility: HOSPITAL | Age: 73
Discharge: HOME | End: 2023-12-06

## 2023-12-06 ENCOUNTER — HOSPITAL ENCOUNTER (OUTPATIENT)
Dept: CT IMAGING | Facility: HOSPITAL | Age: 73
Discharge: HOME | End: 2023-12-06

## 2023-12-06 DIAGNOSIS — Z13.9 DUE FOR SCREENING: ICD-10-CM

## 2023-12-06 PROCEDURE — 75571 CT HRT W/O DYE W/CA TEST: CPT

## 2023-12-06 PROCEDURE — 93799 UNLISTED CV SVC/PROCEDURE: CPT

## 2023-12-07 LAB
BH CV VAS SCREENING CAROTID CCA LEFT: 73 CM/SEC
BH CV VAS SCREENING CAROTID CCA RIGHT: 64 CM/SEC
BH CV VAS SCREENING CAROTID ICA LEFT: 58 CM/SEC
BH CV VAS SCREENING CAROTID ICA RIGHT: 89 CM/SEC
BH CV XLRA MEAS - MID AO DIAM: 2 CM
BH CV XLRA MEAS - PAD LEFT ABI PT: 1.09
BH CV XLRA MEAS - PAD LEFT ARM: 156 MMHG
BH CV XLRA MEAS - PAD LEFT LEG PT: 170 MMHG
BH CV XLRA MEAS - PAD RIGHT ABI PT: 1.15
BH CV XLRA MEAS - PAD RIGHT ARM: 154 MMHG
BH CV XLRA MEAS - PAD RIGHT LEG PT: 179 MMHG
BH CV XLRA MEAS LEFT DIST CCA EDV: -17 CM/SEC
BH CV XLRA MEAS LEFT DIST CCA PSV: -73 CM/SEC
BH CV XLRA MEAS LEFT ICA/CCA RATIO: 0.8
BH CV XLRA MEAS LEFT PROX ICA EDV: 15.4 CM/SEC
BH CV XLRA MEAS LEFT PROX ICA PSV: 57.5 CM/SEC
BH CV XLRA MEAS RIGHT DIST CCA EDV: -16.5 CM/SEC
BH CV XLRA MEAS RIGHT DIST CCA PSV: -64.2 CM/SEC
BH CV XLRA MEAS RIGHT ICA/CCA RATIO: 1.4
BH CV XLRA MEAS RIGHT PROX ICA EDV: -14.9 CM/SEC
BH CV XLRA MEAS RIGHT PROX ICA PSV: -88.8 CM/SEC

## 2023-12-13 DIAGNOSIS — Z78.0 POSTMENOPAUSE: ICD-10-CM

## 2024-01-08 ENCOUNTER — OFFICE VISIT (OUTPATIENT)
Dept: FAMILY MEDICINE CLINIC | Facility: CLINIC | Age: 74
End: 2024-01-08
Payer: MEDICARE

## 2024-01-08 VITALS
RESPIRATION RATE: 18 BRPM | OXYGEN SATURATION: 95 % | DIASTOLIC BLOOD PRESSURE: 82 MMHG | HEIGHT: 61 IN | SYSTOLIC BLOOD PRESSURE: 132 MMHG | HEART RATE: 103 BPM | BODY MASS INDEX: 41.42 KG/M2 | WEIGHT: 219.4 LBS

## 2024-01-08 DIAGNOSIS — J06.9 ACUTE URI: Primary | ICD-10-CM

## 2024-01-08 PROCEDURE — 3075F SYST BP GE 130 - 139MM HG: CPT | Performed by: NURSE PRACTITIONER

## 2024-01-08 PROCEDURE — 1160F RVW MEDS BY RX/DR IN RCRD: CPT | Performed by: NURSE PRACTITIONER

## 2024-01-08 PROCEDURE — 96372 THER/PROPH/DIAG INJ SC/IM: CPT | Performed by: NURSE PRACTITIONER

## 2024-01-08 PROCEDURE — 99213 OFFICE O/P EST LOW 20 MIN: CPT | Performed by: NURSE PRACTITIONER

## 2024-01-08 PROCEDURE — 3079F DIAST BP 80-89 MM HG: CPT | Performed by: NURSE PRACTITIONER

## 2024-01-08 PROCEDURE — 1159F MED LIST DOCD IN RCRD: CPT | Performed by: NURSE PRACTITIONER

## 2024-01-08 RX ORDER — TRIAMCINOLONE ACETONIDE 40 MG/ML
80 INJECTION, SUSPENSION INTRA-ARTICULAR; INTRAMUSCULAR ONCE
Status: COMPLETED | OUTPATIENT
Start: 2024-01-08 | End: 2024-01-08

## 2024-01-08 RX ORDER — ERGOCALCIFEROL 1.25 MG/1
50000 CAPSULE ORAL WEEKLY
COMMUNITY

## 2024-01-08 RX ADMIN — TRIAMCINOLONE ACETONIDE 80 MG: 40 INJECTION, SUSPENSION INTRA-ARTICULAR; INTRAMUSCULAR at 15:40

## 2024-01-08 NOTE — PROGRESS NOTES
"Chief Complaint  Cough and Nasal Congestion  Subjective        Radha Trujillo presents to [facility]  History of Present Illness  Pt comes in today with c/o congestion, sore throat, cough, drainage.  Symptoms started about 3-4 days ago.  Cough is productive with clear to dark colored mucous.   Has a taste from drainage.   No fever or chills.  No sinus pain or pressure.  Lots of nasal drainage, mostly clear.   Taking mucinex otc.   Took 2 covid tests at home and both negative.  Taking Vit C, D, and zinc. Those upset her stomach and vomited afterwards.   Decreased appetite.        Objective     Vital Signs:   /82   Pulse 103   Resp 18   Ht 154.9 cm (60.98\")   Wt 99.5 kg (219 lb 6.4 oz)   SpO2 95%   BMI 41.48 kg/m²       BP Readings from Last 3 Encounters:   01/08/24 132/82   11/03/23 135/75   10/24/23 128/80       Wt Readings from Last 3 Encounters:   01/08/24 99.5 kg (219 lb 6.4 oz)   11/03/23 101 kg (223 lb 6.4 oz)   10/24/23 99.8 kg (220 lb)     Physical Exam  Constitutional:       Appearance: She is well-developed.   HENT:      Right Ear: Tympanic membrane normal.      Left Ear: Tympanic membrane normal.      Nose: Congestion present.      Right Turbinates: Enlarged.      Left Turbinates: Enlarged.      Right Sinus: No maxillary sinus tenderness or frontal sinus tenderness.      Left Sinus: No maxillary sinus tenderness or frontal sinus tenderness.      Mouth/Throat:      Comments: +PND   Eyes:      Pupils: Pupils are equal, round, and reactive to light.   Cardiovascular:      Rate and Rhythm: Normal rate and regular rhythm.   Pulmonary:      Effort: Pulmonary effort is normal.      Breath sounds: Normal breath sounds.   Neurological:      Mental Status: She is alert and oriented to person, place, and time.        Result Review :                 Assessment and Plan    Diagnoses and all orders for this visit:    1. Acute URI (Primary)  -     triamcinolone acetonide " (KENALOG-40) injection 80 mg    Cont mucinex   Add flonase  Tylenol/IBU prn  Kenalog inj today  Follow up if no improvement. If no improvement in the next 3-4 days may consider anbx  Push fluids  During this office visit, we discussed the pertinent aspects of the visit and treatment recommendations. Pt verbalizes understanding. Follow up was discussed. Patient was given the opportunity to ask questions and discuss other concerns.         Follow Up   Return if symptoms worsen or fail to improve.  Patient was given instructions and counseling regarding her condition or for health maintenance advice. Please see specific information pulled into the AVS if appropriate.

## 2024-01-10 ENCOUNTER — PATIENT MESSAGE (OUTPATIENT)
Dept: FAMILY MEDICINE CLINIC | Facility: CLINIC | Age: 74
End: 2024-01-10
Payer: MEDICARE

## 2024-01-10 RX ORDER — BENZONATATE 200 MG/1
200 CAPSULE ORAL 3 TIMES DAILY PRN
Qty: 30 CAPSULE | Refills: 1 | Status: SHIPPED | OUTPATIENT
Start: 2024-01-10 | End: 2024-01-23

## 2024-01-10 RX ORDER — BENZONATATE 200 MG/1
200 CAPSULE ORAL 3 TIMES DAILY PRN
Qty: 30 CAPSULE | Refills: 1 | Status: SHIPPED | OUTPATIENT
Start: 2024-01-10 | End: 2024-01-10 | Stop reason: SDUPTHER

## 2024-01-10 NOTE — TELEPHONE ENCOUNTER
From: Radha Trujillo  To: Maribeljuan miguel Hale  Sent: 1/10/2024 10:38 AM EST  Subject: Cough    Hi Maribel, being that I haven't gotten sick in a while, is it normal to get really tired from coughing so much? If I'm not blowing my nose, I'm coughing, which in turn, I get a little breathless. The phelym is coming out but this all knocks me out. Is this all normal?

## 2024-01-11 RX ORDER — POTASSIUM CHLORIDE 750 MG/1
TABLET, FILM COATED, EXTENDED RELEASE ORAL
Qty: 270 TABLET | Refills: 0 | Status: SHIPPED | OUTPATIENT
Start: 2024-01-11

## 2024-01-20 DIAGNOSIS — I10 ESSENTIAL HYPERTENSION: ICD-10-CM

## 2024-01-21 RX ORDER — AMLODIPINE BESYLATE 10 MG/1
TABLET ORAL
Qty: 90 TABLET | Refills: 1 | Status: SHIPPED | OUTPATIENT
Start: 2024-01-21

## 2024-02-08 ENCOUNTER — HOSPITAL ENCOUNTER (OUTPATIENT)
Dept: CARDIOLOGY | Facility: HOSPITAL | Age: 74
Discharge: HOME | End: 2024-02-08
Payer: MEDICARE

## 2024-02-08 ENCOUNTER — LAB (OUTPATIENT)
Dept: LAB | Facility: HOSPITAL | Age: 74
End: 2024-02-08
Payer: MEDICARE

## 2024-02-08 ENCOUNTER — TRANSCRIBE ORDERS (OUTPATIENT)
Dept: ADMINISTRATIVE | Facility: HOSPITAL | Age: 74
End: 2024-02-08
Payer: MEDICARE

## 2024-02-08 DIAGNOSIS — R73.09 IMPAIRED GLUCOSE TOLERANCE TEST: ICD-10-CM

## 2024-02-08 DIAGNOSIS — Z01.818 OTHER SPECIFIED PRE-OPERATIVE EXAMINATION: ICD-10-CM

## 2024-02-08 DIAGNOSIS — Z01.818 OTHER SPECIFIED PRE-OPERATIVE EXAMINATION: Primary | ICD-10-CM

## 2024-02-08 LAB
ALBUMIN SERPL-MCNC: 4.2 G/DL (ref 3.5–5.2)
ANION GAP SERPL CALCULATED.3IONS-SCNC: 11 MMOL/L (ref 5–15)
BASOPHILS # BLD AUTO: 0 10*3/MM3 (ref 0–0.2)
BASOPHILS NFR BLD AUTO: 0.7 % (ref 0–1.5)
BUN SERPL-MCNC: 19 MG/DL (ref 8–23)
BUN/CREAT SERPL: 26.8 (ref 7–25)
CALCIUM SPEC-SCNC: 9.9 MG/DL (ref 8.6–10.5)
CHLORIDE SERPL-SCNC: 100 MMOL/L (ref 98–107)
CO2 SERPL-SCNC: 28 MMOL/L (ref 22–29)
CREAT SERPL-MCNC: 0.71 MG/DL (ref 0.57–1)
DEPRECATED RDW RBC AUTO: 43.3 FL (ref 37–54)
EGFRCR SERPLBLD CKD-EPI 2021: 89.9 ML/MIN/1.73
EOSINOPHIL # BLD AUTO: 0.1 10*3/MM3 (ref 0–0.4)
EOSINOPHIL NFR BLD AUTO: 2 % (ref 0.3–6.2)
ERYTHROCYTE [DISTWIDTH] IN BLOOD BY AUTOMATED COUNT: 15 % (ref 12.3–15.4)
GLUCOSE SERPL-MCNC: 82 MG/DL (ref 65–99)
HBA1C MFR BLD: 5.8 % (ref 4.8–5.6)
HCT VFR BLD AUTO: 39.4 % (ref 34–46.6)
HGB BLD-MCNC: 12.7 G/DL (ref 12–15.9)
LYMPHOCYTES # BLD AUTO: 1.3 10*3/MM3 (ref 0.7–3.1)
LYMPHOCYTES NFR BLD AUTO: 28 % (ref 19.6–45.3)
MCH RBC QN AUTO: 27.1 PG (ref 26.6–33)
MCHC RBC AUTO-ENTMCNC: 32.3 G/DL (ref 31.5–35.7)
MCV RBC AUTO: 83.8 FL (ref 79–97)
MONOCYTES # BLD AUTO: 0.4 10*3/MM3 (ref 0.1–0.9)
MONOCYTES NFR BLD AUTO: 7.4 % (ref 5–12)
MRSA DNA SPEC QL NAA+PROBE: NORMAL
NEUTROPHILS NFR BLD AUTO: 2.9 10*3/MM3 (ref 1.7–7)
NEUTROPHILS NFR BLD AUTO: 61.9 % (ref 42.7–76)
NRBC BLD AUTO-RTO: 0.1 /100 WBC (ref 0–0.2)
PLATELET # BLD AUTO: 237 10*3/MM3 (ref 140–450)
PMV BLD AUTO: 10.3 FL (ref 6–12)
POTASSIUM SERPL-SCNC: 3.8 MMOL/L (ref 3.5–5.2)
QT INTERVAL: 401 MS
QTC INTERVAL: 406 MS
RBC # BLD AUTO: 4.7 10*6/MM3 (ref 3.77–5.28)
SODIUM SERPL-SCNC: 139 MMOL/L (ref 136–145)
WBC NRBC COR # BLD AUTO: 4.8 10*3/MM3 (ref 3.4–10.8)

## 2024-02-08 PROCEDURE — 83036 HEMOGLOBIN GLYCOSYLATED A1C: CPT

## 2024-02-08 PROCEDURE — 36415 COLL VENOUS BLD VENIPUNCTURE: CPT

## 2024-02-08 PROCEDURE — 80048 BASIC METABOLIC PNL TOTAL CA: CPT

## 2024-02-08 PROCEDURE — 87641 MR-STAPH DNA AMP PROBE: CPT

## 2024-02-08 PROCEDURE — 93005 ELECTROCARDIOGRAM TRACING: CPT | Performed by: ORTHOPAEDIC SURGERY

## 2024-02-08 PROCEDURE — 82040 ASSAY OF SERUM ALBUMIN: CPT

## 2024-02-08 PROCEDURE — 85025 COMPLETE CBC W/AUTO DIFF WBC: CPT

## 2024-02-16 DIAGNOSIS — M17.11 OSTEOARTHRITIS OF RIGHT KNEE, UNSPECIFIED OSTEOARTHRITIS TYPE: Primary | ICD-10-CM

## 2024-02-16 DIAGNOSIS — Z01.810 PREOPERATIVE CARDIOVASCULAR EXAMINATION: ICD-10-CM

## 2024-02-23 ENCOUNTER — HOSPITAL ENCOUNTER (OUTPATIENT)
Dept: NUCLEAR MEDICINE | Facility: HOSPITAL | Age: 74
Discharge: HOME | End: 2024-02-23
Payer: MEDICARE

## 2024-02-23 DIAGNOSIS — M17.11 OSTEOARTHRITIS OF RIGHT KNEE, UNSPECIFIED OSTEOARTHRITIS TYPE: ICD-10-CM

## 2024-02-23 DIAGNOSIS — Z01.810 PREOPERATIVE CARDIOVASCULAR EXAMINATION: ICD-10-CM

## 2024-02-23 LAB
BH CV REST NUCLEAR ISOTOPE DOSE: 9.3 MCI
BH CV STRESS BP STAGE 1: NORMAL
BH CV STRESS BP STAGE 2: NORMAL
BH CV STRESS COMMENTS STAGE 1: NORMAL
BH CV STRESS COMMENTS STAGE 2: NORMAL
BH CV STRESS DOSE REGADENOSON STAGE 1: 0.4
BH CV STRESS DURATION MIN STAGE 1: 0
BH CV STRESS DURATION MIN STAGE 2: 4
BH CV STRESS DURATION SEC STAGE 1: 10
BH CV STRESS DURATION SEC STAGE 2: 0
BH CV STRESS HR STAGE 1: 77
BH CV STRESS HR STAGE 2: 102
BH CV STRESS NUCLEAR ISOTOPE DOSE: 33 MCI
BH CV STRESS PROTOCOL 1: NORMAL
BH CV STRESS RECOVERY BP: NORMAL MMHG
BH CV STRESS RECOVERY HR: 81 BPM
BH CV STRESS STAGE 1: 1
BH CV STRESS STAGE 2: 2
LV EF NUC BP: 84 %
MAXIMAL PREDICTED HEART RATE: 147 BPM
PERCENT MAX PREDICTED HR: 69.39 %
STRESS BASELINE BP: NORMAL MMHG
STRESS BASELINE HR: 77 BPM
STRESS PERCENT HR: 82 %
STRESS POST PEAK BP: NORMAL MMHG
STRESS POST PEAK HR: 102 BPM
STRESS TARGET HR: 125 BPM

## 2024-02-23 PROCEDURE — 93017 CV STRESS TEST TRACING ONLY: CPT

## 2024-02-23 PROCEDURE — 25010000002 REGADENOSON 0.4 MG/5ML SOLUTION: Performed by: INTERNAL MEDICINE

## 2024-02-23 PROCEDURE — A9502 TC99M TETROFOSMIN: HCPCS | Performed by: INTERNAL MEDICINE

## 2024-02-23 PROCEDURE — 78452 HT MUSCLE IMAGE SPECT MULT: CPT

## 2024-02-23 PROCEDURE — 0 TECHNETIUM TETROFOSMIN KIT: Performed by: INTERNAL MEDICINE

## 2024-02-23 RX ORDER — REGADENOSON 0.08 MG/ML
0.4 INJECTION, SOLUTION INTRAVENOUS
Status: COMPLETED | OUTPATIENT
Start: 2024-02-23 | End: 2024-02-23

## 2024-02-23 RX ADMIN — REGADENOSON 0.4 MG: 0.08 INJECTION, SOLUTION INTRAVENOUS at 13:16

## 2024-02-23 RX ADMIN — TETROFOSMIN 1 DOSE: 1.38 INJECTION, POWDER, LYOPHILIZED, FOR SOLUTION INTRAVENOUS at 13:15

## 2024-02-23 RX ADMIN — TETROFOSMIN 1 DOSE: 1.38 INJECTION, POWDER, LYOPHILIZED, FOR SOLUTION INTRAVENOUS at 11:43

## 2024-03-04 ENCOUNTER — TELEPHONE (OUTPATIENT)
Dept: ONCOLOGY | Facility: CLINIC | Age: 74
End: 2024-03-04
Payer: MEDICARE

## 2024-03-04 NOTE — TELEPHONE ENCOUNTER
Contacted the patient regarding her upcoming knee scheduled for 3/8/24. I informed Mrs. Trujillo that she will need to hold her Eliquis for 72 hours prior to the procedure therefore she will take her last dose of Eliquis this evening then begin holding tomorrow. I informed her per Dr. Russo, he is advises she resume taking Eliquis post procedure on 3/8/24 in the evening but stated she will need to confirm with her surgeon Dr. Cortez to ensure he is agreeable to this as well. I stated Dr. Cortez will need to give the officially recommendation regarding when to restart taking Eliquis therefore she will need to verify with him. She voiced understanding. She asked if she will be ok without taking Eliquis for 3 days; I stated Dr. Russo did not advise that she bridge to Lovenox and she has held Eliquis for this same duration in the past without complication. Patient stated she forgot about holding Eliquis for her colonoscopy last year therefore agreeable to hold. She stated she did not know she had a DVT in her leg in the past because she always has pain behind her knee which is why she is having surgery on 3/8/24 nor did she know she had multiple PE until she was seen in the hospital. I advised her to contact our office if she begins to experience any signs or symptoms of a PE or DVT since she is now aware of the signs; she voiced understanding. I advised she contact our office if she has questions or concerns. She confirmed.

## 2024-04-17 ENCOUNTER — TELEPHONE (OUTPATIENT)
Dept: ONCOLOGY | Facility: CLINIC | Age: 74
End: 2024-04-17
Payer: MEDICARE

## 2024-04-17 ENCOUNTER — TRANSCRIBE ORDERS (OUTPATIENT)
Dept: LAB | Facility: HOSPITAL | Age: 74
End: 2024-04-17
Payer: MEDICARE

## 2024-04-17 ENCOUNTER — LAB (OUTPATIENT)
Dept: LAB | Facility: HOSPITAL | Age: 74
End: 2024-04-17
Payer: MEDICARE

## 2024-04-17 DIAGNOSIS — Z01.818 PRE-OP TESTING: Primary | ICD-10-CM

## 2024-04-17 DIAGNOSIS — Z01.818 PRE-OP TESTING: ICD-10-CM

## 2024-04-17 LAB
ALBUMIN SERPL-MCNC: 4.4 G/DL (ref 3.5–5.2)
ANION GAP SERPL CALCULATED.3IONS-SCNC: 9 MMOL/L (ref 5–15)
BASOPHILS # BLD AUTO: 0.04 10*3/MM3 (ref 0–0.2)
BASOPHILS NFR BLD AUTO: 1 % (ref 0–1.5)
BUN SERPL-MCNC: 16 MG/DL (ref 8–23)
BUN/CREAT SERPL: 19.3 (ref 7–25)
CALCIUM SPEC-SCNC: 10 MG/DL (ref 8.6–10.5)
CHLORIDE SERPL-SCNC: 102 MMOL/L (ref 98–107)
CO2 SERPL-SCNC: 29 MMOL/L (ref 22–29)
CREAT SERPL-MCNC: 0.83 MG/DL (ref 0.57–1)
DEPRECATED RDW RBC AUTO: 42.7 FL (ref 37–54)
EGFRCR SERPLBLD CKD-EPI 2021: 74.5 ML/MIN/1.73
EOSINOPHIL # BLD AUTO: 0.13 10*3/MM3 (ref 0–0.4)
EOSINOPHIL NFR BLD AUTO: 3.2 % (ref 0.3–6.2)
ERYTHROCYTE [DISTWIDTH] IN BLOOD BY AUTOMATED COUNT: 13.5 % (ref 12.3–15.4)
GLUCOSE SERPL-MCNC: 99 MG/DL (ref 65–99)
HBA1C MFR BLD: 5.7 % (ref 4.8–5.6)
HCT VFR BLD AUTO: 36.8 % (ref 34–46.6)
HGB BLD-MCNC: 11.8 G/DL (ref 12–15.9)
IMM GRANULOCYTES # BLD AUTO: 0.01 10*3/MM3 (ref 0–0.05)
IMM GRANULOCYTES NFR BLD AUTO: 0.2 % (ref 0–0.5)
LYMPHOCYTES # BLD AUTO: 1.15 10*3/MM3 (ref 0.7–3.1)
LYMPHOCYTES NFR BLD AUTO: 28.2 % (ref 19.6–45.3)
MCH RBC QN AUTO: 27.5 PG (ref 26.6–33)
MCHC RBC AUTO-ENTMCNC: 32.1 G/DL (ref 31.5–35.7)
MCV RBC AUTO: 85.8 FL (ref 79–97)
MONOCYTES # BLD AUTO: 0.34 10*3/MM3 (ref 0.1–0.9)
MONOCYTES NFR BLD AUTO: 8.3 % (ref 5–12)
MRSA DNA SPEC QL NAA+PROBE: NORMAL
NEUTROPHILS NFR BLD AUTO: 2.41 10*3/MM3 (ref 1.7–7)
NEUTROPHILS NFR BLD AUTO: 59.1 % (ref 42.7–76)
NRBC BLD AUTO-RTO: 0 /100 WBC (ref 0–0.2)
PLATELET # BLD AUTO: 249 10*3/MM3 (ref 140–450)
PMV BLD AUTO: 11.5 FL (ref 6–12)
POTASSIUM SERPL-SCNC: 3.9 MMOL/L (ref 3.5–5.2)
RBC # BLD AUTO: 4.29 10*6/MM3 (ref 3.77–5.28)
SODIUM SERPL-SCNC: 140 MMOL/L (ref 136–145)
WBC NRBC COR # BLD AUTO: 4.08 10*3/MM3 (ref 3.4–10.8)

## 2024-04-17 PROCEDURE — 87641 MR-STAPH DNA AMP PROBE: CPT

## 2024-04-17 PROCEDURE — 36415 COLL VENOUS BLD VENIPUNCTURE: CPT

## 2024-04-17 PROCEDURE — 83036 HEMOGLOBIN GLYCOSYLATED A1C: CPT

## 2024-04-17 PROCEDURE — 80048 BASIC METABOLIC PNL TOTAL CA: CPT

## 2024-04-17 PROCEDURE — 85025 COMPLETE CBC W/AUTO DIFF WBC: CPT

## 2024-04-17 PROCEDURE — 82040 ASSAY OF SERUM ALBUMIN: CPT

## 2024-04-17 NOTE — TELEPHONE ENCOUNTER
Attempted to contacted [facility] regarding her message. Unable to speak with a member of their staff. Voicemail left requesting a medical clearance form be faxed to our office. Fax number provided on voicemail.

## 2024-04-17 NOTE — TELEPHONE ENCOUNTER
Provider: renetta    Caller: tootie @ [facility] dental [facility]    Relationship to Patient: provider    Phone Number: [phone]    Reason for Call: needs labwork results from 04/17 and medical clearance - for dental extraction. provider is attempting to complete this prior to 05/16 when pt has knee replacement   Fax #[phone]    When was the patient last seen: 04/17/2024

## 2024-04-18 NOTE — TELEPHONE ENCOUNTER
Received clearance form via fax from [facility]. Once completed by Dr. Russo, clearance form will be faxed back to dental office.

## 2024-04-18 NOTE — TELEPHONE ENCOUNTER
Spoke with the patient regarding her dental extraction. I verified with her that her procedure is scheduled for Monday 4/22/24; she confirmed. I informed her that she will need to hold Eliquis for 3 days prior therefore today is her last day of Eliquis then she will hold the medication Friday, Saturday and Sunday (4/19/24-4/21/24). I stated Dr. Russo advises she resume Eliquis on 4/22/24 in the evening after the procedure is completed. I advised her to confirm with her dentist that he/she is agreeable with her restarting Eliquis at that time. She confirmed and stated she will verify with the dentist. I advised her to contact our office if she has any further questions or concerns. She confirmed.

## 2024-04-18 NOTE — TELEPHONE ENCOUNTER
Received a message from the on call MD Dr. Field, stating the patient is needing the clearance for her dentist due to having an infected tooth. Patient informed Dr. Field that per procedure is scheduled for 4/22/24.    Attempted to contact Mrs. Trujillo regarding her upcoming procedure. Unable to speak with the patient due to no answer. Voicemail left requesting a callback. Callback number left on voicemail.

## 2024-04-26 ENCOUNTER — TELEPHONE (OUTPATIENT)
Dept: CARDIOLOGY | Facility: CLINIC | Age: 74
End: 2024-04-26
Payer: MEDICARE

## 2024-04-26 RX ORDER — ENOXAPARIN SODIUM 100 MG/ML
100 INJECTION SUBCUTANEOUS EVERY 12 HOURS SCHEDULED
Qty: 6 ML | Refills: 0 | Status: SHIPPED | OUTPATIENT
Start: 2024-04-26 | End: 2024-04-29

## 2024-04-26 RX ORDER — ENOXAPARIN SODIUM 100 MG/ML
100 INJECTION SUBCUTANEOUS EVERY 12 HOURS SCHEDULED
Qty: 6 ML | Refills: 0 | Status: SHIPPED | OUTPATIENT
Start: 2024-04-26 | End: 2024-04-26 | Stop reason: SDUPTHER

## 2024-04-26 NOTE — TELEPHONE ENCOUNTER
E-prescription failed. Called in & left Lovenox Rx to [facility] pharmacy [facility].

## 2024-04-30 ENCOUNTER — TELEPHONE (OUTPATIENT)
Dept: ONCOLOGY | Facility: CLINIC | Age: 74
End: 2024-04-30
Payer: MEDICARE

## 2024-04-30 NOTE — TELEPHONE ENCOUNTER
Caller: Radha Trujillo    Relationship: Self    Best call back number: [phone]    What is the best time to reach you: ANYTIME    Who are you requesting to speak with (clinical staff, provider,  specific staff member): CLINICAL     What was the call regarding: PATIENT WILL BE HAVING A TOOTH PULLED ON 5/7/2024 AND SHE WILL BE OFF HER ELIQUIS. IS  GOING TO SEND A SCRIP TO THE PHARMACY FOR HER TO TAKE LOVENOX?    IF YOU SENT SEND TO :   [facility] [address] [place] [place] [address]    CALL TO DISCUSS

## 2024-05-02 NOTE — TELEPHONE ENCOUNTER
AFTER SPEAKING WITH DR. SIMS, I CONTACTED MRS. SIDDIQUI. I INFORMED HER PER DR. SIMS, HE STATED HE IS AGREEABLE TO HER HOLDING ELIQUIS FOR 3 DAYS PRIOR TO HER TOOTH EXTRACTION. I INFORMED HER THAT SHE DOES NOT NEED TO BRIDGE TO LOVENOX PER DR. SIMS. SHE CONFIRMED. I INFORMED HER THAT DR. SIMS ADVISES SHE RESTART ELIQUIS ON 5/7/24 IN THE EVENING AFTER HER TOOTH IS PULLED HOWEVER, HE ADVISES HER TO VERIFY WITH HER DENTIST THAT THEY ARE AGREEABLE WITH THAT RECOMMENDATION AS WELL. SHE CONFIRMED. I ADVISED HER TO CONTACT OUR OFFICE IF SHE HAS ANY QUESTIONS OR CONCERNS. SHE CONFIRMED.

## 2024-05-07 DIAGNOSIS — I10 ESSENTIAL HYPERTENSION: ICD-10-CM

## 2024-05-07 RX ORDER — VALSARTAN AND HYDROCHLOROTHIAZIDE 320; 25 MG/1; MG/1
TABLET, FILM COATED ORAL
Qty: 90 TABLET | Refills: 1 | Status: SHIPPED | OUTPATIENT
Start: 2024-05-07

## 2024-05-08 RX ORDER — POTASSIUM CHLORIDE 750 MG/1
TABLET, FILM COATED, EXTENDED RELEASE ORAL
Qty: 270 TABLET | Refills: 0 | Status: SHIPPED | OUTPATIENT
Start: 2024-05-08

## 2024-05-10 ENCOUNTER — TELEPHONE (OUTPATIENT)
Dept: ONCOLOGY | Facility: CLINIC | Age: 74
End: 2024-05-10
Payer: MEDICARE

## 2024-06-07 ENCOUNTER — TELEPHONE (OUTPATIENT)
Dept: ONCOLOGY | Facility: CLINIC | Age: 74
End: 2024-06-07

## 2024-06-07 DIAGNOSIS — I82.431 ACUTE DEEP VEIN THROMBOSIS (DVT) OF POPLITEAL VEIN OF RIGHT LOWER EXTREMITY: ICD-10-CM

## 2024-06-07 NOTE — TELEPHONE ENCOUNTER
Caller: Radha Trujillo    Relationship: Self    Best call back number: [phone]    Requested Prescriptions:   Requested Prescriptions     Pending Prescriptions Disp Refills    apixaban (Eliquis) 5 MG tablet tablet 60 tablet 11     Sig: Take 1 tablet by mouth 2 (Two) Times a Day.        Pharmacy where request should be sent: [facility]/PHARMACY #[id number] - [facility] [place] [address] [phone] [facility] [phone] FX     Last office visit with prescribing clinician: 11/3/2023   Last telemedicine visit with prescribing clinician: Visit date not found   Next office visit with prescribing clinician: Visit date not found     Does the patient have less than a 3 day supply:  [] Yes  [x] No    Would you like a call back once the refill request has been completed: [] Yes [x] No    If the office needs to give you a call back, can they leave a voicemail: [] Yes [x] No

## 2024-06-07 NOTE — TELEPHONE ENCOUNTER
Caller: Radha Trujillo    Relationship to patient: Self    Best call back number: [phone]    Chief complaint: R/S    Type of visit: LAB & F/U 1    Requested date: THE NEXT COUPLE OF WEEKS.    If rescheduling, when is the original appointment: 5/3/24

## 2024-06-14 NOTE — PROGRESS NOTES
Hematology/Oncology Outpatient Follow Up    PATIENT NAME:Radha Trujillo  :1950  MRN: [id number]  PRIMARY CARE PHYSICIAN: Maribel Hale APRN  REFERRING PHYSICIAN: Maribel Hale APRN         HISTORY OF PRESENT ILLNESS:   Radha Trujillo is a 71 y.o. female who presented to Psychiatric on 2/10/2022 with complaints of abnormal test from her PCP.  Reports that she has not been feeling well and has been feeling short of breath since Monday.  Patient reports that she started walking into [facility] and suddenly felt short of breath.  Denies chest pain, fever, chills, no recent COVID-19 diagnosis, dizziness, lightheadedness, diaphoresis, syncope, abnormal leg pain or swelling.  No recent surgeries or long trips.  She does report she has been more sedentary than normal.  Positive Respiratory panel with viral infection plus a negative procalcitonin without other underlying concern for bacterial infection.  CT chest PE protocol revealed Pulmonary embolism in the right and left segmental and subsegmental branches with early right heart strain. No pulmonary infarct is seen. Patient was started on IV heparin.        22  Hematology/Oncology was consulted for Pulmonary embolism in the right and left segmental and subsegmental branches with early right heart strain.  No pulmonary infarct is seen; Patient was started on IV heparin. No history of blood disorders or DVT.  Patient has no smoking history.    3/3/2022 Ms. Trujillo returned to the clinic for follow-up.  She was feeling well and had tolerated the rivaroxaban without any difficulties.  She had no bleeding.  All her respiratory symptoms were completely resolved.  The physical exam revealed only significant obesity but there was no other abnormalities.  Plans to continue with the same anticoagulant were made.    2022: Back in the office for follow-up.  Compliant with the apixaban and no obvious side effects from it.  Had gained some weight.   Discussed the importance of weight management to reduce her risk of thrombosis.  She was asked to continue on the same anticoagulant.  A new prescription was sent.  She was also asked to return in 6 months.    1/12/2023: Feeling well.  Anxious but had decided to discontinue the antidepressant and was feeling better.  Sleeping less.  Working on losing weight.  Receiving physical therapy for persistent low back pain and bilateral knee pain.  No bleeding.  Remained compliant with the apixaban.  Has had no dyspnea, chest pains or cough.  Also no lower extremity edema or pain.    4/13/2023: Back to her normal routine.  Enrolled again in weight watchers and had lost approximately 4 kg.  Her appetite had remained adequate and she was more energetic.  [facility] and [facility].  She had no more dyspnea than before and had not been coughing more.  She had persisted with edema of the lower extremities but was wearing compression stockings.  The exam revealed no changes.  The laboratory exams were unremarkable.  She had undergone a colonoscopy that disclosed only a small polyp but no evidence of malignancy.  She was asked to continue on the anticoagulant and to return to see me 6 months later.    11/3/2023: Not feeling as well as at the time of the last visit.  Her spouse had a stroke and she has been having to care for him.  This has resulted in fatigue.  However generally she feels well.  She is eating well.  She has gained weight.  She has had persistent hip pain but she has been told that prior to surgery she has to lose some weight.  She has had no abdominal pain or diarrhea and she has not had any bleeding.  On exam I did not find any changes.  Laboratory exams were reviewed and discussed with her.  I asked her to remain on the same anticoagulant and to see me with results of laboratory exams in approximately 6 months.  She is to continue following with her primary care nurse  practitioner.    6/17/2024: Feeling well and recovering well from bilateral knee total arthroplasties.  She did not have any complications and she has been progressively stronger and walking better.  The second procedure took place approximately 3 months before this visit.  She is eating well and her weight has increased.  She has had no nausea or vomiting and has also been without abdominal pain.  No diarrhea or dysuria.  No skin rash.  On exam chronically ill-appearing but in no distress.  Conversant, oriented and in good spirits.  Lungs clear.  Heart regular.  Abdomen protuberant but soft.  No palpable tumors or visceromegaly.  There is bilateral symmetrical lower extremity edema.  The surgical incisions are healing well, the most recent 1 on the left, shows no erythema or discharge.  Reviewed the laboratory exams and discussed with her.  She is to see me in approximately a year and continue with the apixaban.  Prescriptions are available for her.     He/She  has a past medical history of Abnormal coagulation profile, Abnormal EKG, Abnormal laboratory test, Acquired spondylolisthesis, Allergic (7/16/2021), Arthritis, Asthma, Body aches, Bronchitis, acute, Chest pain, Chronic back pain, Cough, Depression with anxiety, DJD (degenerative joint disease), Essential hypertension, Fatigue, History of radiofrequency ablation (RFA) procedure for cardiac arrhythmia (1/5/2021), Hyperlipidemia, Hypokalemia, Low back pain, Lymphocytic colitis, Obesity, Other specified disorders of bone density and structure, other site, Panic disorder, Paroxysmal SVT (supraventricular tachycardia) (HCC), Postmenopausal, Prediabetes, SOB (shortness of breath), Unspecified injury of left Achilles tendon, initial encounter, Vitamin B deficiency (1/14/2019), Vitamin B deficiency, unspecified, and Vitamin D deficiency, unspecified.      PCP: Maribel Hale APRN  History of present illness was reviewed and is unchanged from the previous visit.   1/12/2023    Past Medical History:   Diagnosis Date    Abnormal coagulation profile     Abnormal EKG     Abnormal laboratory test     Acquired spondylolisthesis     Allergic 07/16/2021    Start shots in 7/27/21    Arthritis     Asthma     Body aches     Bronchitis, acute     Chest pain     PRESSURE    Chronic back pain     Colon polyp     Had colonoscopy to remove    Cough     Deep vein thrombosis 02/2022    Taking Eliquis    Depression with anxiety     DJD (degenerative joint disease)     Essential hypertension     Fatigue     History of radiofrequency ablation (RFA) procedure for cardiac arrhythmia 01/05/2021    Hyperlipidemia     Hypokalemia     Low back pain     Lymphocytic colitis     Obesity     Other specified disorders of bone density and structure, other site     Panic disorder     Paroxysmal SVT (supraventricular tachycardia)     Postmenopausal     9 YEARS    Prediabetes     Pulmonary embolism Feb. 2022    Treated    Sleep apnea     SOB (shortness of breath)     Unspecified injury of left Achilles tendon, initial encounter     Vitamin B deficiency 01/14/2019    Vitamin B deficiency, unspecified     Vitamin D deficiency, unspecified      Past Surgical History:   Procedure Laterality Date    ACHILLES TENDON SURGERY Left 2018    AV NODE ABLATION  08/2016    AVNRT RF ABLATION --8/16    CARDIAC CATHETERIZATION Bilateral 02/11/2022    Procedure: EKOS;  Surgeon: Kilo Vivar DO;  Location: [facility] CATH INVASIVE LOCATION;  Service: Cardiovascular;  Laterality: Bilateral;    COLONOSCOPY N/A 01/01/2012    FOOT SURGERY Right     TOE/FOOT SURGERY    KNEE SURGERY Right     2/2 TORN MENICUS    TUBAL ABDOMINAL LIGATION         Current Outpatient Medications:     ALPRAZolam (Xanax) 0.25 MG tablet, Take 1 tablet by mouth 2 (Two) Times a Day As Needed for Anxiety., Disp: 30 tablet, Rfl: 0    apixaban (Eliquis) 5 MG tablet tablet, Take 1 tablet by mouth 2 (Two) Times a Day., Disp: 60 tablet, Rfl: 11     ELDERBERRY PO, Take  by mouth., Disp: , Rfl:     fluticasone (Flovent HFA) 110 MCG/ACT inhaler, Inhale 1 puff 2 (Two) Times a Day., Disp: 1 inhaler, Rfl: 1    Multiple Vitamin (MULTIVITAMIN) capsule, Take 1 capsule by mouth Daily., Disp: , Rfl:     potassium chloride 10 MEQ CR tablet, TAKE 1 TABLET BY MOUTH THREE TIMES A DAY, Disp: 270 tablet, Rfl: 0    Pyridoxine HCl (Vitamin B6) 100 MG tablet, , Disp: , Rfl:     Red Yeast Rice Extract 600 MG capsule, Take 2 tablets by mouth 2 (Two) Times a Day., Disp: , Rfl:     valsartan-hydrochlorothiazide (DIOVAN-HCT) 320-25 MG per tablet, TAKE 1 TABLET BY MOUTH EVERY DAY, Disp: 90 tablet, Rfl: 1    VITAMIN B COMPLEX-C PO, Take  by mouth., Disp: , Rfl:     vitamin D (ERGOCALCIFEROL) 1.25 MG (39078 UT) capsule capsule, Take 1 capsule by mouth 1 (One) Time Per Week., Disp: , Rfl:     vitamin B-12 (CYANOCOBALAMIN) 500 MCG tablet, , Disp: , Rfl:     Allergies   Allergen Reactions    Adhesive Tape Rash    Wellbutrin [Bupropion] Shortness Of Breath     Family History   Problem Relation Age of Onset    Arthritis Mother     Diabetes Mother     Heart disease Mother     Emphysema Mother     Kidney disease Father     Arthritis Sister     Anxiety disorder Sister     Arthritis Brother     Anxiety disorder Brother     Acute myelogenous leukemia Niece     Myasthenia gravis Niece      Cancer-related family history is not on file.    Social History     Tobacco Use    Smoking status: Never    Smokeless tobacco: Never   Vaping Use    Vaping status: Never Used   Substance Use Topics    Alcohol use: Not Currently     Comment: 1 DRINK WEEKLY, IF THAT: RARE/OCC;  CAFFEINE USE + 1 COFFEE DAILY    Drug use: No     HPI, ROS and PFSH have been reviewed and confirmed on 6/17/2024.     SUBJECTIVE:  11/3/2023: Generally feeling well.  No new symptoms.  Fatigued and sleepy but she has been having to care for her spouse.  She is eating well and has had no nausea or vomiting.  She has had no chest pains and  has also been without cough.  No abdominal pain or diarrhea and no dysuria.  No peripheral edema and no skin rash.    REVIEW OF SYSTEMS:  Review of Systems   Constitutional:  Negative for activity change, appetite change, chills, diaphoresis, fatigue, fever and unexpected weight change.   HENT:  Negative for congestion, dental problem, drooling, ear discharge, ear pain, facial swelling, hearing loss, mouth sores, nosebleeds, postnasal drip, rhinorrhea, sinus pressure, sinus pain, sneezing, sore throat, tinnitus, trouble swallowing and voice change.    Eyes:  Negative for photophobia, pain, discharge, redness, itching and visual disturbance.   Respiratory:  Negative for apnea, cough, choking, chest tightness, shortness of breath, wheezing and stridor.    Cardiovascular:  Negative for chest pain, palpitations and leg swelling.   Gastrointestinal:  Negative for abdominal distention, abdominal pain, anal bleeding, blood in stool, constipation, diarrhea, nausea, rectal pain and vomiting.   Endocrine: Negative for cold intolerance, heat intolerance, polydipsia and polyuria.   Genitourinary:  Negative for decreased urine volume, difficulty urinating, dysuria, flank pain, frequency, genital sores, hematuria and urgency.   Musculoskeletal:  Negative for arthralgias, back pain, gait problem, joint swelling, myalgias, neck pain and neck stiffness.   Skin:  Negative for color change, pallor and rash.   Neurological:  Negative for dizziness, tremors, seizures, syncope, facial asymmetry, speech difficulty, weakness, light-headedness, numbness and headaches.   Hematological:  Negative for adenopathy. Does not bruise/bleed easily.   Psychiatric/Behavioral:  Negative for agitation, behavioral problems, confusion, decreased concentration, hallucinations, self-injury, sleep disturbance and suicidal ideas. The patient is not nervous/anxious.      OBJECTIVE:    Vitals:    06/17/24 1401   BP: 159/81   Pulse: 68   Temp: 97.9 °F (36.6 °C)  "  TempSrc: Oral   SpO2: 99%   Weight: 102 kg (223 lb 12.8 oz)   Height: 154.9 cm (60.98\")   PainSc: 0-No pain     Body mass index is 42.31 kg/m².    ECOG  (0) Fully active, able to carry on all predisease performance without restriction    Physical Exam  Constitutional:       General: She is not in acute distress.     Appearance: She is not ill-appearing, toxic-appearing or diaphoretic.      Comments: Conversant, oriented and in good spirits.  Body mass index greater than 43 kg/m².   HENT:      Head: Normocephalic and atraumatic.      Right Ear: External ear normal.      Left Ear: External ear normal.      Nose: Nose normal. No congestion or rhinorrhea.      Mouth/Throat:      Mouth: Mucous membranes are moist.      Pharynx: Oropharynx is clear. No oropharyngeal exudate or posterior oropharyngeal erythema.   Eyes:      General: No scleral icterus.        Right eye: No discharge.         Left eye: No discharge.      Conjunctiva/sclera: Conjunctivae normal.      Pupils: Pupils are equal, round, and reactive to light.   Cardiovascular:      Rate and Rhythm: Normal rate and regular rhythm.      Pulses: Normal pulses.      Heart sounds: No murmur heard.     No friction rub. No gallop.   Pulmonary:      Effort: Pulmonary effort is normal. No respiratory distress.      Breath sounds: Normal breath sounds. No stridor. No wheezing, rhonchi or rales.   Abdominal:      General: Abdomen is flat. Bowel sounds are normal. There is no distension.      Palpations: Abdomen is soft. There is no mass.      Tenderness: There is no abdominal tenderness. There is no right CVA tenderness, left CVA tenderness, guarding or rebound.      Hernia: No hernia is present.   Musculoskeletal:         General: No swelling, tenderness, deformity or signs of injury.      Cervical back: No rigidity or tenderness.      Right lower leg: No edema.      Left lower leg: No edema.   Lymphadenopathy:      Cervical: No cervical adenopathy.   Skin:     " Coloration: Skin is not jaundiced or pale.      Findings: No bruising, lesion or rash.   Neurological:      General: No focal deficit present.      Mental Status: She is alert and oriented to person, place, and time.      Cranial Nerves: No cranial nerve deficit.      Motor: No weakness.      Gait: Gait normal.   Psychiatric:         Mood and Affect: Mood normal.         Behavior: Behavior normal.         Thought Content: Thought content normal.         Judgment: Judgment normal.     Chalino Russo MD performed a physical exam on 6/17/2024 as documented above.    RECENT LABS  WBC   Date Value Ref Range Status   06/17/2024 5.00 3.40 - 10.80 10*3/mm3 Final     RBC   Date Value Ref Range Status   06/17/2024 4.32 3.77 - 5.28 10*6/mm3 Final     Hemoglobin   Date Value Ref Range Status   06/17/2024 12.1 12.0 - 15.9 g/dL Final     Hematocrit   Date Value Ref Range Status   06/17/2024 37.7 34.0 - 46.6 % Final     MCV   Date Value Ref Range Status   06/17/2024 87.3 79.0 - 97.0 fL Final     MCH   Date Value Ref Range Status   06/17/2024 28.0 26.6 - 33.0 pg Final     MCHC   Date Value Ref Range Status   06/17/2024 32.1 31.5 - 35.7 g/dL Final     RDW   Date Value Ref Range Status   06/17/2024 14.0 12.3 - 15.4 % Final     RDW-SD   Date Value Ref Range Status   06/17/2024 44.0 37.0 - 54.0 fl Final     MPV   Date Value Ref Range Status   06/17/2024 10.4 6.0 - 12.0 fL Final     Platelets   Date Value Ref Range Status   06/17/2024 252 140 - 450 10*3/mm3 Final     Neutrophil %   Date Value Ref Range Status   06/17/2024 68.8 42.7 - 76.0 % Final     Lymphocyte %   Date Value Ref Range Status   06/17/2024 19.0 (L) 19.6 - 45.3 % Final     Monocyte %   Date Value Ref Range Status   06/17/2024 9.2 5.0 - 12.0 % Final     Eosinophil %   Date Value Ref Range Status   06/17/2024 2.4 0.3 - 6.2 % Final     Basophil %   Date Value Ref Range Status   06/17/2024 0.6 0.0 - 1.5 % Final     Immature Grans %   Date Value Ref Range Status   04/17/2024  0.2 0.0 - 0.5 % Final     Neutrophils, Absolute   Date Value Ref Range Status   06/17/2024 3.44 1.70 - 7.00 10*3/mm3 Final     Lymphocytes, Absolute   Date Value Ref Range Status   06/17/2024 0.95 0.70 - 3.10 10*3/mm3 Final     Monocytes, Absolute   Date Value Ref Range Status   06/17/2024 0.46 0.10 - 0.90 10*3/mm3 Final     Eosinophils, Absolute   Date Value Ref Range Status   06/17/2024 0.12 0.00 - 0.40 10*3/mm3 Final     Basophils, Absolute   Date Value Ref Range Status   06/17/2024 0.03 0.00 - 0.20 10*3/mm3 Final     Immature Grans, Absolute   Date Value Ref Range Status   04/17/2024 0.01 0.00 - 0.05 10*3/mm3 Final     nRBC   Date Value Ref Range Status   04/17/2024 0.0 0.0 - 0.2 /100 WBC Final       Lab Results   Component Value Date    GLUCOSE 99 04/17/2024    BUN 16 04/17/2024    CREATININE 0.83 04/17/2024    EGFRIFNONA 100 02/12/2022    EGFRIFAFRI 70 03/21/2017    BCR 19.3 04/17/2024    K 3.9 04/17/2024    CO2 29.0 04/17/2024    CALCIUM 10.0 04/17/2024    ALBUMIN 4.4 04/17/2024    LABIL2 1.6 05/23/2019    AST 18 10/24/2023    ALT 16 10/24/2023       ASSESSMENT:  1.  Unprovoked pulmonary embolism: Without any new symptoms.  2.  Reviewed the notes from primary care and orthopedic surgery.  Reviewed all laboratory exams.  Discussed with her.  4.  Continue treatment.  See me in approximately a year.       PLAN  1.  As above.    Chalino Romero MD on 6/17/2024 at 1438.

## 2024-06-17 ENCOUNTER — OFFICE VISIT (OUTPATIENT)
Dept: ONCOLOGY | Facility: CLINIC | Age: 74
End: 2024-06-17
Payer: MEDICARE

## 2024-06-17 ENCOUNTER — LAB (OUTPATIENT)
Dept: LAB | Facility: HOSPITAL | Age: 74
End: 2024-06-17
Payer: MEDICARE

## 2024-06-17 VITALS
HEIGHT: 61 IN | DIASTOLIC BLOOD PRESSURE: 81 MMHG | OXYGEN SATURATION: 99 % | BODY MASS INDEX: 42.25 KG/M2 | TEMPERATURE: 97.9 F | SYSTOLIC BLOOD PRESSURE: 159 MMHG | HEART RATE: 68 BPM | WEIGHT: 223.8 LBS

## 2024-06-17 DIAGNOSIS — I82.431 ACUTE DEEP VEIN THROMBOSIS (DVT) OF POPLITEAL VEIN OF RIGHT LOWER EXTREMITY: Primary | ICD-10-CM

## 2024-06-17 DIAGNOSIS — I26.99 OTHER ACUTE PULMONARY EMBOLISM, UNSPECIFIED WHETHER ACUTE COR PULMONALE PRESENT: Primary | ICD-10-CM

## 2024-06-17 DIAGNOSIS — I82.431 ACUTE DEEP VEIN THROMBOSIS (DVT) OF POPLITEAL VEIN OF RIGHT LOWER EXTREMITY: ICD-10-CM

## 2024-06-17 LAB
ALBUMIN SERPL-MCNC: 4.4 G/DL (ref 3.5–5.2)
ALBUMIN/GLOB SERPL: 1.8 G/DL
ALP SERPL-CCNC: 100 U/L (ref 39–117)
ALT SERPL W P-5'-P-CCNC: 23 U/L (ref 1–33)
ANION GAP SERPL CALCULATED.3IONS-SCNC: 12.3 MMOL/L (ref 5–15)
AST SERPL-CCNC: 22 U/L (ref 1–32)
BASOPHILS # BLD AUTO: 0.03 10*3/MM3 (ref 0–0.2)
BASOPHILS NFR BLD AUTO: 0.6 % (ref 0–1.5)
BILIRUB SERPL-MCNC: 0.2 MG/DL (ref 0–1.2)
BUN SERPL-MCNC: 18 MG/DL (ref 8–23)
BUN/CREAT SERPL: 24.3 (ref 7–25)
CALCIUM SPEC-SCNC: 10.1 MG/DL (ref 8.6–10.5)
CHLORIDE SERPL-SCNC: 101 MMOL/L (ref 98–107)
CO2 SERPL-SCNC: 26.7 MMOL/L (ref 22–29)
CREAT SERPL-MCNC: 0.74 MG/DL (ref 0.57–1)
DEPRECATED RDW RBC AUTO: 44 FL (ref 37–54)
EGFRCR SERPLBLD CKD-EPI 2021: 85.6 ML/MIN/1.73
EOSINOPHIL # BLD AUTO: 0.12 10*3/MM3 (ref 0–0.4)
EOSINOPHIL NFR BLD AUTO: 2.4 % (ref 0.3–6.2)
ERYTHROCYTE [DISTWIDTH] IN BLOOD BY AUTOMATED COUNT: 14 % (ref 12.3–15.4)
GLOBULIN UR ELPH-MCNC: 2.5 GM/DL
GLUCOSE SERPL-MCNC: 103 MG/DL (ref 65–99)
HCT VFR BLD AUTO: 37.7 % (ref 34–46.6)
HGB BLD-MCNC: 12.1 G/DL (ref 12–15.9)
HOLD SPECIMEN: NORMAL
LYMPHOCYTES # BLD AUTO: 0.95 10*3/MM3 (ref 0.7–3.1)
LYMPHOCYTES NFR BLD AUTO: 19 % (ref 19.6–45.3)
MCH RBC QN AUTO: 28 PG (ref 26.6–33)
MCHC RBC AUTO-ENTMCNC: 32.1 G/DL (ref 31.5–35.7)
MCV RBC AUTO: 87.3 FL (ref 79–97)
MONOCYTES # BLD AUTO: 0.46 10*3/MM3 (ref 0.1–0.9)
MONOCYTES NFR BLD AUTO: 9.2 % (ref 5–12)
NEUTROPHILS NFR BLD AUTO: 3.44 10*3/MM3 (ref 1.7–7)
NEUTROPHILS NFR BLD AUTO: 68.8 % (ref 42.7–76)
PLATELET # BLD AUTO: 252 10*3/MM3 (ref 140–450)
PMV BLD AUTO: 10.4 FL (ref 6–12)
POTASSIUM SERPL-SCNC: 3.7 MMOL/L (ref 3.5–5.2)
PROT SERPL-MCNC: 6.9 G/DL (ref 6–8.5)
RBC # BLD AUTO: 4.32 10*6/MM3 (ref 3.77–5.28)
SODIUM SERPL-SCNC: 140 MMOL/L (ref 136–145)
WBC NRBC COR # BLD AUTO: 5 10*3/MM3 (ref 3.4–10.8)
WHOLE BLOOD HOLD COAG: NORMAL

## 2024-06-17 PROCEDURE — 1126F AMNT PAIN NOTED NONE PRSNT: CPT | Performed by: INTERNAL MEDICINE

## 2024-06-17 PROCEDURE — 1159F MED LIST DOCD IN RCRD: CPT | Performed by: INTERNAL MEDICINE

## 2024-06-17 PROCEDURE — 1160F RVW MEDS BY RX/DR IN RCRD: CPT | Performed by: INTERNAL MEDICINE

## 2024-06-17 PROCEDURE — 36415 COLL VENOUS BLD VENIPUNCTURE: CPT

## 2024-06-17 PROCEDURE — 99213 OFFICE O/P EST LOW 20 MIN: CPT | Performed by: INTERNAL MEDICINE

## 2024-06-17 PROCEDURE — 85025 COMPLETE CBC W/AUTO DIFF WBC: CPT

## 2024-06-17 PROCEDURE — 80053 COMPREHEN METABOLIC PANEL: CPT | Performed by: INTERNAL MEDICINE

## 2024-06-17 PROCEDURE — 3077F SYST BP >= 140 MM HG: CPT | Performed by: INTERNAL MEDICINE

## 2024-06-17 PROCEDURE — 3079F DIAST BP 80-89 MM HG: CPT | Performed by: INTERNAL MEDICINE

## 2024-06-25 NOTE — PROGRESS NOTES
Please let pt know that labs were ok.  Up as tolerated with CAM boot on LLE  Use walker as instructed by Physical Therapy

## 2024-08-06 RX ORDER — POTASSIUM CHLORIDE 750 MG/1
TABLET, FILM COATED, EXTENDED RELEASE ORAL
Qty: 270 TABLET | Refills: 0 | Status: SHIPPED | OUTPATIENT
Start: 2024-08-06

## 2024-08-25 PROCEDURE — 87086 URINE CULTURE/COLONY COUNT: CPT | Performed by: NURSE PRACTITIONER

## 2024-08-25 PROCEDURE — 87186 SC STD MICRODIL/AGAR DIL: CPT | Performed by: NURSE PRACTITIONER

## 2024-08-27 ENCOUNTER — OFFICE VISIT (OUTPATIENT)
Dept: FAMILY MEDICINE CLINIC | Facility: CLINIC | Age: 74
End: 2024-08-27
Payer: MEDICARE

## 2024-08-27 VITALS
WEIGHT: 227.8 LBS | DIASTOLIC BLOOD PRESSURE: 72 MMHG | SYSTOLIC BLOOD PRESSURE: 148 MMHG | HEIGHT: 61 IN | BODY MASS INDEX: 43.01 KG/M2 | RESPIRATION RATE: 18 BRPM | HEART RATE: 80 BPM | OXYGEN SATURATION: 98 %

## 2024-08-27 DIAGNOSIS — F41.8 MIXED ANXIETY DEPRESSIVE DISORDER: Primary | Chronic | ICD-10-CM

## 2024-08-27 DIAGNOSIS — N30.00 ACUTE CYSTITIS WITHOUT HEMATURIA: ICD-10-CM

## 2024-08-27 DIAGNOSIS — R06.02 SHORTNESS OF BREATH: ICD-10-CM

## 2024-08-27 PROCEDURE — G2211 COMPLEX E/M VISIT ADD ON: HCPCS | Performed by: NURSE PRACTITIONER

## 2024-08-27 PROCEDURE — 1159F MED LIST DOCD IN RCRD: CPT | Performed by: NURSE PRACTITIONER

## 2024-08-27 PROCEDURE — 1160F RVW MEDS BY RX/DR IN RCRD: CPT | Performed by: NURSE PRACTITIONER

## 2024-08-27 PROCEDURE — 1126F AMNT PAIN NOTED NONE PRSNT: CPT | Performed by: NURSE PRACTITIONER

## 2024-08-27 PROCEDURE — 3078F DIAST BP <80 MM HG: CPT | Performed by: NURSE PRACTITIONER

## 2024-08-27 PROCEDURE — 3077F SYST BP >= 140 MM HG: CPT | Performed by: NURSE PRACTITIONER

## 2024-08-27 PROCEDURE — 99214 OFFICE O/P EST MOD 30 MIN: CPT | Performed by: NURSE PRACTITIONER

## 2024-08-27 RX ORDER — ESCITALOPRAM OXALATE 10 MG/1
10 TABLET ORAL DAILY
Qty: 30 TABLET | Refills: 3 | Status: SHIPPED | OUTPATIENT
Start: 2024-08-27

## 2024-08-27 NOTE — PROGRESS NOTES
"Chief Complaint  Shortness of Breath (Started having shortness of breath after taking Nitrofuratoin/Patient has a paper full os issues )  Subjective        Radha Trujillo presents to [facility]  History of Present Illness  Pt comes in today with c/o SOA  Worried it was a side effect from recent anbx prescribed for UTI.  Was prescribed macrobid on Sunday.   On Monday developed SOA.   Stopped anbx after 3 rd dose.  Slightly better.  Worse with exertion.   States it could be stress related.  Describes \"as tense in chest and throat\"   Has severe anxiety.   Uses CPAP at night and feels better when that is on.  Has had hx of PE and worried about that.  She is on eliquis.   She denies any CP.   Shortness of Breath      Review of Systems   Respiratory:  Positive for shortness of breath.      Objective     Vital Signs:   /72   Pulse 80   Resp 18   Ht 154.9 cm (60.98\")   Wt 103 kg (227 lb 12.8 oz)   SpO2 98%   BMI 43.07 kg/m²       BP Readings from Last 3 Encounters:   08/27/24 148/72   08/25/24 163/87   06/17/24 159/81       Wt Readings from Last 3 Encounters:   08/27/24 103 kg (227 lb 12.8 oz)   08/25/24 101 kg (223 lb)   06/17/24 102 kg (223 lb 12.8 oz)     Physical Exam  Constitutional:       Appearance: She is well-developed.   Eyes:      Pupils: Pupils are equal, round, and reactive to light.   Cardiovascular:      Rate and Rhythm: Normal rate and regular rhythm.   Pulmonary:      Effort: Pulmonary effort is normal.      Breath sounds: Normal breath sounds.   Neurological:      Mental Status: She is alert and oriented to person, place, and time.        Result Review :                 Assessment and Plan    Diagnoses and all orders for this visit:    1. Mixed anxiety depressive disorder (Primary)  Comments:  restart lexapro  Orders:  -     escitalopram (Lexapro) 10 MG tablet; Take 1 tablet by mouth Daily.  Dispense: 30 tablet; Refill: 3    2. Acute cystitis without " hematuria  Comments:  will stop macrobid, and await culture results before starting new anbx.    3. Shortness of breath  Comments:  improving. most likely 2/2 anxiety.    Start lexapro  Await urine culture results  During this office visit, we discussed the pertinent aspects of the visit and treatment recommendations. Pt verbalizes understanding. Follow up was discussed. Patient was given the opportunity to ask questions and discuss other concerns.         Follow Up   Return in about 2 months (around 10/27/2024) for Medicare Wellness.  Patient was given instructions and counseling regarding her condition or for health maintenance advice. Please see specific information pulled into the AVS if appropriate.

## 2024-08-28 ENCOUNTER — TELEPHONE (OUTPATIENT)
Dept: FAMILY MEDICINE CLINIC | Facility: CLINIC | Age: 74
End: 2024-08-28
Payer: MEDICARE

## 2024-08-28 RX ORDER — LEVOFLOXACIN 500 MG/1
500 TABLET, FILM COATED ORAL DAILY
Qty: 7 TABLET | Refills: 0 | Status: SHIPPED | OUTPATIENT
Start: 2024-08-28

## 2024-08-28 NOTE — TELEPHONE ENCOUNTER
Please let pt know that I received her urine culture report and I have sent in new rx for different anbx.

## 2024-08-31 ENCOUNTER — PATIENT MESSAGE (OUTPATIENT)
Dept: FAMILY MEDICINE CLINIC | Facility: CLINIC | Age: 74
End: 2024-08-31
Payer: MEDICARE

## 2024-09-03 RX ORDER — ALPRAZOLAM 0.25 MG
0.25 TABLET ORAL 2 TIMES DAILY PRN
Qty: 30 TABLET | Refills: 0 | Status: SHIPPED | OUTPATIENT
Start: 2024-09-03

## 2024-09-03 NOTE — TELEPHONE ENCOUNTER
From: Radha Trujillo  To: Maribel Hale  Sent: 8/31/2024 7:39 PM EDT  Subject: Hiatel hernia    Maribel, I was reading that a hiatel hernia can cause shortness of breath. I believe one of my past CT scans showed a small one. Could it have grown to where this could be the issue?

## 2024-10-16 NOTE — PROGRESS NOTES
Cardiology Office Follow Up Visit      Primary Care Provider:  Maribel Hale APRN    Reason for f/u:     One year follow-up      Subjective       History of Present Illness       Radha Trujillo is a 74 y.o. female seen in clinic today for continued cardiac care of PSVT and HTN.    She has a past cardiac history of AVNRT s/p ablation in 2016.  Last 2D echo 2/2022 showed an EF = 66-70% with mild RV dysfunction and moderateTR.  Last nuclear stress test 2/2024 showed no ischemia.  PMH includes HTN, HLD, anxiety, and PE/DVT s/p EKOS in 2/2022.  She is anticoagulated with Eliquis.    Labs reviewed from 6/2024 as follows: Na 140, K 3.7, Cr 0.74, Hgb 12.1, .    Patient reports that she is doing well.  She had knee surgery in March and May of this year, and return to water aerobics 6 weeks ago.  She is only had brief self-limited palpitations during times of stress.  She denies any dizziness or pre or estelle syncope.  She admits postop swelling of her lower extremities since her knee surgery.  Her BP is elevated today.  I have rechecked this manually at 148/78.  She states it typically runs SBP = 140 at the highest at home.  She is compliant with sodium restriction.  She has been utilizing the weight watchers program for weight loss.       ASSESSMENT/PLAN:      Diagnoses and all orders for this visit:    1. PSVT (paroxysmal supraventricular tachycardia) (Primary)  -     ECG 12 Lead    2. Essential hypertension  -     ECG 12 Lead    3. Morbid obesity with BMI of 40.0-44.9, adult            MEDICAL DECISION MAKING:    Encouraged continued efforts at weight loss.  BP improved on manual recheck.  Patient is going to monitor twice daily BP diary at home and alert me for consistently elevated values.  Recent labs reviewed as above.      RTC in one year or sooner as needed.    Past Medical History:   Diagnosis Date    Abnormal coagulation profile     Abnormal EKG     Abnormal laboratory test     Acquired  spondylolisthesis     Allergic 07/16/2021    Start shots in 7/27/21    Arthritis     Asthma     Body aches     Bronchitis, acute     Chest pain     PRESSURE    Chronic back pain     Colon polyp     Had colonoscopy to remove    Cough     Deep vein thrombosis 02/2022    Taking Eliquis    Depression with anxiety     DJD (degenerative joint disease)     Essential hypertension     Fatigue     History of radiofrequency ablation (RFA) procedure for cardiac arrhythmia 01/05/2021    Hyperlipidemia     Hypokalemia     Low back pain     Lymphocytic colitis     Obesity     Other specified disorders of bone density and structure, other site     Panic disorder     Paroxysmal SVT (supraventricular tachycardia)     Postmenopausal     9 YEARS    Prediabetes     Pulmonary embolism Feb. 2022    Treated    Sleep apnea     SOB (shortness of breath)     Unspecified injury of left Achilles tendon, initial encounter     Vitamin B deficiency 01/14/2019    Vitamin B deficiency, unspecified     Vitamin D deficiency, unspecified        Past Surgical History:   Procedure Laterality Date    ACHILLES TENDON SURGERY Left 2018    AV NODE ABLATION  08/2016    AVNRT RF ABLATION --8/16    CARDIAC CATHETERIZATION Bilateral 02/11/2022    Procedure: EKOS;  Surgeon: Kilo Vivar DO;  Location: [facility] INVASIVE LOCATION;  Service: Cardiovascular;  Laterality: Bilateral;    COLONOSCOPY N/A 01/01/2012    FOOT SURGERY Right     TOE/FOOT SURGERY    KNEE SURGERY Right     2/2 TORN MENICUS    TUBAL ABDOMINAL LIGATION           Current Outpatient Medications:     ALPRAZolam (Xanax) 0.25 MG tablet, Take 1 tablet by mouth 2 (Two) Times a Day As Needed for Anxiety., Disp: 30 tablet, Rfl: 0    apixaban (Eliquis) 5 MG tablet tablet, Take 1 tablet by mouth 2 (Two) Times a Day., Disp: 60 tablet, Rfl: 11    ELDERBERRY PO, Take  by mouth., Disp: , Rfl:     escitalopram (Lexapro) 10 MG tablet, Take 1 tablet by mouth Daily., Disp: 30 tablet, Rfl: 3     "Multiple Vitamin (MULTIVITAMIN) capsule, Take 1 capsule by mouth Daily., Disp: , Rfl:     potassium chloride 10 MEQ CR tablet, TAKE 1 TABLET BY MOUTH THREE TIMES A DAY, Disp: 270 tablet, Rfl: 0    Pyridoxine HCl (Vitamin B6) 100 MG tablet, , Disp: , Rfl:     Red Yeast Rice Extract 600 MG capsule, Take 2 tablets by mouth 2 (Two) Times a Day., Disp: , Rfl:     valsartan-hydrochlorothiazide (DIOVAN-HCT) 320-25 MG per tablet, TAKE 1 TABLET BY MOUTH EVERY DAY, Disp: 90 tablet, Rfl: 1    VITAMIN B COMPLEX-C PO, Take  by mouth., Disp: , Rfl:     vitamin D (ERGOCALCIFEROL) 1.25 MG (13565 UT) capsule capsule, Take 1 capsule by mouth 1 (One) Time Per Week., Disp: , Rfl:     Social History     Socioeconomic History    Marital status:      Spouse name: Alonso Trujillo    Number of children: 3   Tobacco Use    Smoking status: Never     Passive exposure: Never    Smokeless tobacco: Never   Vaping Use    Vaping status: Never Used   Substance and Sexual Activity    Alcohol use: Not Currently     Comment: 1 DRINK WEEKLY, IF THAT: RARE/OCC;  CAFFEINE USE + 1 COFFEE DAILY    Drug use: No    Sexual activity: Not Currently     Partners: Male     Birth control/protection: None, Tubal ligation     Comment: monogamous w/ ;  s/p BTL - no high-risk sexual behaviors       Family History   Problem Relation Age of Onset    Arthritis Mother     Diabetes Mother     Heart disease Mother     Emphysema Mother     Kidney disease Father     Arthritis Sister     Anxiety disorder Sister     Arthritis Brother     Anxiety disorder Brother     Acute myelogenous leukemia Niece     Myasthenia gravis Niece        The following portions of the patient's history were reviewed and updated as appropriate: allergies, current medications, past family history, past medical history, past social history, past surgical history and problem list.    ROS  /83   Pulse 63   Ht 152.4 cm (60\")   Wt 100 kg (221 lb)   SpO2 98%   BMI 43.16 kg/m² .  Objective "     Physical Exam    Physical Exam:  Neuro:  CV:  Resp:  GI:  Ext:  Pysch: AAOx3, no gross deficits  S1S2 RRR, no murmur  Non-labored, CTA  BS+, abd soft  Pedal pulses palp, BLE edema  Calm and cooperative       Procedures    EKG ordered by and reviewed by me in office  EKG shows sinus rhythm with nonspecific T wave abnormalities.  There is no significant change from prior study.

## 2024-10-18 ENCOUNTER — OFFICE VISIT (OUTPATIENT)
Dept: CARDIOLOGY | Facility: CLINIC | Age: 74
End: 2024-10-18
Payer: MEDICARE

## 2024-10-18 VITALS
WEIGHT: 221 LBS | SYSTOLIC BLOOD PRESSURE: 157 MMHG | HEART RATE: 63 BPM | BODY MASS INDEX: 43.39 KG/M2 | HEIGHT: 60 IN | DIASTOLIC BLOOD PRESSURE: 83 MMHG | OXYGEN SATURATION: 98 %

## 2024-10-18 DIAGNOSIS — I47.10 PSVT (PAROXYSMAL SUPRAVENTRICULAR TACHYCARDIA): Primary | ICD-10-CM

## 2024-10-18 DIAGNOSIS — I10 ESSENTIAL HYPERTENSION: Chronic | ICD-10-CM

## 2024-10-18 DIAGNOSIS — E66.01 MORBID OBESITY WITH BMI OF 40.0-44.9, ADULT: Chronic | ICD-10-CM

## 2024-10-18 PROCEDURE — 99213 OFFICE O/P EST LOW 20 MIN: CPT | Performed by: NURSE PRACTITIONER

## 2024-10-18 PROCEDURE — 93000 ELECTROCARDIOGRAM COMPLETE: CPT | Performed by: NURSE PRACTITIONER

## 2024-10-18 PROCEDURE — 3077F SYST BP >= 140 MM HG: CPT | Performed by: NURSE PRACTITIONER

## 2024-10-18 PROCEDURE — 3079F DIAST BP 80-89 MM HG: CPT | Performed by: NURSE PRACTITIONER

## 2024-10-31 ENCOUNTER — OFFICE VISIT (OUTPATIENT)
Dept: FAMILY MEDICINE CLINIC | Facility: CLINIC | Age: 74
End: 2024-10-31
Payer: MEDICARE

## 2024-10-31 VITALS
HEART RATE: 62 BPM | OXYGEN SATURATION: 98 % | RESPIRATION RATE: 18 BRPM | HEIGHT: 60 IN | SYSTOLIC BLOOD PRESSURE: 136 MMHG | DIASTOLIC BLOOD PRESSURE: 82 MMHG | WEIGHT: 225.6 LBS | BODY MASS INDEX: 44.29 KG/M2

## 2024-10-31 DIAGNOSIS — Z00.00 MEDICARE ANNUAL WELLNESS VISIT, SUBSEQUENT: Primary | ICD-10-CM

## 2024-10-31 DIAGNOSIS — Z12.31 ENCOUNTER FOR SCREENING MAMMOGRAM FOR MALIGNANT NEOPLASM OF BREAST: ICD-10-CM

## 2024-10-31 DIAGNOSIS — F41.8 MIXED ANXIETY DEPRESSIVE DISORDER: Chronic | ICD-10-CM

## 2024-10-31 RX ORDER — ESCITALOPRAM OXALATE 20 MG/1
20 TABLET ORAL DAILY
Qty: 90 TABLET | Refills: 2 | Status: SHIPPED | OUTPATIENT
Start: 2024-10-31

## 2024-10-31 NOTE — PROGRESS NOTES
Subjective   The ABCs of the Annual Wellness Visit  Medicare Wellness Visit      Radha Trujillo is a 74 y.o. patient who presents for a Medicare Wellness Visit.    The following portions of the patient's history were reviewed and   updated as appropriate: allergies, current medications, past family history, past medical history, past social history, past surgical history, and problem list.    Compared to one year ago, the patient's physical   health is better.  Compared to one year ago, the patient's mental   health is better.    Recent Hospitalizations:  She was not admitted to the hospital during the last year.     Current Medical Providers:  Patient Care Team:  Maribel Hale APRN as PCP - General (Nurse Practitioner)  Kilo Vivar DO as Consulting Physician (Cardiology)  Chalino Russo MD as Consulting Physician (Hematology and Oncology)  Gala Solares PA as Physician Assistant (Physician Assistant)    Outpatient Medications Prior to Visit   Medication Sig Dispense Refill    ALPRAZolam (Xanax) 0.25 MG tablet Take 1 tablet by mouth 2 (Two) Times a Day As Needed for Anxiety. 30 tablet 0    apixaban (Eliquis) 5 MG tablet tablet Take 1 tablet by mouth 2 (Two) Times a Day. 60 tablet 11    ELDERBERRY PO Take  by mouth.      Multiple Vitamin (MULTIVITAMIN) capsule Take 1 capsule by mouth Daily.      potassium chloride 10 MEQ CR tablet TAKE 1 TABLET BY MOUTH THREE TIMES A  tablet 0    Pyridoxine HCl (Vitamin B6) 100 MG tablet       Red Yeast Rice Extract 600 MG capsule Take 2 tablets by mouth 2 (Two) Times a Day.      valsartan-hydrochlorothiazide (DIOVAN-HCT) 320-25 MG per tablet TAKE 1 TABLET BY MOUTH EVERY DAY 90 tablet 1    VITAMIN B COMPLEX-C PO Take  by mouth.      vitamin D (ERGOCALCIFEROL) 1.25 MG (63729 UT) capsule capsule Take 1 capsule by mouth 1 (One) Time Per Week.      escitalopram (Lexapro) 10 MG tablet Take 1 tablet by mouth Daily. 30 tablet 3     No  "facility-administered medications prior to visit.     No opioid medication identified on active medication list. I have reviewed chart for other potential  high risk medication/s and harmful drug interactions in the elderly.      Aspirin is not on active medication list.  Aspirin use is not indicated based on review of current medical condition/s. Risk of harm outweighs potential benefits.  .    Patient Active Problem List   Diagnosis    Palpitations    Morbid obesity with BMI of 40.0-44.9, adult    Essential hypertension    Hyperlipidemia    Mixed anxiety depressive disorder    Acute pulmonary embolism    Pulmonary embolism on long-term anticoagulation therapy    PSVT (paroxysmal supraventricular tachycardia)     Advance Care Planning Advance Directive is on file.  ACP discussion was held with the patient during this visit. Patient has an advance directive in EMR which is still valid.             Objective   Vitals:    10/31/24 1412   BP: 136/82   Pulse: 62   Resp: 18   SpO2: 98%   Weight: 102 kg (225 lb 9.6 oz)   Height: 152.4 cm (60\")   PainSc:   2   PainLoc: Hand       Estimated body mass index is 44.06 kg/m² as calculated from the following:    Height as of this encounter: 152.4 cm (60\").    Weight as of this encounter: 102 kg (225 lb 9.6 oz).    Class 3 Severe Obesity (BMI >=40). Obesity-related health conditions include the following: hypertension and dyslipidemias. Obesity is unchanged. BMI is is above average; BMI management plan is completed. We discussed portion control and increasing exercise.       Does the patient have evidence of cognitive impairment? No                                                                                                Health  Risk Assessment    Smoking Status:  Social History     Tobacco Use   Smoking Status Never    Passive exposure: Never   Smokeless Tobacco Never     Alcohol Consumption:  Social History     Substance and Sexual Activity   Alcohol Use Not Currently    " Comment: 1 DRINK WEEKLY, IF THAT: RARE/OCC;  CAFFEINE USE + 1 COFFEE DAILY       Fall Risk Screen  MUNIRA Fall Risk Assessment was completed, and patient is at LOW risk for falls.Assessment completed on:10/31/2024    Depression Screening:      10/31/2024     2:17 PM   PHQ-2/PHQ-9 Depression Screening   Little interest or pleasure in doing things Not at all   Feeling down, depressed, or hopeless Not at all     Health Habits and Functional and Cognitive Screening:      10/24/2024     7:48 AM   Functional & Cognitive Status   Do you have difficulty preparing food and eating? No    Do you have difficulty bathing yourself, getting dressed or grooming yourself? No    Do you have difficulty using the toilet? No    Do you have difficulty moving around from place to place? No    Do you have trouble with steps or getting out of a bed or a chair? No    Current Diet Well Balanced Diet    Dental Exam Up to date    Eye Exam Up to date    Exercise (times per week) 3 times per week    Current Exercises Include Aerobics    Do you need help using the phone?  No    Are you deaf or do you have serious difficulty hearing?  No    Do you need help to go to places out of walking distance? No    Do you need help shopping? No    Do you need help preparing meals?  No    Do you need help with housework?  No    Do you need help with laundry? No    Do you need help taking your medications? No    Do you need help managing money? No    Do you ever drive or ride in a car without wearing a seat belt? No    Have you felt unusual stress, anger or loneliness in the last month? No    Who do you live with? Spouse    If you need help, do you have trouble finding someone available to you? No    Have you been bothered in the last four weeks by sexual problems? No    Do you have difficulty concentrating, remembering or making decisions? No        Patient-reported           Age-appropriate Screening Schedule:  Refer to the list below for future screening  "recommendations based on patient's age, sex and/or medical conditions. Orders for these recommended tests are listed in the plan section. The patient has been provided with a written plan.    Health Maintenance List  Health Maintenance   Topic Date Due    ANNUAL WELLNESS VISIT  10/24/2024    LIPID PANEL  10/24/2024    MAMMOGRAM  12/13/2024    TDAP/TD VACCINES (1 - Tdap) 10/31/2024 (Originally 8/28/1969)    ZOSTER VACCINE (1 of 2) 10/31/2024 (Originally 8/28/2000)    COVID-19 Vaccine (4 - 2023-24 season) 01/20/2025 (Originally 9/1/2024)    BMI FOLLOWUP  10/31/2025    DXA SCAN  12/13/2025    COLORECTAL CANCER SCREENING  03/13/2033    HEPATITIS C SCREENING  Completed    INFLUENZA VACCINE  Completed    Pneumococcal Vaccine 65+  Completed                                                                                                                                                CMS Preventative Services Quick Reference  Risk Factors Identified During Encounter  Fall Risk-High or Moderate: Discussed Fall Prevention in the home    The above risks/problems have been discussed with the patient.  Pertinent information has been shared with the patient in the After Visit Summary.  An After Visit Summary and PPPS were made available to the patient.    Follow Up:   Next Medicare Wellness visit to be scheduled in 1 year.         Additional E&M Note during same encounter follows:  Patient has additional, significant, and separately identifiable condition(s)/problem(s) that require work above and beyond the Medicare Wellness Visit     Chief Complaint  Medicare Wellness-Initial Visit    Subjective   HPI  JOSE is also being seen today for an annual adult preventative physical exam.                 Objective   Vital Signs:  /82   Pulse 62   Resp 18   Ht 152.4 cm (60\")   Wt 102 kg (225 lb 9.6 oz)   SpO2 98%   BMI 44.06 kg/m²   Physical Exam  Constitutional:       Appearance: She is well-developed.   HENT:      Head: " Normocephalic.   Eyes:      Conjunctiva/sclera: Conjunctivae normal.      Pupils: Pupils are equal, round, and reactive to light.   Neck:      Thyroid: No thyromegaly.   Cardiovascular:      Rate and Rhythm: Normal rate and regular rhythm.      Heart sounds: No murmur heard.  Pulmonary:      Effort: Pulmonary effort is normal.      Breath sounds: Normal breath sounds.   Abdominal:      General: Bowel sounds are normal.      Palpations: Abdomen is soft. There is no mass.      Tenderness: There is no abdominal tenderness.   Musculoskeletal:      Cervical back: Neck supple.   Skin:     General: Skin is warm and dry.      Findings: No lesion.   Neurological:      Mental Status: She is alert and oriented to person, place, and time.   Psychiatric:         Behavior: Behavior normal.                 Assessment and Plan               Mixed anxiety depressive disorder    Encounter for screening mammogram for malignant neoplasm of breast    Medicare annual wellness visit, subsequent      Orders Placed This Encounter   Procedures    Mammo Screening Digital Tomosynthesis Bilateral With CAD     Standing Status:   Future     Standing Expiration Date:   10/31/2025     Order Specific Question:   Reason for Exam:     Answer:   screen     Order Specific Question:   Release to patient     Answer:   Routine Release [[id number]]    Comprehensive Metabolic Panel     Standing Status:   Future     Standing Expiration Date:   10/31/2025     Order Specific Question:   Release to patient     Answer:   Routine Release [[id number]]    Hemoglobin A1c     Standing Status:   Future     Standing Expiration Date:   10/31/2025     Order Specific Question:   Release to patient     Answer:   Routine Release [[id number]]    Lipid Panel     Standing Status:   Future     Standing Expiration Date:   10/31/2025     Order Specific Question:   Release to patient     Answer:   Routine Release [[id number]]    TSH     Standing Status:   Future     Standing  Expiration Date:   10/31/2025     Order Specific Question:   Release to patient     Answer:   Routine Release [[id number]]    Urinalysis With Culture If Indicated - Urine, Clean Catch     Standing Status:   Future     Standing Expiration Date:   10/31/2025     Order Specific Question:   Release to patient     Answer:   Routine Release [[id number]]    CBC & Differential     Standing Status:   Future     Standing Expiration Date:   10/31/2025     Order Specific Question:   Manual Differential     Answer:   No     Order Specific Question:   Release to patient     Answer:   Routine Release [[id number]]     New Medications Ordered This Visit   Medications    escitalopram (Lexapro) 20 MG tablet     Sig: Take 1 tablet by mouth Daily.     Dispense:  90 tablet     Refill:  2   Check labs  Discussed importance of regular exercise and recommended starting or continuing a regular exercise program for good health. The patient was also encouraged to lose weight for better health.   During this visit for their annual exam, we reviewed their personal history, social history and family history. We went over their medications and all the recommended health maintenance items for their age group. They were given the opportunity to ask questions and discuss other concerns.          Follow Up   Return in about 6 months (around 4/30/2025).  Patient was given instructions and counseling regarding her condition or for health maintenance advice. Please see specific information pulled into the AVS if appropriate.

## 2024-11-02 DIAGNOSIS — I10 ESSENTIAL HYPERTENSION: ICD-10-CM

## 2024-11-04 RX ORDER — VALSARTAN AND HYDROCHLOROTHIAZIDE 320; 25 MG/1; MG/1
TABLET, FILM COATED ORAL
Qty: 90 TABLET | Refills: 1 | Status: SHIPPED | OUTPATIENT
Start: 2024-11-04

## 2024-11-04 RX ORDER — POTASSIUM CHLORIDE 750 MG/1
TABLET, EXTENDED RELEASE ORAL
Qty: 270 TABLET | Refills: 0 | Status: SHIPPED | OUTPATIENT
Start: 2024-11-04

## 2024-11-07 ENCOUNTER — LAB (OUTPATIENT)
Dept: FAMILY MEDICINE CLINIC | Facility: CLINIC | Age: 74
End: 2024-11-07
Payer: MEDICARE

## 2024-11-07 DIAGNOSIS — Z00.00 MEDICARE ANNUAL WELLNESS VISIT, SUBSEQUENT: ICD-10-CM

## 2024-11-07 LAB
ALBUMIN SERPL-MCNC: 3.5 G/DL (ref 3.5–5.2)
ALBUMIN/GLOB SERPL: 1.2 G/DL
ALP SERPL-CCNC: 78 U/L (ref 39–117)
ALT SERPL W P-5'-P-CCNC: 16 U/L (ref 1–33)
ANION GAP SERPL CALCULATED.3IONS-SCNC: 8 MMOL/L (ref 5–15)
AST SERPL-CCNC: 21 U/L (ref 1–32)
BACTERIA UR QL AUTO: ABNORMAL /HPF
BASOPHILS # BLD AUTO: 0.01 10*3/MM3 (ref 0–0.2)
BASOPHILS NFR BLD AUTO: 0.3 % (ref 0–1.5)
BILIRUB SERPL-MCNC: 0.4 MG/DL (ref 0–1.2)
BILIRUB UR QL STRIP: NEGATIVE
BUN SERPL-MCNC: 13 MG/DL (ref 8–23)
BUN/CREAT SERPL: 17.8 (ref 7–25)
CALCIUM SPEC-SCNC: 9.5 MG/DL (ref 8.6–10.5)
CHLORIDE SERPL-SCNC: 103 MMOL/L (ref 98–107)
CHOLEST SERPL-MCNC: 161 MG/DL (ref 0–200)
CLARITY UR: ABNORMAL
CO2 SERPL-SCNC: 28 MMOL/L (ref 22–29)
COLOR UR: YELLOW
CREAT SERPL-MCNC: 0.73 MG/DL (ref 0.57–1)
DEPRECATED RDW RBC AUTO: 40.9 FL (ref 37–54)
EGFRCR SERPLBLD CKD-EPI 2021: 86.4 ML/MIN/1.73
EOSINOPHIL # BLD AUTO: 0.11 10*3/MM3 (ref 0–0.4)
EOSINOPHIL NFR BLD AUTO: 3.1 % (ref 0.3–6.2)
ERYTHROCYTE [DISTWIDTH] IN BLOOD BY AUTOMATED COUNT: 13.6 % (ref 12.3–15.4)
GLOBULIN UR ELPH-MCNC: 2.9 GM/DL
GLUCOSE SERPL-MCNC: 97 MG/DL (ref 65–99)
GLUCOSE UR STRIP-MCNC: NEGATIVE MG/DL
HBA1C MFR BLD: 5.7 % (ref 4.8–5.6)
HCT VFR BLD AUTO: 36.4 % (ref 34–46.6)
HDLC SERPL-MCNC: 60 MG/DL (ref 40–60)
HGB BLD-MCNC: 11.6 G/DL (ref 12–15.9)
HGB UR QL STRIP.AUTO: NEGATIVE
HOLD SPECIMEN: NORMAL
HYALINE CASTS UR QL AUTO: ABNORMAL /LPF
IMM GRANULOCYTES # BLD AUTO: 0.01 10*3/MM3 (ref 0–0.05)
IMM GRANULOCYTES NFR BLD AUTO: 0.3 % (ref 0–0.5)
KETONES UR QL STRIP: NEGATIVE
LDLC SERPL CALC-MCNC: 81 MG/DL (ref 0–100)
LDLC/HDLC SERPL: 1.31 {RATIO}
LEUKOCYTE ESTERASE UR QL STRIP.AUTO: ABNORMAL
LYMPHOCYTES # BLD AUTO: 0.72 10*3/MM3 (ref 0.7–3.1)
LYMPHOCYTES NFR BLD AUTO: 20.5 % (ref 19.6–45.3)
MCH RBC QN AUTO: 26.7 PG (ref 26.6–33)
MCHC RBC AUTO-ENTMCNC: 31.9 G/DL (ref 31.5–35.7)
MCV RBC AUTO: 83.9 FL (ref 79–97)
MONOCYTES # BLD AUTO: 0.37 10*3/MM3 (ref 0.1–0.9)
MONOCYTES NFR BLD AUTO: 10.5 % (ref 5–12)
NEUTROPHILS NFR BLD AUTO: 2.29 10*3/MM3 (ref 1.7–7)
NEUTROPHILS NFR BLD AUTO: 65.3 % (ref 42.7–76)
NITRITE UR QL STRIP: NEGATIVE
NRBC BLD AUTO-RTO: 0 /100 WBC (ref 0–0.2)
PH UR STRIP.AUTO: 8.5 [PH] (ref 5–8)
PLATELET # BLD AUTO: 210 10*3/MM3 (ref 140–450)
PMV BLD AUTO: 11.8 FL (ref 6–12)
POTASSIUM SERPL-SCNC: 4 MMOL/L (ref 3.5–5.2)
PROT SERPL-MCNC: 6.4 G/DL (ref 6–8.5)
PROT UR QL STRIP: ABNORMAL
RBC # BLD AUTO: 4.34 10*6/MM3 (ref 3.77–5.28)
RBC # UR STRIP: ABNORMAL /HPF
REF LAB TEST METHOD: ABNORMAL
SODIUM SERPL-SCNC: 139 MMOL/L (ref 136–145)
SP GR UR STRIP: 1.01 (ref 1–1.03)
SQUAMOUS #/AREA URNS HPF: ABNORMAL /HPF
TRIGL SERPL-MCNC: 111 MG/DL (ref 0–150)
TSH SERPL DL<=0.05 MIU/L-ACNC: 1.41 UIU/ML (ref 0.27–4.2)
UROBILINOGEN UR QL STRIP: ABNORMAL
VLDLC SERPL-MCNC: 20 MG/DL (ref 5–40)
WBC # UR STRIP: ABNORMAL /HPF
WBC NRBC COR # BLD AUTO: 3.51 10*3/MM3 (ref 3.4–10.8)

## 2024-11-07 PROCEDURE — 36415 COLL VENOUS BLD VENIPUNCTURE: CPT

## 2024-11-07 PROCEDURE — 80053 COMPREHEN METABOLIC PANEL: CPT | Performed by: NURSE PRACTITIONER

## 2024-11-07 PROCEDURE — 85025 COMPLETE CBC W/AUTO DIFF WBC: CPT | Performed by: NURSE PRACTITIONER

## 2024-11-07 PROCEDURE — 80061 LIPID PANEL: CPT | Performed by: NURSE PRACTITIONER

## 2024-11-07 PROCEDURE — 87086 URINE CULTURE/COLONY COUNT: CPT | Performed by: NURSE PRACTITIONER

## 2024-11-07 PROCEDURE — 84443 ASSAY THYROID STIM HORMONE: CPT | Performed by: NURSE PRACTITIONER

## 2024-11-07 PROCEDURE — 83036 HEMOGLOBIN GLYCOSYLATED A1C: CPT | Performed by: NURSE PRACTITIONER

## 2024-11-07 PROCEDURE — 81001 URINALYSIS AUTO W/SCOPE: CPT | Performed by: NURSE PRACTITIONER

## 2024-11-09 LAB — BACTERIA SPEC AEROBE CULT: NORMAL

## 2024-11-11 ENCOUNTER — TELEPHONE (OUTPATIENT)
Dept: FAMILY MEDICINE CLINIC | Facility: CLINIC | Age: 74
End: 2024-11-11
Payer: MEDICARE

## 2024-11-11 NOTE — PROGRESS NOTES
"Recommendations from today's MTM visit:                                                         Increase Ozempic to 1 mg weekly.  Decrease Basaglar insulin to 18 units daily. If you continue to have low blood glucose levels, you can decrease further by 2 units.  Ask [facility] for tizanidine to use as needed for muscle spasm - only use as needed limited and try to only use at night.        It was great speaking with you today.  I value your experience and would be very thankful for your time in providing feedback in our clinic survey. In the next few days, you may receive an email or text message from [facility] [employer] with a link to a survey related to your  clinical pharmacist.\"     To schedule another MTM appointment, please call the clinic directly or you may call the MTM scheduling line at [phone] or toll-free at [phone].     My Clinical Pharmacist's contact information:                                                      Please feel free to contact me with any questions or concerns you have.      Barbara Kc, PharmD, BCACP  Medication Therapy Management Provider, [facility]  " Please let pt know that labs were ok.

## 2024-11-11 NOTE — TELEPHONE ENCOUNTER
----- Message from Maribel Hale sent at 11/11/2024  9:49 AM EST -----  Please let pt know that labs were ok.

## 2024-12-19 DIAGNOSIS — F41.8 MIXED ANXIETY DEPRESSIVE DISORDER: Chronic | ICD-10-CM

## 2024-12-19 RX ORDER — ESCITALOPRAM OXALATE 10 MG/1
10 TABLET ORAL DAILY
Qty: 90 TABLET | Refills: 1 | Status: SHIPPED | OUTPATIENT
Start: 2024-12-19

## 2024-12-23 DIAGNOSIS — Z12.31 ENCOUNTER FOR SCREENING MAMMOGRAM FOR MALIGNANT NEOPLASM OF BREAST: ICD-10-CM

## 2025-01-31 RX ORDER — POTASSIUM CHLORIDE 750 MG/1
TABLET, EXTENDED RELEASE ORAL
Qty: 270 TABLET | Refills: 0 | Status: SHIPPED | OUTPATIENT
Start: 2025-01-31

## 2025-02-06 DIAGNOSIS — I10 ESSENTIAL HYPERTENSION: ICD-10-CM

## 2025-02-06 RX ORDER — VALSARTAN AND HYDROCHLOROTHIAZIDE 320; 25 MG/1; MG/1
TABLET, FILM COATED ORAL
Qty: 90 TABLET | Refills: 1 | Status: SHIPPED | OUTPATIENT
Start: 2025-02-06

## 2025-05-05 RX ORDER — POTASSIUM CHLORIDE 750 MG/1
10 TABLET, EXTENDED RELEASE ORAL 3 TIMES DAILY
Qty: 270 TABLET | Refills: 0 | Status: SHIPPED | OUTPATIENT
Start: 2025-05-05

## 2025-05-07 ENCOUNTER — OFFICE VISIT (OUTPATIENT)
Dept: FAMILY MEDICINE CLINIC | Facility: CLINIC | Age: 75
End: 2025-05-07
Payer: MEDICARE

## 2025-05-07 VITALS
SYSTOLIC BLOOD PRESSURE: 144 MMHG | DIASTOLIC BLOOD PRESSURE: 72 MMHG | RESPIRATION RATE: 18 BRPM | BODY MASS INDEX: 43.74 KG/M2 | HEIGHT: 60 IN | WEIGHT: 222.8 LBS | OXYGEN SATURATION: 97 % | HEART RATE: 89 BPM

## 2025-05-07 DIAGNOSIS — E66.01 MORBID OBESITY WITH BMI OF 40.0-44.9, ADULT: ICD-10-CM

## 2025-05-07 DIAGNOSIS — I10 ESSENTIAL HYPERTENSION: Primary | Chronic | ICD-10-CM

## 2025-05-07 NOTE — ASSESSMENT & PLAN NOTE
Hypertension is stable and controlled  Continue current treatment regimen.  Dietary sodium restriction.  Weight loss.  Regular aerobic exercise.  Ambulatory blood pressure monitoring.  Blood pressure will be reassessed in 6 months.  Will cont with same regimen for now, and monitor closely.

## 2025-05-07 NOTE — PROGRESS NOTES
"Chief Complaint  Hypertension (6 month follow up)  Subjective        Radha Trujillo presents to [facility]  History of Present Illness  Pt comes in today for 6 month follow up on HTN.  States her BP has consistently been running in the 140s at home.   Denies any CP, SOA, palpitations, dizziness, or HA's.   Recently saw cardiology and they were ok with her BP being in the 140's.  Had been on amlodipine in the past as well, but it caused too much swelling.   She is currently being managed with valsartan/hctz.   Hypertension  Associated symptoms: no chest pain, no headaches and no neck pain      Follow up  Onset was at an unknown time.   Pertinent negative symptoms include no abdominal pain, no anorexia, no joint pain, no change in stool, no chest pain, no chills, no congestion, no cough, no diaphoresis, no fatigue, no fever, no headaches, no joint swelling, no myalgias, no nausea, no neck pain, no numbness, no rash, no sore throat, no swollen glands, no dysuria, no vertigo, no visual change, no vomiting and no weakness.     Review of Systems   Constitutional:  Negative for chills, diaphoresis, fatigue and fever.   HENT:  Negative for congestion, sore throat and swollen glands.    Respiratory:  Negative for cough.    Cardiovascular:  Negative for chest pain.   Gastrointestinal:  Negative for abdominal pain, anorexia, nausea and vomiting.   Genitourinary:  Negative for dysuria.   Musculoskeletal:  Negative for joint pain, myalgias and neck pain.   Skin:  Negative for rash.   Neurological:  Negative for vertigo, weakness and numbness.     Objective     Vital Signs:   /72   Pulse 89   Resp 18   Ht 152.4 cm (60\")   Wt 101 kg (222 lb 12.8 oz)   SpO2 97%   BMI 43.51 kg/m²       BP Readings from Last 3 Encounters:   05/07/25 144/72   10/31/24 136/82   10/18/24 157/83       Wt Readings from Last 3 Encounters:   05/07/25 101 kg (222 lb 12.8 oz)   10/31/24 102 kg (225 lb 9.6 oz) "   10/18/24 100 kg (221 lb)     Physical Exam  Constitutional:       Appearance: She is well-developed. She is obese.   Eyes:      Pupils: Pupils are equal, round, and reactive to light.   Cardiovascular:      Rate and Rhythm: Normal rate and regular rhythm.   Pulmonary:      Effort: Pulmonary effort is normal.      Breath sounds: Normal breath sounds.   Neurological:      Mental Status: She is alert and oriented to person, place, and time.        Result Review :                 Assessment and Plan    Diagnoses and all orders for this visit:    1. Essential hypertension (Primary)  Assessment & Plan:  Hypertension is stable and controlled  Continue current treatment regimen.  Dietary sodium restriction.  Weight loss.  Regular aerobic exercise.  Ambulatory blood pressure monitoring.  Blood pressure will be reassessed in 6 months.  Will cont with same regimen for now, and monitor closely.       2. Morbid obesity with BMI of 40.0-44.9, adult  Assessment & Plan:  Patient's (Body mass index is 43.51 kg/m².) indicates that they are morbidly/severely obese (BMI > 40 or > 35 with obesity - related health condition) with health conditions that include hypertension and dyslipidemias . Weight is unchanged. BMI  is above average; BMI management plan is completed. We discussed portion control and increasing exercise.       Will monitor BP and keep diary  Discussed importance of regular exercise and recommended starting or continuing a regular exercise program for good health. The patient was also encouraged to lose weight for better health.   During this office visit, we discussed the pertinent aspects of the visit and treatment recommendations. Pt verbalizes understanding. Follow up was discussed. Patient was given the opportunity to ask questions and discuss other concerns.         Follow Up   Return in about 6 months (around 11/7/2025) for Medicare Wellness.  Patient was given instructions and counseling regarding her condition  or for health maintenance advice. Please see specific information pulled into the AVS if appropriate.

## 2025-05-07 NOTE — ASSESSMENT & PLAN NOTE
Patient's (Body mass index is 43.51 kg/m².) indicates that they are morbidly/severely obese (BMI > 40 or > 35 with obesity - related health condition) with health conditions that include hypertension and dyslipidemias . Weight is unchanged. BMI  is above average; BMI management plan is completed. We discussed portion control and increasing exercise.

## 2025-06-06 ENCOUNTER — TELEPHONE (OUTPATIENT)
Dept: ONCOLOGY | Facility: CLINIC | Age: 75
End: 2025-06-06
Payer: MEDICARE

## 2025-06-12 DIAGNOSIS — F41.8 MIXED ANXIETY DEPRESSIVE DISORDER: Chronic | ICD-10-CM

## 2025-06-12 RX ORDER — ESCITALOPRAM OXALATE 20 MG/1
20 TABLET ORAL DAILY
Qty: 90 TABLET | Refills: 2 | Status: SHIPPED | OUTPATIENT
Start: 2025-06-12

## 2025-06-12 RX ORDER — ESCITALOPRAM OXALATE 10 MG/1
10 TABLET ORAL DAILY
Qty: 90 TABLET | Refills: 1 | OUTPATIENT
Start: 2025-06-12

## 2025-06-19 DIAGNOSIS — I82.431 ACUTE DEEP VEIN THROMBOSIS (DVT) OF POPLITEAL VEIN OF RIGHT LOWER EXTREMITY: ICD-10-CM

## 2025-06-19 RX ORDER — APIXABAN 5 MG/1
5 TABLET, FILM COATED ORAL 2 TIMES DAILY
Qty: 60 TABLET | Refills: 11 | Status: SHIPPED | OUTPATIENT
Start: 2025-06-19

## 2025-07-14 NOTE — PROGRESS NOTES
Hematology/Oncology Outpatient Follow Up    PATIENT NAME:Radha Trujillo  :1950  MRN: [id number]  PRIMARY CARE PHYSICIAN: Maribel Hale APRN  REFERRING PHYSICIAN: No ref. provider found         HISTORY OF PRESENT ILLNESS:   Radha Trujillo is a 71 y.o. female who presented to [facility] on 2/10/2022 with complaints of abnormal test from her PCP.  Reports that she has not been feeling well and has been feeling short of breath since Monday.  Patient reports that she started walking into [facility] and suddenly felt short of breath.  Denies chest pain, fever, chills, no recent COVID-19 diagnosis, dizziness, lightheadedness, diaphoresis, syncope, abnormal leg pain or swelling.  No recent surgeries or long trips.  She does report she has been more sedentary than normal.  Positive Respiratory panel with viral infection plus a negative procalcitonin without other underlying concern for bacterial infection.  CT chest PE protocol revealed Pulmonary embolism in the right and left segmental and subsegmental branches with early right heart strain. No pulmonary infarct is seen. Patient was started on IV heparin.        22  Hematology/Oncology was consulted for Pulmonary embolism in the right and left segmental and subsegmental branches with early right heart strain.  No pulmonary infarct is seen; Patient was started on IV heparin. No history of blood disorders or DVT.  Patient has no smoking history.    3/3/2022 Ms. Trujillo returned to the clinic for follow-up.  She was feeling well and had tolerated the rivaroxaban without any difficulties.  She had no bleeding.  All her respiratory symptoms were completely resolved.  The physical exam revealed only significant obesity but there was no other abnormalities.  Plans to continue with the same anticoagulant were made.    2022: Back in the office for follow-up.  Compliant with the apixaban and no obvious side effects from it.  Had gained some weight.   Discussed the importance of weight management to reduce her risk of thrombosis.  She was asked to continue on the same anticoagulant.  A new prescription was sent.  She was also asked to return in 6 months.    1/12/2023: Feeling well.  Anxious but had decided to discontinue the antidepressant and was feeling better.  Sleeping less.  Working on losing weight.  Receiving physical therapy for persistent low back pain and bilateral knee pain.  No bleeding.  Remained compliant with the apixaban.  Has had no dyspnea, chest pains or cough.  Also no lower extremity edema or pain.    4/13/2023: Back to her normal routine.  Enrolled again in weight watchers and had lost approximately 4 kg.  Her appetite had remained adequate and she was more energetic.  [facility].  She had no more dyspnea than before and had not been coughing more.  She had persisted with edema of the lower extremities but was wearing compression stockings.  The exam revealed no changes.  The laboratory exams were unremarkable.  She had undergone a colonoscopy that disclosed only a small polyp but no evidence of malignancy.  She was asked to continue on the anticoagulant and to return to see me 6 months later.    11/3/2023: Not feeling as well as at the time of the last visit.  Her spouse had a stroke and she has been having to care for him.  This has resulted in fatigue.  However generally she feels well.  She is eating well.  She has gained weight.  She has had persistent hip pain but she has been told that prior to surgery she has to lose some weight.  She has had no abdominal pain or diarrhea and she has not had any bleeding.  On exam I did not find any changes.  Laboratory exams were reviewed and discussed with her.  I asked her to remain on the same anticoagulant and to see me with results of laboratory exams in approximately 6 months.  She is to continue following with her primary care nurse  practitioner.    6/17/2024: Feeling well and recovering well from bilateral knee total arthroplasties.  She did not have any complications and she has been progressively stronger and walking better.  The second procedure took place approximately 3 months before this visit.  She is eating well and her weight has increased.  She has had no nausea or vomiting and has also been without abdominal pain.  No diarrhea or dysuria.  No skin rash.  On exam chronically ill-appearing but in no distress.  Conversant, oriented and in good spirits.  Lungs clear.  Heart regular.  Abdomen protuberant but soft.  No palpable tumors or visceromegaly.  There is bilateral symmetrical lower extremity edema.  The surgical incisions are healing well, the most recent 1 on the left, shows no erythema or discharge.  Reviewed the laboratory exams and discussed with her.  She is to see me in approximately a year and continue with the apixaban.  Prescriptions are available for her.     7/15/2025: Radha is here today for routine 6-month follow-up.  Overall her health is well but she does note that she lost her  1 month ago unexpectedly.  Her blood pressure is elevated at the visit today but she states that yesterday was an emotionally exhausting day as she took his clothes to be donated among other tasks.  She does note that she is continuing to make sure she takes care of herself and she remains active going to the [facility] to complete warm water aerobics.  She is compliant with her Eliquis twice daily and denies any signs or symptoms of bleeding or unexplained bruising.  She does note occasional constipation and with that constipation does occasionally have some bright red blood on the tissue.  No definitive plans for any upcoming procedures but she does see her hand surgeon at [facility] to reevaluate carpal tunnel syndrome and there may in the future be surgical intervention for that.        Past Medical History:   Diagnosis Date     Abnormal coagulation profile     Abnormal EKG     Abnormal laboratory test     Acquired spondylolisthesis     Allergic 07/16/2021    Start shots in 7/27/21    Arthritis     Asthma     Body aches     Bronchitis, acute     Chest pain     PRESSURE    Chronic back pain     Colon polyp     Had colonoscopy to remove    Cough     Deep vein thrombosis 02/2022    Taking Eliquis    Depression with anxiety     DJD (degenerative joint disease)     Essential hypertension     Fatigue     History of radiofrequency ablation (RFA) procedure for cardiac arrhythmia 01/05/2021    Hyperlipidemia     Hypokalemia     Low back pain     Lymphocytic colitis     Obesity     Other specified disorders of bone density and structure, other site     Panic disorder     Paroxysmal SVT (supraventricular tachycardia)     Postmenopausal     9 YEARS    Prediabetes     Pulmonary embolism Feb. 2022    Treated    Sleep apnea     SOB (shortness of breath)     Unspecified injury of left Achilles tendon, initial encounter     Vitamin B deficiency 01/14/2019    Vitamin B deficiency, unspecified     Vitamin D deficiency, unspecified      Past Surgical History:   Procedure Laterality Date    ACHILLES TENDON SURGERY Left 2018    AV NODE ABLATION  08/2016    AVNRT RF ABLATION --8/16    CARDIAC CATHETERIZATION Bilateral 02/11/2022    Procedure: EKOS;  Surgeon: Kilo Vivar DO;  Location: [facility] INVASIVE LOCATION;  Service: Cardiovascular;  Laterality: Bilateral;    COLONOSCOPY N/A 01/01/2012    FOOT SURGERY Right     TOE/FOOT SURGERY    KNEE SURGERY Right     2/2 TORN MENICUS    REPLACEMENT TOTAL KNEE BILATERAL Bilateral     TUBAL ABDOMINAL LIGATION         Current Outpatient Medications:     ELDERBERRY PO, Take  by mouth., Disp: , Rfl:     Eliquis 5 MG tablet tablet, TAKE 1 TABLET BY MOUTH TWICE A DAY, Disp: 60 tablet, Rfl: 11    escitalopram (Lexapro) 20 MG tablet, Take 1 tablet by mouth Daily., Disp: 90 tablet, Rfl: 2    Multiple Vitamin  (MULTIVITAMIN) capsule, Take 1 capsule by mouth Daily., Disp: , Rfl:     potassium chloride 10 MEQ CR tablet, TAKE 1 TABLET BY MOUTH THREE TIMES A DAY, Disp: 270 tablet, Rfl: 0    Pyridoxine HCl (Vitamin B6) 100 MG tablet, , Disp: , Rfl:     Red Yeast Rice Extract 600 MG capsule, Take 2 tablets by mouth 2 (Two) Times a Day., Disp: , Rfl:     valsartan-hydrochlorothiazide (DIOVAN-HCT) 320-25 MG per tablet, TAKE 1 TABLET BY MOUTH EVERY DAY, Disp: 90 tablet, Rfl: 1    VITAMIN B COMPLEX-C PO, Take  by mouth., Disp: , Rfl:     vitamin D (ERGOCALCIFEROL) 1.25 MG (10938 UT) capsule capsule, Take 1 capsule by mouth 1 (One) Time Per Week., Disp: , Rfl:     ALPRAZolam (Xanax) 0.25 MG tablet, Take 1 tablet by mouth 2 (Two) Times a Day As Needed for Anxiety. (Patient not taking: Reported on 7/15/2025), Disp: 30 tablet, Rfl: 0    Allergies   Allergen Reactions    Adhesive Tape Rash    Wellbutrin [Bupropion] Shortness Of Breath    Amlodipine Swelling    Statins Myalgia     Family History   Problem Relation Age of Onset    Arthritis Mother     Diabetes Mother     Heart disease Mother     Emphysema Mother     Kidney disease Father     Arthritis Sister     Anxiety disorder Sister     Arthritis Brother     Anxiety disorder Brother     Acute myelogenous leukemia Niece     Myasthenia gravis Niece      Cancer-related family history is not on file.    Social History     Tobacco Use    Smoking status: Never     Passive exposure: Never    Smokeless tobacco: Never   Vaping Use    Vaping status: Never Used   Substance Use Topics    Alcohol use: Not Currently     Comment: 1 DRINK WEEKLY, IF THAT: RARE/OCC;  CAFFEINE USE + 1 COFFEE DAILY    Drug use: No     HPI, ROS and PFSH have been reviewed and confirmed on 7/15/2025.       REVIEW OF SYSTEMS:  Review of Systems   Constitutional: Negative.    HENT: Negative.     Eyes: Negative.    Respiratory: Negative.     Cardiovascular:  Positive for leg swelling (Bilateral chronic).   Gastrointestinal:   "Positive for anal bleeding (Related to known hemorrhoids).   Endocrine: Negative.    Genitourinary: Negative.    Musculoskeletal: Negative.    Skin: Negative.    Allergic/Immunologic: Negative.    Neurological: Negative.    Hematological: Negative.    Psychiatric/Behavioral: Negative.       OBJECTIVE:    Vitals:    07/15/25 1039   BP: 173/95   Pulse: 56   Temp: 97.7 °F (36.5 °C)   SpO2: 98%   Weight: 104 kg (229 lb)   Height: 152.4 cm (60\")   PainSc: 0-No pain       Body mass index is 44.72 kg/m².    ECOG  (0) Fully active, able to carry on all predisease performance without restriction    Physical Exam  Vitals reviewed.   Constitutional:       General: She is not in acute distress.     Appearance: She is not ill-appearing or toxic-appearing.   HENT:      Head: Normocephalic and atraumatic.   Eyes:      General: No scleral icterus.     Extraocular Movements: Extraocular movements intact.   Cardiovascular:      Rate and Rhythm: Normal rate and regular rhythm.   Pulmonary:      Effort: Pulmonary effort is normal. No respiratory distress.   Musculoskeletal:         General: Normal range of motion.      Cervical back: Normal range of motion.      Right lower leg: Edema present.      Left lower leg: Edema present.   Skin:     General: Skin is warm.      Coloration: Skin is not jaundiced or pale.      Findings: No bruising, erythema or rash.   Neurological:      Mental Status: She is alert.   Psychiatric:         Mood and Affect: Mood normal.         Behavior: Behavior normal.         RECENT LABS  WBC   Date Value Ref Range Status   07/15/2025 3.63 3.40 - 10.80 10*3/mm3 Final     RBC   Date Value Ref Range Status   07/15/2025 4.54 3.77 - 5.28 10*6/mm3 Final     Hemoglobin   Date Value Ref Range Status   07/15/2025 12.2 12.0 - 15.9 g/dL Final     Hematocrit   Date Value Ref Range Status   07/15/2025 38.7 34.0 - 46.6 % Final     MCV   Date Value Ref Range Status   07/15/2025 85.2 79.0 - 97.0 fL Final     MCH   Date Value " Ref Range Status   07/15/2025 26.9 26.6 - 33.0 pg Final     MCHC   Date Value Ref Range Status   07/15/2025 31.5 31.5 - 35.7 g/dL Final     RDW   Date Value Ref Range Status   07/15/2025 14.5 12.3 - 15.4 % Final     RDW-SD   Date Value Ref Range Status   07/15/2025 44.8 37.0 - 54.0 fl Final     MPV   Date Value Ref Range Status   07/15/2025 10.5 6.0 - 12.0 fL Final     Platelets   Date Value Ref Range Status   07/15/2025 218 140 - 450 10*3/mm3 Final     Neutrophil %   Date Value Ref Range Status   07/15/2025 65.8 42.7 - 76.0 % Final     Lymphocyte %   Date Value Ref Range Status   07/15/2025 22.0 19.6 - 45.3 % Final     Monocyte %   Date Value Ref Range Status   07/15/2025 8.3 5.0 - 12.0 % Final     Eosinophil %   Date Value Ref Range Status   07/15/2025 3.3 0.3 - 6.2 % Final     Basophil %   Date Value Ref Range Status   07/15/2025 0.6 0.0 - 1.5 % Final     Immature Grans %   Date Value Ref Range Status   11/07/2024 0.3 0.0 - 0.5 % Final     Neutrophils, Absolute   Date Value Ref Range Status   07/15/2025 2.39 1.70 - 7.00 10*3/mm3 Final     Lymphocytes, Absolute   Date Value Ref Range Status   07/15/2025 0.80 0.70 - 3.10 10*3/mm3 Final     Monocytes, Absolute   Date Value Ref Range Status   07/15/2025 0.30 0.10 - 0.90 10*3/mm3 Final     Eosinophils, Absolute   Date Value Ref Range Status   07/15/2025 0.12 0.00 - 0.40 10*3/mm3 Final     Basophils, Absolute   Date Value Ref Range Status   07/15/2025 0.02 0.00 - 0.20 10*3/mm3 Final     Immature Grans, Absolute   Date Value Ref Range Status   11/07/2024 0.01 0.00 - 0.05 10*3/mm3 Final     nRBC   Date Value Ref Range Status   11/07/2024 0.0 0.0 - 0.2 /100 WBC Final       Lab Results   Component Value Date    GLUCOSE 97 11/07/2024    BUN 13 11/07/2024    CREATININE 0.73 11/07/2024    EGFRIFNONA 100 02/12/2022    EGFRIFAFRI 70 03/21/2017    BCR 17.8 11/07/2024    K 4.0 11/07/2024    CO2 28.0 11/07/2024    CALCIUM 9.5 11/07/2024    ALBUMIN 3.5 11/07/2024    AST 21  11/07/2024    ALT 16 11/07/2024       ASSESSMENT:  1.  Unprovoked pulmonary embolism: Without any new symptoms.  Continue Eliquis 5 mg twice a day.  2.  Reviewed CBC today with the patient.  CMP ordered and pending.  3.  Follow-up in 6 months with Dr. Russo or sooner if needed  4.  Advised use of stool softeners OTC for occasional constipation and to follow-up with PCP or gastroenterology       PLAN  1.  As above.

## 2025-07-15 ENCOUNTER — OFFICE VISIT (OUTPATIENT)
Dept: ONCOLOGY | Facility: CLINIC | Age: 75
End: 2025-07-15
Payer: MEDICARE

## 2025-07-15 ENCOUNTER — LAB (OUTPATIENT)
Dept: LAB | Facility: HOSPITAL | Age: 75
End: 2025-07-15
Payer: MEDICARE

## 2025-07-15 VITALS
SYSTOLIC BLOOD PRESSURE: 173 MMHG | BODY MASS INDEX: 44.96 KG/M2 | HEIGHT: 60 IN | DIASTOLIC BLOOD PRESSURE: 95 MMHG | OXYGEN SATURATION: 98 % | HEART RATE: 56 BPM | TEMPERATURE: 97.7 F | WEIGHT: 229 LBS

## 2025-07-15 DIAGNOSIS — Z86.711 HISTORY OF PULMONARY EMBOLISM: ICD-10-CM

## 2025-07-15 DIAGNOSIS — Z86.711 HISTORY OF PULMONARY EMBOLISM: Primary | ICD-10-CM

## 2025-07-15 DIAGNOSIS — Z79.01 ANTICOAGULATION MANAGEMENT ENCOUNTER: ICD-10-CM

## 2025-07-15 DIAGNOSIS — I26.99 OTHER ACUTE PULMONARY EMBOLISM, UNSPECIFIED WHETHER ACUTE COR PULMONALE PRESENT: Primary | ICD-10-CM

## 2025-07-15 DIAGNOSIS — Z51.81 ANTICOAGULATION MANAGEMENT ENCOUNTER: ICD-10-CM

## 2025-07-15 LAB
ALBUMIN SERPL-MCNC: 4.4 G/DL (ref 3.5–5.2)
ALBUMIN/GLOB SERPL: 1.8 G/DL
ALP SERPL-CCNC: 88 U/L (ref 39–117)
ALT SERPL W P-5'-P-CCNC: 18 U/L (ref 1–33)
ANION GAP SERPL CALCULATED.3IONS-SCNC: 10.3 MMOL/L (ref 5–15)
AST SERPL-CCNC: 22 U/L (ref 1–32)
BASOPHILS # BLD AUTO: 0.02 10*3/MM3 (ref 0–0.2)
BASOPHILS NFR BLD AUTO: 0.6 % (ref 0–1.5)
BILIRUB SERPL-MCNC: 0.4 MG/DL (ref 0–1.2)
BUN SERPL-MCNC: 13.4 MG/DL (ref 8–23)
BUN/CREAT SERPL: 17.9 (ref 7–25)
CALCIUM SPEC-SCNC: 9.7 MG/DL (ref 8.6–10.5)
CHLORIDE SERPL-SCNC: 101 MMOL/L (ref 98–107)
CO2 SERPL-SCNC: 26.7 MMOL/L (ref 22–29)
CREAT SERPL-MCNC: 0.75 MG/DL (ref 0.57–1)
DEPRECATED RDW RBC AUTO: 44.8 FL (ref 37–54)
EGFRCR SERPLBLD CKD-EPI 2021: 83.7 ML/MIN/1.73
EOSINOPHIL # BLD AUTO: 0.12 10*3/MM3 (ref 0–0.4)
EOSINOPHIL NFR BLD AUTO: 3.3 % (ref 0.3–6.2)
ERYTHROCYTE [DISTWIDTH] IN BLOOD BY AUTOMATED COUNT: 14.5 % (ref 12.3–15.4)
GLOBULIN UR ELPH-MCNC: 2.5 GM/DL
GLUCOSE SERPL-MCNC: 102 MG/DL (ref 65–99)
HCT VFR BLD AUTO: 38.7 % (ref 34–46.6)
HGB BLD-MCNC: 12.2 G/DL (ref 12–15.9)
HOLD SPECIMEN: NORMAL
IMM GRANULOCYTES # BLD AUTO: NORMAL 10*3/UL
IMM GRANULOCYTES NFR BLD AUTO: NORMAL %
LYMPHOCYTES # BLD AUTO: 0.8 10*3/MM3 (ref 0.7–3.1)
LYMPHOCYTES NFR BLD AUTO: 22 % (ref 19.6–45.3)
MCH RBC QN AUTO: 26.9 PG (ref 26.6–33)
MCHC RBC AUTO-ENTMCNC: 31.5 G/DL (ref 31.5–35.7)
MCV RBC AUTO: 85.2 FL (ref 79–97)
MONOCYTES # BLD AUTO: 0.3 10*3/MM3 (ref 0.1–0.9)
MONOCYTES NFR BLD AUTO: 8.3 % (ref 5–12)
NEUTROPHILS NFR BLD AUTO: 2.39 10*3/MM3 (ref 1.7–7)
NEUTROPHILS NFR BLD AUTO: 65.8 % (ref 42.7–76)
PLATELET # BLD AUTO: 218 10*3/MM3 (ref 140–450)
PMV BLD AUTO: 10.5 FL (ref 6–12)
POTASSIUM SERPL-SCNC: 4 MMOL/L (ref 3.5–5.2)
PROT SERPL-MCNC: 6.9 G/DL (ref 6–8.5)
RBC # BLD AUTO: 4.54 10*6/MM3 (ref 3.77–5.28)
SODIUM SERPL-SCNC: 138 MMOL/L (ref 136–145)
WBC NRBC COR # BLD AUTO: 3.63 10*3/MM3 (ref 3.4–10.8)
WHOLE BLOOD HOLD COAG: NORMAL

## 2025-07-15 PROCEDURE — 36415 COLL VENOUS BLD VENIPUNCTURE: CPT

## 2025-07-15 PROCEDURE — 80053 COMPREHEN METABOLIC PANEL: CPT | Performed by: PHYSICIAN ASSISTANT

## 2025-07-15 PROCEDURE — 85025 COMPLETE CBC W/AUTO DIFF WBC: CPT

## 2025-08-05 RX ORDER — POTASSIUM CHLORIDE 750 MG/1
10 TABLET, EXTENDED RELEASE ORAL 3 TIMES DAILY
Qty: 270 TABLET | Refills: 0 | Status: SHIPPED | OUTPATIENT
Start: 2025-08-05

## 2025-08-20 ENCOUNTER — PATIENT MESSAGE (OUTPATIENT)
Dept: FAMILY MEDICINE CLINIC | Facility: CLINIC | Age: 75
End: 2025-08-20
Payer: MEDICARE

## 2025-08-20 DIAGNOSIS — Z23 NEED FOR VACCINATION: Primary | ICD-10-CM

## (undated) DEVICE — CATH EKOSONIC MACH4 DS 5.2F 12X106CM

## (undated) DEVICE — LN INJ CONTRST FLXCIL HP F/M LL 1200PSI72

## (undated) DEVICE — SUT SILK 2/0 FS BLK 18IN 685G

## (undated) DEVICE — ELECTRD DEFIB M/FUNC PROPADZ RADIOL 2PK

## (undated) DEVICE — GW FC J TFE/COAT .025 3MM 180CM

## (undated) DEVICE — PINNACLE INTRODUCER SHEATH: Brand: PINNACLE

## (undated) DEVICE — HI-TORQUE STEELCORE 18 PERIPHERAL GUIDEWIRE 300 CM: Brand: HI-TORQUE STEELCORE

## (undated) DEVICE — BALN WEDGE/PRESS 1L .035IN 6F 10MM 90CM

## (undated) DEVICE — CATH DIAG IMPULSE FR4 6F 100CM

## (undated) DEVICE — GW WWIJ35260 WHOLEY WIRE V04: Brand: WHOLEY™

## (undated) DEVICE — CATH F6INF PIG 145 110CM 6SH: Brand: INFINITI

## (undated) DEVICE — RADIFOCUS OBTURATOR: Brand: RADIFOCUS

## (undated) DEVICE — DRSNG SURESITE123 6X8IN

## (undated) DEVICE — GW EMR FIX EXCHG J STD .035 3MM 260CM